# Patient Record
Sex: FEMALE | Race: WHITE | NOT HISPANIC OR LATINO | Employment: FULL TIME | URBAN - METROPOLITAN AREA
[De-identification: names, ages, dates, MRNs, and addresses within clinical notes are randomized per-mention and may not be internally consistent; named-entity substitution may affect disease eponyms.]

---

## 2023-10-24 ENCOUNTER — MEDICAL CORRESPONDENCE (OUTPATIENT)
Dept: HEALTH INFORMATION MANAGEMENT | Facility: CLINIC | Age: 33
End: 2023-10-24
Payer: COMMERCIAL

## 2023-10-27 ENCOUNTER — REFERRAL (OUTPATIENT)
Dept: TRANSPLANT | Facility: CLINIC | Age: 33
End: 2023-10-27
Payer: COMMERCIAL

## 2023-10-27 DIAGNOSIS — K72.90 DECOMPENSATION OF CIRRHOSIS OF LIVER (H): Primary | ICD-10-CM

## 2023-10-27 DIAGNOSIS — K74.60 DECOMPENSATION OF CIRRHOSIS OF LIVER (H): Primary | ICD-10-CM

## 2023-10-27 DIAGNOSIS — K76.6 PORTAL HYPERTENSION (H): ICD-10-CM

## 2023-10-27 DIAGNOSIS — K70.31 ALCOHOLIC CIRRHOSIS OF LIVER WITH ASCITES (H): ICD-10-CM

## 2023-10-27 NOTE — LETTER
October 30, 2023      Lola Milner  [address] [place] [id number]      Dear Lola,    Thank you for your interest in the Transplant Center at [facility]. We look forward to being a part of your care team and assisting you through the transplant process.    As we discussed, your transplant coordinator is Maurilio Gonzalez (Liver).  You may call your coordinator at any time with questions or concerns.  [phone].    Please complete the following.    Fill out and return the enclosed forms  Authorization for Electronic Communication  Authorization to Discuss Protected Health Information  Authorization for Release of Protected Health Information    Sign up for:  [employer], access to your electronic medical record (see enclosed pamphlet)  [email or web address] plugin.[employer], a transplant education website        You can use these tools to learn more about your transplant, communicate with your care team, and track your medical details              Sincerely,      Solid Organ Transplant  [facility]    cc: Referring Physician PCP Care Team

## 2023-10-27 NOTE — TELEPHONE ENCOUNTER
Referral intake call not done due to; patient is currently inpatient at [facility] with plans to transfer to [facility] for further medical management.

## 2023-10-28 NOTE — TELEPHONE ENCOUNTER
"Patient discussed with attending hepatologist Dr. Eloisa Guajardo who devised a plan with OSH where patient is currently ready for discharge.    Patient to come down for outpatient eval on Tuesday 10/31.    Below is note from Dr. Guajardo:  Eloisa Guajardo MD Klint, Thomas Jr., RN  Hey -  Here is the patient for an urgent add on    Hospitalized at [facility], is ready for discharge otherwise. Has had a MELD > 30 for months. Was admitted for EDGAR now that is better. Was cleared for home    4.5 months sober, didn't have insurance for awhile, now does. Now that she has insurance is set up for CD treatment. Has fiance who is ICU rn and supportive family that is taking her away from her previous life (worked as a ). Was told her drinking was hurting her liver in 2019, was \"in denial\". Smokes 1-2 cig/day, no real comorbidities  Eloisa          "

## 2023-10-30 ENCOUNTER — TELEPHONE (OUTPATIENT)
Dept: TRANSPLANT | Facility: CLINIC | Age: 33
End: 2023-10-30
Payer: COMMERCIAL

## 2023-10-30 VITALS — HEIGHT: 63 IN | WEIGHT: 150 LBS | BODY MASS INDEX: 26.58 KG/M2

## 2023-10-30 PROBLEM — K70.30 CIRRHOSIS, ALCOHOLIC (H): Status: ACTIVE | Noted: 2023-08-30

## 2023-10-30 PROBLEM — F10.11 HISTORY OF ALCOHOL ABUSE: Status: RESOLVED | Noted: 2023-06-23 | Resolved: 2023-10-30

## 2023-10-30 PROBLEM — E55.9 VITAMIN D DEFICIENCY: Status: ACTIVE | Noted: 2020-06-23

## 2023-10-30 PROBLEM — G62.1 ALCOHOL-INDUCED POLYNEUROPATHY (H): Status: ACTIVE | Noted: 2023-06-23

## 2023-10-30 PROBLEM — F41.9 ANXIETY: Status: ACTIVE | Noted: 2023-07-31

## 2023-10-30 PROBLEM — F10.11 HISTORY OF ALCOHOL ABUSE: Status: ACTIVE | Noted: 2023-06-23

## 2023-10-30 PROBLEM — K70.31 ALCOHOLIC CIRRHOSIS OF LIVER WITH ASCITES (H): Status: ACTIVE | Noted: 2023-08-30

## 2023-10-30 LAB
ABO/RH(D): NORMAL
ANTIBODY SCREEN: NEGATIVE
SPECIMEN EXPIRATION DATE: NORMAL

## 2023-10-30 NOTE — TELEPHONE ENCOUNTER
"Spoke with mother Thao and step-father John who are bringing her down for eval 10/31.  Gave hotel info  Gave address and times for 10/31  Answered questions and email MTP info to them.     MELD 43 (10/29/23)    Labs & testing done at [facility] - see care everywhere for info    Patient info below:    Head - no  Heart - no  Lungs - ex-smoker  Kidney - from liver   Cancer - no    Lymes as baby (16-18 months old)      Patient discussed with attending hepatologist Dr. Eloisa Guajardo who devised a plan with OSH where patient is currently ready for discharge.     Patient to come down for outpatient eval on Tuesday 10/31.     Below is note from Dr. Guajardo:  Eloisa Guajardo MD Klint, Thomas Jr., RN  Jeffrey -  Here is the patient for an urgent add on    Hospitalized at [facility], is ready for discharge otherwise. Has had a MELD > 30 for months. Was admitted for EDGAR now that is better. Was cleared for home    4.5 months sober, didn't have insurance for awhile, now does. Now that she has insurance is set up for CD treatment. Has aliyah who is ICU rn and supportive family that is taking her away from her previous life (worked as a ). Was told her drinking was hurting her liver in 2019, was \"in denial\". Smokes 1-2 cig/day, no real comorbidities  Eloisa  "

## 2023-10-30 NOTE — TELEPHONE ENCOUNTER
October 30, 2023 9:42 AM - Samantha Blackmon LPN:    I called patient and left a vm for her to return my call

## 2023-10-30 NOTE — TELEPHONE ENCOUNTER
[facility] Call Center    Phone Message    May a detailed message be left on voicemail: no     Reason for Call: Appointment Intake    Outbound call make to patient to confirm tomorrow's PLE (10/31/2023).  Left voicemail with scheduling call back #.  Routing to RNCC, Maurilio Gonzalez and Pranay Ramirez RN to let clinic know that the appointments have NOT been confirmed by patient yet.  Thank you!    Action Taken: Message routed to:  [facility] JANET METZ    Travel Screening: Not Applicable

## 2023-10-30 NOTE — TELEPHONE ENCOUNTER
"  Patient was asked the following questions during liver intake call.      Referring Provider: Arley Arredondo MD  Referring Diagnosis: alcoholic cirrhosis with ascites  PCP:  Susan Guadarrama DO     1)Do you know why you have liver disease: yes I am an alcoholic      If Alcoholic Cirrhosis is present when was your last drink:  June 13, 2023      Have you ever been through treatment for alcohol: [facility] in [place], 1st time today  2) Presence of Ascites: yes Paracentesis: x 4, most recent 1 week ago   3) Presence of Hepatic Encephalopathy: yes  Medications: yes  4) History of GI Bleeding: yes - could not elaborate  5) Oxygen Use: no  6) EGD: yes, Darci  7) Colonoscopy: no  8) MELD Score: 43 (10/29/23) per BIMAL Vanegas  9)  Labs available for review from PCP/GI:  In CE  10)HCC Diagnosis: no                                        11)Insurance information: in chart             Policy Shahid:              Subscriber/Policy/ID Number:              Group Number:      Referral intake process completed.  Patient is aware that after financial approval is received, medical records will be requested.   Patient confirmed for a callback from transplant coordinator - no.Scheduled for eval tomorrow 10/31/23  Tentative evaluation date TBD.     Confirmed coordinator will discuss evaluation process in more detail at the time of their call.   Patient is aware of the need to arrange age appropriate cancer screening, vaccinations, and dental care.  Reminded patient to complete questionnaire, complete medical records release, and review packet prior to evaluation visit .  Assessed patient for special needs (ie--wheelchair, assistance, guardian, and ):     Patient instructed to call [phone] with questions. Uses a walker, visually impaired, \"due to medications?\"     Patient gave verbal consent during intake call to obtain medical records and documents outside of [facility]:   yes      "

## 2023-10-30 NOTE — PROGRESS NOTES
"[facility] Solid Organ Transplant  Outpatient MNT: Liver Transplant Evaluation    Current BMI: 26.6 (HT 63 in,  lbs/68 kg)  BMI is within recommendation of <45 for liver transplant    Fried Frailty-- unable to assess- dizziness impacting ability to complete walk test     FraiLT-- Frail- unable to complete chair stand portion & tandem balance position   [email or web address]    Recommended Interventions to Address Frailty:  If pt does remain/return OP, recommend PT program      Time Spent: 15 minutes  Visit Type: Initial   Referring Physician: Jeffy  Pt accompanied by: her mom & step dad     History of previous txp: none     Nutrition Assessment  H/o etoh cirrhosis. Visit was brief today, as medical team decided to send her to ED after eval visits. MELD >40.   She reports very poor appetite/intake \"for a while\"- at least throughout the summer (when dx presented). For example, she tells me that one day this summer she only ate 1 strawberry. This was the case several times. Yesterday she had a 'better day'- had an apple, protein smoothie, soup, and bread. She typically drinks protein shakes (occasionally) and water.     Her main barrier impacting PO is N/V. She reports vomiting daily and unable to keep much food down. She is unsure if she has been on anti nausea meds- none in her med list currently.     Vitamins, Supplements, Pertinent Meds: MVI  Herbal Medicines/Supplements: none    Edema: + ascites + ALYSSA     Weight hx: pt states it is hard to say what her \"normal wt\" was when healthy, as this fluctuated a lot    Wt Readings from Last 10 Encounters:   10/31/23 68.3 kg (150 lb 8 oz)   10/30/23 68 kg (150 lb)   6/2020- 175 lbs  1/2020- 166 lbs    Physical Activity  Lives w/ fiance  Wheelchair today in the clinic, but has been using walker at home. Has had walker x 2 weeks due to falling, dizziness, etc.   Prior to illness, she reports no prior planned physical activity     Malnutrition  % " Intake: <75% for >/= 3 months (moderate malnutrition)  % Weight Loss: unable to assess w/ pt unsure on weight hx, lack of recent records on file   Subcutaneous Fat Loss: Mild  Muscle Loss: Moderate/Severe   Fluid Accumulation/Edema: Moderate/Severe   Malnutrition Diagnosis: Moderate vs Severe malnutrition in the context of chronic illness     Nutrition Diagnosis  Malnutrition r/t lack of PO intake, increased nutrition needs w/ liver disease AEB moderate vs severe malnutrition, frail status.     Nutrition Intervention  Nutrition education provided:  Discussed sodium intake (low sodium foods and drinks, seasoning food without salt and tips for low sodium diet). Pt reports she is not even craving any food. Would not worry about sodium restriction currently if she is craving a particular food item.     Reviewed adequate protein intake. Encouraged receiving protein from both animal and plant based sources. Pt is hit/miss if she gets a protein drink in. Sounds like most days she does. She would benefit for at least 3-4 protein drinks/day, but this is not feasible currently d/t N/V.     Reviewed if she does get anti-nausea meds to take them as prescribed every x hours to stay ahead of nausea. Pt has not tried this in the past.     Based on medical status/hospitalization outlook, pt would benefit from intensive PT to regain function.     Patient Understanding: Pt verbalized understanding of education provided.  Expected Engagement: Fair  Follow-Up Plans: PRN     Nutrition Goals  No nutrition goals identified at this time    Janna Morton, RD, LD, CCTD

## 2023-10-30 NOTE — TELEPHONE ENCOUNTER
Spoke with Lola briefly, she was in chem dep meeting. She will need a call later today.   She have me step-father, John, number to call as he and her mom are bringing her down for appts tomorrow.  Called and LVM for John at number given [phone]

## 2023-10-31 ENCOUNTER — OFFICE VISIT (OUTPATIENT)
Dept: TRANSPLANT | Facility: CLINIC | Age: 33
End: 2023-10-31
Attending: STUDENT IN AN ORGANIZED HEALTH CARE EDUCATION/TRAINING PROGRAM
Payer: COMMERCIAL

## 2023-10-31 ENCOUNTER — COMMITTEE REVIEW (OUTPATIENT)
Dept: TRANSPLANT | Facility: CLINIC | Age: 33
End: 2023-10-31

## 2023-10-31 ENCOUNTER — TELEPHONE (OUTPATIENT)
Dept: MULTI SPECIALTY CLINIC | Facility: CLINIC | Age: 33
End: 2023-10-31

## 2023-10-31 ENCOUNTER — TELEPHONE (OUTPATIENT)
Dept: TRANSPLANT | Facility: CLINIC | Age: 33
End: 2023-10-31

## 2023-10-31 ENCOUNTER — HOSPITAL ENCOUNTER (INPATIENT)
Facility: CLINIC | Age: 33
LOS: 15 days | Discharge: HOME | End: 2023-11-15
Attending: EMERGENCY MEDICINE | Admitting: TRANSPLANT SURGERY
Payer: COMMERCIAL

## 2023-10-31 ENCOUNTER — LAB (OUTPATIENT)
Dept: LAB | Facility: CLINIC | Age: 33
End: 2023-10-31
Attending: STUDENT IN AN ORGANIZED HEALTH CARE EDUCATION/TRAINING PROGRAM
Payer: COMMERCIAL

## 2023-10-31 ENCOUNTER — APPOINTMENT (OUTPATIENT)
Dept: GENERAL RADIOLOGY | Facility: CLINIC | Age: 33
End: 2023-10-31
Attending: EMERGENCY MEDICINE
Payer: COMMERCIAL

## 2023-10-31 VITALS
DIASTOLIC BLOOD PRESSURE: 54 MMHG | HEIGHT: 63 IN | BODY MASS INDEX: 26.67 KG/M2 | HEART RATE: 75 BPM | SYSTOLIC BLOOD PRESSURE: 88 MMHG | WEIGHT: 150.5 LBS

## 2023-10-31 DIAGNOSIS — N17.9 AKI (ACUTE KIDNEY INJURY) (H): ICD-10-CM

## 2023-10-31 DIAGNOSIS — D84.9 IMMUNOSUPPRESSED STATUS (H): ICD-10-CM

## 2023-10-31 DIAGNOSIS — K74.60 DECOMPENSATION OF CIRRHOSIS OF LIVER (H): ICD-10-CM

## 2023-10-31 DIAGNOSIS — K76.6 PORTAL HYPERTENSION (H): ICD-10-CM

## 2023-10-31 DIAGNOSIS — K70.31 ALCOHOLIC CIRRHOSIS OF LIVER WITH ASCITES (H): ICD-10-CM

## 2023-10-31 DIAGNOSIS — Z94.4 LIVER TRANSPLANTED (H): Primary | ICD-10-CM

## 2023-10-31 DIAGNOSIS — K72.10 END STAGE LIVER DISEASE (H): ICD-10-CM

## 2023-10-31 DIAGNOSIS — E83.42 HYPOMAGNESEMIA: ICD-10-CM

## 2023-10-31 DIAGNOSIS — K72.90 DECOMPENSATION OF CIRRHOSIS OF LIVER (H): ICD-10-CM

## 2023-10-31 DIAGNOSIS — E55.9 VITAMIN D DEFICIENCY: ICD-10-CM

## 2023-10-31 LAB
ABO/RH(D): NORMAL
ALBUMIN SERPL BCG-MCNC: 4 G/DL (ref 3.5–5.2)
ALBUMIN UR-MCNC: 30 MG/DL
ALP SERPL-CCNC: 137 U/L (ref 35–104)
ALT SERPL W P-5'-P-CCNC: 19 U/L (ref 0–50)
AMMONIA PLAS-SCNC: 77 UMOL/L (ref 11–51)
ANION GAP SERPL CALCULATED.3IONS-SCNC: 19 MMOL/L (ref 7–15)
ANTIBODY TITER IGM SCREEN: NEGATIVE
APPEARANCE UR: ABNORMAL
AST SERPL W P-5'-P-CCNC: 62 U/L (ref 0–45)
ATRIAL RATE - MUSE: 68 BPM
B IGG TITR SERPL: 8 {TITER}
B IGM TITR SERPL: 8 {TITER}
BACTERIA #/AREA URNS HPF: ABNORMAL /HPF
BILIRUB DIRECT SERPL-MCNC: >40 MG/DL (ref 0–0.3)
BILIRUB SERPL-MCNC: 44.4 MG/DL
BILIRUB UR QL STRIP: ABNORMAL
BUN SERPL-MCNC: 28.2 MG/DL (ref 6–20)
CALCIUM SERPL-MCNC: 9.6 MG/DL (ref 8.6–10)
CHLORIDE SERPL-SCNC: 92 MMOL/L (ref 98–107)
CMV IGG SERPL IA-ACNC: <0.2 U/ML
CMV IGG SERPL IA-ACNC: NORMAL
COLOR UR AUTO: ABNORMAL
CREAT SERPL-MCNC: 2.76 MG/DL (ref 0.51–0.95)
DEPRECATED HCO3 PLAS-SCNC: 20 MMOL/L (ref 22–29)
DIASTOLIC BLOOD PRESSURE - MUSE: NORMAL MMHG
EBV VCA IGG SER IA-ACNC: 69.3 U/ML
EBV VCA IGG SER IA-ACNC: POSITIVE
EGFRCR SERPLBLD CKD-EPI 2021: 23 ML/MIN/1.73M2
ERYTHROCYTE [DISTWIDTH] IN BLOOD BY AUTOMATED COUNT: 22.1 % (ref 10–15)
GLUCOSE SERPL-MCNC: 67 MG/DL (ref 70–99)
GLUCOSE UR STRIP-MCNC: NEGATIVE MG/DL
HCG SERPL QL: NEGATIVE
HCT VFR BLD AUTO: 22.4 % (ref 35–47)
HGB BLD-MCNC: 8 G/DL (ref 11.7–15.7)
HGB UR QL STRIP: ABNORMAL
HIV 1+2 AB+HIV1 P24 AG SERPL QL IA: NONREACTIVE
HYALINE CASTS: 39 /LPF
INR PPP: 3.81 (ref 0.85–1.15)
INTERPRETATION ECG - MUSE: NORMAL
KETONES UR STRIP-MCNC: NEGATIVE MG/DL
LEUKOCYTE ESTERASE UR QL STRIP: ABNORMAL
MCH RBC QN AUTO: 33.2 PG (ref 26.5–33)
MCHC RBC AUTO-ENTMCNC: 35.7 G/DL (ref 31.5–36.5)
MCV RBC AUTO: 93 FL (ref 78–100)
MUCOUS THREADS #/AREA URNS LPF: PRESENT /LPF
NITRATE UR QL: NEGATIVE
P AXIS - MUSE: 60 DEGREES
PH UR STRIP: 5.5 [PH] (ref 5–7)
PLATELET # BLD AUTO: 115 10E3/UL (ref 150–450)
POTASSIUM SERPL-SCNC: 3.7 MMOL/L (ref 3.4–5.3)
PR INTERVAL - MUSE: 158 MS
PROT SERPL-MCNC: ABNORMAL G/DL
QRS DURATION - MUSE: 106 MS
QT - MUSE: 440 MS
QTC - MUSE: 467 MS
R AXIS - MUSE: 26 DEGREES
RBC # BLD AUTO: 2.41 10E6/UL (ref 3.8–5.2)
RBC URINE: 4 /HPF
SODIUM SERPL-SCNC: 131 MMOL/L (ref 135–145)
SP GR UR STRIP: 1.02 (ref 1–1.03)
SPECIMEN EXPIRATION DATE: NORMAL
SPECIMEN EXPIRATION DATE: NORMAL
SQUAMOUS EPITHELIAL: 31 /HPF
SYSTOLIC BLOOD PRESSURE - MUSE: NORMAL MMHG
T AXIS - MUSE: 33 DEGREES
T PALLIDUM AB SER QL: NONREACTIVE
UROBILINOGEN UR STRIP-MCNC: NORMAL MG/DL
VENTRICULAR RATE- MUSE: 68 BPM
VIT D+METAB SERPL-MCNC: <6 NG/ML (ref 20–50)
WBC # BLD AUTO: 18.4 10E3/UL (ref 4–11)
WBC URINE: 25 /HPF
YEAST #/AREA URNS HPF: ABNORMAL /HPF

## 2023-10-31 PROCEDURE — 99223 1ST HOSP IP/OBS HIGH 75: CPT | Performed by: NURSE PRACTITIONER

## 2023-10-31 PROCEDURE — 250N000013 HC RX MED GY IP 250 OP 250 PS 637: Performed by: PHYSICIAN ASSISTANT

## 2023-10-31 PROCEDURE — 84450 TRANSFERASE (AST) (SGOT): CPT | Performed by: PATHOLOGY

## 2023-10-31 PROCEDURE — 80048 BASIC METABOLIC PNL TOTAL CA: CPT | Performed by: PATHOLOGY

## 2023-10-31 PROCEDURE — 82140 ASSAY OF AMMONIA: CPT | Performed by: EMERGENCY MEDICINE

## 2023-10-31 PROCEDURE — 86665 EPSTEIN-BARR CAPSID VCA: CPT | Performed by: STUDENT IN AN ORGANIZED HEALTH CARE EDUCATION/TRAINING PROGRAM

## 2023-10-31 PROCEDURE — 99291 CRITICAL CARE FIRST HOUR: CPT | Mod: 25 | Performed by: EMERGENCY MEDICINE

## 2023-10-31 PROCEDURE — 99418 PROLNG IP/OBS E/M EA 15 MIN: CPT | Performed by: PHYSICIAN ASSISTANT

## 2023-10-31 PROCEDURE — 999N000248 HC STATISTIC IV INSERT WITH US BY RN

## 2023-10-31 PROCEDURE — 71045 X-RAY EXAM CHEST 1 VIEW: CPT

## 2023-10-31 PROCEDURE — 87086 URINE CULTURE/COLONY COUNT: CPT | Performed by: PHYSICIAN ASSISTANT

## 2023-10-31 PROCEDURE — 82247 BILIRUBIN TOTAL: CPT | Performed by: PATHOLOGY

## 2023-10-31 PROCEDURE — 120N000011 HC R&B TRANSPLANT UMMC

## 2023-10-31 PROCEDURE — 84703 CHORIONIC GONADOTROPIN ASSAY: CPT | Performed by: PATHOLOGY

## 2023-10-31 PROCEDURE — 86923 COMPATIBILITY TEST ELECTRIC: CPT | Performed by: INTERNAL MEDICINE

## 2023-10-31 PROCEDURE — 93010 ELECTROCARDIOGRAM REPORT: CPT | Performed by: EMERGENCY MEDICINE

## 2023-10-31 PROCEDURE — 82306 VITAMIN D 25 HYDROXY: CPT | Performed by: STUDENT IN AN ORGANIZED HEALTH CARE EDUCATION/TRAINING PROGRAM

## 2023-10-31 PROCEDURE — 99417 PROLNG OP E/M EACH 15 MIN: CPT | Performed by: STUDENT IN AN ORGANIZED HEALTH CARE EDUCATION/TRAINING PROGRAM

## 2023-10-31 PROCEDURE — 36415 COLL VENOUS BLD VENIPUNCTURE: CPT | Performed by: EMERGENCY MEDICINE

## 2023-10-31 PROCEDURE — 85027 COMPLETE CBC AUTOMATED: CPT | Performed by: PATHOLOGY

## 2023-10-31 PROCEDURE — 86886 COOMBS TEST INDIRECT TITER: CPT | Performed by: STUDENT IN AN ORGANIZED HEALTH CARE EDUCATION/TRAINING PROGRAM

## 2023-10-31 PROCEDURE — 86644 CMV ANTIBODY: CPT | Performed by: STUDENT IN AN ORGANIZED HEALTH CARE EDUCATION/TRAINING PROGRAM

## 2023-10-31 PROCEDURE — 36415 COLL VENOUS BLD VENIPUNCTURE: CPT | Performed by: PATHOLOGY

## 2023-10-31 PROCEDURE — 81001 URINALYSIS AUTO W/SCOPE: CPT | Performed by: PHYSICIAN ASSISTANT

## 2023-10-31 PROCEDURE — 93005 ELECTROCARDIOGRAM TRACING: CPT | Performed by: EMERGENCY MEDICINE

## 2023-10-31 PROCEDURE — 71045 X-RAY EXAM CHEST 1 VIEW: CPT | Mod: 26 | Performed by: RADIOLOGY

## 2023-10-31 PROCEDURE — 99223 1ST HOSP IP/OBS HIGH 75: CPT | Mod: FS | Performed by: PHYSICIAN ASSISTANT

## 2023-10-31 PROCEDURE — G0463 HOSPITAL OUTPT CLINIC VISIT: HCPCS | Performed by: STUDENT IN AN ORGANIZED HEALTH CARE EDUCATION/TRAINING PROGRAM

## 2023-10-31 PROCEDURE — 86481 TB AG RESPONSE T-CELL SUSP: CPT | Performed by: STUDENT IN AN ORGANIZED HEALTH CARE EDUCATION/TRAINING PROGRAM

## 2023-10-31 PROCEDURE — 250N000011 HC RX IP 250 OP 636: Mod: JZ | Performed by: EMERGENCY MEDICINE

## 2023-10-31 PROCEDURE — 87040 BLOOD CULTURE FOR BACTERIA: CPT | Mod: XS | Performed by: PHYSICIAN ASSISTANT

## 2023-10-31 PROCEDURE — 82248 BILIRUBIN DIRECT: CPT | Performed by: PATHOLOGY

## 2023-10-31 PROCEDURE — 99207 PR APP CREDIT; MD BILLING SHARED VISIT: CPT | Performed by: STUDENT IN AN ORGANIZED HEALTH CARE EDUCATION/TRAINING PROGRAM

## 2023-10-31 PROCEDURE — 36415 COLL VENOUS BLD VENIPUNCTURE: CPT | Performed by: PHYSICIAN ASSISTANT

## 2023-10-31 PROCEDURE — 84075 ASSAY ALKALINE PHOSPHATASE: CPT | Performed by: PATHOLOGY

## 2023-10-31 PROCEDURE — 86780 TREPONEMA PALLIDUM: CPT | Performed by: STUDENT IN AN ORGANIZED HEALTH CARE EDUCATION/TRAINING PROGRAM

## 2023-10-31 PROCEDURE — P9047 ALBUMIN (HUMAN), 25%, 50ML: HCPCS | Performed by: PHYSICIAN ASSISTANT

## 2023-10-31 PROCEDURE — P9045 ALBUMIN (HUMAN), 5%, 250 ML: HCPCS | Mod: JZ | Performed by: EMERGENCY MEDICINE

## 2023-10-31 PROCEDURE — 82040 ASSAY OF SERUM ALBUMIN: CPT | Performed by: PATHOLOGY

## 2023-10-31 PROCEDURE — 250N000013 HC RX MED GY IP 250 OP 250 PS 637: Performed by: EMERGENCY MEDICINE

## 2023-10-31 PROCEDURE — 84460 ALANINE AMINO (ALT) (SGPT): CPT | Performed by: PATHOLOGY

## 2023-10-31 PROCEDURE — 99205 OFFICE O/P NEW HI 60 MIN: CPT | Performed by: STUDENT IN AN ORGANIZED HEALTH CARE EDUCATION/TRAINING PROGRAM

## 2023-10-31 PROCEDURE — 99000 SPECIMEN HANDLING OFFICE-LAB: CPT | Performed by: PATHOLOGY

## 2023-10-31 PROCEDURE — 86900 BLOOD TYPING SEROLOGIC ABO: CPT | Performed by: STUDENT IN AN ORGANIZED HEALTH CARE EDUCATION/TRAINING PROGRAM

## 2023-10-31 PROCEDURE — 250N000011 HC RX IP 250 OP 636: Performed by: PHYSICIAN ASSISTANT

## 2023-10-31 PROCEDURE — G0480 DRUG TEST DEF 1-7 CLASSES: HCPCS | Mod: 90 | Performed by: PATHOLOGY

## 2023-10-31 PROCEDURE — 85610 PROTHROMBIN TIME: CPT | Performed by: PATHOLOGY

## 2023-10-31 PROCEDURE — 99285 EMERGENCY DEPT VISIT HI MDM: CPT | Mod: 25 | Performed by: EMERGENCY MEDICINE

## 2023-10-31 RX ORDER — SODIUM BICARBONATE 650 MG/1
1300 TABLET ORAL 2 TIMES DAILY
Status: DISCONTINUED | OUTPATIENT
Start: 2023-10-31 | End: 2023-11-06

## 2023-10-31 RX ORDER — LACTULOSE 10 G/15ML
100 SOLUTION ORAL
Status: DISCONTINUED | OUTPATIENT
Start: 2023-10-31 | End: 2023-11-07

## 2023-10-31 RX ORDER — PANTOPRAZOLE SODIUM 40 MG/1
40 TABLET, DELAYED RELEASE ORAL
Status: DISCONTINUED | OUTPATIENT
Start: 2023-11-01 | End: 2023-11-05

## 2023-10-31 RX ORDER — MIDODRINE HYDROCHLORIDE 5 MG/1
15 TABLET ORAL 3 TIMES DAILY
Status: DISCONTINUED | OUTPATIENT
Start: 2023-10-31 | End: 2023-11-07

## 2023-10-31 RX ORDER — ALBUMIN (HUMAN) 12.5 G/50ML
100 SOLUTION INTRAVENOUS ONCE
Status: COMPLETED | OUTPATIENT
Start: 2023-10-31 | End: 2023-10-31

## 2023-10-31 RX ORDER — LACTULOSE 10 G/15ML
20 SOLUTION ORAL
Status: DISCONTINUED | OUTPATIENT
Start: 2023-10-31 | End: 2023-11-07

## 2023-10-31 RX ORDER — MINERAL OIL/HYDROPHIL PETROLAT
OINTMENT (GRAM) TOPICAL
Status: DISCONTINUED | OUTPATIENT
Start: 2023-10-31 | End: 2023-11-15 | Stop reason: HOSPADM

## 2023-10-31 RX ORDER — MULTIPLE VITAMINS W/ MINERALS TAB 9MG-400MCG
1 TAB ORAL DAILY
Status: DISCONTINUED | OUTPATIENT
Start: 2023-11-01 | End: 2023-11-02

## 2023-10-31 RX ORDER — ALBUMIN (HUMAN) 12.5 G/50ML
100 SOLUTION INTRAVENOUS DAILY
Status: DISCONTINUED | OUTPATIENT
Start: 2023-10-31 | End: 2023-10-31

## 2023-10-31 RX ORDER — MULTIVIT-MIN/IRON FUM/FOLIC AC 19 MG-400
1 TABLET ORAL DAILY
Status: ON HOLD | COMMUNITY
End: 2023-11-15

## 2023-10-31 RX ORDER — ALBUMIN (HUMAN) 12.5 G/50ML
75 SOLUTION INTRAVENOUS DAILY
Status: COMPLETED | OUTPATIENT
Start: 2023-11-01 | End: 2023-11-02

## 2023-10-31 RX ORDER — GABAPENTIN 100 MG/1
100 CAPSULE ORAL 3 TIMES DAILY
Status: DISCONTINUED | OUTPATIENT
Start: 2023-10-31 | End: 2023-10-31

## 2023-10-31 RX ORDER — LANOLIN ALCOHOL/MO/W.PET/CERES
3 CREAM (GRAM) TOPICAL
COMMUNITY
Start: 2023-10-29 | End: 2023-11-24

## 2023-10-31 RX ORDER — LACTULOSE 10 G/15ML
30 SOLUTION ORAL 3 TIMES DAILY
Status: DISCONTINUED | OUTPATIENT
Start: 2023-10-31 | End: 2023-11-02

## 2023-10-31 RX ORDER — MIDODRINE HYDROCHLORIDE 5 MG/1
15 TABLET ORAL 3 TIMES DAILY
Status: ON HOLD | COMMUNITY
Start: 2023-10-30 | End: 2023-11-15

## 2023-10-31 RX ORDER — LACTULOSE 10 G/15ML
30 SOLUTION ORAL 3 TIMES DAILY
Status: ON HOLD | COMMUNITY
Start: 2023-10-30 | End: 2023-11-15

## 2023-10-31 RX ORDER — GABAPENTIN 100 MG/1
100 CAPSULE ORAL 3 TIMES DAILY
Status: DISCONTINUED | OUTPATIENT
Start: 2023-10-31 | End: 2023-11-07

## 2023-10-31 RX ORDER — GABAPENTIN 100 MG/1
100 CAPSULE ORAL 3 TIMES DAILY
Status: ON HOLD | COMMUNITY
Start: 2023-10-30 | End: 2023-11-15

## 2023-10-31 RX ORDER — CEFTRIAXONE 2 G/1
2 INJECTION, POWDER, FOR SOLUTION INTRAMUSCULAR; INTRAVENOUS EVERY 24 HOURS
Status: DISCONTINUED | OUTPATIENT
Start: 2023-10-31 | End: 2023-11-02

## 2023-10-31 RX ORDER — SODIUM BICARBONATE 650 MG/1
1300 TABLET ORAL 2 TIMES DAILY
Status: ON HOLD | COMMUNITY
Start: 2023-10-30 | End: 2023-11-15

## 2023-10-31 RX ADMIN — LACTULOSE 30 ML: 20 SOLUTION ORAL at 19:58

## 2023-10-31 RX ADMIN — CEFTRIAXONE SODIUM 2 G: 2 INJECTION, POWDER, FOR SOLUTION INTRAMUSCULAR; INTRAVENOUS at 19:45

## 2023-10-31 RX ADMIN — GABAPENTIN 100 MG: 100 CAPSULE ORAL at 17:10

## 2023-10-31 RX ADMIN — GABAPENTIN 100 MG: 100 CAPSULE ORAL at 19:57

## 2023-10-31 RX ADMIN — RIFAXIMIN 550 MG: 550 TABLET ORAL at 19:57

## 2023-10-31 RX ADMIN — SODIUM BICARBONATE 650 MG TABLET 1300 MG: at 19:58

## 2023-10-31 RX ADMIN — ALBUMIN HUMAN 100 G: 0.25 SOLUTION INTRAVENOUS at 19:12

## 2023-10-31 RX ADMIN — MIDODRINE HYDROCHLORIDE 15 MG: 5 TABLET ORAL at 19:58

## 2023-10-31 RX ADMIN — ALBUMIN HUMAN 25 G: 0.05 INJECTION, SOLUTION INTRAVENOUS at 16:33

## 2023-10-31 ASSESSMENT — ACTIVITIES OF DAILY LIVING (ADL)
ADLS_ACUITY_SCORE: 35

## 2023-10-31 ASSESSMENT — PAIN SCALES - GENERAL: PAINLEVEL: MILD PAIN (3)

## 2023-10-31 NOTE — CONSULTS
Hepatology Consultation    Lola Milner   MRN# [id number]     Age: 33 year old YOB: 1990       Referring provider: Trina Ramos  Attending Hepatologist: Dr. Eloisa Guajardo   Consult requested for: decompensated liver disease       Assessment and Recommendation:   Assessment:   Ms. Milner is a 33-year-old with a history of alcohol associated cirrhosis and alcohol use disorder, last use 6/13/23 complicated by EV s/p banding, EDGAR with last wnl creatinine 0.64 (7/28/23), ascites with approximately weekly courtney, HE, hyponatremia (8/2023), anxiety who was seen for consultation for liver transplant in the outpatient hepatology clinic who was admitted with evidence of worsening creatinine to 2.76.       Recommendations:   # EDGAR  - has received albumin challenge.   - continue infectious work up, BC, UC pending. Para pending.   - Limit ascites to <3 liters due to EDGAR  - slight improvement in creatinine but this may be false due to likely dilutional component. With her serum albumin of 4.4, would hold off on further albumin challenge. Now has hemoglobin of 5.1, no evidence of GIB. She has had prior normal haptoglobin and LDH (7/2023)    # Alcohol associated cirrhosis  # Transplant candidacy  - last use 6/13/23, peth pending 10/31  - US abd 10/26 without thrombus or HCC  - seen in OP clinic for transplant eval on 10/31, awaiting completion of testing prior to final determination of listing. Needs Echo (pending), gyn- pap, PT (saw today). Has been seen by tx surgery, tx LISW.   - MELD 40, ABO A    # Ascites  - para for diagnostic sample. Currently empirically treated for infection.   - has needed weekly courtney. Now with EDGAR, not a candidate for diuretics.     # Esophageal varices  - large EV 9/12/23, no evidence of bleeding.     Plan of care discussed with Dr. Elosia Guajardo. The above note reflects our joint plan.     Thank you for the opportunity to be involved in Lola Milner care. Please call with any  questions or concerns.     ANDREEA Horton, CNP  Inpatient Hepatology [facility]               History of Present Illness:   Lola Milner is a 33 year old female with a history of alcohol associated cirrhosis and alcohol use disorder, last use 6/13/23. Her liver disease has been complicated by EV s/p banding, EDGAR with last wnl creatinine 0.64 (7/28/23), ascites with approximately weekly courtney (last one 10/20) , HE, hyponatremia (8/2023), anxiety who was seen for consultation for liver transplant in the outpatient hepatology clinic who was admitted with evidence of worsening creatinine to 2.76.     Ms. Milner denies any change in mentation, fevers, nausea or vomiting. She does report a decrease in her oral intake of protein, able to take in fruit without difficulty. She has been mobile around her house but continues to feel fatigued. She take lactulose TID having 2-3 BM's that are loose daily. She denies melena or hematochezia.     She does have an increase in lower extremity edema, improved today with placement of compression stockings. She does note having an increase in abdominal distention. The increase in abdominal distention, she does note some difficulty with drinking liquids. She did not make urine yesterday, now an increase after receiving albumin challenge last night.              Past Medical History:     Past Medical History:   Diagnosis Date    Alcoholic cirrhosis of liver with ascites (H) 08/30/2023    History of alcohol abuse     Formatting of this note might be different from the original. In remission as of late June 2023    Medical History  Medical History Date Comments   Anxiety 03/2020     Substance abuse (HCC)                Past Surgical History:     Surgical History  Surgery Date Site/Laterality Comments   TONSILLECTOMY & ADENOIDECTOMY < AGE 14                  Social History:     Social History     Tobacco Use    Smoking status: Former     Types: Cigarettes     Quit date: 06/2023     Years since  quittin.4    Smokeless tobacco: Never   Substance Use Topics    Alcohol use: Not Currently     Comment: last ETOH use 2023             Family History:     Medical History Relation Comments   Hyperlipidemia Father     Hypertension Father     Thyroid Half Sister thyroid problems   Negative Half brother     Diabetes Maternal Aunt     Arthritis Maternal Grandmother     Thyroid Mother thyroid problems   Other Paternal Aunt 1 fibromyalgia   Other Paternal Aunt 2 fibromyalgia                  Immunizations:     Immunization History   Administered Date(s) Administered    COVID-19 MONOVALENT 12+ (Pfizer) 2021, 2022    DTAP (<7y) 1997    HIB (PRP-T) 1992    HepB, Unspecified 2004, 2004, 2005    Hepatitis B, Peds 2004, 2004, 2005    Hib, Unspecified 1992    Historical DTP/aP 1992, 1992, 1992, 1993    MMR 1993, 1997    OPV, trivalent, live 1992, 1992, 1993, 1997    TD,PF 7+ (Tenivac) 2004    Td (Adult), Adsorbed 2004            Allergies:   No Known Allergies          Medications:     No current facility-administered medications for this encounter.     Current Outpatient Medications   Medication    gabapentin (NEURONTIN) 100 MG capsule    lactulose (CHRONULAC) 10 GM/15ML solution    melatonin 3 MG tablet    midodrine (PROAMATINE) 5 MG tablet    Multiple Vitamins-Minerals (THERA-TABS M) TABS    omeprazole (PRILOSEC) 20 MG DR capsule    sodium bicarbonate 650 MG tablet            Review of Systems:    ROS: 10 point ROS neg other than the symptoms noted above in the HPI.          Physical Exam:   Blood pressure (!) 83/43, pulse 72, temperature 97.5  F (36.4  C), temperature source Oral, resp. rate 15, SpO2 99%. There is no height or weight on file to calculate BMI.    General: In no acute distress, mild facial muscle wasting  Neuro: AOx3, No asterixis, tremulous  HEENT: PERRL  severe  "scleral icterus, Nooral lesions  Lymph:  Nocervical lymphadenoapthy  CV: S1/S2with gr 2/6 murmurs, Skin warm and dry  Lungs: clear to auscultation, diminished bases Respirations even and nonlabored on room air  Abd: Moderately distended, soft. Mildly tender  Extrem:  +1 lower  peripehral edema  Skin:  moderate jaundice  Psych: pleasant         Data:     Lab Results   Component Value Date    WBC 18.4 10/31/2023     Lab Results   Component Value Date    RBC 2.41 10/31/2023     Lab Results   Component Value Date    HGB 8.0 10/31/2023     Lab Results   Component Value Date    HCT 22.4 10/31/2023     No components found for: \"MCT\"  Lab Results   Component Value Date    MCV 93 10/31/2023     Lab Results   Component Value Date    MCH 33.2 10/31/2023     Lab Results   Component Value Date    MCHC 35.7 10/31/2023     Lab Results   Component Value Date    RDW 22.1 10/31/2023     Lab Results   Component Value Date     10/31/2023       Last Basic Metabolic Panel:  Lab Results   Component Value Date     10/31/2023      Lab Results   Component Value Date    POTASSIUM 3.7 10/31/2023     Lab Results   Component Value Date    CHLORIDE 92 10/31/2023     Lab Results   Component Value Date    GEE 9.6 10/31/2023     Lab Results   Component Value Date    CO2 20 10/31/2023     Lab Results   Component Value Date    BUN 28.2 10/31/2023     Lab Results   Component Value Date    CR 2.76 10/31/2023     Lab Results   Component Value Date    GLC 67 10/31/2023       Liver Function Studies -   Recent Labs   Lab Test 10/31/23  0814   ALBUMIN 4.0   BILITOTAL 44.4*   ALKPHOS 137*   AST 62*   ALT 19       Lab Results   Component Value Date    INR 3.81 10/31/2023       MELD 3.0: 40 at 10/31/2023  8:14 AM  MELD-Na: 45 at 10/31/2023  8:14 AM  Calculated from:  Serum Creatinine: 2.76 mg/dL at 10/31/2023  8:14 AM  Serum Sodium: 131 mmol/L at 10/31/2023  8:14 AM  Total Bilirubin: 44.4 mg/dL at 10/31/2023  8:14 AM  Serum Albumin: 4.0 g/dL (Using " max of 3.5 g/dL) at 10/31/2023  8:14 AM  INR(ratio): 3.81 at 10/31/2023  8:14 AM  Age at listing (hypothetical): 33 years  Sex: Female at 10/31/2023  8:14 AM             Previous Endoscopy:     EGD 9/12/23  Impression:       - Large (> 5 mm) esophageal varices with no stigmata of recent bleeding.        Completely eradicated. Banded.        - Portal hypertensive gastropathy. No gastric varices appreciated.        - Erythematous mucosa in the antrum. Biopsied.        - Normal examined duodenum.       IMAGING:  US abd w doppler 10/24/23  IMPRESSION:   1. Mildly enlarged liver with mild nodularity along the liver surface consistent with known history of liver cirrhosis. No discrete liver lesion identified on ultrasound.   2. Portal veins appear patent and hepatopetal. No portal vein thrombosis identified.   3. Splenomegaly.   4. Ascites adjacent to the liver and spleen.     MRI w/wo 9/29/23  IMPRESSION:   1. No bile duct dilatation or choledocholithiasis.   2.  Thick walled but contracted gallbladder without visible stone.   3.  Cirrhotic liver morphology, splenomegaly, varices, small volume ascites, small pleural effusions and anasarca.     Echo 9/26/23    Left Ventricle: Normal left ventricular systolic function. The EF is   visually estimated to be 60%.     Right Ventricle: Systolic function is normal. The RVSP measures 39   mmHg.    Left Atrium: The left atrium is normal in size.     Interatrial Septum No evidence of an atrial shunt.     Right Atrium: The right atrium is normal in size.     Tricuspid Valve: There is mild regurgitation.     Aorta: The sinus of Valsalva is normal. The ascending aorta is normal.     IVC/SVC: Normal IVC size with normal respirophasic changes.     Pericardium: There is a small pericardial effusion. Ascites noted.

## 2023-10-31 NOTE — PROGRESS NOTES
"\"Much or all of the text in this note was generated through the use of Dragon Dictate voice to text software. Errors in spelling or words which appear to be out of context are unintentional, may be present due having escaped editing\"    Assessment and Plan:  1. liver transplant evaluation - patient is a good candidate overall. Benefits and surgical risks of a liver transplantation were discussed.  2.  End stage liver disease due to Laennec's    Surgical evaluation:  1. Portal Vein:Patent  2. Hepatic Artery: Open  3. TIPS: absent  4. Previous Abdominal Surgery: Yes  5. Hepatocellular Carcinoma: None  6. Ascites: Present - large amount  7. Costal Angle: narrow  8. Portopulmonary Hypertension: absent  9. Hepatopulmonary Syndrome: absent  10. Cardiac Evaluation: needs stress echocardiogram  11. Nutritional Status: Poor  12. Diabetes: no  13.Hypertension no  14. Smoker:no  14: Fraility index:yes  15. Meets guidelines to receive Living Donor  No  16. Potential Living donors No  Computed MELD 3.0 unavailable. One or more values for this score either were not found within the given timeframe or did not fit some other criterion.  Computed MELD-Na unavailable. One or more values for this score either were not found within the given timeframe or did not fit some other criterion.      Recommendations:     Patient has significant liver disease suspected alcoholic hepatitis with alcoholic cirrhosis.  The MELD score is greater than 40.  Would recommend admission and inpatient evaluation and finish evaluation for transplant list if there are no further contraindications      Patients overall evaluation will be discussed at the Liver Transplant selection committee meeting with a final recommendation on the patients suitability for transplant to be made at that time.    Consult Full  Details:  Lola Milner was seen in consultation at the request of Dr. Isaac Obando for evaluation as a potential liver transplant recipient.    Reason " for Visit:  Lola Milner is a 33 year old year old female with Laennec's, who presents for liver transplant evaluation.    HPI:  Presenting complaint: Jaundice    Patient has been diagnosed to have Laënnec cirrhosis.  She is now sober 440 days.  She has decompensated in the form of ascites, and hepatorenal syndrome.  No variceal bleeding no spontaneous bacterial peritonitis.  She does have easy fatigability.  She is very severely jaundiced.  She is frail and uses a walker.  She was recently admitted in [place] for hepatorenal syndrome and just discharged from the hospital.    No known heart disease or lung disease.  Her mother and stepfather were present and they were very supportive.    Past Medical History:   Diagnosis Date    Alcoholic cirrhosis of liver with ascites (H) 08/30/2023    History of alcohol abuse     Formatting of this note might be different from the original. In remission as of late June 2023     No past surgical history on file.  No past surgical history on file.  No family history on file.  No Known Allergies  Prior to Admission medications    Medication Sig Start Date End Date Taking? Authorizing Provider   gabapentin (NEURONTIN) 100 MG capsule Take 100 mg by mouth 3 times daily 10/30/23  Yes Reported, Patient   lactulose (CHRONULAC) 10 GM/15ML solution Take 30 mLs by mouth daily as needed 10/30/23  Yes Reported, Patient   melatonin 3 MG tablet Take 3 mg by mouth nightly as needed 10/29/23  Yes Reported, Patient   midodrine (PROAMATINE) 5 MG tablet Take 15 mg by mouth 3 times daily 10/30/23  Yes Reported, Patient   Multiple Vitamins-Minerals (THERA-TABS M) TABS Take 1 tablet by mouth daily   Yes Reported, Patient   omeprazole (PRILOSEC) 20 MG DR capsule Take 20 mg by mouth daily 9/14/23  Yes Reported, Patient   sodium bicarbonate 650 MG tablet Take 1,300 mg by mouth 2 times daily 10/30/23 11/9/23 Yes Reported, Patient       Previous Transplant Hx: No    Cardiovascular Hx:       h/o Cardiac  "Issues: No       Exercise Tolerance: no chest pain or shortness of breath with exertion.    Potential Donor(s): No    ROS:    REVIEW OF SYSTEMS (check box if normal)  [x]                GENERAL  [x]                  PULMONARY [x]                 GENITOURINARY  [x]                 CNS                 [x]                  CARDIAC  [x]                  ENDOCRINE  [x]                 EARS,NOSE,THROAT [x]                  GASTROINTESTINAL [x]                  NEUROLOGIC    [x]                 MUSCLOSKELTAL  [x]                   HEMATOLOGY    Examination:     Vitals:  BP (!) 88/54 (BP Location: Right arm, Patient Position: Sitting, Cuff Size: Adult Regular)   Pulse 75   Ht 1.6 m (5' 3\")   Wt 68.3 kg (150 lb 8 oz)   BMI 26.66 kg/m      GENERAL APPEARANCE: alert and no distress; deeply icteric  EYES: PERRL  HENT: mouth without ulcers or lesions  NECK: supple, no adenopathy  RESP: lungs clear to auscultation - no rales, rhonchi or wheezes  CV: regular rhythm, normal rate, no rub   ABDOMEN:  soft, nontender, large amount of ascites present  MS: extremities normal- no gross deformities noted, no evidence of inflammation in joints, no muscle tenderness  SKIN: no rash  NEURO: Normal strength and tone, sensory exam grossly normal, mentation intact and speech normal  PSYCH: mentation appears normal. and affect normal/bright      Results:   Recent Results (from the past 168 hour(s))   Basic metabolic panel    Collection Time: 10/31/23  8:14 AM   Result Value Ref Range    Sodium 131 (L) 135 - 145 mmol/L    Potassium 3.7 3.4 - 5.3 mmol/L    Chloride 92 (L) 98 - 107 mmol/L    Carbon Dioxide (CO2) 20 (L) 22 - 29 mmol/L    Anion Gap 19 (H) 7 - 15 mmol/L    Urea Nitrogen 28.2 (H) 6.0 - 20.0 mg/dL    Creatinine 2.76 (H) 0.51 - 0.95 mg/dL    GFR Estimate 23 (L) >60 mL/min/1.73m2    Calcium 9.6 8.6 - 10.0 mg/dL    Glucose 67 (L) 70 - 99 mg/dL   HCG qualitative (female only)    Collection Time: 10/31/23  8:14 AM   Result Value Ref Range "    hCG Serum Qualitative Negative Negative   INR    Collection Time: 10/31/23  8:14 AM   Result Value Ref Range    INR 3.81 (H) 0.85 - 1.15   CBC with platelets    Collection Time: 10/31/23  8:14 AM   Result Value Ref Range    WBC Count 18.4 (H) 4.0 - 11.0 10e3/uL    RBC Count 2.41 (L) 3.80 - 5.20 10e6/uL    Hemoglobin 8.0 (L) 11.7 - 15.7 g/dL    Hematocrit 22.4 (L) 35.0 - 47.0 %    MCV 93 78 - 100 fL    MCH 33.2 (H) 26.5 - 33.0 pg    MCHC 35.7 31.5 - 36.5 g/dL    RDW 22.1 (H) 10.0 - 15.0 %    Platelet Count 115 (L) 150 - 450 10e3/uL     I had a long discussion with the patient regarding liver transplantation which included but was not limited to  the following points:    Liver transplant selection committee process.  The federal rules for cadaveric waiting list, the size and blood type matching of the organ. The availability of living-related donor transplantation.  The types of donors: brain death donors, non-heart beating donors, partial liver grafts: splits and living donor grafts  Extended criteria  Donors (older age, steasosis) and the increased  risk of primary non-function using the extended criteria donors  [facility] high risk donors,  Risk of donor transmitted infections and donor transmitted malignancy  The liver transplant operation and the associated risks and technical complications which can include intraoperative death, post operative death,  Primary non-function, bleeding requiring re-operations, arterial and biliary complications, bowel perforations, and intra abdominal abscess. Some of these complicaitons may require a second operation.  The postoperative course, the ICU stay and risk of postoperative complications which can include sepsis, MI, stroke, brain injury, pneumonia, pleural effusions, and renal dysfunction.  The current 1 year and 5 year graft and patient survivals.  The need for life long immunosuppressive therapy and the side effects of these medications, including the possibility of  toxicity, opportunistic infections, risk of cancer including lymphoma, and the possibility of rejection even if the patient is taking the medication exactly as prescribed.  The need for compliance with medications and follow-up visits in the clinic and thereafter.  The patient and family understand these risks and wish to proceed to transplantation     [facility] [place] [facility]  60 minutes spent by me on the date of the encounter doing chart review, history and exam, documentation and further activities per the note   HPI      ROS      Physical Exam

## 2023-10-31 NOTE — PROGRESS NOTES
Psychosocial Assessment  Lola Milner was seen in the Transplant Center as part of her evaluation as a potential liver transplant recipient. Her mother, Thao and step father, John accompanied her to the appointment.   Living Situation: Lola lives in an apartment in [place], [place] with her fianceAndi. She's lived there since September 2021, and does not repot any concerns related to her living environment.   [place], [place] is about 240 miles away from our transplant center. I discussed with them the requirement to remain local post transplant for about 30 days with a caregiver. Lola's mother, step father, and s/o would be able to be her caregivers. I reviewed local lodging options and potential funding.   Education/Employment:  Lola completed some college. She last worked in June 2023 as a . She is no longer working due to her health. She is not a .   Financial /Income: Lola does not have a source of income at this time. Her fiance is a RN and works fulltime. She does not have any financial concerns at this time. She is considering applying for SSDI, however does not want this to impact her insurance through [place].   Health Insurance:  [place] [employer] ([place] This writer talked with Lola about the financial risks of transplant, particularly about the high cost of transplant related medications and the importance of maintaining adequate health insurance coverage.  Family/Social Support: Lola lives with her fiance Andi. They've been together for seven years. She does not have any children. She has five siblings, who live in [facility] [facility] [place], and [place]. Her father John lives in [place], and her mother/step father live in [place], [place].   Her mother, step father and significant other would be her primary caregivers post transplant.    This writer stressed the importance of having a stable and involved support network before and after transplant.  Provided Lola  with education about the  relationship between a stable support system and better surgical and post-transplant outcomes compared to patients with a limited support system.    Functional Status: Lola utilized a walker to ambulate. Today, she presented to clinic with a wheelchair. She is able to complete all other daily living activities, and needs supervision when showering.   Chemical Dependency:  Lola's last consumption of alcohol was June 13, 2023. Prior to that she was drinking about 1/2 liter of vodka daily (about 11 standard drinks). She reports that she had been drinking daily for the last 10 years, and heavily for at least the last few years. She discontinued tobacco use in June 2023, and as smoking about a pack every three days. She denied any other substance use.   She has a history of two DUIs (2014, and 2015). She voiced that she has been advised to abstain from alcohol in the past. Lola is engaged in a partial hospitalizations substance use program through the [facility] in [place], [place]. This consists of one month treatment, five days of treatment four hours each time. Lola signed a release of information for me to speak with [facility] and obtain records. I spoke with Mackenzie [facility]ch [facility]. Mackenzie confirmed that Lola started treatment yesterday, and would be recommended to step down to their intensive outpatient program. Mackenzie shared that she did test positive for CBD.   Mental Health: Lola reports a history of anxiety and depression. She had counseling about a year ago and lasted for about a month. She felt like it was helpful, but discontinued therapy due to the cost. She was uninsured at the time. She does not have a history of other psychotherapy, psychiatric hospitalizations or past/current suicidal thoughts.   Adjustment to Illness:  This writer provided Lola  with supportive counseling throughout this interview.  This writer also encouraged Lola to attend the liver transplant support group for  "additional support and encouragement.   Impression/Recommendations:   Lola verbalizes understanding the psychosocial risks of transplant and teaching provided during this evaluation.  Lola is about four months sober. She shared that this is her longest period of sobriety. She completed a substance use assessment through the [facility] in [place], [place]. I spoke with Mackenzie through the [facility] and will send this writer collateral. Mackenzie confirmed that she started treatment yesterday, 10/30/2023.   Lola shared with this writer that she was drinking daily to address her mental health concerns of depression and anxiety. She noted that it was an \"escape.\" She presented as motivated for treatment, and that she is proud of her sobriety.   Lola has adequate finances and health insurance for transplant, and an intact support system. This writer will remain available to assist patient throughout the evaluation process and will follow patient through transplant if she is listed.  It was a pleasure to evaluate this patient for liver transplant.   Teaching completed during assessment:  1.     Housing and relocation needs post transplant.  2.     Caregiver needs post transplant.  3.     Financial issues related to transplant.  4.     Risks of alcohol use post transplant.  5.     Common psychosocial stressors pre/post transplant.        6.     Liver Transplant support group availability.        7.     [employer] Directive             Psychosocial Risks of Transplant Reviewed:  1.     Increased stress related to your emotional, family, social, employment, or   financial situation.  2.     Affect on work and/or disability benefits.  3.     Affect on future health and life insurance.  4.     Transplant outcome expectations may not be met.  5.     Mental Health risks: anxiety, depression, PTSD, guilt, grief and chronic fatigue.     Melanie Baer, ABEBA, LICSW  "

## 2023-10-31 NOTE — TELEPHONE ENCOUNTER
Quick Visit note:    Ms. Milner is a 33 year old woman with a history of AUD, ARLD, who presents for discussion of liver transplant.     Her labs done prior to the visit show worsening EDGAR and leukocytosis, recommend ED evaluation for IV albumin for EDGAR and infectious rule out (paracentesis, UA, blood cx, CXR)    Discussed as transplant selection today, felt to be reasonable from a social standpoint in terms of her AUD as she has engaged in treatment - although has only done one session. Has family support with her today in clinic. Recommend admission for further medical management as above and to also complete her transplant evaluation (TTE, pap smear/HPV testing). Recommend PT consult while in house. Please consult hepatology    MELD 3.0: 40 at 10/31/2023  8:14 AM  MELD-Na: 45 at 10/31/2023  8:14 AM  Calculated from:  Serum Creatinine: 2.76 mg/dL at 10/31/2023  8:14 AM  Serum Sodium: 131 mmol/L at 10/31/2023  8:14 AM  Total Bilirubin: 44.4 mg/dL at 10/31/2023  8:14 AM  Serum Albumin: 4.0 g/dL (Using max of 3.5 g/dL) at 10/31/2023  8:14 AM  INR(ratio): 3.81 at 10/31/2023  8:14 AM  Age at listing (hypothetical): 33 years  Sex: Female at 10/31/2023  8:14 AM    LT  - Needs to continue to engage in CD treatment  - Recommend pap smear  - TTE

## 2023-10-31 NOTE — ED TRIAGE NOTES
Pt ambulatory to triage with c/o abnormal labs. Pt recently discharged, had follow up labs this AM and creatine elevated, referred to ED for further evaluation. Pt A&Ox3, hypotensive otherwise all other VSS on RA, pain 5/10.      Triage Assessment (Adult)       Row Name 10/31/23 1229          Triage Assessment    Airway WDL WDL        Respiratory WDL    Respiratory WDL WDL        Skin Circulation/Temperature WDL    Skin Circulation/Temperature WDL X  Jaundice        Cardiac WDL    Cardiac WDL WDL        Peripheral/Neurovascular WDL    Peripheral Neurovascular WDL WDL        Cognitive/Neuro/Behavioral WDL    Cognitive/Neuro/Behavioral WDL X     Level of Consciousness alert     Arousal Level opens eyes spontaneously     Orientation disoriented to;time     Speech logical     Mood/Behavior calm;cooperative;behavior appropriate to situation        Marli Coma Scale    Best Eye Response 4-->(E4) spontaneous     Best Motor Response 6-->(M6) obeys commands     Best Verbal Response 4-->(V4) confused     Maril Coma Scale Score 14

## 2023-10-31 NOTE — H&P
[facility]    History and Physical - Hospitalist Service, GOLD TEAM        Date of Admission:  10/31/2023    Assessment & Plan      Lola Milner is a 33 year old female admitted on 10/31/2023. She has a past medical history significant for MDD, prior tobacco use, gout, polyneuropathy and decompensated cirrhosis secondary to alcohol use c/b hepatic encephalopathy, esophageal varices s/p banding, recurrent ascities and hepatorenal syndrome.     Recently admitted to [place] 10/23-10/29 and referred for liver transplant eval at [facility]. She was seen in Hepatology and Transplant clinic day of admission. Referred to the ED due to worsening renal function and leukocytosis.     Decompensated alcohol cirrhosis c/b HRS, HE, recurrent ascites, esophageal varices s/p banding (9/2023)  Hyperbilirubinemia   Coagulopathy  Seen in clinic by Dr. Guajardo and Dr. Watson prior to admission. Initially presented with decompensated liver disease in 6/2023. Course complicated by insurance challenges. Recently started chem dep. Requires intermittent paracentesis, last on 10/23. Tbili 44.4 on admission with direct bili >40. Appears Tbili has been in 40s for past few weeks. Recent RUQ US with doppler was negative for thrombus. Some concern for possible SBP on admission with worsening renal function and abdominal pain. Ideally would obtain paracentesis however unable to this evening, will empirically cover.   - Hepatology consulted  - CAPS consulted for dx paracentesis in AM   - Trending MELD labs daily  - Continue lactulose TID + [place] protocol  - Added back rifaxmin 550 mg BID (not taking outpatient, awaiting prior auth)  - Transplant eval:    ::Pending PETH    ::EBV IgG positive, CMV negative, treponema negative    ::Pending HIV, TB quant    ::TTE tomorrow    ::Needs pap smear/HPV testing - will need to check in AM if Gynecology able to assist while inpatient     MELD 3.0: 40 at  10/31/2023  8:14 AM  MELD-Na: 45 at 10/31/2023  8:14 AM  Calculated from:  Serum Creatinine: 2.76 mg/dL at 10/31/2023  8:14 AM  Serum Sodium: 131 mmol/L at 10/31/2023  8:14 AM  Total Bilirubin: 44.4 mg/dL at 10/31/2023  8:14 AM  Serum Albumin: 4.0 g/dL (Using max of 3.5 g/dL) at 10/31/2023  8:14 AM  INR(ratio): 3.81 at 10/31/2023  8:14 AM  Age at listing (hypothetical): 33 years  Sex: Female at 10/31/2023  8:14 AM    Leukocytosis  WBC 18.4 on admission. BP softer on admission, though in setting of cirrhosis and unclear true baseline. CXR without focal consolidation and no respiratory complaints. No dysuria but not voiding well for past 48 hours. She is having new onset abdominal pain, raising highest concern for SBP. Did have PICC line recently so would also worry about bacteremia, though afebrile.   - Start empiric ceftriaxone 2g daily   - Pending BCx2  - UA/UC   - CAPS consulted for dx paracentesis     EDGAR   AGMA   Hepatorenal syndrome  Cr 2.76, AG 19 on admission. Recent baseline from prior discharge appears ~1.5-1.7. Was seen by Nephrology during admission at [place] with concern for HRS, Cr improved with albumin and midodrine. Lasix and spironolactone held at time of discharge. Worsening renal function with concern for infection does raise concern for SBP. Has had decreased urine output.   - Albumin 100g today (1.5 g/kg) followed by albumin 70g daily x 2 days (1g/kg)  - Continue PTA midodrine 15 mg TID  - Continue PTA sodium bicarbonate 1300 mg BID  - Daily weights, I/Os  - SBP work-up as above    Hyponatremia  Na 131 on admission. Na was 135 at time of recent discharge. Likely related to EDGAR.  - Albumin challenge as above  - Trend Na in AM     Acute thrombocytopenia  Plts 115 on admission. Recent baseline appears to be 210s. Unable to tell heparin exposure but patient does report receiving twice daily abdominal injections during recent admission, raising c/f heparin exposure. 4T score is intermediate. May also  "be due to possible infection or worsening liver disease.   - HIT panel  - Hold/avoid heparin products  - Trend Plts     Chronic normocytic anemia  Hgb 8.0 on admission. Recent baseline appears around 7.  - Trend while inpatient    Blurred vision  Developed blurred vision to both eyes in past 72 hours with associated burning. Seems to be better in AM and worsening throughout the day. Improves with visine. Having trouble reading any labels. Wears glasses at baseline.  - Ophthalmology consulted  - PF eye drops as needed    Chest discomfort  Endorses \"squeezing\" chest pain for the past few days. Occasional racing heart/skipped beats that come and go. Located along sternum. Improves with palpation on exam and HR in NSR. Very low suspicion for cardiac etiology though certainly could have PVCs at times. Family wonders if having MSK symptoms due to using walker which is new.   - Low threshold to obtain troponin and EKG    Generalized weakness  Frail and deconditioned at baseline. Uses a walker. Undergoing transplant eval.   - PT/OT  - RD consult     Polyneuropathy   - Okay to continue gabapentin 100 mg TID for now, will need to hold if worsening EDGAR          Diet: 2 Gram Sodium Diet  DVT Prophylaxis: Pneumatic Compression Devices  Izaguirre Catheter: Not present  Lines: None     Cardiac Monitoring: None  Code Status: Full Code    Clinically Significant Risk Factors Present on Admission             # Anion Gap Metabolic Acidosis: Highest Anion Gap = 19 mmol/L in last 2 days, will monitor and treat as appropriate     # Coagulation Defect: INR = 3.81 (Ref range: 0.85 - 1.15) and/or PTT = N/A, will monitor for bleeding  # Thrombocytopenia: Lowest platelets = 115 in last 2 days, will monitor for bleeding        # Overweight: Estimated body mass index is 26.66 kg/m  as calculated from the following:    Height as of an earlier encounter on 10/31/23: 1.6 m (5' 3\").    Weight as of an earlier encounter on 10/31/23: 68.3 kg (150 lb 8 " oz).              Disposition Plan      Expected Discharge Date: 11/02/2023                The patient's care was discussed with the Attending Physician, Dr. Hearn, Patient, Patient's Family, and Hepatology Team.    Eleanor Copeland PA-C  Hospitalist Service, [facility]  Securely message with CallApp (more info)  Text page via [facility] Paging/Directory   See signed in provider for up to date coverage information    ______________________________________________________________________    Chief Complaint   Weakness, fatigue, referred from clinic    History is obtained from the patient and her family    History of Present Illness   Lola Milner is a 33 year old female who presented to the ED from Hepatology clinic due to increased WBC and worsening renal function.     She was recently admitted to [place] with confusion, elevated WBC and EDGAR. Discharged on 10/29. Had been doing well at time of discharge. Drove from [place]. Reports that on Sunday she had been urinating at her baseline but now it has decreased. No dysuria that she knows of. Does have worsening cramping of her entire abdomen which developed in the past day. Some nausea but relates some of this to anxiety. Has not had a BM since early this morning. Has skipped some lactulose due to fear of incontinence during the drive down. Notes her mentation feels slowed and not always able to find the word she is looking for. Hasn't really eaten today, has been drinking fluids. Has had some worsening swelling in her legs in past 1-2 days as well.     She does endorse some squeezing chest pain at times in the past few days. Occasionally feels like her heart is racing and skipping beats. Seems to come and go.     Also reporting new blurred vision in the past 3 days. Having difficulty reading anything. Seems better in the morning, gets worse as the day goes by. Affects both eyes. Feels burning at times. Visine  has been helpful.     Past Medical History    Past Medical History:   Diagnosis Date    Alcoholic cirrhosis of liver with ascites (H) 08/30/2023    History of alcohol abuse     Formatting of this note might be different from the original. In remission as of late June 2023       Past Surgical History   No past surgical history on file.    Prior to Admission Medications   Prior to Admission Medications   Prescriptions Last Dose Informant Patient Reported? Taking?   Multiple Vitamins-Minerals (THERA-TABS M) TABS   Yes No   Sig: Take 1 tablet by mouth daily   gabapentin (NEURONTIN) 100 MG capsule   Yes No   Sig: Take 100 mg by mouth 3 times daily   lactulose (CHRONULAC) 10 GM/15ML solution   Yes No   Sig: Take 30 mLs by mouth daily as needed   melatonin 3 MG tablet   Yes No   Sig: Take 3 mg by mouth nightly as needed   midodrine (PROAMATINE) 5 MG tablet   Yes No   Sig: Take 15 mg by mouth 3 times daily   omeprazole (PRILOSEC) 20 MG DR capsule   Yes No   Sig: Take 20 mg by mouth daily   sodium bicarbonate 650 MG tablet   Yes No   Sig: Take 1,300 mg by mouth 2 times daily      Facility-Administered Medications: None        Review of Systems    The 10 point Review of Systems is negative other than noted in the HPI or here.      Physical Exam   Vital Signs: Temp: 98.1  F (36.7  C) Temp src: Oral BP: (!) 87/46 Pulse: 74   Resp: 15 SpO2: 94 % O2 Device: None (Room air)    Weight: 0 lbs 0 oz    General Appearance: Awake. Alert and oriented x4. Sitting up in bed. Slow to respond at times.   Eyes: Normal lids. + scleral icterus. Pupils equal, round, reactive. No nystagmus.   HEENT: Normocephalic, atraumatic. Nares patent. Oropharynx clear. Mucous membranes moist. Dry lips.   Respiratory: Normal work of breathing on room air. Lungs CTAB. No wheezes.  Cardiovascular: RRR. S1, S2. + murmur, question physiologic vs systolic. Pedal pulses 2+ BLE with 1+ edema.  GI: Abdomen mildly distended. Normoactive. Soft. Tender throughout. No  guarding. No rebounding.   Lymph/Hematologic: No abnormal or excessive bruising on exposed skin.   Skin: Warm, dry + jaundice   Musculoskeletal: Moves all extremities spontaneously.  Neurologic: A&Ox4. Slow to respond. + asterixis     Medical Decision Making       90 MINUTES SPENT BY ME on the date of service doing chart review, history, exam, documentation & further activities per the note.      Data     I have personally reviewed the following data over the past 24 hrs:    18.4 (H)  \   8.0 (L)   / 115 (L)     131 (L) 92 (L) 28.2 (H) /  67 (L)   3.7 20 (L) 2.76 (H) \     ALT: 19 AST: 62 (H) AP: 137 (H) TBILI: 44.4 (HH)   ALB: 4.0 TOT PROTEIN: N/A LIPASE: N/A     INR:  3.81 (H) PTT:  N/A   D-dimer:  N/A Fibrinogen:  N/A       Imaging results reviewed over the past 24 hrs:   Recent Results (from the past 24 hour(s))   XR Chest Port 1 View    Narrative    EXAM: XR CHEST PORT 1 VIEW 10/31/2023 12:59 PM      HISTORY: sob.    COMPARISON: None.     TECHNIQUE: Frontal view of the chest.    FINDINGS: Trachea is midline. Mild cardiomegaly.. Pulmonary  vasculature is distinct. No focal airspace opacity, pleural effusion,  pneumothorax. Low lung volumes.    No acute osseous abnormality. Visualized soft tissues and upper  abdomen are unremarkable.      Impression    IMPRESSION:   1. No focal consolidative opacity.  2. Cardiomegaly.  3. Low lung volumes.    I have personally reviewed the examination and initial interpretation  and I agree with the findings.    YAZ IRVIN MD         SYSTEM ID:  [id number]

## 2023-10-31 NOTE — COMMITTEE REVIEW
Abdominal Committee Review Note     Evaluation Date: 10/31/2023  Committee Review Date: 10/31/2023    Organ being evaluated for: Liver    Transplant Phase: Evaluation  Transplant Status: Active    Transplant Coordinator: Maurilio Gonzalez  Transplant Surgeon:   Luis Watson    Referring Physician: Arley Arredondo    Primary Diagnosis: Alcohol-Associated Cirrhosis Without Acute Alcohol-Associated Hepatitis  Secondary Diagnosis:     Committee Review Members:  Nutrition Janna Morton, RD   Pharmacist Gina Gongora, [facility]    - Clinical Clarita Tidwell, ABEBA, Melanie Baer, [facility]   Transplant Denia Amador, SUSANN, Alyson Oviedo, RN, Mariola Castellanos, RN, Jr Pranay Ramirez, RN, Afia Houston, APRN CNP, Sabas Dillard, RN, Maurilio Gonzalez, RN   Transplant Hepatology  Anna Antony Sinha MD, Arley Hogan MD, Remedios Obando MD, Eloisa Guajardo MD, Dennis Mathis MD, Thomas M. Leventhal, MD   Transplant Surgery Trenton Harper MD, Aime Lowe MD, Thao Benito PA-C, Zenobia Chew NP, Jagjit Santiago MD, Papa Coe MD, Romina Elizabeth MD, MD       Transplant Eligibility: Cirrhosis with MELD, ETOH    Committee Review Decision: Approved    Relative Contraindications:     Absolute Contraindications:     Committee Chair Eloisa Guajardo MD verbally attested to the committee's decision.    Committee Discussion Details: Approved. Not Approved for Living Donor.

## 2023-10-31 NOTE — LETTER
"    10/31/2023         RE: Lola Milner  [address]  [facility] [address]        Dear Colleague,    Thank you for referring your patient, Lola Milner, to the [facility] TRANSPL[facility]. Please see a copy of my visit note below.    [facility] Solid Organ Transplant  Outpatient MNT: Liver Transplant Evaluation    Current BMI: 26.6 (HT 63 in,  lbs/68 kg)  BMI is within recommendation of <45 for liver transplant    Fried Frailty-- unable to assess- dizziness impacting ability to complete walk test     FraiLT-- Frail- unable to complete chair stand portion & tandem balance position   [email or web address]    Recommended Interventions to Address Frailty:  If pt does remain/return OP, recommend PT program      Time Spent: 15 minutes  Visit Type: Initial   Referring Physician: Jeffy  Pt accompanied by: her mom & step dad     History of previous txp: none     Nutrition Assessment  H/o etoh cirrhosis. Visit was brief today, as medical team decided to send her to ED after eval visits. MELD >40.   She reports very poor appetite/intake \"for a while\"- at least throughout the summer (when dx presented). For example, she tells me that one day this summer she only ate 1 strawberry. This was the case several times. Yesterday she had a 'better day'- had an apple, protein smoothie, soup, and bread. She typically drinks protein shakes (occasionally) and water.     Her main barrier impacting PO is N/V. She reports vomiting daily and unable to keep much food down. She is unsure if she has been on anti nausea meds- none in her med list currently.     Vitamins, Supplements, Pertinent Meds: MVI  Herbal Medicines/Supplements: none    Edema: + ascites + ALYSSA     Weight hx: pt states it is hard to say what her \"normal wt\" was when healthy, as this fluctuated a lot    Wt Readings from Last 10 Encounters:   10/31/23 68.3 kg (150 lb 8 oz)   10/30/23 68 kg (150 lb)   6/2020- 175 lbs  1/2020- 166 " "lbs    Physical Activity  Lives w/ fiance  Wheelchair today in the clinic, but has been using walker at home. Has had walker x 2 weeks due to falling, dizziness, etc.   Prior to illness, she reports no prior planned physical activity     Malnutrition  % Intake: <75% for >/= 3 months (moderate malnutrition)  % Weight Loss: unable to assess w/ pt unsure on weight hx, lack of recent records on file   Subcutaneous Fat Loss: Mild  Muscle Loss: Moderate/Severe   Fluid Accumulation/Edema: Moderate/Severe   Malnutrition Diagnosis: Moderate vs Severe malnutrition in the context of chronic illness     Nutrition Diagnosis  Malnutrition r/t lack of PO intake, increased nutrition needs w/ liver disease AEB moderate vs severe malnutrition, frail status.     Nutrition Intervention  Nutrition education provided:  Discussed sodium intake (low sodium foods and drinks, seasoning food without salt and tips for low sodium diet). Pt reports she is not even craving any food. Would not worry about sodium restriction currently if she is craving a particular food item.     Reviewed adequate protein intake. Encouraged receiving protein from both animal and plant based sources. Pt is hit/miss if she gets a protein drink in. Sounds like most days she does. She would benefit for at least 3-4 protein drinks/day, but this is not feasible currently d/t N/V.     Reviewed if she does get anti-nausea meds to take them as prescribed every x hours to stay ahead of nausea. Pt has not tried this in the past.     Based on medical status/hospitalization outlook, pt would benefit from intensive PT to regain function.     Patient Understanding: Pt verbalized understanding of education provided.  Expected Engagement: Fair  Follow-Up Plans: PRN     Nutrition Goals  No nutrition goals identified at this time    Janna Morton, RD, LD, CCTD                                  \"Much or all of the text in this note was generated through the use of Dragon Dictate " "voice to text software. Errors in spelling or words which appear to be out of context are unintentional, may be present due having escaped editing\"    Assessment and Plan:  1. liver transplant evaluation - patient is a good candidate overall. Benefits and surgical risks of a liver transplantation were discussed.  2.  End stage liver disease due to Laennec's    Surgical evaluation:  1. Portal Vein:Patent  2. Hepatic Artery: Open  3. TIPS: absent  4. Previous Abdominal Surgery: Yes  5. Hepatocellular Carcinoma: None  6. Ascites: Present - large amount  7. Costal Angle: narrow  8. Portopulmonary Hypertension: absent  9. Hepatopulmonary Syndrome: absent  10. Cardiac Evaluation: needs stress echocardiogram  11. Nutritional Status: Poor  12. Diabetes: no  13.Hypertension no  14. Smoker:no  14: Fraility index:yes  15. Meets guidelines to receive Living Donor  No  16. Potential Living donors No  Computed MELD 3.0 unavailable. One or more values for this score either were not found within the given timeframe or did not fit some other criterion.  Computed MELD-Na unavailable. One or more values for this score either were not found within the given timeframe or did not fit some other criterion.      Recommendations:     Patient has significant liver disease suspected alcoholic hepatitis with alcoholic cirrhosis.  The MELD score is greater than 40.  Would recommend admission and inpatient evaluation and finish evaluation for transplant list if there are no further contraindications      Patients overall evaluation will be discussed at the Liver Transplant selection committee meeting with a final recommendation on the patients suitability for transplant to be made at that time.    Consult Full  Details:  Lola Milner was seen in consultation at the request of Dr. Isaac Obando for evaluation as a potential liver transplant recipient.    Reason for Visit:  Lola Milner is a 33 year old year old female with Laennec's, who " presents for liver transplant evaluation.    HPI:  Presenting complaint: Jaundice    Patient has been diagnosed to have Laënnec cirrhosis.  She is now sober 440 days.  She has decompensated in the form of ascites, and hepatorenal syndrome.  No variceal bleeding no spontaneous bacterial peritonitis.  She does have easy fatigability.  She is very severely jaundiced.  She is frail and uses a walker.  She was recently admitted in [place] for hepatorenal syndrome and just discharged from the hospital.    No known heart disease or lung disease.  Her mother and stepfather were present and they were very supportive.    Past Medical History:   Diagnosis Date    Alcoholic cirrhosis of liver with ascites (H) 08/30/2023    History of alcohol abuse     Formatting of this note might be different from the original. In remission as of late June 2023     No past surgical history on file.  No past surgical history on file.  No family history on file.  No Known Allergies  Prior to Admission medications    Medication Sig Start Date End Date Taking? Authorizing Provider   gabapentin (NEURONTIN) 100 MG capsule Take 100 mg by mouth 3 times daily 10/30/23  Yes Reported, Patient   lactulose (CHRONULAC) 10 GM/15ML solution Take 30 mLs by mouth daily as needed 10/30/23  Yes Reported, Patient   melatonin 3 MG tablet Take 3 mg by mouth nightly as needed 10/29/23  Yes Reported, Patient   midodrine (PROAMATINE) 5 MG tablet Take 15 mg by mouth 3 times daily 10/30/23  Yes Reported, Patient   Multiple Vitamins-Minerals (THERA-TABS M) TABS Take 1 tablet by mouth daily   Yes Reported, Patient   omeprazole (PRILOSEC) 20 MG DR capsule Take 20 mg by mouth daily 9/14/23  Yes Reported, Patient   sodium bicarbonate 650 MG tablet Take 1,300 mg by mouth 2 times daily 10/30/23 11/9/23 Yes Reported, Patient       Previous Transplant Hx: No    Cardiovascular Hx:       h/o Cardiac Issues: No       Exercise Tolerance: no chest pain or shortness of breath with  "exertion.    Potential Donor(s): No    ROS:    REVIEW OF SYSTEMS (check box if normal)  [x]                GENERAL  [x]                  PULMONARY [x]                 GENITOURINARY  [x]                 CNS                 [x]                  CARDIAC  [x]                  ENDOCRINE  [x]                 EARS,NOSE,THROAT [x]                  GASTROINTESTINAL [x]                  NEUROLOGIC    [x]                 MUSCLOSKELTAL  [x]                   HEMATOLOGY    Examination:     Vitals:  BP (!) 88/54 (BP Location: Right arm, Patient Position: Sitting, Cuff Size: Adult Regular)   Pulse 75   Ht 1.6 m (5' 3\")   Wt 68.3 kg (150 lb 8 oz)   BMI 26.66 kg/m      GENERAL APPEARANCE: alert and no distress; deeply icteric  EYES: PERRL  HENT: mouth without ulcers or lesions  NECK: supple, no adenopathy  RESP: lungs clear to auscultation - no rales, rhonchi or wheezes  CV: regular rhythm, normal rate, no rub   ABDOMEN:  soft, nontender, large amount of ascites present  MS: extremities normal- no gross deformities noted, no evidence of inflammation in joints, no muscle tenderness  SKIN: no rash  NEURO: Normal strength and tone, sensory exam grossly normal, mentation intact and speech normal  PSYCH: mentation appears normal. and affect normal/bright      Results:   Recent Results (from the past 168 hour(s))   Basic metabolic panel    Collection Time: 10/31/23  8:14 AM   Result Value Ref Range    Sodium 131 (L) 135 - 145 mmol/L    Potassium 3.7 3.4 - 5.3 mmol/L    Chloride 92 (L) 98 - 107 mmol/L    Carbon Dioxide (CO2) 20 (L) 22 - 29 mmol/L    Anion Gap 19 (H) 7 - 15 mmol/L    Urea Nitrogen 28.2 (H) 6.0 - 20.0 mg/dL    Creatinine 2.76 (H) 0.51 - 0.95 mg/dL    GFR Estimate 23 (L) >60 mL/min/1.73m2    Calcium 9.6 8.6 - 10.0 mg/dL    Glucose 67 (L) 70 - 99 mg/dL   HCG qualitative (female only)    Collection Time: 10/31/23  8:14 AM   Result Value Ref Range    hCG Serum Qualitative Negative Negative   INR    Collection Time: 10/31/23  " 8:14 AM   Result Value Ref Range    INR 3.81 (H) 0.85 - 1.15   CBC with platelets    Collection Time: 10/31/23  8:14 AM   Result Value Ref Range    WBC Count 18.4 (H) 4.0 - 11.0 10e3/uL    RBC Count 2.41 (L) 3.80 - 5.20 10e6/uL    Hemoglobin 8.0 (L) 11.7 - 15.7 g/dL    Hematocrit 22.4 (L) 35.0 - 47.0 %    MCV 93 78 - 100 fL    MCH 33.2 (H) 26.5 - 33.0 pg    MCHC 35.7 31.5 - 36.5 g/dL    RDW 22.1 (H) 10.0 - 15.0 %    Platelet Count 115 (L) 150 - 450 10e3/uL     I had a long discussion with the patient regarding liver transplantation which included but was not limited to  the following points:    Liver transplant selection committee process.  The federal rules for cadaveric waiting list, the size and blood type matching of the organ. The availability of living-related donor transplantation.  The types of donors: brain death donors, non-heart beating donors, partial liver grafts: splits and living donor grafts  Extended criteria  Donors (older age, steasosis) and the increased  risk of primary non-function using the extended criteria donors  The CDC high risk donors,  Risk of donor transmitted infections and donor transmitted malignancy  The liver transplant operation and the associated risks and technical complications which can include intraoperative death, post operative death,  Primary non-function, bleeding requiring re-operations, arterial and biliary complications, bowel perforations, and intra abdominal abscess. Some of these complicaitons may require a second operation.  The postoperative course, the ICU stay and risk of postoperative complications which can include sepsis, MI, stroke, brain injury, pneumonia, pleural effusions, and renal dysfunction.  The current 1 year and 5 year graft and patient survivals.  The need for life long immunosuppressive therapy and the side effects of these medications, including the possibility of toxicity, opportunistic infections, risk of cancer including lymphoma, and the  possibility of rejection even if the patient is taking the medication exactly as prescribed.  The need for compliance with medications and follow-up visits in the clinic and thereafter.  The patient and family understand these risks and wish to proceed to transplantation     [facility]  60 minutes spent by me on the date of the encounter doing chart review, history and exam, documentation and further activities per the note   HPI      ROS      Physical Exam          Psychosocial Assessment  Lola Milner was seen in the Transplant Center as part of her evaluation as a potential liver transplant recipient. Her mother, Thao and step father, John accompanied her to the appointment.   Living Situation: Lola lives in an apartment in [place], [place] with her fianceAndi. She's lived there since September 2021, and does not repot any concerns related to her living environment.   [place], [place] is about 240 miles away from our transplant center. I discussed with them the requirement to remain local post transplant for about 30 days with a caregiver. Lola's mother, step father, and s/o would be able to be her caregivers. I reviewed local lodging options and potential funding.   Education/Employment:  Lola completed some college. She last worked in June 2023 as a . She is no longer working due to her health. She is not a .   Financial /Income: Lola does not have a source of income at this time. Her fiance is a RN and works fulltime. She does not have any financial concerns at this time. She is considering applying for SSDI, however does not want this to impact her insurance through [place].   Health Insurance:  [employer] [place] [employer] [place] This writer talked with Lola about the financial risks of transplant, particularly about the high cost of transplant related medications and the importance of maintaining adequate health insurance  coverage.  Family/Social Support: Lola lives with her fiance Andi. They've been together for seven years. She does not have any children. She has five siblings, who live in [facility], and [place]. Her father John lives in [place], and her mother/step father live in [place], [place].   Her mother, step father and significant other would be her primary caregivers post transplant.    This writer stressed the importance of having a stable and involved support network before and after transplant.  Provided Lola  with education about the relationship between a stable support system and better surgical and post-transplant outcomes compared to patients with a limited support system.    Functional Status: Lola utilized a walker to ambulate. Today, she presented to clinic with a wheelchair. She is able to complete all other daily living activities, and needs supervision when showering.   Chemical Dependency:  Lola's last consumption of alcohol was June 13, 2023. Prior to that she was drinking about 1/2 liter of vodka daily (about 11 standard drinks). She reports that she had been drinking daily for the last 10 years, and heavily for at least the last few years. She discontinued tobacco use in June 2023, and as smoking about a pack every three days. She denied any other substance use.   She has a history of two DUIs (2014, and 2015). She voiced that she has been advised to abstain from alcohol in the past. Lola is engaged in a partial hospitalizations substance use program through the [facility] in [place], [place]. This consists of one month treatment, five days of treatment four hours each time. Lola signed a release of information for me to speak with [facility] and obtain records. I spoke with Mackenzie Birch [facility]. Mackenzie confirmed that Lola started treatment yesterday, and would be recommended to step down to their intensive outpatient program. Mackenzie shared that she did test positive for CBD.   Mental  "Health: Lola reports a history of anxiety and depression. She had counseling about a year ago and lasted for about a month. She felt like it was helpful, but discontinued therapy due to the cost. She was uninsured at the time. She does not have a history of other psychotherapy, psychiatric hospitalizations or past/current suicidal thoughts.   Adjustment to Illness:  This writer provided Lola  with supportive counseling throughout this interview.  This writer also encouraged Lola to attend the liver transplant support group for additional support and encouragement.   Impression/Recommendations:   Lola verbalizes understanding the psychosocial risks of transplant and teaching provided during this evaluation.  Lola is about four months sober. She shared that this is her longest period of sobriety. She completed a substance use assessment through the [facility] in [place], [place]. I spoke with Mackenzie through the [facility] and will send this writer collateral. Mackenzie confirmed that she started treatment yesterday, 10/30/2023.   Lola shared with this writer that she was drinking daily to address her mental health concerns of depression and anxiety. She noted that it was an \"escape.\" She presented as motivated for treatment, and that she is proud of her sobriety.   Lola has adequate finances and health insurance for transplant, and an intact support system. This writer will remain available to assist patient throughout the evaluation process and will follow patient through transplant if she is listed.  It was a pleasure to evaluate this patient for liver transplant.   Teaching completed during assessment:  1.     Housing and relocation needs post transplant.  2.     Caregiver needs post transplant.  3.     Financial issues related to transplant.  4.     Risks of alcohol use post transplant.  5.     Common psychosocial stressors pre/post transplant.        6.     Liver Transplant support group availability.        7.   "   [employer] Directive             Psychosocial Risks of Transplant Reviewed:  1.     Increased stress related to your emotional, family, social, employment, or   financial situation.  2.     Affect on work and/or disability benefits.  3.     Affect on future health and life insurance.  4.     Transplant outcome expectations may not be met.  5.     Mental Health risks: anxiety, depression, PTSD, guilt, grief and chronic fatigue.     ABEBA Rosas, [facility]     Date of Visit: 10/31/2023    Reason for referral: LT evaluation    Subjective: Ms. Milner is a 33 year old woman with a history of AUD, ARLD who presents to discuss transplant    2019 - seen in urgent care for joint pain - found to have elevated LFTs   TBR 0.8, RUQ showing hepatic steatosis. Provider thought this was related to heavy alcohol use. She got labs with her PCP 2/2022 AST 65. Labs 5/2022 in ED for vaginal bleeding TBR 1.3      About a year and a half ago sought out a therapist for her mental health issues. Has tried to stop drinking in the past, would tell her mom when she quit for a short period in the past and was proud of this. 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 Attended AA in June and more recently. Did not have insurance, so had issues following up but did see [facility] and was subsequent hospitalizations for complications of her liver disease. She got insurance this month, was referred for CD treatment and for transplant evaluation.     She was due to start CD treatment last week but was admitted. Attended 4 hours of group CD treatment yesterday, focusing on self care during this session.     She is weaker but walking with a walker.     Ascites  - Requiring serial  paracenteisis    HE  - No history of this    Varices    She has never had a Pap smear     Co morbidities  - Former tobacco use  - Gout   - Polyneuropathy  - Depression    ROS: 14 point ROS negative except for positives noted in HPI.    PMHx:  Past Medical History:   Diagnosis Date    Alcoholic cirrhosis of liver with ascites (H) 2023    History of alcohol abuse     Formatting of this note might be different from the original. In remission as of late 2023   Gout     PSHx:  None    FamHx:  No family history of liver disease  Older sister with mental health and alcohol use    SocHx:  Social History     Socioeconomic History    Marital status: Single     Spouse name: Not on file    Number of children: Not on file    Years of education: Not on file    Highest education level: Not on file   Occupational History    Not on file   Tobacco Use    Smoking status: Former     Types: Cigarettes     Quit date: 2023     Years since quittin.4    Smokeless tobacco: Never   Substance and Sexual Activity    Alcohol use: Not Currently     Comment: last ETOH use 2023    Drug use: Never    Sexual activity: Not on file   Other Topics Concern    Not on file   Social History Narrative    Not on file     Social Determinants of Health     Financial Resource Strain: Not on file   Food Insecurity: Not on file   Transportation Needs: Not on file   Physical Activity: Not on file   Stress: Not on file   Social Connections: Not on file   Interpersonal Safety: Not on file   Housing Stability: Not on file       Medications:  Current Outpatient Medications   Medication    gabapentin (NEURONTIN) 100 MG capsule    lactulose (CHRONULAC) 10 GM/15ML solution    melatonin 3 MG tablet    midodrine (PROAMATINE) 5 MG tablet    Multiple Vitamins-Minerals (THERA-TABS M) TABS    omeprazole (PRILOSEC) 20 MG DR capsule    sodium bicarbonate 650 MG tablet     No current facility-administered medications for this visit.       Allergies:   No  "Known Allergies    Objective:  BP (!) 88/54 (BP Location: Right arm, Patient Position: Sitting, Cuff Size: Adult Regular)   Pulse 75   Ht 1.6 m (5' 3\")   Wt 68.3 kg (150 lb 8 oz)   BMI 26.66 kg/m    Constitutional: chronically ill appearing woman in mild distress  Eyes: icteric  Respiratory: Normal respiratory excursion   MSK: normal range of motion of visualized extremities  Abd: +ascites  Skin:  jaundice  Neuro: AAOx3, sometimes slow to respond  Psychiatric: normal mood and orientation    Labs:  Last Comprehensive Metabolic Panel:  Sodium   Date Value Ref Range Status   10/31/2023 131 (L) 135 - 145 mmol/L Final     Comment:     Reference intervals for this test were updated on 09/26/2023 to more accurately reflect our healthy population. There may be differences in the flagging of prior results with similar values performed with this method. Interpretation of those prior results can be made in the context of the updated reference intervals.      Potassium   Date Value Ref Range Status   10/31/2023 3.7 3.4 - 5.3 mmol/L Final     Chloride   Date Value Ref Range Status   10/31/2023 92 (L) 98 - 107 mmol/L Final     Carbon Dioxide (CO2)   Date Value Ref Range Status   10/31/2023 20 (L) 22 - 29 mmol/L Final     Anion Gap   Date Value Ref Range Status   10/31/2023 19 (H) 7 - 15 mmol/L Final     Glucose   Date Value Ref Range Status   10/31/2023 67 (L) 70 - 99 mg/dL Final     Urea Nitrogen   Date Value Ref Range Status   10/31/2023 28.2 (H) 6.0 - 20.0 mg/dL Final     Creatinine   Date Value Ref Range Status   10/31/2023 2.76 (H) 0.51 - 0.95 mg/dL Final     GFR Estimate   Date Value Ref Range Status   10/31/2023 23 (L) >60 mL/min/1.73m2 Final     Calcium   Date Value Ref Range Status   10/31/2023 9.6 8.6 - 10.0 mg/dL Final     Bilirubin Total   Date Value Ref Range Status   10/31/2023 44.4 (HH) <=1.2 mg/dL Final     Alkaline Phosphatase   Date Value Ref Range Status   10/31/2023 137 (H) 35 - 104 U/L Final     ALT "   Date Value Ref Range Status   10/31/2023 19 0 - 50 U/L Final     Comment:     Reference intervals for this test were updated on 6/12/2023 to more accurately reflect our healthy population. There may be differences in the flagging of prior results with similar values performed with this method. Interpretation of those prior results can be made in the context of the updated reference intervals.       AST   Date Value Ref Range Status   10/31/2023 62 (H) 0 - 45 U/L Final     Comment:     Reference intervals for this test were updated on 6/12/2023 to more accurately reflect our healthy population. There may be differences in the flagging of prior results with similar values performed with this method. Interpretation of those prior results can be made in the context of the updated reference intervals.       Lab Results   Component Value Date    WBC 18.4 10/31/2023     Lab Results   Component Value Date    RBC 2.41 10/31/2023     Lab Results   Component Value Date    HGB 8.0 10/31/2023     Lab Results   Component Value Date    HCT 22.4 10/31/2023     Lab Results   Component Value Date    MCV 93 10/31/2023     Lab Results   Component Value Date    MCH 33.2 10/31/2023     Lab Results   Component Value Date    MCHC 35.7 10/31/2023     Lab Results   Component Value Date    RDW 22.1 10/31/2023     Lab Results   Component Value Date     10/31/2023       INR   Date Value Ref Range Status   10/31/2023 3.81 (H) 0.85 - 1.15 Final        MELD 3.0: 40 at 10/31/2023  8:14 AM  MELD-Na: 45 at 10/31/2023  8:14 AM  Calculated from:  Serum Creatinine: 2.76 mg/dL at 10/31/2023  8:14 AM  Serum Sodium: 131 mmol/L at 10/31/2023  8:14 AM  Total Bilirubin: 44.4 mg/dL at 10/31/2023  8:14 AM  Serum Albumin: 4.0 g/dL (Using max of 3.5 g/dL) at 10/31/2023  8:14 AM  INR(ratio): 3.81 at 10/31/2023  8:14 AM  Age at listing (hypothetical): 33 years  Sex: Female at 10/31/2023  8:14 AM      Imaging:    RUQ US 10/2023    FINDINGS:   Liver is  mildly enlarged measuring approximately 17.1 cm in the craniocaudad dimension.. Mild nodularity along the liver surface. Mild increase echogenicity along the pleural triads which is nonspecific. No discrete liver lesion identified on ultrasound imaging.     PEAK VELOCITIES:   Main portal vein: 24.6 cm/s, hepatopetal flow.   Left portal vein: 22.3 cm/s, hepatopetal flow.   Right portal vein: 33.3 cm/s, hepatopetal flow.   Splenic vein: 22 cm/s, hepatopetal flow.     HEPATIC ARTERY:   Peak systolic velocity: 232.3 cm/s.   End-diastolic velocity: 96.1 cm/s.   Resistive index: 0.59     IVC/MHV/LHV/RHV demonstrate normal phasicity. Main portal vein measures 1.16 cm.     Spleen: Enlarged measuring 15.9 x 5.1 x 15.3 cm     Ascites adjacent to the liver and spleen. The lower abdomen is not evaluated for ascites.     IMPRESSION:   1. Mildly enlarged liver with mild nodularity along the liver surface consistent with known history of liver cirrhosis. No discrete liver lesion identified on ultrasound.   2. Portal veins appear patent and hepatopetal. No portal vein thrombosis identified.   3. Splenomegaly.   4. Ascites adjacent to the liver and spleen.     MRCP 9/2023    FINDINGS:     Lung bases:.Small pleural effusions and dependent atelectasis left greater than right.     Liver: The liver is enlarged measuring 18.5 cm in length and demonstrates diffuse surface nodularity of cirrhosis. No enhancing mass lesion evident. No significant signal loss on out of phase imaging.     Gallbladder and bile ducts: Thick-walled but nondistended gallbladder with surrounding pericholecystic fluid.. No visible calculi. No bile duct dilatation or evidence of choledocholithiasis.     Spleen: The spleen is enlarged measuring 15 cm in length.     Pancreas: Normal pancreas.     Adrenal glands: Normal bilateral adrenal glands.     Bilateral kidneys and ureters: No renal calculus or hydronephrosis. Unremarkable ureters.     Bowel: No distention or  signs of obstruction. Small volume ascites present within the upper abdomen over the surface of the liver, spleen and between loops of intestine.     Vessels: No abdominal aortic aneurysm. Tangle of varices are present within the right paracolic region and the umbilical vein is recanalized. Additional small varices are visible around the GE junction.     Lymph nodes: No enlarged lymph nodes identified.     Abdominal wall: Diffuse subcutaneous edema.     Osseous: Normal osseous structures.      IMPRESSION:   1.  No bile duct dilatation or choledocholithiasis.   2.  Thick walled but contracted gallbladder without visible stone.   3.  Cirrhotic liver morphology, splenomegaly, varices, small volume ascites, small pleural effusions and anasarca.          Endoscopy:     EGD 9/2023    Findings:        Two columns of large (> 5 mm) varices with no stigmata of recent        bleeding were found in the lower third of the esophagus. They were 7 mm        in largest diameter. No red tevin signs were present. Three bands were        successfully placed with complete eradication, resulting in deflation of        varices. There was no bleeding during and at the end of the procedure.        Mild portal hypertensive gastropathy was found in the gastric fundus and        in the gastric body. No gastric varices appreciated.        Diffuse mildly erythematous mucosa without bleeding was found in the        gastric antrum. Biopsies were taken with a cold forceps for Helicobacter        pylori testing.        The examined duodenum was normal.                                                                                    Impression:        - Large (> 5 mm) esophageal varices with no stigmata of recent bleeding.        Completely eradicated. Banded.        - Portal hypertensive gastropathy. No gastric varices appreciated.        - Erythematous mucosa in the antrum. Biopsied.        - Normal examined duodenum.     Assessment/Plan: Ms.  Annia is a 33 year old woman with a history of AUD, ARLD, who presents for discussion of liver transplant.     Her labs done prior to the visit show worsening EDGAR and leukocytosis, recommend ED evaluation for IV albumin for EDGAR and infectious rule out (paracentesis, UA, blood cx, CXR)    Discussed as transplant selection today, felt to be reasonable from a social standpoint in terms of her AUD as she has engaged in treatment - although has only done one session due to insurance issues. Has family support with her today in clinic. Recommend admission for further medical management as above and to also complete her transplant evaluation (TTE, pap smear/HPV testing). Recommend PT consult while in house.    MELD 3.0: 40 at 10/31/2023  8:14 AM  MELD-Na: 45 at 10/31/2023  8:14 AM  Calculated from:  Serum Creatinine: 2.76 mg/dL at 10/31/2023  8:14 AM  Serum Sodium: 131 mmol/L at 10/31/2023  8:14 AM  Total Bilirubin: 44.4 mg/dL at 10/31/2023  8:14 AM  Serum Albumin: 4.0 g/dL (Using max of 3.5 g/dL) at 10/31/2023  8:14 AM  INR(ratio): 3.81 at 10/31/2023  8:14 AM  Age at listing (hypothetical): 33 years  Sex: Female at 10/31/2023  8:14 AM    LT evaluation  - Needs to continue to engage in CD treatment  - Recommend pap smear/HPV testing  - TTE    Eloisa Guajardo MD MSc  Transplant Hepatology  [facility]    Approximately 30 minutes was spent for the visit with 60 minutes of non face-to-face time were spent in review of the patient's medical record on the day of the visit. This included review of previous: clinic visits, hospital records, lab results, imaging studies, and documentation.  The findings from this review are summarized in the above note.

## 2023-10-31 NOTE — TELEPHONE ENCOUNTER
DATE:  10/31/2023     TIME OF RECEIPT FROM LAB: 1012    LAB TEST:  Total Bili/Direct Bili    LAB VALUE:  Total Bili=44.4--Direct Bili=>40    RESULTS GIVEN WITH READ-BACK TO:Maurilio Gonzalez     TIME LAB VALUE REPORTED TO PROVIDER:   1013  Lab phone # [phone]

## 2023-10-31 NOTE — PROGRESS NOTES
[facility]     Date of Visit: 10/31/2023    Reason for referral: LT evaluation    Subjective: Ms. Milner is a 33 year old woman with a history of AUD, ARLD who presents to discuss transplant    2019 - seen in urgent care for joint pain - found to have elevated LFTs   TBR 0.8, RUQ showing hepatic steatosis. Provider thought this was related to heavy alcohol use. She got labs with her PCP 2/2022 AST 65. Labs 5/2022 in ED for vaginal bleeding TBR 1.3      About a year and a half ago sought out a therapist for her mental health issues. Has tried to stop drinking in the past, would tell her mom when she quit for a short period in the past and was proud of this. 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 Attended AA in June and more recently. Did not have insurance, so had issues following up but did see [facility] and was subsequent hospitalizations for complications of her liver disease. She got insurance this month, was referred for CD treatment and for transplant evaluation.     She was due to start CD treatment last week but was admitted. Attended 4 hours of group CD treatment yesterday, focusing on self care during this session.     She is weaker but walking with a walker.     Ascites  - Requiring serial paracenteisis    HE  - No history of this    Varices    She has never had a Pap smear     Co morbidities  - Former tobacco use  - Gout   - Polyneuropathy  - Depression    ROS: 14 point ROS negative except for positives noted in HPI.    PMHx:  Past Medical History:   Diagnosis Date     Alcoholic cirrhosis of liver with ascites (H) 08/30/2023     History of alcohol abuse     Formatting of this note might be different from the original. In remission as of late June 2023   Gout  "    PSHx:  None    FamHx:  No family history of liver disease  Older sister with mental health and alcohol use    SocHx:  Social History     Socioeconomic History     Marital status: Single     Spouse name: Not on file     Number of children: Not on file     Years of education: Not on file     Highest education level: Not on file   Occupational History     Not on file   Tobacco Use     Smoking status: Former     Types: Cigarettes     Quit date: 2023     Years since quittin.4     Smokeless tobacco: Never   Substance and Sexual Activity     Alcohol use: Not Currently     Comment: last ETOH use 2023     Drug use: Never     Sexual activity: Not on file   Other Topics Concern     Not on file   Social History Narrative     Not on file     Social Determinants of Health     Financial Resource Strain: Not on file   Food Insecurity: Not on file   Transportation Needs: Not on file   Physical Activity: Not on file   Stress: Not on file   Social Connections: Not on file   Interpersonal Safety: Not on file   Housing Stability: Not on file       Medications:  Current Outpatient Medications   Medication     gabapentin (NEURONTIN) 100 MG capsule     lactulose (CHRONULAC) 10 GM/15ML solution     melatonin 3 MG tablet     midodrine (PROAMATINE) 5 MG tablet     Multiple Vitamins-Minerals (THERA-TABS M) TABS     omeprazole (PRILOSEC) 20 MG DR capsule     sodium bicarbonate 650 MG tablet     No current facility-administered medications for this visit.       Allergies:   No Known Allergies    Objective:  BP (!) 88/54 (BP Location: Right arm, Patient Position: Sitting, Cuff Size: Adult Regular)   Pulse 75   Ht 1.6 m (5' 3\")   Wt 68.3 kg (150 lb 8 oz)   BMI 26.66 kg/m    Constitutional: chronically ill appearing woman in mild distress  Eyes: icteric  Respiratory: Normal respiratory excursion   MSK: normal range of motion of visualized extremities  Abd: +ascites  Skin:  jaundice  Neuro: AAOx3, sometimes slow to " respond  Psychiatric: normal mood and orientation    Labs:  Last Comprehensive Metabolic Panel:  Sodium   Date Value Ref Range Status   10/31/2023 131 (L) 135 - 145 mmol/L Final     Comment:     Reference intervals for this test were updated on 09/26/2023 to more accurately reflect our healthy population. There may be differences in the flagging of prior results with similar values performed with this method. Interpretation of those prior results can be made in the context of the updated reference intervals.      Potassium   Date Value Ref Range Status   10/31/2023 3.7 3.4 - 5.3 mmol/L Final     Chloride   Date Value Ref Range Status   10/31/2023 92 (L) 98 - 107 mmol/L Final     Carbon Dioxide (CO2)   Date Value Ref Range Status   10/31/2023 20 (L) 22 - 29 mmol/L Final     Anion Gap   Date Value Ref Range Status   10/31/2023 19 (H) 7 - 15 mmol/L Final     Glucose   Date Value Ref Range Status   10/31/2023 67 (L) 70 - 99 mg/dL Final     Urea Nitrogen   Date Value Ref Range Status   10/31/2023 28.2 (H) 6.0 - 20.0 mg/dL Final     Creatinine   Date Value Ref Range Status   10/31/2023 2.76 (H) 0.51 - 0.95 mg/dL Final     GFR Estimate   Date Value Ref Range Status   10/31/2023 23 (L) >60 mL/min/1.73m2 Final     Calcium   Date Value Ref Range Status   10/31/2023 9.6 8.6 - 10.0 mg/dL Final     Bilirubin Total   Date Value Ref Range Status   10/31/2023 44.4 (HH) <=1.2 mg/dL Final     Alkaline Phosphatase   Date Value Ref Range Status   10/31/2023 137 (H) 35 - 104 U/L Final     ALT   Date Value Ref Range Status   10/31/2023 19 0 - 50 U/L Final     Comment:     Reference intervals for this test were updated on 6/12/2023 to more accurately reflect our healthy population. There may be differences in the flagging of prior results with similar values performed with this method. Interpretation of those prior results can be made in the context of the updated reference intervals.       AST   Date Value Ref Range Status   10/31/2023  62 (H) 0 - 45 U/L Final     Comment:     Reference intervals for this test were updated on 6/12/2023 to more accurately reflect our healthy population. There may be differences in the flagging of prior results with similar values performed with this method. Interpretation of those prior results can be made in the context of the updated reference intervals.       Lab Results   Component Value Date    WBC 18.4 10/31/2023     Lab Results   Component Value Date    RBC 2.41 10/31/2023     Lab Results   Component Value Date    HGB 8.0 10/31/2023     Lab Results   Component Value Date    HCT 22.4 10/31/2023     Lab Results   Component Value Date    MCV 93 10/31/2023     Lab Results   Component Value Date    MCH 33.2 10/31/2023     Lab Results   Component Value Date    MCHC 35.7 10/31/2023     Lab Results   Component Value Date    RDW 22.1 10/31/2023     Lab Results   Component Value Date     10/31/2023       INR   Date Value Ref Range Status   10/31/2023 3.81 (H) 0.85 - 1.15 Final        MELD 3.0: 40 at 10/31/2023  8:14 AM  MELD-Na: 45 at 10/31/2023  8:14 AM  Calculated from:  Serum Creatinine: 2.76 mg/dL at 10/31/2023  8:14 AM  Serum Sodium: 131 mmol/L at 10/31/2023  8:14 AM  Total Bilirubin: 44.4 mg/dL at 10/31/2023  8:14 AM  Serum Albumin: 4.0 g/dL (Using max of 3.5 g/dL) at 10/31/2023  8:14 AM  INR(ratio): 3.81 at 10/31/2023  8:14 AM  Age at listing (hypothetical): 33 years  Sex: Female at 10/31/2023  8:14 AM      Imaging:    RUQ US 10/2023    FINDINGS:   Liver is mildly enlarged measuring approximately 17.1 cm in the craniocaudad dimension.. Mild nodularity along the liver surface. Mild increase echogenicity along the pleural triads which is nonspecific. No discrete liver lesion identified on ultrasound imaging.     PEAK VELOCITIES:   Main portal vein: 24.6 cm/s, hepatopetal flow.   Left portal vein: 22.3 cm/s, hepatopetal flow.   Right portal vein: 33.3 cm/s, hepatopetal flow.   Splenic vein: 22 cm/s,  hepatopetal flow.     HEPATIC ARTERY:   Peak systolic velocity: 232.3 cm/s.   End-diastolic velocity: 96.1 cm/s.   Resistive index: 0.59     IVC/MHV/LHV/RHV demonstrate normal phasicity. Main portal vein measures 1.16 cm.     Spleen: Enlarged measuring 15.9 x 5.1 x 15.3 cm     Ascites adjacent to the liver and spleen. The lower abdomen is not evaluated for ascites.     IMPRESSION:   1. Mildly enlarged liver with mild nodularity along the liver surface consistent with known history of liver cirrhosis. No discrete liver lesion identified on ultrasound.   2. Portal veins appear patent and hepatopetal. No portal vein thrombosis identified.   3. Splenomegaly.   4. Ascites adjacent to the liver and spleen.     MRCP 9/2023    FINDINGS:     Lung bases:.Small pleural effusions and dependent atelectasis left greater than right.     Liver: The liver is enlarged measuring 18.5 cm in length and demonstrates diffuse surface nodularity of cirrhosis. No enhancing mass lesion evident. No significant signal loss on out of phase imaging.     Gallbladder and bile ducts: Thick-walled but nondistended gallbladder with surrounding pericholecystic fluid.. No visible calculi. No bile duct dilatation or evidence of choledocholithiasis.     Spleen: The spleen is enlarged measuring 15 cm in length.     Pancreas: Normal pancreas.     Adrenal glands: Normal bilateral adrenal glands.     Bilateral kidneys and ureters: No renal calculus or hydronephrosis. Unremarkable ureters.     Bowel: No distention or signs of obstruction. Small volume ascites present within the upper abdomen over the surface of the liver, spleen and between loops of intestine.     Vessels: No abdominal aortic aneurysm. Tangle of varices are present within the right paracolic region and the umbilical vein is recanalized. Additional small varices are visible around the GE junction.     Lymph nodes: No enlarged lymph nodes identified.     Abdominal wall: Diffuse subcutaneous  edema.     Osseous: Normal osseous structures.      IMPRESSION:   1.  No bile duct dilatation or choledocholithiasis.   2.  Thick walled but contracted gallbladder without visible stone.   3.  Cirrhotic liver morphology, splenomegaly, varices, small volume ascites, small pleural effusions and anasarca.          Endoscopy:     EGD 9/2023    Findings:        Two columns of large (> 5 mm) varices with no stigmata of recent        bleeding were found in the lower third of the esophagus. They were 7 mm        in largest diameter. No red tevin signs were present. Three bands were        successfully placed with complete eradication, resulting in deflation of        varices. There was no bleeding during and at the end of the procedure.        Mild portal hypertensive gastropathy was found in the gastric fundus and        in the gastric body. No gastric varices appreciated.        Diffuse mildly erythematous mucosa without bleeding was found in the        gastric antrum. Biopsies were taken with a cold forceps for Helicobacter        pylori testing.        The examined duodenum was normal.                                                                                    Impression:        - Large (> 5 mm) esophageal varices with no stigmata of recent bleeding.        Completely eradicated. Banded.        - Portal hypertensive gastropathy. No gastric varices appreciated.        - Erythematous mucosa in the antrum. Biopsied.        - Normal examined duodenum.     Assessment/Plan: Ms. Milner is a 33 year old woman with a history of AUD, ARLD, who presents for discussion of liver transplant.     Her labs done prior to the visit show worsening EDGAR and leukocytosis, recommend ED evaluation for IV albumin for EDGAR and infectious rule out (paracentesis, UA, blood cx, CXR)    Discussed as transplant selection today, felt to be reasonable from a social standpoint in terms of her AUD as she has engaged in treatment - although has only  done one session due to insurance issues. Has family support with her today in clinic. Recommend admission for further medical management as above and to also complete her transplant evaluation (TTE, pap smear/HPV testing). Recommend PT consult while in house.    MELD 3.0: 40 at 10/31/2023  8:14 AM  MELD-Na: 45 at 10/31/2023  8:14 AM  Calculated from:  Serum Creatinine: 2.76 mg/dL at 10/31/2023  8:14 AM  Serum Sodium: 131 mmol/L at 10/31/2023  8:14 AM  Total Bilirubin: 44.4 mg/dL at 10/31/2023  8:14 AM  Serum Albumin: 4.0 g/dL (Using max of 3.5 g/dL) at 10/31/2023  8:14 AM  INR(ratio): 3.81 at 10/31/2023  8:14 AM  Age at listing (hypothetical): 33 years  Sex: Female at 10/31/2023  8:14 AM    LT evaluation  - Needs to continue to engage in CD treatment  - Recommend pap smear/HPV testing  - TTE    Eloisa Guajardo MD MSc  Transplant Hepatology  [facility]    Approximately 30 minutes was spent for the visit with 60 minutes of non face-to-face time were spent in review of the patient's medical record on the day of the visit. This included review of previous: clinic visits, hospital records, lab results, imaging studies, and documentation.  The findings from this review are summarized in the above note.

## 2023-10-31 NOTE — ED PROVIDER NOTES
[place] EMERGENCY DEPARTMENT ([facility])    10/31/23       ED PROVIDER NOTE        History     Chief Complaint   Patient presents with    Abnormal Labs     HPI  Lola Milner is a 33 year old female with a past medical history significant for end stage liver disease, decompensated alcoholic cirrhosis with history of hepatic encephalopathy, esophageal varices s/p banding 9/2023, recurrent ascites, MDD and tobacco use  who presents to the Emergency Department for evaluation of liver failure.  Patient says that she was discharged from [facility] in in [place] 2 days prior.  Patient says they are waiting for her Medicaid to go through so she get approval to come to the [facility].  The left yesterday from [place] and came here to the emergency [place] to be evaluated for possible liver transplant.  Patient had a full day of evaluation for a liver transplant.  Patient had blood work done this morning.  Patient was transferred to the ER to be directly admitted.  Patient says that she was feeling stable yesterday.  Says that now that she had a full day of evaluation she is feeling more tired and weak.  Family notes that her yellow skin is stable and has not worsened.  They say that her condition has been similarly since July of this past summer.  Patient has been sober.  Patient notes some mild shortness of breath.  Notes mild swelling in her legs but says is improved since previous.      Past Medical History  Past Medical History:   Diagnosis Date    Alcoholic cirrhosis of liver with ascites (H) 08/30/2023    History of alcohol abuse     Formatting of this note might be different from the original. In remission as of late June 2023     No past surgical history on file.  gabapentin (NEURONTIN) 100 MG capsule  lactulose (CHRONULAC) 10 GM/15ML solution  melatonin 3 MG tablet  midodrine (PROAMATINE) 5 MG tablet  Multiple Vitamins-Minerals (THERA-TABS M) TABS  omeprazole (PRILOSEC) 20 MG   capsule  sodium bicarbonate 650 MG tablet      No Known Allergies  Family History  No family history on file.  Social History   Social History     Tobacco Use    Smoking status: Former     Types: Cigarettes     Quit date: 2023     Years since quittin.4    Smokeless tobacco: Never   Substance Use Topics    Alcohol use: Not Currently     Comment: last ETOH use 2023    Drug use: Never      Past medical history, past surgical history, medications, allergies, family history, and social history were reviewed with the patient. No additional pertinent items.      A complete review of systems was performed with pertinent positives and negatives noted in the HPI, and all other systems negative.    Physical Exam   BP: (!) 83/43  Pulse: 72  Temp: 97.5  F (36.4  C)  Resp: 15  SpO2: 99 %  Physical Exam  Constitutional:       General: She is not in acute distress.     Appearance: She is well-developed. She is ill-appearing. She is not toxic-appearing or diaphoretic.      Comments: Extremely icteric skin.  Answering questions.  Oriented.   HENT:      Head: Normocephalic and atraumatic.   Eyes:      General: Scleral icterus present.   Cardiovascular:      Rate and Rhythm: Normal rate and regular rhythm.      Heart sounds: Normal heart sounds.   Pulmonary:      Effort: Pulmonary effort is normal. No respiratory distress.      Breath sounds: Normal breath sounds.   Abdominal:      General: There is no distension.      Palpations: Abdomen is soft.      Tenderness: There is no abdominal tenderness. There is no rebound.   Musculoskeletal:         General: No tenderness.      Cervical back: Normal range of motion.      Right lower leg: Edema present.      Left lower leg: Edema present.   Skin:     General: Skin is warm and dry.   Neurological:      Mental Status: She is alert and oriented to person, place, and time.      Comments: 2 flap asterixis   Psychiatric:         Behavior: Behavior normal.         Thought Content:  Thought content normal.           ED Course, Procedures, & Data      Procedures            EKG Interpretation:      Interpreted by Trina Ramos MD  Time reviewed: 1302  Symptoms at time of EKG: None   Rhythm: normal sinus   Rate: Normal  Axis: Normal  Ectopy: none  Conduction: normal  ST Segments/ T Waves: No ST-T wave changes  Q Waves: none  Comparison to prior: No old EKG available    Clinical Impression: non-specific EKG                       Results for orders placed or performed during the hospital encounter of 10/31/23   XR Chest Port 1 View     Status: None    Narrative    EXAM: XR CHEST PORT 1 VIEW 10/31/2023 12:59 PM      HISTORY: sob.    COMPARISON: None.     TECHNIQUE: Frontal view of the chest.    FINDINGS: Trachea is midline. Mild cardiomegaly.. Pulmonary  vasculature is distinct. No focal airspace opacity, pleural effusion,  pneumothorax. Low lung volumes.    No acute osseous abnormality. Visualized soft tissues and upper  abdomen are unremarkable.      Impression    IMPRESSION:   1. No focal consolidative opacity.  2. Cardiomegaly.  3. Low lung volumes.    I have personally reviewed the examination and initial interpretation  and I agree with the findings.    YAZ IRVIN MD         SYSTEM ID:  [id number]   Ammonia (on ice)     Status: Abnormal   Result Value Ref Range    Ammonia 77 (H) 11 - 51 umol/L   EKG 12-lead, tracing only     Status: None (Preliminary result)   Result Value Ref Range    Systolic Blood Pressure  mmHg    Diastolic Blood Pressure  mmHg    Ventricular Rate 68 BPM    Atrial Rate 68 BPM    TN Interval 158 ms    QRS Duration 106 ms     ms    QTc 467 ms    P Axis 60 degrees    R AXIS 26 degrees    T Axis 33 degrees    Interpretation ECG       Sinus rhythm  Low voltage QRS  Cannot rule out Anterior infarct , age undetermined  Abnormal ECG     Results for orders placed or performed in visit on 10/31/23   Basic metabolic panel     Status: Abnormal   Result Value Ref  Range    Sodium 131 (L) 135 - 145 mmol/L    Potassium 3.7 3.4 - 5.3 mmol/L    Chloride 92 (L) 98 - 107 mmol/L    Carbon Dioxide (CO2) 20 (L) 22 - 29 mmol/L    Anion Gap 19 (H) 7 - 15 mmol/L    Urea Nitrogen 28.2 (H) 6.0 - 20.0 mg/dL    Creatinine 2.76 (H) 0.51 - 0.95 mg/dL    GFR Estimate 23 (L) >60 mL/min/1.73m2    Calcium 9.6 8.6 - 10.0 mg/dL    Glucose 67 (L) 70 - 99 mg/dL   Hepatic panel     Status: Abnormal   Result Value Ref Range    Protein Total      Albumin 4.0 3.5 - 5.2 g/dL    Bilirubin Total 44.4 (HH) <=1.2 mg/dL    Alkaline Phosphatase 137 (H) 35 - 104 U/L    AST 62 (H) 0 - 45 U/L    ALT 19 0 - 50 U/L    Bilirubin Direct >40.00 (H) 0.00 - 0.30 mg/dL   HCG qualitative (female only)     Status: Normal   Result Value Ref Range    hCG Serum Qualitative Negative Negative   Vitamin D Deficiency     Status: Abnormal   Result Value Ref Range    Vitamin D, Total (25-Hydroxy) <6 (L) 20 - 50 ng/mL    Narrative    Season, race, dietary intake, and treatment affect the concentration of 25-hydroxy-Vitamin D. Values may decrease during winter months and increase during summer months.    Vitamin D determination is routinely performed by an immunoassay specific for 25 hydroxyvitamin D3.  If an individual is on vitamin D2(ergocalciferol) supplementation, please specify 25 OH vitamin D2 and D3 level determination by LCMSMS test VITD23.     INR     Status: Abnormal   Result Value Ref Range    INR 3.81 (H) 0.85 - 1.15   CBC with platelets     Status: Abnormal   Result Value Ref Range    WBC Count 18.4 (H) 4.0 - 11.0 10e3/uL    RBC Count 2.41 (L) 3.80 - 5.20 10e6/uL    Hemoglobin 8.0 (L) 11.7 - 15.7 g/dL    Hematocrit 22.4 (L) 35.0 - 47.0 %    MCV 93 78 - 100 fL    MCH 33.2 (H) 26.5 - 33.0 pg    MCHC 35.7 31.5 - 36.5 g/dL    RDW 22.1 (H) 10.0 - 15.0 %    Platelet Count 115 (L) 150 - 450 10e3/uL   CMV Antibody IgG     Status: Normal   Result Value Ref Range    CMV Ernestine IgG Instrument Value <0.20 <0.60 U/mL    CMV Antibody IgG  No detectable antibody. No detectable antibody.    EBV Capsid Antibody IgG     Status: Abnormal   Result Value Ref Range    EBV Capsid Ernestine IgG Instrument Value 69.3 (H) <18.0 U/mL    EBV Capsid Antibody IgG Positive (A) No detectable antibody.   Treponema Abs w Reflex to RPR and Titer     Status: Normal   Result Value Ref Range    Treponema Antibody Total Nonreactive Nonreactive   ABO and Rh     Status: None   Result Value Ref Range    ABO/RH(D) A POS     SPECIMEN EXPIRATION DATE 20231103235900    Antibody titer red cell     Status: None   Result Value Ref Range    Anti-B IgG Titer 8     Anti-B IgM Titer 8     SPECIMEN EXPIRATION DATE [id number]     ANTIBODY TITER IGM SCREEN Negative    Adult Type and Screen     Status: None   Result Value Ref Range    ABO/RH(D) A POS     Antibody Screen Negative Negative    SPECIMEN EXPIRATION DATE [id number]    ABO/Rh type and screen     Status: None    Narrative    The following orders were created for panel order ABO/Rh type and screen.  Procedure                               Abnormality         Status                     ---------                               -----------         ------                     Adult Type and Screen[[id number]]                            Final result                 Please view results for these tests on the individual orders.   Quantiferon TB Gold Plus     Status: None (In process)    Specimen: Arm, Right; Blood    Narrative    The following orders were created for panel order Quantiferon TB Gold Plus.  Procedure                               Abnormality         Status                     ---------                               -----------         ------                     Quantiferon TB Gold Plus...[[id number]]                      In process                 Quantiferon TB Gold Plus...[[id number]]                      In process                 Quantiferon TB Gold Plus...[[id number]]                      In process                  Quantiferon TB Gold Plus...[[id number]]                      In process                   Please view results for these tests on the individual orders.     Medications - No data to display  Labs Ordered and Resulted from Time of ED Arrival to Time of ED Departure   AMMONIA - Abnormal       Result Value    Ammonia 77 (*)      XR Chest Port 1 View   Final Result   IMPRESSION:    1. No focal consolidative opacity.   2. Cardiomegaly.   3. Low lung volumes.      I have personally reviewed the examination and initial interpretation   and I agree with the findings.      YAZ IRVIN MD            SYSTEM ID:  [id number]             Critical care was performed.   Critical Care Addendum  My initial assessment, based on my review of vital signs, physical exam, and discussion with transplant surgery , established a high suspicion that Lola Milner has  ESLD , which requires immediate intervention, and therefore she is critically ill.     After the initial assessment, the care team initiated multiple lab tests and consulted with transplant surgery  to provide stabilization care. Due to the critical nature of this patient, I reassessed vital signs, physical exam, and mental status multiple times prior to her disposition.     Time also spent performing documentation and discussion with family to obtain medical information for decision making.     Critical care time (excluding teaching time and procedures): 30 minutes.     Assessment & Plan    Patient is a 33-year-old female with a known history of end-stage liver disease who presents here to be admitted for evaluation for possible liver transplant.  Patient's MELD score is above 20.  Patient has severe hepatorenal disease with a creatinine that is elevated and a T. bili that is above 40.  Patient's case was initially discussed with the transplant surgery team who recommends internal medicine admission.  Patient will be admitted to internal medicine with hepatology on  consult.  Patient and family are aware of plan of care.    I have reviewed the nursing notes. I have reviewed the findings, diagnosis, plan and need for follow up with the patient.    New Prescriptions    No medications on file       Final diagnoses:   End stage liver disease (H)       Trina NICOLE [facility] EMERGENCY DEPARTMENT  10/31/2023     Trina Ramos MD  10/31/23 2123

## 2023-11-01 ENCOUNTER — APPOINTMENT (OUTPATIENT)
Dept: PHYSICAL THERAPY | Facility: CLINIC | Age: 33
End: 2023-11-01
Attending: PHYSICIAN ASSISTANT
Payer: COMMERCIAL

## 2023-11-01 ENCOUNTER — APPOINTMENT (OUTPATIENT)
Dept: CARDIOLOGY | Facility: CLINIC | Age: 33
End: 2023-11-01
Attending: PHYSICIAN ASSISTANT
Payer: COMMERCIAL

## 2023-11-01 ENCOUNTER — APPOINTMENT (OUTPATIENT)
Dept: OCCUPATIONAL THERAPY | Facility: CLINIC | Age: 33
End: 2023-11-01
Attending: PHYSICIAN ASSISTANT
Payer: COMMERCIAL

## 2023-11-01 LAB
ABSOLUTE NEUTROPHILS, BODY FLUID: 9.5 /UL
ACANTHOCYTES BLD QL SMEAR: SLIGHT
ALBUMIN BODY FLUID SOURCE: NORMAL
ALBUMIN FLD-MCNC: 1.7 G/DL
ALBUMIN SERPL BCG-MCNC: 4.4 G/DL (ref 3.5–5.2)
ALP SERPL-CCNC: 77 U/L (ref 35–104)
ALT SERPL W P-5'-P-CCNC: 14 U/L (ref 0–50)
ANION GAP SERPL CALCULATED.3IONS-SCNC: 19 MMOL/L (ref 7–15)
APPEARANCE FLD: CLEAR
AST SERPL W P-5'-P-CCNC: 39 U/L (ref 0–45)
AUER BODIES BLD QL SMEAR: ABNORMAL
BACTERIA UR CULT: NORMAL
BASO STIPL BLD QL SMEAR: ABNORMAL
BASOPHILS # BLD AUTO: 0 10E3/UL (ref 0–0.2)
BASOPHILS NFR BLD AUTO: 0 %
BILIRUB SERPL-MCNC: 33.8 MG/DL
BITE CELLS BLD QL SMEAR: ABNORMAL
BLD PROD TYP BPU: NORMAL
BLISTER CELLS BLD QL SMEAR: ABNORMAL
BLOOD COMPONENT TYPE: NORMAL
BUN SERPL-MCNC: 32.9 MG/DL (ref 6–20)
BURR CELLS BLD QL SMEAR: ABNORMAL
CALCIUM SERPL-MCNC: 9.3 MG/DL (ref 8.6–10)
CELL COUNT BODY FLUID SOURCE: NORMAL
CHLORIDE SERPL-SCNC: 90 MMOL/L (ref 98–107)
CODING SYSTEM: NORMAL
COLOR FLD: YELLOW
CREAT SERPL-MCNC: 2.64 MG/DL (ref 0.51–0.95)
CROSSMATCH: NORMAL
CROSSMATCH: NORMAL
DACRYOCYTES BLD QL SMEAR: ABNORMAL
DEPRECATED HCO3 PLAS-SCNC: 19 MMOL/L (ref 22–29)
EGFRCR SERPLBLD CKD-EPI 2021: 24 ML/MIN/1.73M2
ELLIPTOCYTES BLD QL SMEAR: ABNORMAL
EOSINOPHIL # BLD AUTO: 0.1 10E3/UL (ref 0–0.7)
EOSINOPHIL NFR BLD AUTO: 1 %
ERYTHROCYTE [DISTWIDTH] IN BLOOD BY AUTOMATED COUNT: 20.6 % (ref 10–15)
ERYTHROCYTE [DISTWIDTH] IN BLOOD BY AUTOMATED COUNT: 22 % (ref 10–15)
FIBRINOGEN PPP-MCNC: <61 MG/DL (ref 170–490)
FIBRINOGEN PPP-MCNC: <61 MG/DL (ref 170–490)
FRAGMENTS BLD QL SMEAR: ABNORMAL
GAMMA INTERFERON BACKGROUND BLD IA-ACNC: 0 IU/ML
GLUCOSE SERPL-MCNC: 101 MG/DL (ref 70–99)
GRAM STAIN RESULT: NORMAL
GRAM STAIN RESULT: NORMAL
HCT VFR BLD AUTO: 14.6 % (ref 35–47)
HCT VFR BLD AUTO: 20.4 % (ref 35–47)
HGB BLD-MCNC: 5.1 G/DL (ref 11.7–15.7)
HGB BLD-MCNC: 7.2 G/DL (ref 11.7–15.7)
HGB C CRYSTALS: ABNORMAL
HOLD SPECIMEN HIT: NORMAL
HOLD SPECIMEN: NORMAL
HOWELL-JOLLY BOD BLD QL SMEAR: ABNORMAL
IMM GRANULOCYTES # BLD: 0.1 10E3/UL
IMM GRANULOCYTES NFR BLD: 1 %
INR PPP: 4.85 (ref 0.85–1.15)
ISSUE DATE AND TIME: NORMAL
LVEF ECHO: NORMAL
LYMPHOCYTES # BLD AUTO: 1.1 10E3/UL (ref 0.8–5.3)
LYMPHOCYTES NFR BLD AUTO: 12 %
LYMPHOCYTES NFR FLD MANUAL: 37 %
M TB IFN-G BLD-IMP: ABNORMAL
M TB IFN-G CD4+ BCKGRND COR BLD-ACNC: 0.21 IU/ML
MCH RBC QN AUTO: 32.4 PG (ref 26.5–33)
MCH RBC QN AUTO: 32.7 PG (ref 26.5–33)
MCHC RBC AUTO-ENTMCNC: 34.9 G/DL (ref 31.5–36.5)
MCHC RBC AUTO-ENTMCNC: 35.3 G/DL (ref 31.5–36.5)
MCV RBC AUTO: 92 FL (ref 78–100)
MCV RBC AUTO: 94 FL (ref 78–100)
MITOGEN IGNF BCKGRD COR BLD-ACNC: 0 IU/ML
MITOGEN IGNF BCKGRD COR BLD-ACNC: 0 IU/ML
MONOCYTES # BLD AUTO: 0.5 10E3/UL (ref 0–1.3)
MONOCYTES NFR BLD AUTO: 6 %
MONOS+MACROS NFR FLD MANUAL: 58 %
NEUTROPHILS # BLD AUTO: 7.5 10E3/UL (ref 1.6–8.3)
NEUTROPHILS NFR BLD AUTO: 80 %
NEUTS BAND NFR FLD MANUAL: 6 %
NEUTS HYPERSEG BLD QL SMEAR: ABNORMAL
NRBC # BLD AUTO: 0 10E3/UL
NRBC BLD AUTO-RTO: 0 /100
PF4 HEPARIN CMPLX AB SER QL: NEGATIVE
PLAT MORPH BLD: ABNORMAL
PLATELET # BLD AUTO: 70 10E3/UL (ref 150–450)
PLATELET # BLD AUTO: 71 10E3/UL (ref 150–450)
POLYCHROMASIA BLD QL SMEAR: ABNORMAL
POTASSIUM SERPL-SCNC: 3.6 MMOL/L (ref 3.4–5.3)
PROT FLD-MCNC: 1.6 G/DL
PROT SERPL-MCNC: ABNORMAL G/DL
PROTEIN BODY FLUID SOURCE: NORMAL
QUANTIFERON MITOGEN: 0.21 IU/ML
QUANTIFERON NIL TUBE: 0 IU/ML
QUANTIFERON TB1 TUBE: 0 IU/ML
QUANTIFERON TB2 TUBE: 0
RBC # BLD AUTO: 1.56 10E6/UL (ref 3.8–5.2)
RBC # BLD AUTO: 2.22 10E6/UL (ref 3.8–5.2)
RBC AGGLUT BLD QL: ABNORMAL
RBC MORPH BLD: ABNORMAL
ROULEAUX BLD QL SMEAR: ABNORMAL
SICKLE CELLS BLD QL SMEAR: ABNORMAL
SMUDGE CELLS BLD QL SMEAR: ABNORMAL
SODIUM SERPL-SCNC: 128 MMOL/L (ref 135–145)
SPHEROCYTES BLD QL SMEAR: ABNORMAL
STOMATOCYTES BLD QL SMEAR: ABNORMAL
TARGETS BLD QL SMEAR: ABNORMAL
TOXIC GRANULES BLD QL SMEAR: ABNORMAL
UNIT ABO/RH: NORMAL
UNIT NUMBER: NORMAL
UNIT STATUS: NORMAL
UNIT TYPE ISBT: 6200
UNIT TYPE ISBT: 6200
UNIT TYPE ISBT: 8400
VARIANT LYMPHS BLD QL SMEAR: ABNORMAL
WBC # BLD AUTO: 9.3 10E3/UL (ref 4–11)
WBC # BLD AUTO: 9.5 10E3/UL (ref 4–11)
WBC # FLD AUTO: 159 /UL

## 2023-11-01 PROCEDURE — 85384 FIBRINOGEN ACTIVITY: CPT | Performed by: PHYSICIAN ASSISTANT

## 2023-11-01 PROCEDURE — P9012 CRYOPRECIPITATE EACH UNIT: HCPCS | Performed by: INTERNAL MEDICINE

## 2023-11-01 PROCEDURE — P9047 ALBUMIN (HUMAN), 25%, 50ML: HCPCS | Performed by: PHYSICIAN ASSISTANT

## 2023-11-01 PROCEDURE — 250N000013 HC RX MED GY IP 250 OP 250 PS 637: Performed by: INTERNAL MEDICINE

## 2023-11-01 PROCEDURE — 0W9G3ZX DRAINAGE OF PERITONEAL CAVITY, PERCUTANEOUS APPROACH, DIAGNOSTIC: ICD-10-PCS | Performed by: INTERNAL MEDICINE

## 2023-11-01 PROCEDURE — P9047 ALBUMIN (HUMAN), 25%, 50ML: HCPCS | Performed by: INTERNAL MEDICINE

## 2023-11-01 PROCEDURE — 85027 COMPLETE CBC AUTOMATED: CPT | Performed by: STUDENT IN AN ORGANIZED HEALTH CARE EDUCATION/TRAINING PROGRAM

## 2023-11-01 PROCEDURE — 93321 DOPPLER ECHO F-UP/LMTD STD: CPT | Mod: 26 | Performed by: INTERNAL MEDICINE

## 2023-11-01 PROCEDURE — 93308 TTE F-UP OR LMTD: CPT

## 2023-11-01 PROCEDURE — 250N000011 HC RX IP 250 OP 636: Performed by: INTERNAL MEDICINE

## 2023-11-01 PROCEDURE — 36415 COLL VENOUS BLD VENIPUNCTURE: CPT | Performed by: STUDENT IN AN ORGANIZED HEALTH CARE EDUCATION/TRAINING PROGRAM

## 2023-11-01 PROCEDURE — 82042 OTHER SOURCE ALBUMIN QUAN EA: CPT | Performed by: PHYSICIAN ASSISTANT

## 2023-11-01 PROCEDURE — 87070 CULTURE OTHR SPECIMN AEROBIC: CPT | Performed by: PHYSICIAN ASSISTANT

## 2023-11-01 PROCEDURE — 97116 GAIT TRAINING THERAPY: CPT | Mod: GP

## 2023-11-01 PROCEDURE — 93325 DOPPLER ECHO COLOR FLOW MAPG: CPT | Mod: 26 | Performed by: INTERNAL MEDICINE

## 2023-11-01 PROCEDURE — 97530 THERAPEUTIC ACTIVITIES: CPT | Mod: GP

## 2023-11-01 PROCEDURE — 84157 ASSAY OF PROTEIN OTHER: CPT | Performed by: PHYSICIAN ASSISTANT

## 2023-11-01 PROCEDURE — 36415 COLL VENOUS BLD VENIPUNCTURE: CPT | Performed by: INTERNAL MEDICINE

## 2023-11-01 PROCEDURE — 93325 DOPPLER ECHO COLOR FLOW MAPG: CPT

## 2023-11-01 PROCEDURE — 82040 ASSAY OF SERUM ALBUMIN: CPT | Performed by: PHYSICIAN ASSISTANT

## 2023-11-01 PROCEDURE — 85025 COMPLETE CBC W/AUTO DIFF WBC: CPT | Performed by: PHYSICIAN ASSISTANT

## 2023-11-01 PROCEDURE — 250N000013 HC RX MED GY IP 250 OP 250 PS 637: Performed by: EMERGENCY MEDICINE

## 2023-11-01 PROCEDURE — 250N000011 HC RX IP 250 OP 636: Mod: JZ | Performed by: STUDENT IN AN ORGANIZED HEALTH CARE EDUCATION/TRAINING PROGRAM

## 2023-11-01 PROCEDURE — 250N000011 HC RX IP 250 OP 636: Performed by: PHYSICIAN ASSISTANT

## 2023-11-01 PROCEDURE — 86022 PLATELET ANTIBODIES: CPT | Performed by: PHYSICIAN ASSISTANT

## 2023-11-01 PROCEDURE — 85384 FIBRINOGEN ACTIVITY: CPT | Performed by: INTERNAL MEDICINE

## 2023-11-01 PROCEDURE — 97161 PT EVAL LOW COMPLEX 20 MIN: CPT | Mod: GP

## 2023-11-01 PROCEDURE — 87205 SMEAR GRAM STAIN: CPT | Performed by: PHYSICIAN ASSISTANT

## 2023-11-01 PROCEDURE — P9016 RBC LEUKOCYTES REDUCED: HCPCS | Performed by: INTERNAL MEDICINE

## 2023-11-01 PROCEDURE — 36415 COLL VENOUS BLD VENIPUNCTURE: CPT | Performed by: PHYSICIAN ASSISTANT

## 2023-11-01 PROCEDURE — 97165 OT EVAL LOW COMPLEX 30 MIN: CPT | Mod: GO | Performed by: OCCUPATIONAL THERAPIST

## 2023-11-01 PROCEDURE — 49083 ABD PARACENTESIS W/IMAGING: CPT | Performed by: INTERNAL MEDICINE

## 2023-11-01 PROCEDURE — 250N000009 HC RX 250: Performed by: INTERNAL MEDICINE

## 2023-11-01 PROCEDURE — 85610 PROTHROMBIN TIME: CPT | Performed by: PHYSICIAN ASSISTANT

## 2023-11-01 PROCEDURE — 89050 BODY FLUID CELL COUNT: CPT | Performed by: PHYSICIAN ASSISTANT

## 2023-11-01 PROCEDURE — 99232 SBSQ HOSP IP/OBS MODERATE 35: CPT | Mod: 25 | Performed by: INTERNAL MEDICINE

## 2023-11-01 PROCEDURE — 93308 TTE F-UP OR LMTD: CPT | Mod: 26 | Performed by: INTERNAL MEDICINE

## 2023-11-01 PROCEDURE — 250N000013 HC RX MED GY IP 250 OP 250 PS 637: Performed by: PHYSICIAN ASSISTANT

## 2023-11-01 PROCEDURE — 120N000011 HC R&B TRANSPLANT UMMC

## 2023-11-01 PROCEDURE — 97535 SELF CARE MNGMENT TRAINING: CPT | Mod: GO | Performed by: OCCUPATIONAL THERAPIST

## 2023-11-01 RX ORDER — ERYTHROMYCIN 5 MG/G
OINTMENT OPHTHALMIC 2 TIMES DAILY
Status: COMPLETED | OUTPATIENT
Start: 2023-11-01 | End: 2023-11-04

## 2023-11-01 RX ORDER — CARBOXYMETHYLCELLULOSE SODIUM 5 MG/ML
1 SOLUTION/ DROPS OPHTHALMIC 4 TIMES DAILY
Status: DISCONTINUED | OUTPATIENT
Start: 2023-11-01 | End: 2023-11-15 | Stop reason: HOSPADM

## 2023-11-01 RX ORDER — FOLIC ACID 1 MG/1
1 TABLET ORAL DAILY
Status: COMPLETED | OUTPATIENT
Start: 2023-11-01 | End: 2023-11-07

## 2023-11-01 RX ORDER — ONDANSETRON 2 MG/ML
4 INJECTION INTRAMUSCULAR; INTRAVENOUS EVERY 6 HOURS PRN
Status: DISCONTINUED | OUTPATIENT
Start: 2023-11-01 | End: 2023-11-15 | Stop reason: HOSPADM

## 2023-11-01 RX ORDER — CHOLECALCIFEROL (VITAMIN D3) 1250 MCG
1250 CAPSULE ORAL
Qty: 8 CAPSULE | Refills: 0 | Status: DISCONTINUED | OUTPATIENT
Start: 2023-11-01 | End: 2023-11-07

## 2023-11-01 RX ORDER — ALBUMIN (HUMAN) 12.5 G/50ML
25 SOLUTION INTRAVENOUS ONCE
Status: COMPLETED | OUTPATIENT
Start: 2023-11-01 | End: 2023-11-01

## 2023-11-01 RX ADMIN — RIFAXIMIN 550 MG: 550 TABLET ORAL at 20:24

## 2023-11-01 RX ADMIN — MIDODRINE HYDROCHLORIDE 15 MG: 5 TABLET ORAL at 20:23

## 2023-11-01 RX ADMIN — FOLIC ACID 1 MG: 1 TABLET ORAL at 13:58

## 2023-11-01 RX ADMIN — THIAMINE HCL TAB 100 MG 100 MG: 100 TAB at 13:57

## 2023-11-01 RX ADMIN — ONDANSETRON 4 MG: 2 INJECTION INTRAMUSCULAR; INTRAVENOUS at 19:05

## 2023-11-01 RX ADMIN — Medication 1 DROP: at 20:25

## 2023-11-01 RX ADMIN — MIDODRINE HYDROCHLORIDE 15 MG: 5 TABLET ORAL at 13:58

## 2023-11-01 RX ADMIN — GABAPENTIN 100 MG: 100 CAPSULE ORAL at 09:11

## 2023-11-01 RX ADMIN — Medication 1 TABLET: at 09:09

## 2023-11-01 RX ADMIN — ALBUMIN HUMAN 50 G: 0.25 SOLUTION INTRAVENOUS at 09:03

## 2023-11-01 RX ADMIN — ALBUMIN HUMAN 25 G: 0.25 SOLUTION INTRAVENOUS at 10:04

## 2023-11-01 RX ADMIN — RIFAXIMIN 550 MG: 550 TABLET ORAL at 09:13

## 2023-11-01 RX ADMIN — LACTULOSE 30 ML: 20 SOLUTION ORAL at 09:08

## 2023-11-01 RX ADMIN — LACTULOSE 30 ML: 20 SOLUTION ORAL at 13:59

## 2023-11-01 RX ADMIN — GABAPENTIN 100 MG: 100 CAPSULE ORAL at 20:24

## 2023-11-01 RX ADMIN — CEFTRIAXONE SODIUM 2 G: 2 INJECTION, POWDER, FOR SOLUTION INTRAMUSCULAR; INTRAVENOUS at 19:05

## 2023-11-01 RX ADMIN — SODIUM BICARBONATE 650 MG TABLET 1300 MG: at 09:13

## 2023-11-01 RX ADMIN — MIDODRINE HYDROCHLORIDE 15 MG: 5 TABLET ORAL at 09:13

## 2023-11-01 RX ADMIN — LACTULOSE 20 G: 20 SOLUTION ORAL at 06:10

## 2023-11-01 RX ADMIN — ERYTHROMYCIN 1 G: 5 OINTMENT OPHTHALMIC at 21:41

## 2023-11-01 RX ADMIN — Medication 1 DROP: at 18:58

## 2023-11-01 RX ADMIN — GABAPENTIN 100 MG: 100 CAPSULE ORAL at 13:58

## 2023-11-01 RX ADMIN — SODIUM BICARBONATE 650 MG TABLET 1300 MG: at 20:22

## 2023-11-01 RX ADMIN — PANTOPRAZOLE SODIUM 40 MG: 40 TABLET, DELAYED RELEASE ORAL at 09:09

## 2023-11-01 ASSESSMENT — ACTIVITIES OF DAILY LIVING (ADL)
ADLS_ACUITY_SCORE: 43
NUMBER_OF_TIMES_PATIENT_HAS_FALLEN_WITHIN_LAST_SIX_MONTHS: 4
FALL_HISTORY_WITHIN_LAST_SIX_MONTHS: YES
DOING_ERRANDS_INDEPENDENTLY_DIFFICULTY: NO
ADLS_ACUITY_SCORE: 35
DIFFICULTY_COMMUNICATING: NO
ADLS_ACUITY_SCORE: 37
DRESSING/BATHING_DIFFICULTY: NO
HEARING_DIFFICULTY_OR_DEAF: NO
ADLS_ACUITY_SCORE: 43
WEAR_GLASSES_OR_BLIND: YES
ADLS_ACUITY_SCORE: 37
ADLS_ACUITY_SCORE: 33
ADLS_ACUITY_SCORE: 43
ADLS_ACUITY_SCORE: 37
DIFFICULTY_EATING/SWALLOWING: YES
ADLS_ACUITY_SCORE: 43
EATING/SWALLOWING: EATING
PREVIOUS_RESPONSIBILITIES: MEAL PREP;HOUSEKEEPING;LAUNDRY;SHOPPING;MEDICATION MANAGEMENT;FINANCES;DRIVING
WALKING_OR_CLIMBING_STAIRS_DIFFICULTY: NO
ADLS_ACUITY_SCORE: 35
CONCENTRATING,_REMEMBERING_OR_MAKING_DECISIONS_DIFFICULTY: YES
ADLS_ACUITY_SCORE: 43
TOILETING_ISSUES: NO
CHANGE_IN_FUNCTIONAL_STATUS_SINCE_ONSET_OF_CURRENT_ILLNESS/INJURY: YES
ADLS_ACUITY_SCORE: 35

## 2023-11-01 NOTE — PLAN OF CARE
Goal Outcome Evaluation:    [id number]  /54   Pulse 86   Temp 98.2  F (36.8  C) (Oral)   Resp 15   SpO2 95%     Pt. A&O, VSS, on RA. Denies pain and SOB. On 2 gram Na diet. Up with x1 with walker to commode (pt brought home walker). On stage 1 of  [place] Encephalopathy criteria, gave PRN Lactulose q2h x3, 2 more dose left to be given. PIV, SL. Skin is general jaundice with yellowish eye. No acute change, continue with poc.

## 2023-11-01 NOTE — PROVIDER NOTIFICATION
RE: ED rm 9 M.R.    FYI- pt bleeding from rectum with bowel movements, bright red blood and lg amt.  Thanks,   Melanie [phone]

## 2023-11-01 NOTE — PROGRESS NOTES
November 1, 2023 9:59 AM - Maurilio Gonzalez RN:     Patient completing Liver Transplant Evaluation.    NEEDS:  Echocardiogram  Pap smear  Full virology labs (Hepatitis A Antibody IgG, Hepatitis B core antibody, Hepatitis B Surface Antibody, Hepatitis B surface antigen, Hepatitis C antibody)

## 2023-11-01 NOTE — PROGRESS NOTES
Hgb 5.1 and Platelet 71, significantly down from yesterday. Medicine team was notified. Awaiting blood product to come up from blood bank.

## 2023-11-01 NOTE — PROGRESS NOTES
"   11/01/23 0930   Appointment Info   Signing Clinician's Name / Credentials (OT) Kiki Russ, OTR/L   Rehab Comments (OT) OT / Lymphedema Evaluation   Living Environment   People in Home significant other  (fiance)   Current Living Arrangements apartment   Home Accessibility no concerns   Transportation Anticipated car, drives self;family or friend will provide   Living Environment Comments Pt lives with her fiance in an apartment w/ elevator access. Pt has a tub/shower combo w/ new shower chair.   Self-Care   Usual Activity Tolerance good   Current Activity Tolerance moderate   Equipment Currently Used at Home walker, rolling;raised toilet seat;shower chair   Fall history within last six months yes   Number of times patient has fallen within last six months 4   Activity/Exercise/Self-Care Comment Pt was IND w/ ADLs at baseline, though father recently provided FWW, shower chair and raised toilet seat due to pt w/ increased falls.   Instrumental Activities of Daily Living (IADL)   Previous Responsibilities meal prep;housekeeping;laundry;shopping;medication management;finances;driving   IADL Comments Pt is IND w/ IADLs at baseline, is primarily responsible for cooking and cleaning. Pt is a . Pt is not currently working. Pt's fiance works as an ICU RN.   General Information   Onset of Illness/Injury or Date of Surgery 10/31/23   Referring Physician Eleanor Copeland PA-C   Patient/Family Therapy Goal Statement (OT) To improve endurance, to reduce falls   Additional Occupational Profile Info/Pertinent History of Current Problem Per EMR: \"Lola Milner is a 33 year old female admitted on 10/31/2023. She has a past medical history significant for MDD, prior tobacco use, gout, polyneuropathy and decompensated cirrhosis secondary to alcohol use c/b hepatic encephalopathy, esophageal varices s/p banding, recurrent ascities and hepatorenal syndrome.      Recently admitted to [place] 10/23-10/29 and referred for liver " "transplant eval at [facility]. She was seen in Hepatology and Transplant clinic day of admission. Referred to the ED due to worsening renal function and leukocytosis.\"   Existing Precautions/Restrictions fall   Limitations/Impairments safety/cognitive  (will monitor)   Left Upper Extremity (Weight-bearing Status) full weight-bearing (FWB)   Right Upper Extremity (Weight-bearing Status) full weight-bearing (FWB)   Left Lower Extremity (Weight-bearing Status) full weight-bearing (FWB)   Right Lower Extremity (Weight-bearing Status) full weight-bearing (FWB)   General Observations and Info Activity order: Up w/ assist   Cognitive Status Examination   Orientation Status orientation to person, place and time   Cognitive Status Comments Pt reports fatigue and feeling overwhelmed due to many providers arriving all at once. Pt IND answering PLOF questions, seems mildly slow to answer at times. Will continue to assess/monitor functional cogition for higher level IADLs.   Visual Perception   Visual Impairment/Limitations WFL;blurry vision  (intermittent)   Sensory   Sensory Quick Adds sensation intact   Sensory Comments Pt reports baseline neuropathy   Pain Assessment   Patient Currently in Pain No   Edema General Information   Onset of Edema   (chronic)   Affected Body Part(s) Right LE;Left LE   Etiology Comments EDGAR, cirrhosis, decreased activity   General Comments/Previous Edema Treatment/Edema Equipment Pt reports current presentation is much reduced from prior. Pt reports significant pedal edema.   Edema Examination/Assessment   Skin Condition Dryness;Intact;Pitting   Skin Condition Comments Jaundiced. Bruising midshin. Mildly dry.   Scar No   Ulcerations No   Pitting Assessment 1+ concentrated around ankles.   Range of Motion Comprehensive   General Range of Motion bilateral upper extremity ROM WFL   Strength Comprehensive (MMT)   Comment, General Manual Muscle Testing (MMT) Assessment BUE WFL for ADLs.   Coordination "   Coordination Comments Tremor in BUE noted.   Bed Mobility   Bed Mobility supine-sit   Supine-Sit [place] (Bed Mobility) supervision   Transfers   Transfers sit-stand transfer   Sit-Stand Transfer   Sit-Stand [place] (Transfers) contact guard   Assistive Device (Sit-Stand Transfers) walker, front-wheeled   Balance   Balance Comments Pt appears mildly unsteady w/ FWW. Defer to PT for formal assessment.   Activities of Daily Living   BADL Assessment/Intervention bathing;upper body dressing;lower body dressing;grooming;toileting   Bathing Assessment/Intervention   Position (Bathing) unsupported sitting   [place] Level (Bathing) minimum assist (75% patient effort)   Comment, (Bathing) Per clinical judgment   Assistive Devices (Bathing) hand-held shower spray hose;shower chair   Upper Body Dressing Assessment/Training   Comment, (Upper Body Dressing) Per clinical judgment   [place] Level (Upper Body Dressing) set up   Lower Body Dressing Assessment/Training   Comment, (Lower Body Dressing) Total A to don compression stockings. Anticipate Min A for other LB clothing.   [place] Level (Lower Body Dressing) minimum assist (75% patient effort)   Grooming Assessment/Training   Position (Grooming) unsupported sitting   [place] Level (Grooming) set up   Comment, (Grooming) Seated for balance and safety, per clinical judgment   Toileting   Position (Toileting) unsupported sitting   Assistive Devices (Toileting) grab bar, toilet   Comment, (Toileting) IND w/ garment management and juan alberto cares. Mod I w/ STS w/ grab bar and FWW   [place] Level (Toileting) supervision   Clinical Impression   Criteria for Skilled Therapeutic Interventions Met (OT) Yes, treatment indicated   OT Diagnosis Impaired ADL/IADL performance   Edema: Patient Presentation Edema   OT Problem List-Impairments impacting ADL problems related to;activity tolerance impaired;balance;cognition;mobility   Assessment of Occupational  Performance 3-5 Performance Deficits   Identified Performance Deficits LB dressing, meal prep, housekeeping, work, community mobility   Planned Therapy Interventions (OT) ADL retraining;IADL retraining;home program guidelines;progressive activity/exercise   Edema: Planned Interventions Fit for compression garment;Edema exercises;Precautions to prevent infection/exacerbation;Education;ADL training   Intervention Comments Donned Comperm for edema management and OH   Clinical Decision Making Complexity (OT) problem focused assessment/low complexity   Risk & Benefits of therapy have been explained evaluation/treatment results reviewed;care plan/treatment goals reviewed;risks/benefits reviewed;participants voiced agreement with care plan;participants included;patient   Clinical Impression Comments Pt presents w/ BLE edema, orthostatic hypotension, and mildly impaired ADL/IADL performance. Pt will benefit from IP OT and lymphedema therapies to promote IND and safety.   OT Total Evaluation Time   OT Eval, Low Complexity Minutes (43035) 5   OT Goals   Therapy Frequency (OT) 3 times/week   OT Predicted Duration/Target Date for Goal Attainment 11/17/23   OT Goals Lower Body Dressing;Meal Preparation;Home Management;Cognition;Edema;OT Goal 1   OT: Lower Body Dressing Modified independent   OT: Meal Preparation Modified independent;with simple meal preparation;ambulatory level   OT: Home Management Modified independent;with light demand household tasks;using adaptive equipment;ambulatory level   OT: Cognitive Patient/caregiver will verbalize understanding of cognitive assessment results/recommendations as needed for safe discharge planning   OT: Edema education to increase ability to manage edema after discharge from the hospital Patient;Demonstrate;[place];Skin care routine;signs/symptoms of intolerance;wear schedule;limb positioning;garrnet/bandage care;discharge recommendations   OT: Management of edema bandages  Patient;Verbalize;Caregiver;Demonstrate;[place];garment(s)   OT: Functional edema exercise program to reduce limb volume, increase activity tolerance and improve independence with ADL Patient;Demonstrates;[place];HEP;walking program   OT: Goal 1 Pt will IND verbalize x3 EC/WS or memory aid techniques to promote IND w/ IADLs.   Self-Care/Home Management   Self-Care/Home Mgmt/ADL, Compensatory, Meal Prep Minutes (73742) 10   Symptoms Noted During/After Treatment (Meal Preparation/Planning Training) none   Treatment Detail/Skilled Intervention Edema: Pt working w/ PT, but due to report of OH, OT visited w/ pt to complete quick evaluation (see above) and issue BLE compression. OT provided basic overview of lymphatic system anatomy/physiology, precautions, and treatment options to manage swelling and OH. See evaluation above for description of BLE edema and skin. Pt is appropriate for trial of Comperm elastic stockinette to promote fluid mobilization and edema management, for improved skin integrity, functional mobility, and ADL independence. Pt's LE's washed and lotioned with Sween 24. Donned Size E Comperm from MTPs to knee creases w/ double thickness folded over feet for increased compression. Pt reporting comfort. Pt educated on wearing for ~23 hrs/day with ~1 hr break to assess skin and perform skin cares. Pt educated in indications for removal (pain, numbness, tingling, soiled, or rolling). Pt educated in monitoring Comperm carefully for rolling/bunching which could cause tourniquet effect. Pt educated in conservative strategies for managing BLE edema, including elevation and muscle pump activation. Pt verbalized understanding of all education. Patient care order in place. Pt left w/ PT about to complete walk in [place].   OT Discharge Planning   OT Plan OT: standing GH, LB dressing, higher level functional cog. Edema: New C2   OT Discharge Recommendation (DC Rec) home with assist   OT Rationale for DC Rec  Pt is just below IND ADL baseline, has assist from family for IADLs. Pt is unstable during functional mobility, defer to PT for recs.   OT Brief overview of current status SBA w/ FWW   Total Session Time   Timed Code Treatment Minutes 10   Total Session Time (sum of timed and untimed services) 15

## 2023-11-01 NOTE — PLAN OF CARE
Goal Outcome Evaluation:      Plan of Care Reviewed With: patient    Overall Patient Progress: no changeOverall Patient Progress: no change    Outcome Evaluation: see RD note 11/1    Jo-Ann Arce RDN, LD    6C (beds 2259-3863) + 7C (beds 9418-9740) + ED   RD pager: [phone]  Weekend/Holiday RD pager: [phone]     hard copy

## 2023-11-01 NOTE — PROGRESS NOTES
[facility]    Medicine Progress Note - Hospitalist Service, GOLD TEAM 10    Date of Admission:  10/31/2023    Assessment & Plan   Lola Milner is a 33 year old female admitted on 10/31/2023. She has a past medical history significant for MDD, prior tobacco use, gout, polyneuropathy and decompensated cirrhosis secondary to alcohol use c/b hepatic encephalopathy, esophageal varices s/p banding, recurrent ascities and hepatorenal syndrome.      Recently admitted to [place] 10/23-10/29 and referred for liver transplant eval at [facility]. She was seen in Hepatology and Transplant clinic day of admission. Referred to the ED due to worsening renal function and leukocytosis, being considered for transplant and undergoing eval.     Decompensated alcohol cirrhosis c/b HRS, HE, recurrent ascites, esophageal varices s/p banding (9/2023)  Hyperbilirubinemia   Coagulopathy  Seen in clinic by Dr. Guajardo and Dr. Watson prior to admission. Initially presented with decompensated liver disease in 6/2023. Course complicated by insurance challenges. Recently started chem dep. Requires intermittent paracentesis, last on 10/23. Tbili 44.4 on admission with direct bili >40. Appears Tbili has been in 40s for past few weeks. Recent RUQ US with doppler was negative for thrombus. Some concern for possible SBP on admission with worsening renal function and abdominal pain. Ideally would obtain paracentesis however unable to this evening, will empirically cover.   - Hepatology consulted  - CAPS consulted for dx paracentesis-->procedure pendin cryoprecipitate and PRBC transfusion  - Trending MELD labs daily  - Continue lactulose TID + [place] protocol  - rifaxmin 550 mg BID (not taking outpatient, awaiting prior auth)  - Transplant eval:    ::PETH ordered    ::EBV IgG positive, CMV negative, treponema negative    ::Pending HIV, TB quant    ::TTE completed 11/1    ::Needs pap smear/HPV testing--Gyn  consult placed for cervical cancer screening and patient as part of transplant evaluation      MELD 3.0: 40 at 11/1/2023  6:22 AM  MELD-Na: 45 at 11/1/2023  6:22 AM  Calculated from:  Serum Creatinine: 2.64 mg/dL at 11/1/2023  6:22 AM  Serum Sodium: 128 mmol/L at 11/1/2023  6:22 AM  Total Bilirubin: 33.8 mg/dL at 11/1/2023  6:22 AM  Serum Albumin: 4.4 g/dL (Using max of 3.5 g/dL) at 11/1/2023  6:22 AM  INR(ratio): 4.85 at 11/1/2023  6:22 AM  Age at listing (hypothetical): 33 years  Sex: Female at 11/1/2023  6:22 AM       Leukocytosis: Resolved  WBC 18.4 on admission. BP softer on admission, though in setting of cirrhosis and unclear true baseline. CXR without focal consolidation and no respiratory complaints. No dysuria but not voiding well for past 48 hours. She is having new onset abdominal pain, raising highest concern for SBP. Did have PICC line recently so would also worry about bacteremia, though afebrile.   -Continue empiric ceftriaxone 2g daily while awaiting diagnostic paracentesis  - BCx2  - UA not indicative of infection, UC in process  - CAPS consulted for dx paracentesis      EDGAR   AGMA   Hepatorenal syndrome  Cr 2.76, AG 19 on admission. Recent baseline from prior discharge appears ~1.5-1.7. Was seen by Nephrology during admission at [place] with concern for HRS, Cr improved with albumin and midodrine. Lasix and spironolactone held at time of discharge. Worsening renal function with concern for infection does raise concern for SBP. Has had decreased urine output.   - Albumin 100g 10/31 (1.5 g/kg) followed by albumin 70g daily x 2 days (1g/kg)  - Continue PTA midodrine 15 mg TID  - Continue PTA sodium bicarbonate 1300 mg BID  - Daily weights, I/Os  - SBP work-up as above     Hyponatremia  Na 131 on admission. Na was 135 at time of recent discharge.  In setting of EDGAR, also advanced liver cirrhosis.  Sodium today 128, no mental status changes.  - Albumin challenge as above  -Repeat BMP in the morning    "  Acute thrombocytopenia  Plts 115 on admission down trended to 71 today although some component of dilution likely. Recent baseline appears to be 210s. Unable to tell heparin exposure but patient does report receiving twice daily abdominal injections during recent admission, raising c/f heparin exposure. 4T score is intermediate. May also be due to possible infection or worsening liver disease.   - HIT panel ordered and negative  - Hold/avoid heparin products  - Trend Plts      Chronic normocytic anemia  Hgb 8.0 on admission. Recent baseline appears around 7.  Hemoglobin 5.1 this morning, likely some component of dilution with large volume albumin administration over the last 24 hours.  -Repeat hemoglobin after transfusion and repeat CBC in the morning     Blurred vision  Developed blurred vision to both eyes in past 72 hours with associated burning. Seems to be better in AM and worsening throughout the day. Improves with visine. Having trouble reading any labels. Wears glasses at baseline.  - Ophthalmology consulted  -Per Optho recs:   Discontinue using visine   Preservative free artificial tears four times a day scheduled, up to q2h as needed  Erythromycin ointment bid for 3 days  Ophtho will perform surface check on 11/03, since the patient is going to be admitted and see if her vision has improved in the interim     Chest discomfort: Resolved today  Endorses \"squeezing\" chest pain for the past few days. Occasional racing heart/skipped beats that come and go. Located along sternum. Improves with palpation on exam and HR in NSR. Very low suspicion for cardiac etiology though certainly could have PVCs at times. Family wonders if having MSK symptoms due to using walker which is new. Admission EKG without focal ischemic changes, TTE without wall motion abnormalities noted.     Generalized weakness  Frail and deconditioned at baseline. Uses a walker. Undergoing transplant eval.   - PT/OT  - RD consult    " "  Polyneuropathy   - Okay to continue gabapentin 100 mg TID for now, will need to hold if worsening EDGAR                Diet: Snacks/Supplements Adult: Other; 10 AM: Vital cuisine, vary flavors. 2PM magic cup, vary flavors. HS: ensure max, vary flavors; Between Meals  Regular Diet Adult    DVT Prophylaxis: Pneumatic Compression Devices  Izaguirre Catheter: Not present  Lines: None     Cardiac Monitoring: None  Code Status: Full Code      Clinically Significant Risk Factors         # Hyponatremia: Lowest Na = 128 mmol/L in last 2 days, will monitor as appropriate     # Anion Gap Metabolic Acidosis: Highest Anion Gap = 19 mmol/L in last 2 days, will monitor and treat as appropriate   # Coagulation Defect: INR = 4.85 (Ref range: 0.85 - 1.15) and/or PTT = N/A, will monitor for bleeding  # Thrombocytopenia: Lowest platelets = 71 in last 2 days, will monitor for bleeding          # Overweight: Estimated body mass index is 26.66 kg/m  as calculated from the following:    Height as of an earlier encounter on 10/31/23: 1.6 m (5' 3\").    Weight as of an earlier encounter on 10/31/23: 68.3 kg (150 lb 8 oz)., PRESENT ON ADMISSION  # Severe Malnutrition: based on nutrition assessment, PRESENT ON ADMISSION          Disposition Plan     Expected Discharge Date: 11/02/2023                    Amalia Preston MD  Hospitalist Service, GOLD TEAM 10  [facility]  Securely message with [employer] (more info)  Text page via [employer] Paging/Directory   See signed in provider for up to date coverage information  ______________________________________________________________________    Interval History   No acute events overnight.  Labs checked this morning with fibrinogen less than 61, hemoglobin 5.1.  Patient denies shortness of breath, pain, nausea.  Has baseline tremor in hands that she states is unchanged from usual.    Physical Exam   Vital Signs: Temp: 97.3  F (36.3  C) Temp src: Oral BP: 107/62 " Pulse: 80   Resp: 16 SpO2: 99 % O2 Device: None (Room air)    Weight: 0 lbs 0 oz    Gen: NAD, lying comfortably in bed, pleasant and cooperative with interview and exam  HEENT: normocephalic, +scleral icterus, no perioral lesions, oral mucosa moist and mild mucosal bleeding appreciated  CV: RRR at time of exam  Pulm: good air movement bilaterally without wheezing  Abd: soft, +bs, nontender to palpation diffusely, nondistended  LE: Trace edema bilaterally  Skin: no rash or jaundice on limited exam  Neuro: AOx3, resting tremor present, no parminder asterixis in upper extremities or head and neck  Psych: mood-affect congruent      Medical Decision Making             Data     I have personally reviewed the following data over the past 24 hrs:    9.5  \   5.1 (LL)   / 71 (L)     128 (L) 90 (L) 32.9 (H) /  101 (H)   3.6 19 (L) 2.64 (H) \     ALT: 14 AST: 39 AP: 77 TBILI: 33.8 (HH)   ALB: 4.4 TOT PROTEIN: N/A LIPASE: N/A     INR:  4.85 (H) PTT:  N/A   D-dimer:  N/A Fibrinogen:  <61 (LL)

## 2023-11-01 NOTE — PROGRESS NOTES
CLINICAL NUTRITION SERVICES - ASSESSMENT NOTE     Nutrition Prescription    RECOMMENDATIONS FOR MDs/PROVIDERS TO ORDER:  Sent [employer] message to provider:   Recommend liberalizing to regular diet d/t very poor PO, do not anticipate patient will not have excessive sodium intakes at this time.   Please add scheduled nausea medications as N/V are significant barrier for patient's PO.     Malnutrition Status:    Severe malnutrition in the context of acute illness/disease    Recommendations already ordered by Registered Dietitian (RD):  Addition of thiamine 100 mg/day and folic acid 1 mg/day due to very poor PO and liver cirrhosis. Addition of   Due to low vitamin D level, provide with 50,000 international unit(s) of vitamin D3 once a week for 8 weeks then recheck vitamin D level.  Pt agreeable to trial of variety of ONS, will follow up on pt preference.     Future/Additional Recommendations:  If patient unable to significantly increase PO with scheduled anti-nausea medications, would recommend enteral nutrition support.    If TF is initiated, would recommend: Osmolite 1.5 Santhosh (or equivalent) @ goal of  60ml/hr  (1440ml/day) provides: 2160 kcals, 90 g PRO, 1097 ml free H20, 293 g CHO, and 0 g fiber daily.    Initiate @ 10 ml/hr and advance by 10 ml Q8H pending pt's tolerance.  Do not advance TF rate unless Mg++ > 1.5, K+ is > 3, and phos > 1.9  Recommend 30 ml Q4H fluid flushes for tube patency. Additional fluids and/or adjustments per MD.     Ensure patient has 100 mg Thiamine x 5-7 days to prevent lyte depletion if at risk for refeeding syndrome   If gastric enteral access: HOB > 30 degrees      REASON FOR ASSESSMENT  Lola Milner is a/an 33 year old female assessed by the dietitian for Provider Order - undergoing transplant eval, optimize nutrition status    Patient admitted for liver transplant eval at [facility]. She was seen in Hepatology and Transplant clinic day of admission. Referred to the ED due to worsening renal  "function and leukocytosis.      MEDICAL HISTORY  MDD, prior tobacco use, gout, polyneuropathy and decompensated cirrhosis secondary to alcohol use c/b hepatic encephalopathy, esophageal varices s/p banding, recurrent ascities and hepatorenal syndrome.      NUTRITION HISTORY  Pt had outpatient dietitian visit 10/31/23 for liver transplant evaluation. Visit was brief d/t pt sent to ED after evaluation visits. Pt reported very poor appetite and intake for \"a while\" (at least throughout the summer). Pt struggles with PO d/t N/V as well. Pt reports vomiting daily. Pt reported several times where she only ate 1 strawberry. A better day of PO (such as was on 10/30) would be apple, protein smoothie, soup, and bread. She typically drinks protein shakes (occasionally) and water.   Pt received education on low sodium diet, protein intakes. Reviewed if patient gets anti-nausea meds as prescribed to stay ahead of nausea.     Confirmed above history with patient. She reports nausea medications tend to help her. Visit cut short d/t another provider coming in for visit.     CURRENT NUTRITION ORDERS  Diet: 2 g Sodium    Intake/Tolerance: Intake of 0 % of 1 meal(s) so far.    LABS   10/31/23 08:14 10/31/23 13:55 11/01/23 06:22   Sodium 131 (L)  128 (L)   Potassium 3.7  3.6   Chloride 92 (L)  90 (L)   Carbon Dioxide (CO2) 20 (L)  19 (L)   Urea Nitrogen 28.2 (H)  32.9 (H)   Creatinine 2.76 (H)  2.64 (H)   GFR Estimate 23 (L)  24 (L)   Calcium 9.6  9.3   Anion Gap 19 (H)  19 (H)   Albumin 4.0  4.4   Protein Total See Comment  See Comment   Alkaline Phosphatase 137 (H)  77   ALT 19  14   AST 62 (H)  39   Ammonia  77 (H)    Bilirubin Direct >40.00 (H)     Bilirubin Total 44.4 (HH)  33.8 (HH)   Glucose 67 (L)  101 (H)   HCG Qualitative Serum Negative     Vitamin D, Total (25-Hydroxy) <6 (L)         MEDICATIONS  Lactulose  Multivitamin w/ minerals  Protonix   Sodium bicarb    ANTHROPOMETRICS  Height: 160 cm (5' 3\")  Most Recent Weight:    "   IBW: 52.3 kg   BMI: 26.66 kg/m2; BMI Category: Overweight BMI 25-29.9  Weight History: Wt fluctuates with fluid status but additionally suspect true weight loss with ongoing poor PO.  10.4 kg (13.2%) weight loss over 1 month.  Wt Readings from Last 20 Encounters:   10/31/23 68.3 kg (150 lb 8 oz)   10/30/23 68 kg (150 lb)     72.9 kg (160 lb 12.8 oz) 10/13/2023     78.7 kg (173 lb 6.4 oz) 09/25/2023      79 kg (174 lb 3.2 oz) 08/25/2023     80.8 kg (178 lb 2.1 oz) 07/09/2023     76.2 kg (168 lb 1.6 oz) 06/23/2023       ASSESSED NUTRITION NEEDS  Dosing Weight: 68.3 kg (Actual BW)   Estimated Energy Needs: 0098-2229 kcals/day (30 - 35 kcals/kg )  Justification: Increased needs  Estimated Protein Needs:  grams protein/day (1.2 - 1.5 grams of pro/kg)  Justification: Increased needs  Estimated Fluid Needs: 1399-5497 mL/day (25 - 30 mL/kg)   Justification: Maintenance    PHYSICAL FINDINGS  Jaundice     Selwyn Score: 15  Per EMR: Skin  Skin WDL: .WDL except, color  Skin Color: other (see comments) (jaundice)  Device Skin Interventions Taken: No adjustments needed    MALNUTRITION  % Intake: </= 50% for >/= 5 days (severe)  % Weight Loss: > 5% in 1 month (severe malnutrition)  Subcutaneous Fat Loss: Unable to assess - visit cut short by another provider coming in for visit, will follow up for NFPE  Muscle Loss: Unable to assess - visit cut short by another provider coming in for visit, will follow up for NFPE  Fluid Accumulation/Edema: Unable to assess - visit cut short by another provider coming in for visit, will follow up for NFPE  Malnutrition Diagnosis: Severe malnutrition in the context of acute illness/disease    NUTRITION DIAGNOSIS  Inadequate oral intake related to poor appetite, N/V as evidenced by patient report, chart review, weight loss.       INTERVENTIONS  Implementation  Nutrition Education: will be provided if nutrition education needs arise.    Medical food supplement therapy     Goals  Patient to  consume % of nutritionally adequate meal trays TID, or the equivalent with supplements/snacks.        Monitoring/Evaluation  Progress toward goals will be monitored and evaluated per protocol.    Jo-Ann Arce RDN, LD    6C (beds 4192-2836) + 7C (beds 3746-2741) + ED   RD pager: [phone]  Weekend/Holiday RD pager: [phone]

## 2023-11-01 NOTE — CONSULTS
OPHTHALMOLOGY CONSULT NOTE  11/01/23    Patient: Lola Milner    ASSESSMENT/PLAN:     Lola Milner is a 33 year old female who presented with     #Dry Eye Syndrome, OU  Intermittent blurred vision that is accompanied by gritty sensation and burning. Better in the morning and worsens throughout the day. Patient has been medicating with visine. No relative afferent pupillary defect; pressures within normal limits; extraocular movements full, ortho, and painless; confrontational visual fields full. Exam with diffuse punctate epithelial erosions in both eyes and a 2 mm perilimbal confluence of stain uptake in the right eye. This is [place] negative, extending from 12 o'clock toward central visual axis. Everted lids and swept fornices but no evidence of foreign body. Unclear if this may be an excoriation due to a foreign body that has since been ejected or simply a confluent collection of punctate epithelial erosions caused by dry eye. No infiltrate, heaped edges, or stromal haze. Considered corneal endothelial dystrophy but no gross evidence on exam or convincing history. Fundus exam unremarkable. The patient's current visual acuity is worse than would normally be associated with moderate to severe dry eye, but no other obvious causes of her worsened visual acuity can be elucidated on bedside exam.     Plan:  Discontinue using visine   Preservative free artificial tears four times a day scheduled, up to q1h as needed  Erythromycin ophthalmic ointment bid for 3 days to both eyes  Ophtho will perform surface check on 11/03, since the patient is going to be admitted and see if her vision has improved in the interim; otherwise patient to schedule follow up in 1-2 weeks for further testing, though denies acute change in vision    It is our pleasure to participate in this patient's care and treatment. Please contact us with any further questions or concerns.    Discussed with Dr. Quintero who agreed with this assessment and  plan.     Kerwin Hightower MD, PGY2  Ophthalmology Resident  [facility]    HISTORY OF PRESENTING ILLNESS:     Lola Milner is a 33 year old female who presented on 11/01/23  with blurred vision.  Intermittent blurred vision that is accompanied by gritty sensation and burning. Better in the morning and worsens throughout the day. Patient has been medicating with visine. Past 3 days or so.  Denies flashes, floaters, double vision, pain with Extraocular movements, or field deficits    10+ review of systems were otherwise negative except for that which has been stated above.      OCULAR/MEDICAL/SURGICAL HISTORIES:     Past Ocular History:   Last eye exam: none   Previously diagnosed ocular conditions: none  Prior eye surgery/laser: none  Contact lens wear: none  Glasses: none  Eyedrops: visine prn    Pertinent Systemic Medications:   Current Facility-Administered Medications   Medication    albumin human 25 % injection 75 g    cefTRIAXone (ROCEPHIN) 2 g vial to attach to  ml bag for ADULTS or NS 50 ml bag for PEDS    Daily 2 GRAM acetaminophen limit, unless fulminent liver failure or transaminases greater than or equal to 300 - 400, then none    gabapentin (NEURONTIN) capsule 100 mg    lactulose (CHRONULAC) solution 20 g    Or    lactulose (CHRONULAC) solution for enema prep 100 g    lactulose (CHRONULAC) solution 30 mL    midodrine (PROAMATINE) tablet 15 mg    mineral oil-hydrophilic petrolatum (AQUAPHOR)    multivitamin w/minerals (THERA-VIT-M) tablet 1 tablet    pantoprazole (PROTONIX) EC tablet 40 mg    polyethylene glycol-propylene glycol PF (SYSTANE ULTRA PF) opthalmic solution 1 drop    rifaximin (XIFAXAN) tablet 550 mg    sodium bicarbonate tablet 1,300 mg     Current Outpatient Medications   Medication    gabapentin (NEURONTIN) 100 MG capsule    lactulose (CHRONULAC) 10 GM/15ML solution    melatonin 3 MG tablet    midodrine (PROAMATINE) 5 MG tablet    Multiple Vitamins-Minerals (THERA-TABS  M) TABS    omeprazole (PRILOSEC) 20 MG DR capsule    sodium bicarbonate 650 MG tablet     Past Medical History:  Past Medical History:   Diagnosis Date    Alcoholic cirrhosis of liver with ascites (H) 08/30/2023    History of alcohol abuse     Formatting of this note might be different from the original. In remission as of late June 2023       Past Surgical History:   No past surgical history on file.    Family History:   No history of macular degeneration or glaucoma    Social History:   No tobacco use    EXAMINATION:     Base Eye Exam       Visual Acuity (Snellen - Linear)         Right Left    Near sc 20/200 ph 20/100 20/200 ph 20/100              Tonometry (Tonopen, 11:57 AM)         Right Left    Pressure 19 20              Pupils         Pupils    Right PERRL    Left PERRL              Visual Fields         Left Right     Full Full              Extraocular Movement         Right Left     Full, Ortho Full, Ortho              Dilation       Both eyes: 1.0% Mydriacyl, 2.5% Jace Synephrine @ 11:57 AM                  Slit Lamp and Fundus Exam       External Exam         Right Left    External Normal Normal              Slit Lamp Exam         Right Left    Lids/Lashes Normal Normal    Conjunctiva/Sclera White and quiet White and quiet    Cornea Diffuse Punctate epithelial erosions; 2 mm perilimbal confluence of stain uptake. [place] negative, extending from 12 o'clock toward central visual axis Clear    Anterior Chamber Deep and quiet Deep and quiet    Iris Round and reactive Round and reactive    Lens Clear Clear    Anterior Vitreous Normal Normal              Fundus Exam         Right Left    Disc Normal Normal    Macula Normal Normal    Vessels Normal Normal    Periphery Normal Normal                      Kerwin Hightower MD  Resident Physician, PGY2  Department of Ophthalmology  11/01/23 11:03 AM

## 2023-11-01 NOTE — PROCEDURES
[facility]    Paracentesis    Date/Time: 11/1/2023 5:34 PM    Performed by: Janett Marinelli MD  Authorized by: Janett Marinelli MD    Risks, benefits and alternatives discussed.      UNIVERSAL PROTOCOL   Site Marked: Yes  Prior Images Obtained and Reviewed:  Yes  Required items: Required blood products, implants, devices and special equipment available    Patient identity confirmed:  Verbally with patient, arm band, provided demographic data and hospital-assigned identification number  NA - No sedation, light sedation, or local anesthesia  Confirmation Checklist:  Patient's identity using two indicators, relevant allergies, procedure was appropriate and matched the consent or emergent situation and correct equipment/implants were available  Time out: Immediately prior to the procedure a time out was called    Universal Protocol: the Joint Commission Universal Protocol was followed    Preparation: Patient was prepped and draped in usual sterile fashion    ESBL (mL):  0    PRE-PROCEDURE DETAILS     Procedure purpose:  Diagnostic    ANESTHESIA (see MAR for exact dosages):     Anesthesia method:  Local infiltration    Local anesthetic:  Lidocaine 1% w/o epi    PROCEDURE DETAILS     Ultrasound guidance: yes      Puncture site:  R lower quadrant    Fluid removed amount:  30 ml    Fluid appearance:  Clear, yellow and serous    Dressing:  Adhesive bandage      PROCEDURE  Describe Procedure: 21 G 2 inch needle used under dynamic US guidance and 30 ml yellow serous fluid drained.  Patient Tolerance:  Patient tolerated the procedure well with no immediate complications

## 2023-11-01 NOTE — PROGRESS NOTES
Second unit of blood started. Pt has not been able to allow blood transfusion to go faster. Hgb recheck changed to 1600 from 1400. Pt reported noticing blood in stool with last BM. Pt was advised not to flush toilet next time.

## 2023-11-01 NOTE — PROGRESS NOTES
"ED PT Eval     11/01/23 0900   Appointment Info   Signing Clinician's Name / Credentials (PT) Holland Mary, PT, DPT   Living Environment   People in Home significant other  (fiance)   Current Living Arrangements apartment   Home Accessibility no concerns   Living Environment Comments Patient lives in apartment with elevator access with fiance who works as RN in ICU.   Self-Care   Usual Activity Tolerance good   Current Activity Tolerance moderate   Equipment Currently Used at Home walker, rolling   Fall history within last six months yes   Number of times patient has fallen within last six months 4   Activity/Exercise/Self-Care Comment Patient's father recently visited and brought FWW due to recent falls. Patient has been using FWW since. At baseline patient is fully IND and was working before she got sick.   General Information   Onset of Illness/Injury or Date of Surgery 11/01/23   Referring Physician Eleanor Copeland, BAKARI   Patient/Family Therapy Goals Statement (PT) To return home   Pertinent History of Current Problem (include personal factors and/or comorbidities that impact the POC) Per EMR \"Lola Milner is a 33 year old female admitted on 10/31/2023. She has a past medical history significant for MDD, prior tobacco use, gout, polyneuropathy and decompensated cirrhosis secondary to alcohol use c/b hepatic encephalopathy, esophageal varices s/p banding, recurrent ascities and hepatorenal syndrome.\"   Existing Precautions/Restrictions fall   General Observations activity: up with assist   Cognition   Affect/Mental Status (Cognition) WFL   Cognitive Status Comments reports some \"fogginess\" but intact and appropriate throughout   Range of Motion (ROM)   Range of Motion ROM is WFL   Strength (Manual Muscle Testing)   Strength (Manual Muscle Testing) strength is WFL   Bed Mobility   Bed Mobility sit-supine;supine-sit   Supine-Sit [place] (Bed Mobility) independent   Sit-Supine [place] (Bed Mobility) " independent   Transfers   Transfers sit-stand transfer   Sit-Stand Transfer   Sit-Stand [place] (Transfers) modified independence   Assistive Device (Sit-Stand Transfers) walker, front-wheeled   Gait/Stairs (Locomotion)   [place] Level (Gait) contact guard   Assistive Device (Gait) walker, front-wheeled   Comment, (Gait/Stairs) slight posterior lean while ambulating, compensated well with FWW   Balance   Balance Comments compensatory ankle strategy in unsupported stance with sway   Clinical Impression   Criteria for Skilled Therapeutic Intervention Yes, treatment indicated   PT Diagnosis (PT) impaired functional mobility   Influenced by the following impairments decreased activity tolerance, impaired balance   Functional limitations due to impairments bed mobility, transfers, gait, stairs   Clinical Presentation (PT Evaluation Complexity) stable   Clinical Presentation Rationale clinical judgement   Clinical Decision Making (Complexity) low complexity   Planned Therapy Interventions (PT) balance training;bed mobility training;gait training;ROM (range of motion);stair training;strengthening;stretching;transfer training   Risk & Benefits of therapy have been explained evaluation/treatment results reviewed;care plan/treatment goals reviewed;risks/benefits reviewed;current/potential barriers reviewed;participants voiced agreement with care plan;participants included;patient   PT Total Evaluation Time   PT Eval, Low Complexity Minutes (55985) 10   Physical Therapy Goals   PT Frequency 5x/week   PT Predicted Duration/Target Date for Goal Attainment 11/15/23   PT Goals Bed Mobility;Transfers;Gait;Stairs   PT Discharge Planning   PT Plan progress ambulatory endurance, progress to unsupported gait as appropriate, monitor BP   PT Discharge Recommendation (DC Rec) home with outpatient physical therapy   PT Rationale for DC Rec At this time patient with impaired balance and endurance but is compensated well with FWW.  Ximena prolonged hospitalization and project that patient will be appropriate for return home with OP PT when medically stable.   PT Brief overview of current status mod I with home walker (in room)   Total Session Time   Total Session Time (sum of timed and untimed services) 10       Holland Mary, PT, DPT  Pager #[phone]

## 2023-11-01 NOTE — PLAN OF CARE
Goal Outcome Evaluation:      Plan of Care Reviewed With: patient, parent    Overall Patient Progress: no changeOverall Patient Progress: no change    Outcome Evaluation: Liver Transplant Evaluation Ongoing, discharge needs TBD

## 2023-11-02 ENCOUNTER — APPOINTMENT (OUTPATIENT)
Dept: CARDIOLOGY | Facility: CLINIC | Age: 33
End: 2023-11-02
Attending: NURSE PRACTITIONER
Payer: COMMERCIAL

## 2023-11-02 ENCOUNTER — APPOINTMENT (OUTPATIENT)
Dept: OCCUPATIONAL THERAPY | Facility: CLINIC | Age: 33
End: 2023-11-02
Attending: PHYSICIAN ASSISTANT
Payer: COMMERCIAL

## 2023-11-02 LAB
ALBUMIN SERPL BCG-MCNC: 4.4 G/DL (ref 3.5–5.2)
ALP SERPL-CCNC: 74 U/L (ref 35–104)
ALT SERPL W P-5'-P-CCNC: 13 U/L (ref 0–50)
ANION GAP SERPL CALCULATED.3IONS-SCNC: 18 MMOL/L (ref 7–15)
AST SERPL W P-5'-P-CCNC: 36 U/L (ref 0–45)
BILIRUB SERPL-MCNC: 33.8 MG/DL
BUN SERPL-MCNC: 35.5 MG/DL (ref 6–20)
CALCIUM SERPL-MCNC: 9.6 MG/DL (ref 8.6–10)
CHLORIDE SERPL-SCNC: 92 MMOL/L (ref 98–107)
CREAT SERPL-MCNC: 2.62 MG/DL (ref 0.51–0.95)
DEPRECATED HCO3 PLAS-SCNC: 19 MMOL/L (ref 22–29)
EGFRCR SERPLBLD CKD-EPI 2021: 24 ML/MIN/1.73M2
ERYTHROCYTE [DISTWIDTH] IN BLOOD BY AUTOMATED COUNT: 21.7 % (ref 10–15)
GLUCOSE SERPL-MCNC: 77 MG/DL (ref 70–99)
HCT VFR BLD AUTO: 19.9 % (ref 35–47)
HGB BLD-MCNC: 7 G/DL (ref 11.7–15.7)
INR PPP: 4.96 (ref 0.85–1.15)
MCH RBC QN AUTO: 32 PG (ref 26.5–33)
MCHC RBC AUTO-ENTMCNC: 35.2 G/DL (ref 31.5–36.5)
MCV RBC AUTO: 91 FL (ref 78–100)
PLATELET # BLD AUTO: 72 10E3/UL (ref 150–450)
POTASSIUM SERPL-SCNC: 3.7 MMOL/L (ref 3.4–5.3)
PROT SERPL-MCNC: ABNORMAL G/DL
RBC # BLD AUTO: 2.19 10E6/UL (ref 3.8–5.2)
SODIUM SERPL-SCNC: 129 MMOL/L (ref 135–145)
WBC # BLD AUTO: 10.3 10E3/UL (ref 4–11)

## 2023-11-02 PROCEDURE — 36415 COLL VENOUS BLD VENIPUNCTURE: CPT | Performed by: PHYSICIAN ASSISTANT

## 2023-11-02 PROCEDURE — P9047 ALBUMIN (HUMAN), 25%, 50ML: HCPCS | Performed by: PHYSICIAN ASSISTANT

## 2023-11-02 PROCEDURE — 97530 THERAPEUTIC ACTIVITIES: CPT | Mod: GO

## 2023-11-02 PROCEDURE — 97535 SELF CARE MNGMENT TRAINING: CPT | Mod: GO

## 2023-11-02 PROCEDURE — 250N000013 HC RX MED GY IP 250 OP 250 PS 637: Performed by: INTERNAL MEDICINE

## 2023-11-02 PROCEDURE — 93325 DOPPLER ECHO COLOR FLOW MAPG: CPT | Mod: TC

## 2023-11-02 PROCEDURE — 250N000009 HC RX 250: Performed by: INTERNAL MEDICINE

## 2023-11-02 PROCEDURE — C8928 TTE W OR W/O FOL W/CON,STRES: HCPCS

## 2023-11-02 PROCEDURE — 85610 PROTHROMBIN TIME: CPT | Performed by: PHYSICIAN ASSISTANT

## 2023-11-02 PROCEDURE — 250N000011 HC RX IP 250 OP 636: Performed by: STUDENT IN AN ORGANIZED HEALTH CARE EDUCATION/TRAINING PROGRAM

## 2023-11-02 PROCEDURE — 99232 SBSQ HOSP IP/OBS MODERATE 35: CPT | Performed by: INTERNAL MEDICINE

## 2023-11-02 PROCEDURE — 99233 SBSQ HOSP IP/OBS HIGH 50: CPT | Performed by: NURSE PRACTITIONER

## 2023-11-02 PROCEDURE — 250N000013 HC RX MED GY IP 250 OP 250 PS 637: Performed by: EMERGENCY MEDICINE

## 2023-11-02 PROCEDURE — 93018 CV STRESS TEST I&R ONLY: CPT | Performed by: STUDENT IN AN ORGANIZED HEALTH CARE EDUCATION/TRAINING PROGRAM

## 2023-11-02 PROCEDURE — 85027 COMPLETE CBC AUTOMATED: CPT | Performed by: PHYSICIAN ASSISTANT

## 2023-11-02 PROCEDURE — G0145 SCR C/V CYTO,THINLAYER,RESCR: HCPCS | Performed by: STUDENT IN AN ORGANIZED HEALTH CARE EDUCATION/TRAINING PROGRAM

## 2023-11-02 PROCEDURE — 93350 STRESS TTE ONLY: CPT | Mod: 26 | Performed by: STUDENT IN AN ORGANIZED HEALTH CARE EDUCATION/TRAINING PROGRAM

## 2023-11-02 PROCEDURE — 93017 CV STRESS TEST TRACING ONLY: CPT

## 2023-11-02 PROCEDURE — 250N000011 HC RX IP 250 OP 636: Performed by: PHYSICIAN ASSISTANT

## 2023-11-02 PROCEDURE — 93325 DOPPLER ECHO COLOR FLOW MAPG: CPT | Mod: 26 | Performed by: STUDENT IN AN ORGANIZED HEALTH CARE EDUCATION/TRAINING PROGRAM

## 2023-11-02 PROCEDURE — 93321 DOPPLER ECHO F-UP/LMTD STD: CPT | Mod: 26 | Performed by: STUDENT IN AN ORGANIZED HEALTH CARE EDUCATION/TRAINING PROGRAM

## 2023-11-02 PROCEDURE — 120N000011 HC R&B TRANSPLANT UMMC

## 2023-11-02 PROCEDURE — 255N000002 HC RX 255 OP 636: Performed by: STUDENT IN AN ORGANIZED HEALTH CARE EDUCATION/TRAINING PROGRAM

## 2023-11-02 PROCEDURE — 87624 HPV HI-RISK TYP POOLED RSLT: CPT | Performed by: STUDENT IN AN ORGANIZED HEALTH CARE EDUCATION/TRAINING PROGRAM

## 2023-11-02 PROCEDURE — 93016 CV STRESS TEST SUPVJ ONLY: CPT | Performed by: STUDENT IN AN ORGANIZED HEALTH CARE EDUCATION/TRAINING PROGRAM

## 2023-11-02 PROCEDURE — 250N000013 HC RX MED GY IP 250 OP 250 PS 637: Performed by: PHYSICIAN ASSISTANT

## 2023-11-02 PROCEDURE — 250N000009 HC RX 250: Performed by: STUDENT IN AN ORGANIZED HEALTH CARE EDUCATION/TRAINING PROGRAM

## 2023-11-02 PROCEDURE — 258N000003 HC RX IP 258 OP 636: Performed by: STUDENT IN AN ORGANIZED HEALTH CARE EDUCATION/TRAINING PROGRAM

## 2023-11-02 PROCEDURE — 84450 TRANSFERASE (AST) (SGOT): CPT | Performed by: PHYSICIAN ASSISTANT

## 2023-11-02 RX ORDER — LACTULOSE 10 G/15ML
30 SOLUTION ORAL 2 TIMES DAILY
Status: DISCONTINUED | OUTPATIENT
Start: 2023-11-02 | End: 2023-11-06

## 2023-11-02 RX ORDER — ATROPINE SULFATE 0.4 MG/ML
.2-2 AMPUL (ML) INJECTION
Status: COMPLETED | OUTPATIENT
Start: 2023-11-02 | End: 2023-11-02

## 2023-11-02 RX ORDER — METOPROLOL TARTRATE 1 MG/ML
1-20 INJECTION, SOLUTION INTRAVENOUS
Status: ACTIVE | OUTPATIENT
Start: 2023-11-02 | End: 2023-11-02

## 2023-11-02 RX ORDER — MULTIVITAMIN,THERAPEUTIC
1 TABLET ORAL DAILY
Status: DISCONTINUED | OUTPATIENT
Start: 2023-11-03 | End: 2023-11-13 | Stop reason: ALTCHOICE

## 2023-11-02 RX ORDER — SODIUM CHLORIDE 9 MG/ML
INJECTION, SOLUTION INTRAVENOUS CONTINUOUS
Status: ACTIVE | OUTPATIENT
Start: 2023-11-02 | End: 2023-11-02

## 2023-11-02 RX ORDER — CIPROFLOXACIN 250 MG/1
250 TABLET, FILM COATED ORAL
Status: DISCONTINUED | OUTPATIENT
Start: 2023-11-02 | End: 2023-11-06

## 2023-11-02 RX ORDER — DOBUTAMINE HYDROCHLORIDE 200 MG/100ML
10-50 INJECTION INTRAVENOUS CONTINUOUS
Status: ACTIVE | OUTPATIENT
Start: 2023-11-02 | End: 2023-11-02

## 2023-11-02 RX ADMIN — RIFAXIMIN 550 MG: 550 TABLET ORAL at 08:42

## 2023-11-02 RX ADMIN — MIDODRINE HYDROCHLORIDE 15 MG: 5 TABLET ORAL at 19:43

## 2023-11-02 RX ADMIN — LACTULOSE 30 ML: 20 SOLUTION ORAL at 08:43

## 2023-11-02 RX ADMIN — Medication 1 DROP: at 14:47

## 2023-11-02 RX ADMIN — FOLIC ACID 1 MG: 1 TABLET ORAL at 08:42

## 2023-11-02 RX ADMIN — ALBUMIN HUMAN 75 G: 0.25 SOLUTION INTRAVENOUS at 09:35

## 2023-11-02 RX ADMIN — METOPROLOL TARTRATE 3 MG: 5 INJECTION INTRAVENOUS at 15:42

## 2023-11-02 RX ADMIN — PANTOPRAZOLE SODIUM 40 MG: 40 TABLET, DELAYED RELEASE ORAL at 08:42

## 2023-11-02 RX ADMIN — GABAPENTIN 100 MG: 100 CAPSULE ORAL at 14:51

## 2023-11-02 RX ADMIN — CIPROFLOXACIN HYDROCHLORIDE 250 MG: 250 TABLET, FILM COATED ORAL at 14:51

## 2023-11-02 RX ADMIN — MIDODRINE HYDROCHLORIDE 15 MG: 5 TABLET ORAL at 08:42

## 2023-11-02 RX ADMIN — LACTULOSE 30 ML: 20 SOLUTION ORAL at 19:43

## 2023-11-02 RX ADMIN — RIFAXIMIN 550 MG: 550 TABLET ORAL at 19:43

## 2023-11-02 RX ADMIN — GABAPENTIN 100 MG: 100 CAPSULE ORAL at 08:42

## 2023-11-02 RX ADMIN — SODIUM BICARBONATE 650 MG TABLET 1300 MG: at 08:43

## 2023-11-02 RX ADMIN — THIAMINE HCL TAB 100 MG 100 MG: 100 TAB at 08:43

## 2023-11-02 RX ADMIN — GABAPENTIN 100 MG: 100 CAPSULE ORAL at 19:43

## 2023-11-02 RX ADMIN — DOBUTAMINE 10 MCG/KG/MIN: 250 INJECTION INTRAVENOUS at 15:23

## 2023-11-02 RX ADMIN — Medication 1 TABLET: at 08:42

## 2023-11-02 RX ADMIN — ATROPINE SULFATE 2 MG: 0.4 INJECTION, SOLUTION INTRAVENOUS at 15:40

## 2023-11-02 RX ADMIN — ERYTHROMYCIN 1 G: 5 OINTMENT OPHTHALMIC at 19:43

## 2023-11-02 RX ADMIN — Medication 1 DROP: at 19:43

## 2023-11-02 RX ADMIN — HUMAN ALBUMIN MICROSPHERES AND PERFLUTREN 7 ML: 10; .22 INJECTION, SOLUTION INTRAVENOUS at 15:44

## 2023-11-02 RX ADMIN — Medication 1 DROP: at 08:43

## 2023-11-02 RX ADMIN — ERYTHROMYCIN 1 G: 5 OINTMENT OPHTHALMIC at 08:43

## 2023-11-02 RX ADMIN — MIDODRINE HYDROCHLORIDE 15 MG: 5 TABLET ORAL at 14:51

## 2023-11-02 ASSESSMENT — ACTIVITIES OF DAILY LIVING (ADL)
ADLS_ACUITY_SCORE: 33
ADLS_ACUITY_SCORE: 34
ADLS_ACUITY_SCORE: 34
ADLS_ACUITY_SCORE: 33
ADLS_ACUITY_SCORE: 34
ADLS_ACUITY_SCORE: 34
ADLS_ACUITY_SCORE: 33
ADLS_ACUITY_SCORE: 34

## 2023-11-02 NOTE — CONSULTS
Brief Gynecology Consult Note    Lola Milner is a 34yo admitted to medicine for liver transplant evaluation in the setting of decompensated alcohol cirrhosis complicated by hepatic encephalopathy, ascites, esophageal varices, etc.    Gynecology was consulted for cervical cancer screening as part of the liver transplant.    Lola reports that she has never had a Pap smear.  She has been amenorrheic for at least half a year since developing decompensated liver disease.  Prior to that point, she reports abnormal uterine bleeding with menses that lasted for about a month at a time.  She denies any personal or family history of cervical, ovarian, or endometrial cancer.    Pap smear with HPV cotesting performed, specimen sent to the lab.  Please reconsult gynecology if results are abnormal.    Loyda Hedrick MD  OB/GYN, PGY-4  11/02/2023, 11:30 AM     The patient was reviewed with Dr. Hedrick.  I agree with the above assessment and plan of care.     Marie German MD, FACOG

## 2023-11-02 NOTE — PROGRESS NOTES
Pt here for dobutamine stress test. Test, meds and side effects reviewed with patient. Test stopped at 50 mcg dobutamine and a total of 2 mg IV atropine. Target HR not achieved. Gave a total of 3 mg IV Metoprolol to bring HR back to baseline. Post monitoring complete and VSS. Pt transferred back to  via transport. Report given to SHADI Hamilton.    Meryl Cummings RN

## 2023-11-02 NOTE — PLAN OF CARE
BP (!) 89/45 (BP Location: Right arm)   Pulse 86   Temp 98.1  F (36.7  C) (Oral)   Resp 17   SpO2 99%     Shift: 1652-9354  Isolation Status: none  VS: hypotensive on room air, afebrile  Neuro: Aox4  Behaviors: calm and cooperative  BG: n/a  Labs: hemoglobin 7.2  Respiratory: WDL, denies SOB  Cardiac: WDL, regular rate and rhythm  Pain/Nausea: moderate pain and intermittent nausea   PRN: none given  Diet: regular, poor appetite  IV Access: R. PIV SL  Infusion(s): none given  Lines/Drains: none  GI/: 1 BM, stool urine/mix  Skin: Paracentesis site covered with small dressing  Mobility: standby assist with walker  Events/Education: Pt had one emesis directly after giving her meds. All thrown up intact. On [facility] protocol scored a zero. Team aware of low blood pressure.  Plan: Continue plan of care and notify team of any changes.

## 2023-11-02 NOTE — PROGRESS NOTES
Admitted/transferred from: ED  Time of arrival on unit 2000  2 RN full  skin assessment completed by Leilani SALAMANCA RN and Marilee García RN  Skin assessment finding: issues found bruise on right knee, some generalized bruising, redness in groin area and under breasts, paracentesis site with bandage     Interventions/actions: other none taken      Will continue to monitor.

## 2023-11-02 NOTE — PROGRESS NOTES
[facility]    Medicine Progress Note - Hospitalist Service, GOLD TEAM 10    Date of Admission:  10/31/2023    Assessment & Plan   Lola Milner is a 33 year old female admitted on 10/31/2023. She has a past medical history significant for MDD, prior tobacco use, gout, polyneuropathy and decompensated cirrhosis secondary to alcohol use c/b hepatic encephalopathy, esophageal varices s/p banding, recurrent ascities and hepatorenal syndrome.      Recently admitted to [facility] 10/23-10/29 and referred for liver transplant eval at [facility]. She was seen in Hepatology and Transplant clinic day of admission. Referred to the ED due to worsening renal function and leukocytosis, being considered for transplant and undergoing eval.     Decompensated alcohol cirrhosis c/b HRS, HE, recurrent ascites, esophageal varices s/p banding (9/2023)  Hyperbilirubinemia   Coagulopathy  Seen in clinic by Dr. Guajardo and Dr. Watson prior to admission. Initially presented with decompensated liver disease in 6/2023. Course complicated by insurance challenges. Recently started chem dep. Requires intermittent paracentesis, last on 10/23. Tbili 44.4 on admission with direct bili >40. Appears Tbili has been in 40s for past few weeks. Recent RUQ US with doppler was negative for thrombus. Some concern for possible SBP on admission with worsening renal function and abdominal pain. Ideally would obtain paracentesis however unable to this evening, will empirically cover.   - Hepatology consulted, appreciate recommendations  - CAPS consulted for dx paracentesis--> no evidence of SBP on cell count and differential, fluid culture in process  - De-escalation of antibiotics today 11/2, stop ceftriaxone and start ciprofloxacin SBP prophylaxis  - Trending MELD labs daily  -Decrease to lactulose twice daily given frequent stooling + [facility] protocol  - rifaxmin 550 mg BID (not taking outpatient, awaiting prior  auth)  - Transplant eval:    ::PETH ordered    ::EBV IgG positive, CMV negative, treponema negative    ::Pending HIV, TB quant    ::TTE completed 11/1    ::Needs pap smear/HPV testing--Gyn consult placed for cervical cancer screening and this was completed today 11/2     MELD 3.0: 40 at 11/2/2023  5:24 AM  MELD-Na: 45 at 11/2/2023  5:24 AM  Calculated from:  Serum Creatinine: 2.62 mg/dL at 11/2/2023  5:24 AM  Serum Sodium: 129 mmol/L at 11/2/2023  5:24 AM  Total Bilirubin: 33.8 mg/dL at 11/2/2023  5:24 AM  Serum Albumin: 4.4 g/dL (Using max of 3.5 g/dL) at 11/2/2023  5:24 AM  INR(ratio): 4.96 at 11/2/2023  5:24 AM  Age at listing (hypothetical): 33 years  Sex: Female at 11/2/2023  5:24 AM           Leukocytosis: Resolved  WBC 18.4 on admission. BP softer on admission, though in setting of cirrhosis and unclear true baseline. CXR without focal consolidation and no respiratory complaints. No dysuria but not voiding well for past 48 hours. She is having new onset abdominal pain, raising highest concern for SBP. Did have PICC line recently so would also worry about bacteremia, though afebrile.   -De-escalation of antibiotics today 11/2 as above, stop ceftriaxone and start ciprofloxacin SBP prophylaxis  - BCx2  - UA not indicative of infection, UC in process  - CAPS consulted for dx paracentesis      EDGAR   AGMA   Hepatorenal syndrome  Cr 2.76, AG 19 on admission. Recent baseline from prior discharge appears ~1.5-1.7. Was seen by Nephrology during admission at [place] with concern for HRS, Cr improved with albumin and midodrine. Lasix and spironolactone held at time of discharge. Worsening renal function with concern for infection does raise concern for SBP. Has had decreased urine output.   - Albumin 100g 10/31 (1.5 g/kg) followed by albumin 70g daily x 2 days (1g/kg)--this completes after today 11/2  - Continue PTA midodrine 15 mg TID  - Continue PTA sodium bicarbonate 1300 mg BID  - Daily weights, I/Os  - SBP work-up  "as above     Hyponatremia: Stable  Na 131 on admission. Na was 135 at time of recent discharge.  In setting of EDGAR, also advanced liver cirrhosis.  Sodium today 129, no mental status changes.  - Albumin challenge as above  -Trend BMP     Acute thrombocytopenia  Plts 115 on admission down trended to 71 today although some component of dilution likely. Recent baseline appears to be 210s. Unable to tell heparin exposure but patient does report receiving twice daily abdominal injections during recent admission, raising c/f heparin exposure. 4T score is intermediate. May also be due to possible infection or worsening liver disease.   - HIT panel ordered and negative  - Hold/avoid heparin products  - Trend Plts      Chronic normocytic anemia  Hgb 8.0 on admission. Recent baseline appears around 7.  Hemoglobin 5.1 11/1, likely some component of dilution with large volume albumin administration over previous last 24 hours, responded to a little over a unit packed RBC transfusion with repeat hemoglobin just above 7.  -Daily CBC     Blurred vision  Developed blurred vision to both eyes in past 72 hours before admission with associated burning. Seems to be better in AM and worsening throughout the day. Improves with visine. Having trouble reading any labels. Wears glasses at baseline.  - Ophthalmology consulted  -Per Optho recs:   Discontinue using visine   Preservative free artificial tears four times a day scheduled, up to q2h as needed  Erythromycin ointment bid for 3 days  Ophtho will perform surface check on 11/03, since the patient is going to be admitted and see if her vision has improved in the interim     Chest discomfort: Resolved 11/1  Endorses \"squeezing\" chest pain for the past few days. Occasional racing heart/skipped beats that come and go. Located along sternum. Improves with palpation on exam and HR in NSR. Very low suspicion for cardiac etiology though certainly could have PVCs at times. Family wonders if " "having MSK symptoms due to using walker which is new. Admission EKG without focal ischemic changes, TTE without wall motion abnormalities noted.     Generalized weakness  Frail and deconditioned at baseline. Uses a walker. Undergoing transplant eval.   - PT/OT  - RD consult      Polyneuropathy   - Okay to continue gabapentin 100 mg TID for now, will need to hold if worsening EDGAR                Diet: Regular Diet Adult  Snacks/Supplements Adult: Other; 10 AM: magic cup, vary flavors. 2PM magic cup, vary flavors. HS: ensure clear, vary flavors; Between Meals    DVT Prophylaxis: VTE Prophylaxis contraindicated due to coagulopathy of liver disease and need for blood product transfusion this admission  Izaguirre Catheter: Not present  Lines: None     Cardiac Monitoring: None  Code Status: Full Code      Clinically Significant Risk Factors         # Hyponatremia: Lowest Na = 128 mmol/L in last 2 days, will monitor as appropriate     # Anion Gap Metabolic Acidosis: Highest Anion Gap = 19 mmol/L in last 2 days, will monitor and treat as appropriate   # Coagulation Defect: INR = 4.96 (Ref range: 0.85 - 1.15) and/or PTT = N/A, will monitor for bleeding  # Thrombocytopenia: Lowest platelets = 70 in last 2 days, will monitor for bleeding          # Overweight: Estimated body mass index is 26.66 kg/m  as calculated from the following:    Height as of an earlier encounter on 10/31/23: 1.6 m (5' 3\").    Weight as of an earlier encounter on 10/31/23: 68.3 kg (150 lb 8 oz)., PRESENT ON ADMISSION  # Severe Malnutrition: based on nutrition assessment, PRESENT ON ADMISSION   # Financial/Environmental Concerns: none (Pt reports her SO is able to pay rent and pay her bills)         Disposition Plan      Expected Discharge Date: 11/04/2023      Destination: home with family              Amalia Angela Preston MD  Hospitalist Service, GOLD TEAM 10  [facility]  Securely message with [employer] (more " info)  Text page via [facility] Paging/Directory   See signed in provider for up to date coverage information  ______________________________________________________________________    Interval History   No acute events overnight.  Patient without acute complaint this morning, just feels a bit overwhelmed with being in the hospital and illness burden and notes that she has family and loved ones coming to stay with her today and in coming days and this keeps her comfort.  She does not report pain, nausea or shortness of breath.  She reports frequent stooling with over 10 stools a day and asks if lactulose dosing could be reduced.    Physical Exam   Vital Signs: Temp: 97.8  F (36.6  C) Temp src: Oral BP: 103/52 Pulse: 78   Resp: 16 SpO2: 94 % O2 Device: None (Room air)    Weight: 0 lbs 0 oz    Gen: NAD, lying comfortably in bed, pleasant and cooperative with interview and exam  HEENT: normocephalic, + scleral icterus, no perioral lesions, oral mucosa moist  CV: RRR at time of exam  Pulm: good air movement bilaterally without wheezing  Abd: soft, +bs, nontender to palpation diffusely, moderately distended  LE: no edema bilaterally  Skin: Marked jaundice on limited exam  Neuro: AOx3  Psych: mood-affect congruent      Medical Decision Making             Data     I have personally reviewed the following data over the past 24 hrs:    10.3  \   7.0 (L)   / 72 (L)     129 (L) 92 (L) 35.5 (H) /  77   3.7 19 (L) 2.62 (H) \     ALT: 13 AST: 36 AP: 74 TBILI: 33.8 (HH)   ALB: 4.4 TOT PROTEIN: N/A LIPASE: N/A     INR:  4.96 (H) PTT:  N/A   D-dimer:  N/A Fibrinogen:  N/A

## 2023-11-02 NOTE — PROGRESS NOTES
BP 91/47 (BP Location: Right arm)   Pulse 76   Temp 97.3  F (36.3  C) (Oral)   Resp 17   SpO2 99%     SHIFT: 9081-6731  ISOLATION: NA  VITALS: Soft BP on RA (90's Systolic), afebrile  BG: NA  Recent Labs   Lab 11/01/23  0622 10/31/23  0814   * 67*   NEURO: Aox4, Calm, Cooperative, Q4h [place] score (0,0)  DIET: Regular Diet, Poor appetite  PAIN: Denies Pain & Nausea  RESPIRATORY: Clear Upper & Lower Lobes Bilaterally  CARDIAC: S1 & S2 audible  : Pt did not void within shift, MD notified (bladder scanned 2 mL)  GI: No BM within shift, last BM 11/1/23  TUBES: NA  MIVF/GTT/ABX: NA  PIV: Right Upper PIV  ASSIST: 1x assist w/ walker  SAFETY: WDL  SKIN: Jaundice, abdominal distention, paracentesis site covered with small dressing  LABS: WBC 10.3, Hgb 7.0, Plt 72  Recent Labs   Lab Test 11/02/23  0524 11/01/23  1753 11/01/23  1031 11/01/23  0622 10/31/23  0814   POTASSIUM  --   --   --  3.6 3.7   HGB 7.0* 7.2* 5.1*  --  8.0*   PLAN: Continue w/poc & Notify MD of any changes

## 2023-11-02 NOTE — PROVIDER NOTIFICATION
Lola Milner, [id number]    [facility], latest BP is 89/45.    Maribell RN   [phone]      Will continue to monitor per provider.

## 2023-11-02 NOTE — PROGRESS NOTES
[facility]  HEPATOLOGY PROGRESS NOTE  Lola Milner [id number]       IMPRESSION:  Lola Milner is a 33 year old female with a history of alcohol associated cirrhosis and alcohol use disorder, last use 6/13/23 complicated by EV s/p banding, EDGAR with last wnl creatinine 0.64 (7/28/23), ascites with approximately weekly courtney, HE, hyponatremia (8/2023), anxiety who was seen for consultation for liver transplant in the outpatient hepatology clinic who was admitted with evidence of worsening creatinine to 2.76.      RECOMMENDATIONS:  # EDGAR  - received albumin challenge.   - infectious work up ngtd- BC, UC, para  - creat remains elevated    # Alcohol associated cirrhosis  # Transplant candidacy  - last reported use 6/13/23, peth pending.   - discussed at transplant conference 10/31, completing eval while inpatient. Echo with increased fluid volume. Transplant surgery requesting dobutamine stress echo. Appreciate gyn seeing inpatient for pap and risk assessment. Has been seen by PT and up walking.   - US abd 10/26 without thrombus or HCC  - MELD 40, ABO A    # Ascites  - diagnostic para negative for SBP. May de escalate abx. She meets criteria for SBP ppx with EDGAR, high t. Bili. Will need Cipro 250 mg daily .   - para as needed for comfort. Not a candidate for diuretics.     # Esophageal varices  - large EV seen 9/12/23. No current evidence of bleeding.     # Mild protein calorie malnutrition  - seen by dietitian, has protein supplements ordered.     Patient was discussed with attending physician, Dr. Remedios Obando.  The above note reflects our joint plan.     Next follow up appointment: tbd    Thank you for the opportunity to be involved with  Lola Milner care. Please call with any questions or concerns.    Afia Houston, ANDREEA, CNP  Inpatient Hepatology [facility]        SUBJECTIVE:  Up in hallway walking with walker. Did have nausea and vomiting yesterday. Has been trying to increase her oral intake with supplements.  No  fevers, abdominal pain, melena or hematochezia. Feeling overwhelmed with process of transplant eval.     ROS:  A 10-point review of systems was negative.    OBJECTIVE:  VS: /52   Pulse 78   Temp 97.8  F (36.6  C) (Oral)   Resp 16   SpO2 94%       General: In no acute distress, mild facial muscle wasting  Neuro: AOx3, No asterixis  HEENT:   moderate scleral icterus, Nooral lesions  CV: Skin warm and dry  Lungs:  Respirations even and nonlabored on room air  Abd:  Moderately distended, nontender   Extrem:  +1 lower peripheral edema   Skin:  moderate jaundice  Psych: pleasant    MEDICATIONS:  Current Facility-Administered Medications   Medication    carboxymethylcellulose PF (REFRESH PLUS) 0.5 % ophthalmic solution 1 drop    cefTRIAXone (ROCEPHIN) 2 g vial to attach to  ml bag for ADULTS or NS 50 ml bag for PEDS    cholecalciferol (VITAMIN D3) capsule 1,250 mcg    Daily 2 GRAM acetaminophen limit, unless fulminent liver failure or transaminases greater than or equal to 300 - 400, then none    erythromycin (ROMYCIN) ophthalmic ointment    folic acid (FOLVITE) tablet 1 mg    gabapentin (NEURONTIN) capsule 100 mg    lactulose (CHRONULAC) solution 20 g    Or    lactulose (CHRONULAC) solution for enema prep 100 g    lactulose (CHRONULAC) solution 30 mL    midodrine (PROAMATINE) tablet 15 mg    mineral oil-hydrophilic petrolatum (AQUAPHOR)    multivitamin w/minerals (THERA-VIT-M) tablet 1 tablet    ondansetron (ZOFRAN) injection 4 mg    pantoprazole (PROTONIX) EC tablet 40 mg    polyethylene glycol-propylene glycol PF (SYSTANE ULTRA PF) opthalmic solution 1 drop    rifaximin (XIFAXAN) tablet 550 mg    sodium bicarbonate tablet 1,300 mg    thiamine (B-1) tablet 100 mg       REVIEW OF LABORATORY, PATHOLOGY AND IMAGING RESULTS:  BMP  Recent Labs   Lab 11/02/23 0524 11/01/23  0622 10/31/23  0814   * 128* 131*   POTASSIUM 3.7 3.6 3.7   CHLORIDE 92* 90* 92*   GEE 9.6 9.3 9.6   CO2 19* 19* 20*   BUN 35.5*  32.9* 28.2*   CR 2.62* 2.64* 2.76*   GLC 77 101* 67*     CBC  Recent Labs   Lab 11/02/23  0524 11/01/23  1753 11/01/23  1031 10/31/23  0814   WBC 10.3 9.3 9.5 18.4*   RBC 2.19* 2.22* 1.56* 2.41*   HGB 7.0* 7.2* 5.1* 8.0*   HCT 19.9* 20.4* 14.6* 22.4*   MCV 91 92 94 93   MCH 32.0 32.4 32.7 33.2*   MCHC 35.2 35.3 34.9 35.7   RDW 21.7* 20.6* 22.0* 22.1*   PLT 72* 70* 71* 115*     INR  Recent Labs   Lab 11/02/23  0524 11/01/23  0622 10/31/23  0814   INR 4.96* 4.85* 3.81*     LFTs  Recent Labs   Lab 11/02/23  0524 11/01/23  0622 10/31/23  0814   ALKPHOS 74 77 137*   AST 36 39 62*   ALT 13 14 19   BILITOTAL 33.8* 33.8* 44.4*   ALBUMIN 4.4 4.4 4.0        MELD 3.0: 40 at 11/2/2023  5:24 AM  MELD-Na: 45 at 11/2/2023  5:24 AM  Calculated from:  Serum Creatinine: 2.62 mg/dL at 11/2/2023  5:24 AM  Serum Sodium: 129 mmol/L at 11/2/2023  5:24 AM  Total Bilirubin: 33.8 mg/dL at 11/2/2023  5:24 AM  Serum Albumin: 4.4 g/dL (Using max of 3.5 g/dL) at 11/2/2023  5:24 AM  INR(ratio): 4.96 at 11/2/2023  5:24 AM  Age at listing (hypothetical): 33 years  Sex: Female at 11/2/2023  5:24 AM      Imaging Results:    Echo 10/31/23  Interpretation Summary  Global and regional left ventricular function is hyperkinetic with an EF of  65-70%.  Global right ventricular function is normal.  No significant valvular abnormalities present.  Mild pulmonary hypertension is present.  IVC diameter >2.1 cm collapsing <50% with sniff suggests a high RA pressure  estimated at 15 mmHg or greater.  Trivial pericardial effusion is present.

## 2023-11-02 NOTE — PROGRESS NOTES
Nutrition Services Brief Note - please see note from 11/1 for full assessment    Diet: regular with 10 am: vital cuisine, 2 pm: magic cup, HS ensure max protein     Labs (11/2): Na 129 mmol/L (L), BUN 35.5 mg/dL (H), Cr 2.62 mg/dL (H), T Bili 33.8 mg/dl (H)     Meds:  Thera-vit-m   Thiamine    Interventions  Modify Thera-vit-m to Thera-vit. Patient's T. Bili is elevated.   Modify nutritional supplements to ensure clear - berry once daily and magic cup BID     Unit RD will continue to monitor per protocol  Gina Gaming MS/RD/LD  7A/7B (beds 219-229) RD pager: [phone]  Weekend/Holiday RD pager: [phone]

## 2023-11-03 ENCOUNTER — DOCUMENTATION ONLY (OUTPATIENT)
Dept: TRANSPLANT | Facility: CLINIC | Age: 33
End: 2023-11-03
Payer: COMMERCIAL

## 2023-11-03 ENCOUNTER — APPOINTMENT (OUTPATIENT)
Dept: OCCUPATIONAL THERAPY | Facility: CLINIC | Age: 33
End: 2023-11-03
Payer: COMMERCIAL

## 2023-11-03 ENCOUNTER — APPOINTMENT (OUTPATIENT)
Dept: PHYSICAL THERAPY | Facility: CLINIC | Age: 33
End: 2023-11-03
Payer: COMMERCIAL

## 2023-11-03 ENCOUNTER — TELEPHONE (OUTPATIENT)
Dept: TRANSPLANT | Facility: CLINIC | Age: 33
End: 2023-11-03
Payer: COMMERCIAL

## 2023-11-03 LAB
ALBUMIN SERPL BCG-MCNC: 4.4 G/DL (ref 3.5–5.2)
ALP SERPL-CCNC: 80 U/L (ref 35–104)
ALT SERPL W P-5'-P-CCNC: 11 U/L (ref 0–50)
ANION GAP SERPL CALCULATED.3IONS-SCNC: 20 MMOL/L (ref 7–15)
AST SERPL W P-5'-P-CCNC: 33 U/L (ref 0–45)
BILIRUB SERPL-MCNC: 36.4 MG/DL
BUN SERPL-MCNC: 39.7 MG/DL (ref 6–20)
CALCIUM SERPL-MCNC: 9.9 MG/DL (ref 8.6–10)
CHLORIDE SERPL-SCNC: 92 MMOL/L (ref 98–107)
CREAT SERPL-MCNC: 2.88 MG/DL (ref 0.51–0.95)
DEPRECATED HCO3 PLAS-SCNC: 17 MMOL/L (ref 22–29)
EGFRCR SERPLBLD CKD-EPI 2021: 22 ML/MIN/1.73M2
ERYTHROCYTE [DISTWIDTH] IN BLOOD BY AUTOMATED COUNT: 21.5 % (ref 10–15)
GLUCOSE SERPL-MCNC: 68 MG/DL (ref 70–99)
HCT VFR BLD AUTO: 21.3 % (ref 35–47)
HGB BLD-MCNC: 7.5 G/DL (ref 11.7–15.7)
INR PPP: 4.23 (ref 0.85–1.15)
LABORATORY REPORT: NORMAL
MCH RBC QN AUTO: 32.8 PG (ref 26.5–33)
MCHC RBC AUTO-ENTMCNC: 35.2 G/DL (ref 31.5–36.5)
MCV RBC AUTO: 93 FL (ref 78–100)
PLATELET # BLD AUTO: 81 10E3/UL (ref 150–450)
PLPETH BLD-MCNC: <10 NG/ML
POPETH BLD-MCNC: <10 NG/ML
POTASSIUM SERPL-SCNC: 3.6 MMOL/L (ref 3.4–5.3)
PROT SERPL-MCNC: ABNORMAL G/DL
RBC # BLD AUTO: 2.29 10E6/UL (ref 3.8–5.2)
SODIUM SERPL-SCNC: 129 MMOL/L (ref 135–145)
WBC # BLD AUTO: 10.6 10E3/UL (ref 4–11)

## 2023-11-03 PROCEDURE — 99232 SBSQ HOSP IP/OBS MODERATE 35: CPT | Performed by: INTERNAL MEDICINE

## 2023-11-03 PROCEDURE — 97530 THERAPEUTIC ACTIVITIES: CPT | Mod: GO

## 2023-11-03 PROCEDURE — 97535 SELF CARE MNGMENT TRAINING: CPT | Mod: GO

## 2023-11-03 PROCEDURE — 120N000011 HC R&B TRANSPLANT UMMC

## 2023-11-03 PROCEDURE — 85610 PROTHROMBIN TIME: CPT | Performed by: PHYSICIAN ASSISTANT

## 2023-11-03 PROCEDURE — 250N000013 HC RX MED GY IP 250 OP 250 PS 637: Performed by: EMERGENCY MEDICINE

## 2023-11-03 PROCEDURE — 250N000013 HC RX MED GY IP 250 OP 250 PS 637: Performed by: INTERNAL MEDICINE

## 2023-11-03 PROCEDURE — 99233 SBSQ HOSP IP/OBS HIGH 50: CPT | Performed by: NURSE PRACTITIONER

## 2023-11-03 PROCEDURE — 250N000011 HC RX IP 250 OP 636: Mod: JZ | Performed by: STUDENT IN AN ORGANIZED HEALTH CARE EDUCATION/TRAINING PROGRAM

## 2023-11-03 PROCEDURE — 99233 SBSQ HOSP IP/OBS HIGH 50: CPT | Mod: GC | Performed by: SURGERY

## 2023-11-03 PROCEDURE — 85027 COMPLETE CBC AUTOMATED: CPT | Performed by: PHYSICIAN ASSISTANT

## 2023-11-03 PROCEDURE — 84450 TRANSFERASE (AST) (SGOT): CPT | Performed by: PHYSICIAN ASSISTANT

## 2023-11-03 PROCEDURE — 250N000009 HC RX 250: Performed by: INTERNAL MEDICINE

## 2023-11-03 PROCEDURE — 36415 COLL VENOUS BLD VENIPUNCTURE: CPT | Performed by: PHYSICIAN ASSISTANT

## 2023-11-03 PROCEDURE — 250N000013 HC RX MED GY IP 250 OP 250 PS 637: Performed by: PHYSICIAN ASSISTANT

## 2023-11-03 PROCEDURE — 97116 GAIT TRAINING THERAPY: CPT | Mod: GP

## 2023-11-03 PROCEDURE — 97530 THERAPEUTIC ACTIVITIES: CPT | Mod: GP

## 2023-11-03 RX ORDER — HYDROXYZINE HYDROCHLORIDE 10 MG/1
10 TABLET, FILM COATED ORAL
Status: COMPLETED | OUTPATIENT
Start: 2023-11-03 | End: 2023-11-03

## 2023-11-03 RX ADMIN — GABAPENTIN 100 MG: 100 CAPSULE ORAL at 14:12

## 2023-11-03 RX ADMIN — PANTOPRAZOLE SODIUM 40 MG: 40 TABLET, DELAYED RELEASE ORAL at 10:03

## 2023-11-03 RX ADMIN — Medication 1 DROP: at 14:12

## 2023-11-03 RX ADMIN — ONDANSETRON 4 MG: 2 INJECTION INTRAMUSCULAR; INTRAVENOUS at 09:24

## 2023-11-03 RX ADMIN — SODIUM BICARBONATE 650 MG TABLET 1300 MG: at 09:04

## 2023-11-03 RX ADMIN — THIAMINE HCL TAB 100 MG 100 MG: 100 TAB at 09:06

## 2023-11-03 RX ADMIN — RIFAXIMIN 550 MG: 550 TABLET ORAL at 09:07

## 2023-11-03 RX ADMIN — GABAPENTIN 100 MG: 100 CAPSULE ORAL at 09:06

## 2023-11-03 RX ADMIN — GABAPENTIN 100 MG: 100 CAPSULE ORAL at 19:41

## 2023-11-03 RX ADMIN — SODIUM BICARBONATE 650 MG TABLET 1300 MG: at 19:40

## 2023-11-03 RX ADMIN — LACTULOSE 30 ML: 20 SOLUTION ORAL at 19:41

## 2023-11-03 RX ADMIN — CIPROFLOXACIN HYDROCHLORIDE 250 MG: 250 TABLET, FILM COATED ORAL at 09:04

## 2023-11-03 RX ADMIN — MIDODRINE HYDROCHLORIDE 15 MG: 5 TABLET ORAL at 09:05

## 2023-11-03 RX ADMIN — Medication 1 DROP: at 21:14

## 2023-11-03 RX ADMIN — ONDANSETRON 4 MG: 2 INJECTION INTRAMUSCULAR; INTRAVENOUS at 21:17

## 2023-11-03 RX ADMIN — FOLIC ACID 1 MG: 1 TABLET ORAL at 09:07

## 2023-11-03 RX ADMIN — MIDODRINE HYDROCHLORIDE 15 MG: 5 TABLET ORAL at 14:12

## 2023-11-03 RX ADMIN — Medication 1 DROP: at 16:42

## 2023-11-03 RX ADMIN — HYDROXYZINE HYDROCHLORIDE 10 MG: 10 TABLET ORAL at 21:14

## 2023-11-03 RX ADMIN — RIFAXIMIN 550 MG: 550 TABLET ORAL at 19:40

## 2023-11-03 RX ADMIN — LACTULOSE 30 ML: 20 SOLUTION ORAL at 09:03

## 2023-11-03 RX ADMIN — ERYTHROMYCIN 1 G: 5 OINTMENT OPHTHALMIC at 09:18

## 2023-11-03 RX ADMIN — ERYTHROMYCIN 1 G: 5 OINTMENT OPHTHALMIC at 19:40

## 2023-11-03 RX ADMIN — Medication 1 DROP: at 09:08

## 2023-11-03 RX ADMIN — THERA TABS 1 TABLET: TAB at 14:12

## 2023-11-03 ASSESSMENT — ACTIVITIES OF DAILY LIVING (ADL)
ADLS_ACUITY_SCORE: 34

## 2023-11-03 NOTE — PROGRESS NOTES
/71 (BP Location: Right arm)   Pulse 79   Temp 97.6  F (36.4  C) (Oral)   Resp 18   Wt 68.6 kg (151 lb 4.8 oz)   SpO2 98%   BMI 26.80 kg/m      SHIFT: 4543-6434  ISOLATION: NA  VITALS: Softer BP on RA, afebrile  BG: NA  Recent Labs   Lab 11/02/23  0524 11/01/23  0622 10/31/23  0814   GLC 77 101* 67*   NEURO: Aox4, Pt on [place] scoring (0,0,0)  DIET: Regular Diet, Poor appetite  PAIN: Denies Pain & Nausea  RESPIRATORY: Clear Upper & Lower Lobes Bilaterally  CARDIAC: S1 & S2 audible  /GI: Pt had 1x unmeasured urine stool mix  TUBES: NA  MIVF/GTT/ABX: NA  ASSIST: SBA with walker   SAFETY: Bed alarm on  SKIN: Jaundice, abdominal distention   LABS: Hgb increase 7.0 to 7.5, WBC 10.6, Plt 81, Total Bilirubin 36.4  Recent Labs   Lab Test 11/03/23  0530 11/02/23  0524 11/01/23  1753 11/01/23  1031 11/01/23  0622 10/31/23  0814   POTASSIUM  --  3.7  --   --  3.6 3.7   HGB 7.5* 7.0* 7.2*   < >  --  8.0*    < > = values in this interval not displayed.   PLAN: Continue to monitor Pt orientation status ([place] assessment)

## 2023-11-03 NOTE — PROGRESS NOTES
November 3, 2023 1:44 PM - Maurilio Gonzalez RN:     NEW LIVER LISTING    Listing MELD: 48  ABO: A  Diagnosis: alcoholic cirrhosis of liver with ascites    Patient is Open to All Extended Criteria. Not a Living Donor candidate.

## 2023-11-03 NOTE — LETTER
November 3, 2023    Lola Milner  [address] [place] [id number]  [facility] [address]    Dear Ms. Milner,    This letter is sent to confirm that you have completed your transplant work-up and you are a candidate in the liver transplant program at the [facility].  You were placed on the liver active waitlist on 11/3/2023.      Now that the evaluation process is complete, the general hepatology team will take over the management of your cares. This team also works with your hepatologist and will be your main contact for symptom management, appointments, medications, and any questions that are not specific to transplant. You can reach this team by calling [phone] or [employer] message your hepatology provider. The transplant team will continue to monitor MELD labs and notify you when MELD labs are due.     When you are active on the waitlist and an organ becomes available, a coordinator will need to speak to you immediately.  You could be contacted at any time during the day or night as an organ could become available at any time.  Please make certain our office always has your current telephone numbers and address.      Items we will need from you:    We have received approval from your insurance company for the transplant procedure.  It is critical that you notify us if there is any change in your insurance.  It is also important that you familiarize yourself with the details of your specific insurance policy.  Our patient  is available to assist you if you should have any questions regarding your coverage.    You will need to have labs drawn frequently while you are listed. The frequency is based on your MELD score. Your current listing MELD is 40. Below is a chart to help you understand how often to have labs drawn.  If you are uncertain of your MELD score/how often you need labs, please contact your Transplant Coordinator.  Additionally, if you would like  to have labs drawn outside the [facility] system, please notify me to make arrangements to have labs ordered.  It will be your responsibility to remind your physician to forward your results to the Transplant Office.                MELD greater than 25 - Weekly labs              MELD 18-24 - Monthly labs        MELD 17 or less- every 3 months    During this waiting period, we may request additional periodic laboratory tests with your primary physician.  It will be your responsibility to remind your physician to forward your results to the Transplant Office.    We need to be kept informed of any changes in your medical condition such as:    changes in your medications,   significant changes in your health  significant infections (such as pneumonia or abscesses)  blood transfusions  any condition which requires hospitalization  any surgery    Remember to complete any routine cancer screening tests required before your transplant.  This includes colonoscopy; prostate screening for men, and mammogram and gynecologic testing for women, as well as dental work.  Your primary care clinic can assist you with arranging for these exams.  Remind your caregivers to forward copies of the records and final reports.      We want you to know that our program has physician and surgeon coverage 24 hours a day, 365 days a year. In addition, our transplant surgeons and physicians will not be on call for two or more transplant programs more than 30 miles apart unless the circumstances have been reviewed and approved by the [employer] Organ [employer]aring (UNOS) Membership and Professional Standards Committee (MPSC). Finally, our primary physician and primary surgeons are not designated as the primary surgeon or primary physician at more than 1 transplant hospital. If this coverage changes or there are substantial program changes, you will be notified in writing by letter.     Attached is a letter from UNOS that describes the  services and information offered to patients by UNOS and the Organ Procurement and Transplantation Network (OPTN).    We appreciate having had the opportunity to participate in your care.  If you have questions, please feel free to call the Transplant Office at [phone] or [phone].    Sincerely,  Maurilio Gonzalez RN  Pre-Liver Transplant Coordinator  [phone] (direct)  [phone] (fax)    Solid Organ Transplant  [facility]    Enclosures: UNOS Letter  cc: Care Team    The Organ Procurement and Transplantation Network Toll-free patient services line:  [phone]  Your resource for organ transplant information    Staffed 8:30 am - 5:00 pm ET Monday - Friday Leave a message 24/7 to receive a call back    The Organ Procurement and Transplantation Network (OPTN) is the national transplant system. It makes the policies that decide how donated organs are matched to patients waiting for a transplant. The OPTN:    Makes sure donated organs get matched to people on the transplant waiting list  Tells people about the donation and transplant processes  Makes sure that the public knows about the need for more organ and tissue donations    The OPTN has a free patient services line that you can call to:  Get more information about:  Organ donation and organ transplants  Donation and transplant policies  Get an information kit with:  A list of transplant hospitals  Waiting list information  Talk about any questions you may have about your transplant hospital or organ procurement organization. The staff will do their best to help you or point you to others who may help.  Find out how you can volunteer with the OPTN and help shape transplant policy     The patient services line number is: 2-667-796-4233    Patient services line staff CANNOT answer questions about your own medical care, including:  Waiting list  status  Test results  Medical records  You will need to call your transplant hospital for this information.    The following websites have more information about transplantation and donation:    OPTN: https://optn.transplant.hrsa.gov/    For potential living donors and transplant recipients:    Living with transplant: https://www.transplantliving.org/    Living donation process: https://optn.transplant.hrsa.gov/living-donation/    Financial assistance: https://www.livingdonorassistance.org/    Transplantation data: https://www.srtr.org/    Organ donation: https://www.organdonor.gov/    Volunteer with the OPTN: https://optn.transplant.hrsa.gov/get-involved/

## 2023-11-03 NOTE — PROGRESS NOTES
CLINICAL NUTRITION SERVICES - BRIEF NOTE      Nutrition Prescription     RECOMMENDATIONS FOR MDs/PROVIDERS TO ORDER:  None at this time     Recommendations already ordered by Registered Dietitian (RD):  Updated supplement order/preferences     Future/Additional Recommendations:  If patient unable to significantly increase PO with scheduled anti-nausea medications, would recommend enteral nutrition support.    If TF is initiated, would recommend: Osmolite 1.5 Santhosh (or equivalent) @ goal of  60ml/hr  (1440ml/day) provides: 2160 kcals, 90 g PRO, 1097 ml free H20, 293 g CHO, and 0 g fiber daily.    Initiate @ 10 ml/hr and advance by 10 ml Q8H pending pt's tolerance.  Do not advance TF rate unless Mg++ > 1.5, K+ is > 3, and phos > 1.9  Recommend 30 ml Q4H fluid flushes for tube patency. Additional fluids and/or adjustments per MD.     Ensure patient has 100 mg Thiamine x 5-7 days to prevent lyte depletion if at risk for refeeding syndrome   If gastric enteral access: HOB > 30 degrees     *Please see full assessment note from 11/1/23    New Findings:  Pt and family reports continued poor po intake. Pt voices preference for chocolate magic cup, would like to continue Ensure clear once daily. Family reports pt liked Ensure clear mixed with moses. Family also reports pt isn't consuming much PO, so would like to have the magic cup TID. Pt has some snacks in room including peanuts, which she was snacking on during this writer's visit.     Interventions  Medical food supplement therapy  Encouraged small/frequent meals    RD to follow per protocol.    HELGA Ledesma, RD, LD  7A/7B (beds 219-229) RD pager: [phone]  Weekend/Holiday RD pager: [phone]

## 2023-11-03 NOTE — CONSULTS
Assessment and Plan:  1. liver transplant evaluation - patient is a good candidate overall. Benefits and surgical risks of a liver transplantation were discussed.  2. 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    Surgical evaluation:  1. Portal Vein: patent  2. Hepatic Artery: patent  3. TIPS: no  4. Previous Abdominal Surgery: no  5. Hepatocellular Carcinoma: no  6. Ascites: yes  8. Portopulmonary Hypertension: no  9. Hepatopulmonary Syndrome: no  10. Cardiac Evaluation: good but recommend stress test.  11. Nutritional Status: good  12. Tobacco:  no  13. Diabetes:no  14. HTN:no  15. LD candidate: no    Recommendations:   -Follow up PAP results.   -Recommend stress echo to evaluate for alcoholic cardiomyopathy.   -Confirm plan for ongoing lifelong management of alcohol addiction (last drink was back in June and she seems very committed to staying sober).    Patients overall evaluation will be discussed at the Liver Transplant selection committee meeting with a final recommendation on the patients suitability for transplant to be made at that time.    HPI:  33 year old female with alcoholic cirrhosis who was recently admitted after being seen in hepatology clinic with increased wbc and worsening renal function. Her MELD is at least 40 now and liver recovery is not likely. Appears to be acute on chronic liver failure given rapid worsening and swollen liver on imaging.     Previous Transplant Hx: none    Cardiovascular Hx:  h/o Cardiac Issues: no  Exercise Tolerance: good    Potential Donor(s): no    ROS:   REVIEW OF SYSTEMS (check box if normal)  [X]   GENERAL [X]   PULMONARY [X]   GENITOURINARY  [X]    CNS [X]   CARDIAC [X]    ENDOCRINE  [X]   EARS,NOSE,THROAT [X]    GASTROINTESTINAL [X]   NEUROLOGIC   [X]   MUSCLOSKELTAL [X]    HEMATOLOGY    Examination:     Vitals:  There were no vitals taken for this visit.    GENERAL APPEARANCE: alert and no distress  EYES: PERRL  HENT: mouth without  ulcers or lesions  NECK: supple, no adenopathy  RESP: lungs clear to auscultation - no rales, rhonchi or wheezes  CV: regular rhythm, normal rate, no rub   ABDOMEN: protuberent, soft, nontender. No scars  MS: extremities normal- no gross deformities noted, no evidence of inflammation in joints, no muscle tenderness  SKIN: no rash  NEURO: Normal strength and tone, sensory exam grossly normal, mentation intact and speech normal  PSYCH: mentation appears normal. and affect normal/bright      Results:             I had a long discussion with the patient regarding liver transplantation which included but was not limited to  the following points:  Liver transplant selection committee process.  The federal rules for cadaveric waiting list, the size and blood type matching of the organ. The availability of living-related donor transplantation.  The types of donors: brain death donors, non-heart beating donors, partial liver grafts: splits and living donor grafts  Extended criteria Donors (older age, steasosis) and the increased risk of primary non-function using the extended criteria donors  [facility] high risk donors, Risk of donor transmitted infections and donor transmitted malignancy  The liver transplant operation and the associated risks and technical complications which can include intraoperative death, post operative death, Primary non-function, bleeding requiring re-operations, arterial and biliary complications, bowel perforations, and intra abdominal abscess. Some of these complicaitons may require a second operation.  The postoperative course, the ICU stay and risk of postoperative complications which can include sepsis, MI, stroke, brain injury, pneumonia, pleural effusions, and renal dysfunction.  The current 1 year and 5 year graft and patient survivals.  The need for life long immunosuppressive therapy and the side effects of these medications, including the possibility of toxicity, opportunistic infections,  risk of cancer including lymphoma, and the possibility of rejection even if the patient is taking the medication exactly as prescribed.  The need for compliance with medications and follow-up visits in the clinic and thereafter.  The patient and family understand these risks and wish to proceed to transplantation    I spent 60 minutes with the patient and more than 50% of the time was spend in direct face to face counseling.

## 2023-11-03 NOTE — PROGRESS NOTES
Transplant Social Work Services Progress Note    Date of Initial Social Work Evaluation: 10/31/2023  Collaborated with: Patient at bedside, patient's mother and step-father.     Data: Lola is in evaluation for liver transplant  Intervention: I met with Lola, her mother and step-father at bedside. I reviewed that the expectation is for her to return back to her [facility] treatment once medically able. Lola and her parents voiced understanding. They have also contacted the program and have the option to join virtually however, need privacy to complete treatment. They did not have any other questions or concerns at this time.   Unable to send patient a [employer] message   Assessment: No new assessment  Education provided by SW: Recommendation to return back to treatment when able. Patient presented as motivated to do so.   Plan: I am not follow for any discharge planning. Will continue to follow for transplant evaluation.     ABEBA Rosas, [facility]  Liver Transplant   [facility]  Phone: [phone]  Pager: [phone]

## 2023-11-03 NOTE — PROGRESS NOTES
[facility]  HEPATOLOGY PROGRESS NOTE  Lola Milner [id number]       IMPRESSION:  Lola Milner is a 33 year old female with a history of alcohol associated cirrhosis and alcohol use disorder, last use 6/13/23 complicated by EV s/p banding, EDGAR with last wnl creatinine 0.64 (7/28/23), ascites with approximately weekly courtney, HE, hyponatremia (8/2023), anxiety who was seen for consultation for liver transplant in the outpatient hepatology clinic who was admitted with evidence of worsening creatinine to 2.76.      RECOMMENDATIONS:  # EDGAR  - received albumin challenge.   - infectious work up ngtd- BC, UC, para  - creat remains elevated. Continues to make urine, reports an increase. Likely multifactorial with pre renal process- dehydration, LVP, high t. Bili.     # Alcohol associated cirrhosis  # Transplant candidacy  - last reported use 6/13/23, peth pending. Peth negative 10/2, 9/4  - discussed at transplant conference 10/31, completing eval while inpatient. Echo with increased fluid volume, DSE negative. GYN pap obtained and pending. Has been seen by PT and up walking. Once her pap results are available, will plan to list for liver transplant.   - US abd 10/26 without thrombus or HCC  - MELD 40, ABO A Continue with daily MELD labs.     # Ascites  - diagnostic para negative for SBP. On ciprofloxacin 250 mg daily for SBP ppx.    - para as needed for comfort. Not a candidate for diuretics given EDGAR.     # Esophageal varices  - large EV seen 9/12/23. No current evidence of bleeding.     # Mild protein calorie malnutrition  - seen by dietitian, has protein supplements ordered.     Patient was discussed with attending physician, Dr. Remedios Obando.  The above note reflects our joint plan.     Next follow up appointment: tbd    Thank you for the opportunity to be involved with  Lola Milner care. Please call with any questions or concerns.    Afia Houston, ANDREEA, CNP  Inpatient Hepatology [facility]        SUBJECTIVE:  Vision  improved with current drops and lacrilube. Had episode of vomiting yesterday, trying to increase her oral intake of supplements and snacks. No fevers, melena. Mild abdominal pain that has been stable.     ROS:  A 10-point review of systems was negative.    OBJECTIVE:  VS: /71 (BP Location: Right arm)   Pulse 79   Temp 97.6  F (36.4  C) (Oral)   Resp 18   Wt 68.6 kg (151 lb 4.8 oz)   SpO2 98%   BMI 26.80 kg/m        General: In no acute distress, mild facial muscle wasting  Neuro: AOx3, No asterixis  HEENT:   moderate scleral icterus, Nooral lesions  CV: Skin warm and dry  Lungs:  Respirations even and nonlabored on room air  Abd:  Moderately distended, nontender   Extrem:  +1 lower peripheral edema   Skin:  moderate jaundice  Psych: pleasant    MEDICATIONS:  Current Facility-Administered Medications   Medication    carboxymethylcellulose PF (REFRESH PLUS) 0.5 % ophthalmic solution 1 drop    cholecalciferol (VITAMIN D3) capsule 1,250 mcg    ciprofloxacin (CIPRO) tablet 250 mg    Daily 2 GRAM acetaminophen limit, unless fulminent liver failure or transaminases greater than or equal to 300 - 400, then none    erythromycin (ROMYCIN) ophthalmic ointment    folic acid (FOLVITE) tablet 1 mg    gabapentin (NEURONTIN) capsule 100 mg    lactulose (CHRONULAC) solution 20 g    Or    lactulose (CHRONULAC) solution for enema prep 100 g    lactulose (CHRONULAC) solution 30 mL    midodrine (PROAMATINE) tablet 15 mg    mineral oil-hydrophilic petrolatum (AQUAPHOR)    multivitamin, therapeutic (THERA-VIT) tablet 1 tablet    ondansetron (ZOFRAN) injection 4 mg    pantoprazole (PROTONIX) EC tablet 40 mg    polyethylene glycol-propylene glycol PF (SYSTANE ULTRA PF) opthalmic solution 1 drop    rifaximin (XIFAXAN) tablet 550 mg    sodium bicarbonate tablet 1,300 mg    sodium chloride (PF) 0.9% PF flush 5-10 mL    thiamine (B-1) tablet 100 mg       REVIEW OF LABORATORY, PATHOLOGY AND IMAGING RESULTS:  [facility]  Recent Labs   Lab  11/03/23  0530 11/02/23  0524 11/01/23  0622 10/31/23  0814   * 129* 128* 131*   POTASSIUM 3.6 3.7 3.6 3.7   CHLORIDE 92* 92* 90* 92*   GEE 9.9 9.6 9.3 9.6   CO2 17* 19* 19* 20*   BUN 39.7* 35.5* 32.9* 28.2*   CR 2.88* 2.62* 2.64* 2.76*   GLC 68* 77 101* 67*     CBC  Recent Labs   Lab 11/03/23  0530 11/02/23  0524 11/01/23  1753 11/01/23  1031   WBC 10.6 10.3 9.3 9.5   RBC 2.29* 2.19* 2.22* 1.56*   HGB 7.5* 7.0* 7.2* 5.1*   HCT 21.3* 19.9* 20.4* 14.6*   MCV 93 91 92 94   MCH 32.8 32.0 32.4 32.7   MCHC 35.2 35.2 35.3 34.9   RDW 21.5* 21.7* 20.6* 22.0*   PLT 81* 72* 70* 71*     INR  Recent Labs   Lab 11/03/23 0530 11/02/23  0524 11/01/23  0622 10/31/23  0814   INR 4.23* 4.96* 4.85* 3.81*     LFTs  Recent Labs   Lab 11/03/23  0530 11/02/23  0524 11/01/23  0622 10/31/23  0814   ALKPHOS 80 74 77 137*   AST 33 36 39 62*   ALT 11 13 14 19   BILITOTAL 36.4* 33.8* 33.8* 44.4*   ALBUMIN 4.4 4.4 4.4 4.0        MELD 3.0: 40 at 11/3/2023  5:30 AM  MELD-Na: 44 at 11/3/2023  5:30 AM  Calculated from:  Serum Creatinine: 2.88 mg/dL at 11/3/2023  5:30 AM  Serum Sodium: 129 mmol/L at 11/3/2023  5:30 AM  Total Bilirubin: 36.4 mg/dL at 11/3/2023  5:30 AM  Serum Albumin: 4.4 g/dL (Using max of 3.5 g/dL) at 11/3/2023  5:30 AM  INR(ratio): 4.23 at 11/3/2023  5:30 AM  Age at listing (hypothetical): 33 years  Sex: Female at 11/3/2023  5:30 AM      Imaging Results:    DSE 11/2/23  Non diagnostic dobutamine echocardiogram given target heart rate was not  achieved (reached 75% of MPHR).  However at this degree of stress there was no inducible ischemia.  Normal biventricular size, thickness, and global systolic function at  baseline, LVEF=60-65%.  With dobutamine, LVEF increased to >70% and LV cavity size decreased  appropriately.  No regional wall motion abnormalities at rest or with dobutamine.  No angina was elicited.  No ECG evidence of ischemia. Normal heart rate and blood pressure response to  dobutamine

## 2023-11-03 NOTE — PROGRESS NOTES
[facility]    Medicine Progress Note - Hospitalist Service, GOLD TEAM 10    Date of Admission:  10/31/2023    Assessment & Plan   Lola Milner is a 33 year old female admitted on 10/31/2023. She has a past medical history significant for MDD, prior tobacco use, gout, polyneuropathy and decompensated cirrhosis secondary to alcohol use c/b hepatic encephalopathy, esophageal varices s/p banding, recurrent ascities and hepatorenal syndrome.      Recently admitted to [place] 10/23-10/29 and referred for liver transplant eval at [facility]. She was seen in Hepatology and Transplant clinic day of admission. Referred to the ED due to worsening renal function and leukocytosis, being considered for transplant and undergoing eval.     Decompensated alcohol cirrhosis c/b HRS, HE, recurrent ascites, esophageal varices s/p banding (9/2023)  Hyperbilirubinemia   Coagulopathy  Seen in clinic by Dr. Guajardo and Dr. Watson prior to admission. Initially presented with decompensated liver disease in 6/2023. Course complicated by insurance challenges. Recently started chem dep. Requires intermittent paracentesis, last on 10/23. Tbili 44.4 on admission with direct bili >40. Appears Tbili has been in 40s for past few weeks. Recent RUQ US with doppler was negative for thrombus. Some concern for possible SBP on admission with worsening renal function and abdominal pain. Ideally would obtain paracentesis however unable to this evening, will empirically cover.   - Hepatology consulted, appreciate recommendations  - CAPS consulted for dx paracentesis--> no evidence of SBP on cell count and differential, fluid culture in process  - De-escalation of antibiotics 11/2, stop ceftriaxone and start ciprofloxacin SBP prophylaxis  - Trending MELD labs daily  - lactulose twice daily (decreased 11/2 given frequent stooling) + [place] protocol  - rifaxmin 550 mg BID (not taking outpatient, awaiting prior  auth)  - Transplant eval:    ::PETH ordered and <10    ::EBV IgG positive, CMV negative, treponema negative    ::HIV negative, TB quant indeterminate    ::TTE completed     ::Pap smear/HPV testing in process (completed , appreciate Gyn team assistance)     MELD 3.0: 40 at 11/3/2023  5:30 AM  MELD-Na: 44 at 11/3/2023  5:30 AM  Calculated from:  Serum Creatinine: 2.88 mg/dL at 11/3/2023  5:30 AM  Serum Sodium: 129 mmol/L at 11/3/2023  5:30 AM  Total Bilirubin: 36.4 mg/dL at 11/3/2023  5:30 AM  Serum Albumin: 4.4 g/dL (Using max of 3.5 g/dL) at 11/3/2023  5:30 AM  INR(ratio): 4.23 at 11/3/2023  5:30 AM  Age at listin years  Sex: Female at 11/3/2023  5:30 AM    Leukocytosis: Resolved  WBC 18.4 on admission. BP softer on admission, though in setting of cirrhosis and unclear true baseline. CXR without focal consolidation and no respiratory complaints. No dysuria but not voiding well for past 48 hours. She is having new onset abdominal pain, raising highest concern for SBP. Did have PICC line recently so would also worry about bacteremia, though afebrile.   -De-escalation of antibiotics  as above, stopped ceftriaxone and started ciprofloxacin SBP prophylaxis  - BCx2  - UA not indicative of infection, UC in process  - CAPS consulted for dx paracentesis and this was completed , appreciate assistance     EDGAR   AGMA   Hepatorenal syndrome  Cr 2.76, AG 19 on admission. Recent baseline from prior discharge appears ~1.5-1.7. Was seen by Nephrology during admission at [place] with concern for HRS, Cr improved with albumin and midodrine. Lasix and spironolactone held at time of discharge. Assessed for SBP. Has had decreased urine output.   - Albumin 100g 10/31 (1.5 g/kg) followed by albumin 70g daily x 2 days (1g/kg)--this completed   - Continue PTA midodrine 15 mg TID  - Continue PTA sodium bicarbonate 1300 mg BID  - Daily weights, I/Os  - SBP work-up as above     Hyponatremia: Stable  Na 131 on admission.  "Na was 135 at time of recent discharge.  In setting of EDGAR, also advanced liver cirrhosis.  Sodium today 129, no mental status changes.  - Albumin challenge completedas above  -Trend BMP     Acute thrombocytopenia  Plts 115 on admission down trended to 71 today although some component of dilution likely. Recent baseline appears to be 210s. Unable to tell heparin exposure but patient does report receiving twice daily abdominal injections during recent admission, raising c/f heparin exposure. 4T score is intermediate. Likely due to worsening liver disease.   - HIT panel ordered and negative  - Hold/avoid heparin products  - Trend Plts      Chronic normocytic anemia  Hgb 8.0 on admission. Recent baseline appears around 7.  Hemoglobin 5.1 11/1, likely some component of dilution with large volume albumin administration over previous last 24 hours, responded to a little over a unit packed RBC transfusion on 11/1 with repeat hemoglobin just above 7.  -Daily CBC     Blurred vision  Developed blurred vision to both eyes in past 72 hours before admission with associated burning. Seems to be better in AM and worsening throughout the day. Improves with visine. Having trouble reading any labels. Wears glasses at baseline.  - Ophthalmology consulted  -Per Optho recs:   Discontinue using visine   Preservative free artificial tears four times a day scheduled, up to q2h as needed  Erythromycin ointment bid for 3 days  Ophtho will perform surface check on 11/03, since the patient is going to be admitted and see if her vision has improved in the interim     Chest discomfort: Resolved 11/1  Endorses \"squeezing\" chest pain for the past few days. Occasional racing heart/skipped beats that come and go. Located along sternum. Improves with palpation on exam and HR in NSR. Very low suspicion for cardiac etiology though certainly could have PVCs at times. Family wonders if having MSK symptoms due to using walker which is new. Admission EKG " "without focal ischemic changes, TTE without wall motion abnormalities noted.     Generalized weakness  Frail and deconditioned at baseline. Uses a walker. Undergoing transplant eval.   - PT/OT  - RD consult      Polyneuropathy   - Okay to continue gabapentin 100 mg TID for now, will need to hold if worsening EDGAR                Diet: Regular Diet Adult  Snacks/Supplements Adult: Other; Chocolate Magic cup TID. Ensure clear once daily.; Between Meals    DVT Prophylaxis: VTE Prophylaxis contraindicated due to coagulopathy of liver disease and need for blood product transfusion this admission   Izaguirre Catheter: Not present  Lines: None     Cardiac Monitoring: None  Code Status: Full Code      Clinically Significant Risk Factors         # Hyponatremia: Lowest Na = 129 mmol/L in last 2 days, will monitor as appropriate     # Anion Gap Metabolic Acidosis: Highest Anion Gap = 20 mmol/L in last 2 days, will monitor and treat as appropriate   # Coagulation Defect: INR = 4.23 (Ref range: 0.85 - 1.15) and/or PTT = N/A, will monitor for bleeding  # Thrombocytopenia: Lowest platelets = 70 in last 2 days, will monitor for bleeding          # Overweight: Estimated body mass index is 26.8 kg/m  as calculated from the following:    Height as of an earlier encounter on 10/31/23: 1.6 m (5' 3\").    Weight as of this encounter: 68.6 kg (151 lb 4.8 oz)., PRESENT ON ADMISSION  # Severe Malnutrition: based on nutrition assessment, PRESENT ON ADMISSION   # Financial/Environmental Concerns: none (Pt reports her SO is able to pay rent and pay her bills)         Disposition Plan      Expected Discharge Date: 11/10/2023      Destination: home with family              Amalia Angela Preston MD  Hospitalist Service, GOLD TEAM [address] [place] [address]  Securely message with [employer] (more info)  Text page via [facility] Paging/Directory   See signed in provider for up to date coverage " information  ______________________________________________________________________    Interval History   No acute events overnight. Pt visiting with family this morning. No SOB or pain, had emesis with morning meds and better with medications given spaced out.     Physical Exam   Vital Signs: Temp: 98.3  F (36.8  C) Temp src: Oral BP: 90/47 Pulse: 77   Resp: 18 SpO2: 96 % O2 Device: None (Room air)    Weight: 151 lbs 4.8 oz    Gen: NAD, lying comfortably in bed, pleasant and cooperative with interview and exam  HEENT: normocephalic, marked scleral icterus, no perioral lesions, oral mucosa moist  CV: RRR at time of exam  Pulm: good air movement bilaterally without wheezing  Abd: soft, +bs, nontender to palpation diffusely, mildly to moderately distended  Skin: marked jaundice on limited exam  Neuro: AOx3  Psych: mood-affect congruent      Medical Decision Making             Data     I have personally reviewed the following data over the past 24 hrs:    10.6  \   7.5 (L)   / 81 (L)     129 (L) 92 (L) 39.7 (H) /  68 (L)   3.6 17 (L) 2.88 (H) \     ALT: 11 AST: 33 AP: 80 TBILI: 36.4 (HH)   ALB: 4.4 TOT PROTEIN: N/A LIPASE: N/A     INR:  4.23 (H) PTT:  N/A   D-dimer:  N/A Fibrinogen:  N/A

## 2023-11-03 NOTE — PROGRESS NOTES
Pre-Liver Transplant Evaluation/Teaching    TEACHING TOPICS: Evaluation Process, Evaluation Items, Diagnostic Studies, Consultation, Chemical Dependency Policy, CD Eval, Donor Source, Liver Allocation, MELD System, UNOS, Waiting List, Follow up while on transplant list, Follow up after transplantation, Infection and Rejection, Immunosuppression , Medication Teaching, Lab Recording after transplant, Laboratory Frequency after transplant , Consent for evaluation and One year survival rates    INSTRUCTIONAL MATERIAL USED/GIVEN:   Liver Transplant Handbook, MELD Booklet, Donor Booklet, Web Sites Options, Verbal instructions, Multiple Listing Brochure , Consent for evaluation signed, One year survival rates and SRTR (Scientific Registry) Data    Person(s) involved in teaching: patient, aliyah Escamilla, mother Thao  Asks Questions: YES  Eager to Learn: YES  Cooperative: YES  Receptive (willing/able to accept information): YES  Reason for the appointment, diagnosis and treatment plan: YES  Knowledge of proper use of medications and conditions for which they are ordered (with special attention to potential side effects or drug interactions): YES  Which situations necessitate calling provider and whom to contact: YES    Teaching Concerns Addressed   Comments: patient, family asked good questions, very attentive   Nutritional needs and diet plan: YES  How and/when to access community resources: YES  Patient is aware of and agrees to required commitment to post-op care and long term follow-up: YES    Patient open to all Extended Criteria organ offers (underutilized donors): Yes or no: Yes  Details: Patient is Open to All Extended Criteria.    Patient has name and phone number of transplant coordinator.   Time Spent face-to-face teachin minutes.

## 2023-11-03 NOTE — TELEPHONE ENCOUNTER
[facility] Call Center    Phone Message    May a detailed message be left on voicemail: yes     Reason for Call: Other: patient states she is returning call to coordinator, coordinator not available at time of call. Please call back.     Action Taken: Message routed to:  Other: RNCC    Travel Screening: Not Applicable

## 2023-11-03 NOTE — PROVIDER NOTIFICATION
Pt in room 7201 Lola Milner total Bilirubin was 36.4 elevated from previous lab draw of 33.8.  Pt Total Protein not proceed due to Enteric Compromised in lab   Kita Trent RN, [phone]

## 2023-11-04 ENCOUNTER — ANESTHESIA EVENT (OUTPATIENT)
Dept: SURGERY | Facility: CLINIC | Age: 33
End: 2023-11-04
Payer: COMMERCIAL

## 2023-11-04 ENCOUNTER — APPOINTMENT (OUTPATIENT)
Dept: OCCUPATIONAL THERAPY | Facility: CLINIC | Age: 33
End: 2023-11-04
Payer: COMMERCIAL

## 2023-11-04 ENCOUNTER — ORGAN (OUTPATIENT)
Dept: TRANSPLANT | Facility: CLINIC | Age: 33
End: 2023-11-04

## 2023-11-04 ENCOUNTER — APPOINTMENT (OUTPATIENT)
Dept: GENERAL RADIOLOGY | Facility: CLINIC | Age: 33
End: 2023-11-04
Attending: NURSE PRACTITIONER
Payer: COMMERCIAL

## 2023-11-04 LAB
ABO/RH(D): NORMAL
ACANTHOCYTES BLD QL SMEAR: ABNORMAL
ALBUMIN SERPL BCG-MCNC: 4.1 G/DL (ref 3.5–5.2)
ALBUMIN SERPL BCG-MCNC: 4.3 G/DL (ref 3.5–5.2)
ALP SERPL-CCNC: 77 U/L (ref 35–104)
ALP SERPL-CCNC: 83 U/L (ref 35–104)
ALT SERPL W P-5'-P-CCNC: 11 U/L (ref 0–50)
ALT SERPL W P-5'-P-CCNC: 11 U/L (ref 0–50)
AMYLASE SERPL-CCNC: 46 U/L (ref 28–100)
ANION GAP SERPL CALCULATED.3IONS-SCNC: 17 MMOL/L (ref 7–15)
ANION GAP SERPL CALCULATED.3IONS-SCNC: 18 MMOL/L (ref 7–15)
ANTIBODY SCREEN: NEGATIVE
APTT PPP: 84 SECONDS (ref 22–38)
AST SERPL W P-5'-P-CCNC: 29 U/L (ref 0–45)
AST SERPL W P-5'-P-CCNC: 32 U/L (ref 0–45)
AUER BODIES BLD QL SMEAR: ABNORMAL
BASO STIPL BLD QL SMEAR: ABNORMAL
BASOPHILS # BLD AUTO: 0 10E3/UL (ref 0–0.2)
BASOPHILS NFR BLD AUTO: 0 %
BILIRUB SERPL-MCNC: 33.2 MG/DL
BILIRUB SERPL-MCNC: 35.9 MG/DL
BITE CELLS BLD QL SMEAR: ABNORMAL
BLD PROD TYP BPU: NORMAL
BLISTER CELLS BLD QL SMEAR: ABNORMAL
BLOOD COMPONENT TYPE: NORMAL
BUN SERPL-MCNC: 43.6 MG/DL (ref 6–20)
BUN SERPL-MCNC: 44.2 MG/DL (ref 6–20)
BURR CELLS BLD QL SMEAR: ABNORMAL
CALCIUM SERPL-MCNC: 9.5 MG/DL (ref 8.6–10)
CALCIUM SERPL-MCNC: 9.7 MG/DL (ref 8.6–10)
CHLORIDE SERPL-SCNC: 89 MMOL/L (ref 98–107)
CHLORIDE SERPL-SCNC: 90 MMOL/L (ref 98–107)
CODING SYSTEM: NORMAL
CREAT SERPL-MCNC: 2.72 MG/DL (ref 0.51–0.95)
CREAT SERPL-MCNC: 3.07 MG/DL (ref 0.51–0.95)
CROSSMATCH: NORMAL
DACRYOCYTES BLD QL SMEAR: ABNORMAL
DEPRECATED HCO3 PLAS-SCNC: 18 MMOL/L (ref 22–29)
DEPRECATED HCO3 PLAS-SCNC: 19 MMOL/L (ref 22–29)
EGFRCR SERPLBLD CKD-EPI 2021: 20 ML/MIN/1.73M2
EGFRCR SERPLBLD CKD-EPI 2021: 23 ML/MIN/1.73M2
ELLIPTOCYTES BLD QL SMEAR: ABNORMAL
EOSINOPHIL # BLD AUTO: 0.1 10E3/UL (ref 0–0.7)
EOSINOPHIL NFR BLD AUTO: 1 %
ERYTHROCYTE [DISTWIDTH] IN BLOOD BY AUTOMATED COUNT: 20.9 % (ref 10–15)
ERYTHROCYTE [DISTWIDTH] IN BLOOD BY AUTOMATED COUNT: 21 % (ref 10–15)
FIBRINOGEN PPP-MCNC: 82 MG/DL (ref 170–490)
FRAGMENTS BLD QL SMEAR: ABNORMAL
GLUCOSE SERPL-MCNC: 85 MG/DL (ref 70–99)
GLUCOSE SERPL-MCNC: 91 MG/DL (ref 70–99)
HCG INTACT+B SERPL-ACNC: 1 MIU/ML
HCT VFR BLD AUTO: 19.1 % (ref 35–47)
HCT VFR BLD AUTO: 19.2 % (ref 35–47)
HGB BLD-MCNC: 6.8 G/DL (ref 11.7–15.7)
HGB BLD-MCNC: 8.3 G/DL (ref 11.7–15.7)
HGB C CRYSTALS: ABNORMAL
HOWELL-JOLLY BOD BLD QL SMEAR: ABNORMAL
IMM GRANULOCYTES # BLD: 0.1 10E3/UL
IMM GRANULOCYTES NFR BLD: 1 %
INR PPP: 4.31 (ref 0.85–1.15)
INR PPP: 4.72 (ref 0.85–1.15)
ISSUE DATE AND TIME: NORMAL
LYMPHOCYTES # BLD AUTO: 1.1 10E3/UL (ref 0.8–5.3)
LYMPHOCYTES NFR BLD AUTO: 12 %
MAGNESIUM SERPL-MCNC: 1.8 MG/DL (ref 1.7–2.3)
MCH RBC QN AUTO: 32.4 PG (ref 26.5–33)
MCH RBC QN AUTO: 32.4 PG (ref 26.5–33)
MCHC RBC AUTO-ENTMCNC: 35.4 G/DL (ref 31.5–36.5)
MCHC RBC AUTO-ENTMCNC: 35.6 G/DL (ref 31.5–36.5)
MCV RBC AUTO: 91 FL (ref 78–100)
MCV RBC AUTO: 91 FL (ref 78–100)
MONOCYTES # BLD AUTO: 0.6 10E3/UL (ref 0–1.3)
MONOCYTES NFR BLD AUTO: 7 %
NEUTROPHILS # BLD AUTO: 7.2 10E3/UL (ref 1.6–8.3)
NEUTROPHILS NFR BLD AUTO: 79 %
NEUTS HYPERSEG BLD QL SMEAR: ABNORMAL
NRBC # BLD AUTO: 0 10E3/UL
NRBC BLD AUTO-RTO: 0 /100
PHOSPHATE SERPL-MCNC: 4 MG/DL (ref 2.5–4.5)
PLAT MORPH BLD: ABNORMAL
PLATELET # BLD AUTO: 73 10E3/UL (ref 150–450)
PLATELET # BLD AUTO: 75 10E3/UL (ref 150–450)
POLYCHROMASIA BLD QL SMEAR: ABNORMAL
POTASSIUM SERPL-SCNC: 3.3 MMOL/L (ref 3.4–5.3)
POTASSIUM SERPL-SCNC: 3.6 MMOL/L (ref 3.4–5.3)
PROT SERPL-MCNC: ABNORMAL G/DL
PROT SERPL-MCNC: ABNORMAL G/DL
RBC # BLD AUTO: 2.1 10E6/UL (ref 3.8–5.2)
RBC # BLD AUTO: 2.1 10E6/UL (ref 3.8–5.2)
RBC AGGLUT BLD QL: ABNORMAL
RBC MORPH BLD: ABNORMAL
ROULEAUX BLD QL SMEAR: ABNORMAL
SARS-COV-2 RNA RESP QL NAA+PROBE: NEGATIVE
SICKLE CELLS BLD QL SMEAR: ABNORMAL
SMUDGE CELLS BLD QL SMEAR: ABNORMAL
SODIUM SERPL-SCNC: 125 MMOL/L (ref 135–145)
SODIUM SERPL-SCNC: 126 MMOL/L (ref 135–145)
SPECIMEN EXPIRATION DATE: NORMAL
SPHEROCYTES BLD QL SMEAR: ABNORMAL
STOMATOCYTES BLD QL SMEAR: ABNORMAL
TARGETS BLD QL SMEAR: ABNORMAL
TOXIC GRANULES BLD QL SMEAR: ABNORMAL
UNIT ABO/RH: NORMAL
UNIT NUMBER: NORMAL
UNIT STATUS: NORMAL
UNIT TYPE ISBT: 6200
VARIANT LYMPHS BLD QL SMEAR: ABNORMAL
WBC # BLD AUTO: 8.9 10E3/UL (ref 4–11)
WBC # BLD AUTO: 9.1 10E3/UL (ref 4–11)

## 2023-11-04 PROCEDURE — 120N000011 HC R&B TRANSPLANT UMMC

## 2023-11-04 PROCEDURE — 250N000013 HC RX MED GY IP 250 OP 250 PS 637: Performed by: PHYSICIAN ASSISTANT

## 2023-11-04 PROCEDURE — 36415 COLL VENOUS BLD VENIPUNCTURE: CPT | Performed by: PHYSICIAN ASSISTANT

## 2023-11-04 PROCEDURE — 87535 HIV-1 PROBE&REVERSE TRNSCRPJ: CPT | Performed by: NURSE PRACTITIONER

## 2023-11-04 PROCEDURE — 93005 ELECTROCARDIOGRAM TRACING: CPT

## 2023-11-04 PROCEDURE — 82435 ASSAY OF BLOOD CHLORIDE: CPT | Performed by: NURSE PRACTITIONER

## 2023-11-04 PROCEDURE — 85610 PROTHROMBIN TIME: CPT | Performed by: PHYSICIAN ASSISTANT

## 2023-11-04 PROCEDURE — 86923 COMPATIBILITY TEST ELECTRIC: CPT | Performed by: INTERNAL MEDICINE

## 2023-11-04 PROCEDURE — 86644 CMV ANTIBODY: CPT | Performed by: NURSE PRACTITIONER

## 2023-11-04 PROCEDURE — 81001 URINALYSIS AUTO W/SCOPE: CPT | Performed by: NURSE PRACTITIONER

## 2023-11-04 PROCEDURE — 84450 TRANSFERASE (AST) (SGOT): CPT | Performed by: NURSE PRACTITIONER

## 2023-11-04 PROCEDURE — 85018 HEMOGLOBIN: CPT | Performed by: STUDENT IN AN ORGANIZED HEALTH CARE EDUCATION/TRAINING PROGRAM

## 2023-11-04 PROCEDURE — 85018 HEMOGLOBIN: CPT | Performed by: INTERNAL MEDICINE

## 2023-11-04 PROCEDURE — 85025 COMPLETE CBC W/AUTO DIFF WBC: CPT | Performed by: PHYSICIAN ASSISTANT

## 2023-11-04 PROCEDURE — 36415 COLL VENOUS BLD VENIPUNCTURE: CPT | Performed by: STUDENT IN AN ORGANIZED HEALTH CARE EDUCATION/TRAINING PROGRAM

## 2023-11-04 PROCEDURE — 250N000011 HC RX IP 250 OP 636: Mod: JZ | Performed by: INTERNAL MEDICINE

## 2023-11-04 PROCEDURE — 250N000013 HC RX MED GY IP 250 OP 250 PS 637: Performed by: INTERNAL MEDICINE

## 2023-11-04 PROCEDURE — 86665 EPSTEIN-BARR CAPSID VCA: CPT | Performed by: NURSE PRACTITIONER

## 2023-11-04 PROCEDURE — 71046 X-RAY EXAM CHEST 2 VIEWS: CPT | Mod: 26 | Performed by: RADIOLOGY

## 2023-11-04 PROCEDURE — 86803 HEPATITIS C AB TEST: CPT | Performed by: NURSE PRACTITIONER

## 2023-11-04 PROCEDURE — 83735 ASSAY OF MAGNESIUM: CPT | Performed by: NURSE PRACTITIONER

## 2023-11-04 PROCEDURE — 250N000009 HC RX 250: Performed by: INTERNAL MEDICINE

## 2023-11-04 PROCEDURE — 86900 BLOOD TYPING SEROLOGIC ABO: CPT | Performed by: NURSE PRACTITIONER

## 2023-11-04 PROCEDURE — P9016 RBC LEUKOCYTES REDUCED: HCPCS | Performed by: INTERNAL MEDICINE

## 2023-11-04 PROCEDURE — 84702 CHORIONIC GONADOTROPIN TEST: CPT | Performed by: NURSE PRACTITIONER

## 2023-11-04 PROCEDURE — 36415 COLL VENOUS BLD VENIPUNCTURE: CPT | Performed by: NURSE PRACTITIONER

## 2023-11-04 PROCEDURE — 85018 HEMOGLOBIN: CPT | Performed by: NURSE PRACTITIONER

## 2023-11-04 PROCEDURE — 71046 X-RAY EXAM CHEST 2 VIEWS: CPT

## 2023-11-04 PROCEDURE — 82150 ASSAY OF AMYLASE: CPT | Performed by: NURSE PRACTITIONER

## 2023-11-04 PROCEDURE — 85730 THROMBOPLASTIN TIME PARTIAL: CPT | Performed by: NURSE PRACTITIONER

## 2023-11-04 PROCEDURE — 86923 COMPATIBILITY TEST ELECTRIC: CPT

## 2023-11-04 PROCEDURE — 87081 CULTURE SCREEN ONLY: CPT | Performed by: NURSE PRACTITIONER

## 2023-11-04 PROCEDURE — 80069 RENAL FUNCTION PANEL: CPT | Performed by: PHYSICIAN ASSISTANT

## 2023-11-04 PROCEDURE — 99232 SBSQ HOSP IP/OBS MODERATE 35: CPT | Performed by: INTERNAL MEDICINE

## 2023-11-04 PROCEDURE — 250N000013 HC RX MED GY IP 250 OP 250 PS 637: Performed by: EMERGENCY MEDICINE

## 2023-11-04 PROCEDURE — 87389 HIV-1 AG W/HIV-1&-2 AB AG IA: CPT | Performed by: NURSE PRACTITIONER

## 2023-11-04 PROCEDURE — 85610 PROTHROMBIN TIME: CPT | Performed by: NURSE PRACTITIONER

## 2023-11-04 PROCEDURE — 86704 HEP B CORE ANTIBODY TOTAL: CPT | Performed by: NURSE PRACTITIONER

## 2023-11-04 PROCEDURE — 36415 COLL VENOUS BLD VENIPUNCTURE: CPT | Performed by: INTERNAL MEDICINE

## 2023-11-04 PROCEDURE — 86706 HEP B SURFACE ANTIBODY: CPT | Performed by: NURSE PRACTITIONER

## 2023-11-04 PROCEDURE — 85384 FIBRINOGEN ACTIVITY: CPT | Performed by: NURSE PRACTITIONER

## 2023-11-04 PROCEDURE — 97535 SELF CARE MNGMENT TRAINING: CPT | Mod: GO

## 2023-11-04 PROCEDURE — 87340 HEPATITIS B SURFACE AG IA: CPT | Performed by: NURSE PRACTITIONER

## 2023-11-04 PROCEDURE — 87635 SARS-COV-2 COVID-19 AMP PRB: CPT | Performed by: NURSE PRACTITIONER

## 2023-11-04 PROCEDURE — 86645 CMV ANTIBODY IGM: CPT | Performed by: NURSE PRACTITIONER

## 2023-11-04 RX ORDER — FLUCONAZOLE 2 MG/ML
400 INJECTION, SOLUTION INTRAVENOUS ONCE
Qty: 200 ML | Refills: 0 | Status: COMPLETED | OUTPATIENT
Start: 2023-11-05 | End: 2023-11-05

## 2023-11-04 RX ORDER — PIPERACILLIN SODIUM, TAZOBACTAM SODIUM 3; .375 G/15ML; G/15ML
3.38 INJECTION, POWDER, LYOPHILIZED, FOR SOLUTION INTRAVENOUS ONCE
Qty: 15 ML | Refills: 0 | Status: COMPLETED | OUTPATIENT
Start: 2023-11-05 | End: 2023-11-05

## 2023-11-04 RX ORDER — PIPERACILLIN SODIUM, TAZOBACTAM SODIUM 3; .375 G/15ML; G/15ML
3.38 INJECTION, POWDER, LYOPHILIZED, FOR SOLUTION INTRAVENOUS
Status: DISCONTINUED | OUTPATIENT
Start: 2023-11-05 | End: 2023-11-05 | Stop reason: HOSPADM

## 2023-11-04 RX ORDER — LIDOCAINE 40 MG/G
CREAM TOPICAL
Status: CANCELLED | OUTPATIENT
Start: 2023-11-04

## 2023-11-04 RX ORDER — SODIUM CHLORIDE, SODIUM LACTATE, POTASSIUM CHLORIDE, CALCIUM CHLORIDE 600; 310; 30; 20 MG/100ML; MG/100ML; MG/100ML; MG/100ML
INJECTION, SOLUTION INTRAVENOUS CONTINUOUS
Status: CANCELLED | OUTPATIENT
Start: 2023-11-04

## 2023-11-04 RX ORDER — HYDROMORPHONE HCL IN WATER/PF 6 MG/30 ML
0.4 PATIENT CONTROLLED ANALGESIA SYRINGE INTRAVENOUS EVERY 5 MIN PRN
Status: CANCELLED | OUTPATIENT
Start: 2023-11-04

## 2023-11-04 RX ORDER — FENTANYL CITRATE 50 UG/ML
25 INJECTION, SOLUTION INTRAMUSCULAR; INTRAVENOUS EVERY 5 MIN PRN
Status: CANCELLED | OUTPATIENT
Start: 2023-11-04

## 2023-11-04 RX ORDER — FENTANYL CITRATE 50 UG/ML
50 INJECTION, SOLUTION INTRAMUSCULAR; INTRAVENOUS EVERY 5 MIN PRN
Status: CANCELLED | OUTPATIENT
Start: 2023-11-04

## 2023-11-04 RX ORDER — ONDANSETRON 2 MG/ML
4 INJECTION INTRAMUSCULAR; INTRAVENOUS EVERY 30 MIN PRN
Status: CANCELLED | OUTPATIENT
Start: 2023-11-04

## 2023-11-04 RX ORDER — HYDROMORPHONE HCL IN WATER/PF 6 MG/30 ML
0.2 PATIENT CONTROLLED ANALGESIA SYRINGE INTRAVENOUS EVERY 5 MIN PRN
Status: CANCELLED | OUTPATIENT
Start: 2023-11-04

## 2023-11-04 RX ORDER — ONDANSETRON 4 MG/1
4 TABLET, ORALLY DISINTEGRATING ORAL EVERY 30 MIN PRN
Status: CANCELLED | OUTPATIENT
Start: 2023-11-04

## 2023-11-04 RX ORDER — POTASSIUM CHLORIDE 7.45 MG/ML
10 INJECTION INTRAVENOUS ONCE
Status: COMPLETED | OUTPATIENT
Start: 2023-11-04 | End: 2023-11-04

## 2023-11-04 RX ADMIN — RIFAXIMIN 550 MG: 550 TABLET ORAL at 19:27

## 2023-11-04 RX ADMIN — PANTOPRAZOLE SODIUM 40 MG: 40 TABLET, DELAYED RELEASE ORAL at 08:52

## 2023-11-04 RX ADMIN — Medication 1 DROP: at 05:54

## 2023-11-04 RX ADMIN — Medication 1 DROP: at 12:07

## 2023-11-04 RX ADMIN — ERYTHROMYCIN 1 G: 5 OINTMENT OPHTHALMIC at 09:12

## 2023-11-04 RX ADMIN — SODIUM BICARBONATE 650 MG TABLET 1300 MG: at 19:27

## 2023-11-04 RX ADMIN — GABAPENTIN 100 MG: 100 CAPSULE ORAL at 19:27

## 2023-11-04 RX ADMIN — POTASSIUM CHLORIDE 10 MEQ: 7.46 INJECTION, SOLUTION INTRAVENOUS at 09:11

## 2023-11-04 RX ADMIN — Medication 1 DROP: at 19:28

## 2023-11-04 RX ADMIN — SODIUM BICARBONATE 650 MG TABLET 1300 MG: at 08:52

## 2023-11-04 RX ADMIN — GABAPENTIN 100 MG: 100 CAPSULE ORAL at 15:01

## 2023-11-04 RX ADMIN — MIDODRINE HYDROCHLORIDE 15 MG: 5 TABLET ORAL at 15:01

## 2023-11-04 RX ADMIN — GABAPENTIN 100 MG: 100 CAPSULE ORAL at 08:52

## 2023-11-04 RX ADMIN — FOLIC ACID 1 MG: 1 TABLET ORAL at 08:52

## 2023-11-04 RX ADMIN — CIPROFLOXACIN HYDROCHLORIDE 250 MG: 250 TABLET, FILM COATED ORAL at 08:52

## 2023-11-04 RX ADMIN — RIFAXIMIN 550 MG: 550 TABLET ORAL at 08:52

## 2023-11-04 RX ADMIN — LACTULOSE 30 ML: 20 SOLUTION ORAL at 19:25

## 2023-11-04 RX ADMIN — MIDODRINE HYDROCHLORIDE 15 MG: 5 TABLET ORAL at 08:52

## 2023-11-04 RX ADMIN — Medication 1 DROP: at 15:44

## 2023-11-04 RX ADMIN — LACTULOSE 30 ML: 20 SOLUTION ORAL at 08:52

## 2023-11-04 RX ADMIN — THERA TABS 1 TABLET: TAB at 12:07

## 2023-11-04 RX ADMIN — THIAMINE HCL TAB 100 MG 100 MG: 100 TAB at 08:52

## 2023-11-04 ASSESSMENT — LIFESTYLE VARIABLES: TOBACCO_USE: 1

## 2023-11-04 ASSESSMENT — ENCOUNTER SYMPTOMS
ORTHOPNEA: 0
DYSRHYTHMIAS: 0

## 2023-11-04 ASSESSMENT — ACTIVITIES OF DAILY LIVING (ADL)
ADLS_ACUITY_SCORE: 34

## 2023-11-04 ASSESSMENT — COPD QUESTIONNAIRES: COPD: 0

## 2023-11-04 NOTE — PROGRESS NOTES
[facility]    Medicine Progress Note - Hospitalist Service, GOLD TEAM 10    Date of Admission:  10/31/2023    Assessment & Plan   Lola Milner is a 33 year old female admitted on 10/31/2023. She has a past medical history significant for MDD, prior tobacco use, gout, polyneuropathy and decompensated cirrhosis secondary to alcohol use c/b hepatic encephalopathy, esophageal varices s/p banding, recurrent ascities and hepatorenal syndrome.      Recently admitted to [place] 10/23-10/29 and referred for liver transplant eval at [facility]. She was seen in Hepatology and Transplant clinic day of admission. Referred to the ED due to worsening renal function and leukocytosis, being considered for transplant and undergoing eval.    Today:  --Transfuse 1 unit packed RBC with recheck hemoglobin this evening  --Patient is n.p.o. at midnight  --Liver transplant listed at 11/3     Decompensated alcohol cirrhosis c/b HRS, HE, recurrent ascites, esophageal varices s/p banding (9/2023)  Hyperbilirubinemia   Coagulopathy  Seen in clinic by Dr. Guajardo and Dr. Watson prior to admission. Initially presented with decompensated liver disease in 6/2023. Course complicated by insurance challenges. Recently started chem dep. Requires intermittent paracentesis, last on 10/23. Tbili 44.4 on admission with direct bili >40. Appears Tbili has been in 40s for past few weeks. Recent RUQ US with doppler was negative for thrombus. Some concern for possible SBP on admission with worsening renal function and abdominal pain. Ideally would obtain paracentesis however unable to this evening, will empirically cover.   - Hepatology consulted, appreciate recommendations  - CAPS consulted for dx paracentesis--> no evidence of SBP on cell count and differential, fluid culture in process  - De-escalation of antibiotics 11/2, stopped ceftriaxone and started ciprofloxacin SBP prophylaxis  - Trending MELD labs daily  -  lactulose twice daily (decreased  given frequent stooling) + [place] protocol  - rifaxmin 550 mg BID (not taking outpatient, awaiting prior auth)  - Transplant eval:    ::PETH ordered and <10    ::EBV IgG positive, CMV negative, treponema negative    ::HIV negative, TB quant indeterminate    ::TTE completed     ::Pap smear/HPV testing in process (completed , appreciate Gyn team assistance)     MELD 3.0: 40 at 2023 10:49 AM  MELD-Na: 44 at 2023 10:49 AM  Calculated from:  Serum Creatinine: 2.72 mg/dL at 2023 10:49 AM  Serum Sodium: 125 mmol/L at 2023 10:49 AM  Total Bilirubin: 35.9 mg/dL at 2023 10:49 AM  Serum Albumin: 4.3 g/dL (Using max of 3.5 g/dL) at 2023 10:49 AM  INR(ratio): 4.31 at 2023 10:49 AM  Age at listin years  Sex: Female at 2023 10:49 AM        Leukocytosis: Resolved  WBC 18.4 on admission. BP softer on admission, though in setting of cirrhosis and unclear true baseline. CXR without focal consolidation and no respiratory complaints. No dysuria but not voiding well for past 48 hours. She is having new onset abdominal pain, raising highest concern for SBP. Did have PICC line recently so would also worry about bacteremia, though afebrile.   -De-escalation of antibiotics  as above, stopped ceftriaxone and started ciprofloxacin SBP prophylaxis  - BCx2 NGTD  - UA not indicative of infection, UC in process  - CAPS consulted for dx paracentesis and this was completed , appreciate assistance     EDGAR   AGMA   Hepatorenal syndrome  Cr 2.76, AG 19 on admission. Recent baseline from prior discharge appears ~1.5-1.7. Was seen by Nephrology during admission at [place] with concern for HRS, Cr improved with albumin and midodrine. Lasix and spironolactone held at time of discharge. Assessed for SBP. Has had decreased urine output.   - Albumin 100g 10/31 (1.5 g/kg) followed by albumin 70g daily x 2 days (1g/kg)--this completed   - Continue PTA  "midodrine 15 mg TID  - Continue PTA sodium bicarbonate 1300 mg BID  - Daily weights, I/Os  - SBP work-up completed per above     Hyponatremia: Stable  Na 131 on admission. Na was 135 at time of recent discharge.  In setting of EDGAR, also advanced liver cirrhosis.  Sodium today 129, no mental status changes.  - Albumin challenge completed as above  -Trend BMP     Acute thrombocytopenia  Plts 115 on admission down trended to 71 today although some component of dilution likely. Recent baseline appears to be 210s. Unable to tell heparin exposure but patient does report receiving twice daily abdominal injections during recent admission, raising c/f heparin exposure. 4T score is intermediate. Likely due to worsening liver disease.   - HIT panel ordered and negative  - Hold/avoid heparin products  - Trend Plts      Chronic normocytic anemia  Hgb 8.0 on admission. Recent baseline appears around 7.  Hemoglobin 5.1 11/1, likely some component of dilution with large volume albumin administration over previous last 24 hours, responded to a little over a unit packed RBC transfusion on 11/1 with repeat hemoglobin just above 7.  -Transfuse 1 unit packed RBC today 11/4 with recheck hemoglobin this evening  -Daily CBC     Blurred vision  Developed blurred vision to both eyes in past 72 hours before admission with associated burning. Seems to be better in AM and worsening throughout the day. Improves with visine. Having trouble reading any labels. Wears glasses at baseline.  - Ophthalmology consulted  -Per Optho recs:   Discontinue using visine   Preservative free artificial tears four times a day scheduled, up to q2h as needed  Erythromycin ointment bid for 3 days  Ophtho will perform surface check on 11/03, since the patient is going to be admitted and see if her vision has improved in the interim     Chest discomfort: Resolved 11/1  Endorses \"squeezing\" chest pain for the past few days. Occasional racing heart/skipped beats that " "come and go. Located along sternum. Improves with palpation on exam and HR in NSR. Very low suspicion for cardiac etiology though certainly could have PVCs at times. Family wonders if having MSK symptoms due to using walker which is new. Admission EKG without focal ischemic changes, TTE without wall motion abnormalities noted.     Generalized weakness  Frail and deconditioned at baseline. Uses a walker. Undergoing transplant eval.   - PT/OT  - RD consult      Polyneuropathy   - Okay to continue gabapentin 100 mg TID for now, will need to hold if worsening EDGAR                Diet: Regular Diet Adult  Snacks/Supplements Adult: Other; Chocolate Magic cup TID. Ensure clear once daily.; Between Meals  NPO for Medical/Clinical Reasons Except for: Meds    DVT Prophylaxis: VTE Prophylaxis contraindicated due to coagulopathy of liver disease and need for blood product transfusion this admission    Izaguirre Catheter: Not present  Lines: None     Cardiac Monitoring: None  Code Status: Full Code      Clinically Significant Risk Factors        # Hypokalemia: Lowest K = 3.3 mmol/L in last 2 days, will replace as needed  # Hyponatremia: Lowest Na = 125 mmol/L in last 2 days, will monitor as appropriate     # Anion Gap Metabolic Acidosis: Highest Anion Gap = 20 mmol/L in last 2 days, will monitor and treat as appropriate   # Coagulation Defect: INR = 4.31 (Ref range: 0.85 - 1.15) and/or PTT = 84 Seconds (Ref range: 22 - 38 Seconds), will monitor for bleeding  # Thrombocytopenia: Lowest platelets = 73 in last 2 days, will monitor for bleeding          # Overweight: Estimated body mass index is 27.67 kg/m  as calculated from the following:    Height as of an earlier encounter on 10/31/23: 1.6 m (5' 3\").    Weight as of this encounter: 70.9 kg (156 lb 3.2 oz).   # Severe Malnutrition: based on nutrition assessment    # Financial/Environmental Concerns: none (Pt reports her SO is able to pay rent and pay her bills)         Disposition " Plan      Expected Discharge Date: 11/10/2023      Destination: home with family  Discharge Comments: If liver transplant listed, will remain inpatient until transplant            Amalia Preston MD  Hospitalist Service, GOLD TEAM 10  [facility]  Securely message with iodine (more info)  Text page via [facility] Paging/Directory   See signed in provider for up to date coverage information  ______________________________________________________________________    Interval History   No acute events overnight.  Patient newly liver transplant listed as of 11/3.  Patient without acute complaint today other than feeling a bit nervous about possible impending surgery but happy to be listed for transplant.  Patient denies pain, dyspnea and nausea.  No excessive stooling reported.  Family bedside and supportive.    Physical Exam   Vital Signs: Temp: 97.3  F (36.3  C) Temp src: Oral BP: 106/64 Pulse: 70   Resp: 16 SpO2: 99 % O2 Device: Nasal cannula Oxygen Delivery: 1/2 LPM  Weight: 156 lbs 3.2 oz    Gen: NAD, lying comfortably in bed, pleasant and cooperative with interview and exam  HEENT: normocephalic, marked scleral icterus, no perioral lesions, oral mucosa moist  CV: RRR at time of exam  Pulm: good air movement bilaterally without wheezing  Abd: soft, +bs, very mildly tender to palpation diffusely, moderately distended with fluid wave present  Skin: Marked jaundice on limited exam  Neuro: AOx3  Psych: mood-affect congruent      Medical Decision Making             Data     I have personally reviewed the following data over the past 24 hrs:    9.1  \   6.8 (LL)   / 75 (L)     125 (L) 89 (L) 44.2 (H) /  85   3.6 18 (L) 2.72 (H) \     ALT: 11 AST: 32 AP: 83 TBILI: 35.9 (HH)   ALB: 4.3 TOT PROTEIN: N/A LIPASE: N/A     INR:  4.31 (H) PTT:  84 (H)   D-dimer:  N/A Fibrinogen:  82 (LL)

## 2023-11-04 NOTE — PROGRESS NOTES
CLINICAL NUTRITION SERVICES - Brief NOTE     Nutrition Prescription    RECOMMENDATIONS FOR MDs/PROVIDERS TO ORDER:  Recommend FT placement after surgery. Patient with poor intake/appetite, weight loss and severe malnutrition     Future/Additional Recommendations:  -- when diet is advanced after surgery recommend calorie counts and supplements: magic cup BID (chocolate) and ensure clear (berry) per patient supplement preferences.     -- If TF warranted this admission recommend: Osmolite 1.5 Santhosh (or equivalent) @ goal of  55ml/hr  (1320ml/day) + 2 packets Prosource TF20 provides: 2140 kcals (31 kcals/kg) , 123 g PRO (1.8 g/kg), 1005 ml free H20, 268 g CHO, and 0 g fiber daily.  - start TF at 15 ml/hr for 8 hrs and increase 10 ml q 8 hrs until goal rate  - 30 ml H2O flush q 4 hours for tube patency  - liquid multivitamin w/ minerals 15 ml/daily  - monitor K+, Mg++ and phos with TF advancement. Patient is at risk for refeeding syndrome.        Provider consult - Pre Op Liver Transplant     EVALUATION OF THE PROGRESS TOWARD GOALS   Diet: Regular with magic cup BID (chocolate) and ensure clear berry      NEW FINDINGS   Labs (11/4): Na 126 mmol/L (L), K+ 3.3 mmol/L (L), BUN 43.6 mg/dL (H), Cr 3.07 mg/dL (H)     Meds:  Cipro  Folvite  Lactulose   Thera-vit  Thiamine    ASSESSED NUTRITION NEEDS (liver transplant) - dosing weight 69 kg   Estimated Energy Needs: 2130-8381 kcals/day (30 - 35 kcals/kg )   Justification: Increased needs, SOT course   Estimated Protein Needs: 104-138 grams protein/day (1.5 - 2 grams of pro/kg)   Justification: Increased needs SOT course   Estimated Fluid Needs: 7921-2554 mL/day (25 - 30 mL/kg)   Justification: Maintenance     INTERVENTIONS  Implementation  EN regimen recs    Monitoring/Evaluation  Progress toward goals will be monitored and evaluated per protocol.    Gina Gaming MS/RD/LD  7A/7B (beds 219-229) RD pager: [phone]  Weekend/Holiday RD pager: [phone]

## 2023-11-04 NOTE — PROVIDER NOTIFICATION
Paged Marie Patiño: pt is reporting 5/10 abdominal pain and does not have any PRNs. Could we get something ordered for pt?     Response: One time PRN ordered for 10mg Atarax ordered

## 2023-11-04 NOTE — PLAN OF CARE
VS: BP 93/45   Pulse 75   Temp 97.8  F (36.6  C) (Oral)   Resp 16   Wt 70.9 kg (156 lb 3.2 oz)   SpO2 96%   BMI 27.67 kg/m      Cares: 1900 - 0730     Neuro: Aox4 / scheduled lactulose and [place] scoring 0 all night   Cardiac: softer pressures 90/40s, afebrile   Respiratory: WDL on RA, denies SOB   GI/: oliguric, LBM 11-4 (loose)  Skin: jaundiced / scattered bruising   Diet: Regular   Labs: pending AM labs   BG: none   LDA: Right PIV SL   Mobility: SBA w/ gait belt and walker   Pain/Nausea: abdominal pain, nausea x1   PRN medications: zofran x1, atarax x1  Plan of Care: continue with current POC and update MD with any changes

## 2023-11-04 NOTE — PROGRESS NOTES
/58 (BP Location: Right arm)   Pulse 71   Temp 97.7  F (36.5  C) (Oral)   Resp 16   Wt 70.9 kg (156 lb 3.2 oz)   SpO2 100%   BMI 27.67 kg/m      Shift: 9603-3127  Isolation Status: Standard  VS: 100% on 0.5L nasal cannula, 96% on room air- O2 fell to 85% during sleep, afebrile  Neuro: Aox4  Behaviors: calm, cooperative, some nervousness surrounding news of transplant  BG: N/A  Labs: 2 critical labs this shift, hgb: 6.8, fibrinogen: 82  Respiratory: patient reported increased coughing this shift, IS at bedside   Cardiac: Bps low, SBP:80s-90s, DBP:40-50s  Pain/Nausea: Denies pain, one episode of emesis this shift after taking 4 medications- advise taking pills one at a time   PRN: none this shift   Diet: NPO at midnight, currently regular adult diet   IV Access: 1 RPIV  Infusion(s): packed RBCs given this shift   Lines/Drains: none   GI/: oliguric   Skin: jaundice, generalized bruising   Mobility: SBA  Events/Education: Education provided surrounding transplant surgery, video resources given to patient and caregiver (mother). Education provided around IS use and encouraged every 2 hours   Plan: Notified this shift that liver is becoming available tomorrow morning, transplant surgery estimated for tomorrow afternoon/evening. Pre-operative check list being reviewed and finished at this time. Waiting on urine sample for hcg and urinalysis prior to surgery.

## 2023-11-04 NOTE — PROGRESS NOTES
BP 90/47 (BP Location: Right arm)   Pulse 77   Temp 98.3  F (36.8  C) (Oral)   Resp 18   Wt 68.6 kg (151 lb 4.8 oz)   SpO2 96%   BMI 26.80 kg/m      Shift: 6282-7598  Isolation Status: Standard  VS: 96 on room air, afebrile  Neuro: Aox4  Behaviors: calm, cooperative   BG: none   Labs: critical lab value last shift: Tbili 36.4  Respiratory: WDL  Cardiac: WDL, low Bps: SBP: 90s, DBP: 50s  Pain/Nausea: One episode of emesis this shift, nausea when taking pills   PRN: IV Zofran administered 1x  Diet: Regular   IV Access: 1 right PIV   Infusion(s): none  Lines/Drains: none  GI/: WDL  Skin: jaundice, bruising on both arms and scattered, +1 edema of the ankles bilaterally   Mobility: SBA  Events/Education: Patient and family notified that patient is on transplant list. Patient reports that she is fearful.   Plan: Continue observation until liver transplantation surgery. Scheduled lactulose + [place] protocol (Q4 hours).

## 2023-11-04 NOTE — TELEPHONE ENCOUNTER
"TRANSPLANT OR REPORT    Organ: Liver  Laterality (if known): Na  Organ Location: [facility]    UNOS ID: [id number]  Donor OR Time: 11/5/23 0800 EST  Expected/Actual Cross Clamp Time: Unknown  Expected Organ Arrival Time: 11/5/23 1100 CST    Surgeon: Mynor/González  Time in OR: 11/5/23 1100  Time in 3C (N/A for JULIENNE): Na    Recipient Details  Admission ETA: Already admitted to 7A  Unit: 7A  Isolation: None  Latex Allergy: No  : No  Diagnosis: ESLD    Liver Transplants  Bypass/Perfusion: No  Cellsaver: Yes  Hemodialysis: No  ~ \"RENAL STAFF TEACHING SERVICE MEDICINE\" : Remind JULIENNE Fellow to discuss with nephrology on call.  ~ CRRT Resource Nurse: VERNON, Dr. Lowe declined CRRT  (Telephone Number for CRRT [phone]. When prompted, the caller should say  CRRT Resource 1\")    Kidney/Panc Transplants  XM Status (Need to wait for XM?): NA    Liver or KP/PA Recipients - Vessel Banking:  Donor has positive serologies for HIV/HCV/HBV: No  Donor has risk criteria for HIV/HCV/HBV: No      Transplant Coordinator Contact Info: Flor Leiva [phone]      Vessel Bank Information  Transplant hospitals must not store a donor s extra vessels if the donor has tested positive for any of the following:   - HIV by antibody, antigen, or nucleic acid test (NINA)   - Hepatitis B surface antigen (HBsAg)   - Hepatitis B (HBV) by NINA   - Hepatitis C (HCV) by antibody or NINA     Extra vessels from donors that do not test positive for HIV, HBV, or HCV as above may be stored   "

## 2023-11-04 NOTE — ANESTHESIA PREPROCEDURE EVALUATION
Anesthesia Pre-Procedure Evaluation    Patient: Lola Milner   MRN: [id number] : 1990        Procedure : Procedure(s):  Transplant liver recipient  donor          Past Medical History:   Diagnosis Date     Alcoholic cirrhosis of liver with ascites (H) 2023     History of alcohol abuse     Formatting of this note might be different from the original. In remission as of late 2023      No past surgical history on file.   No Known Allergies   Social History     Tobacco Use     Smoking status: Former     Types: Cigarettes     Quit date: 2023     Years since quittin.4     Smokeless tobacco: Never   Substance Use Topics     Alcohol use: Not Currently     Comment: last ETOH use 2023      Wt Readings from Last 1 Encounters:   23 70.9 kg (156 lb 3.2 oz)        Anesthesia Evaluation   Pt has had prior anesthetic. Type: MAC.    No history of anesthetic complications       ROS/MED HX  ENT/Pulmonary:     (+)                tobacco use, Past use,                   (-) asthma and COPD   Neurologic:     (+)    peripheral neuropathy, - alcohol induced polyneuropathy hands and feet.                        (-) no CVA and no Spinal cord injury   Cardiovascular:     (+)  - -   -  - -                                 Previous cardiac testing   Echo: Date: 10/31/23 Results:  Interpretation Summary  Global and regional left ventricular function is hyperkinetic with an EF of  65-70%.  Global right ventricular function is normal.  No significant valvular abnormalities present.  Mild pulmonary hypertension is present.  IVC diameter >2.1 cm collapsing <50% with sniff suggests a high RA pressure  estimated at 15 mmHg or greater.  Trivial pericardial effusion is present.  There is no prior study for direct comparison.  ______________________________________________________________________________  Left Ventricle  Global and regional left ventricular function is hyperkinetic with an EF of  65-70%. Left  ventricular size is normal. Left ventricular wall thickness is  normal. Left ventricular diastolic function is not assessable.     Right Ventricle  The right ventricle is normal size. Global right ventricular function is  normal.     Atria  Mild biatrial enlargement is present.     Mitral Valve  The mitral valve is normal. Trace mitral insufficiency is present.     Aortic Valve  Aortic valve is normal in structure and function.     Tricuspid Valve  Mild tricuspid insufficiency is present. The right ventricular systolic  pressure is approximated at 35.8 mmHg plus the right atrial pressure. Mild  pulmonary hypertension is present.     Pulmonic Valve  The pulmonic valve is normal.     Vessels  The aorta root is normal. IVC diameter >2.1 cm collapsing <50% with sniff  suggests a high RA pressure estimated at 15 mmHg or greater.     Pericardium  Trivial pericardial effusion is present.     Miscellaneous  A left pleural effusion is present. No significant valvular abnormalities  present.     Compared to Previous Study  There is no prior study for direct comparison.      Stress Test:  Date: 11/2/23 Results:  Interpretation Summary  Non diagnostic dobutamine echocardiogram given target heart rate was not  achieved (reached 75% of MPHR).  However at this degree of stress there was no inducible ischemia.  Normal biventricular size, thickness, and global systolic function at  baseline, LVEF=60-65%.  With dobutamine, LVEF increased to >70% and LV cavity size decreased  appropriately.  No regional wall motion abnormalities at rest or with dobutamine.  No angina was elicited.  No ECG evidence of ischemia. Normal heart rate and blood pressure response to  dobutamine.  Baseline pictures not taken given complete echo performed the day before.  ______________________________________________________________________________  Stress  The drug infusion was stopped due to maximum amount of medication given.  Target HR not acheived.  The  patient did not exhibit any symptoms during drug infusion.  The maximum dose of dobutamine was 50mcg/kg/min.  The maximum dose of atropine was 2.0mg.  The maximum dose of metoprolol was 3mg.        ECG Reviewed:  Date: 10/31/23 Results:  Sinus rhythm   Low voltage QRS   Cannot rule out Anterior infarct , age undetermined   Abnormal ECG     Cath:  Date: Results:   (-) CHF, orthopnea/PND and arrhythmias   METS/Exercise Tolerance:     Hematologic: Comments: Chronic normocytic anemia, thrombocytopenia    (+)      anemia, history of blood transfusion,      (-) history of blood clots   Musculoskeletal:       GI/Hepatic: Comment: Decompensated alcoholic cirrhosis cb hepatorenal syndrome, hepatic encephalopathy, recurrent ascites, hyperbilirubinemia, hyponatremia, and esophageal varices sp banding 9/2023,     (+)   esophageal disease, Varices,         liver disease,       Renal/Genitourinary:     (+) renal disease, type: ARF,            Endo: Comment: AGMA, hyponatremia, vitamin D deficiency      Psychiatric/Substance Use:     (+) psychiatric history 1 and depression alcohol abuse      Infectious Disease:  - neg infectious disease ROS     Malignancy:  - neg malignancy ROS     Other:      (+)  , H/O Chronic Pain,, other significant disability Other (comment) (generalized weakness requiring walker)         Physical Exam    Airway        Mallampati: III   TM distance: > 3 FB   Neck ROM: full   Mouth opening: > 3 cm    Respiratory Devices and Support         Dental       (+) Minor Abnormalities - some fillings, tiny chips      Cardiovascular   cardiovascular exam normal          Pulmonary   pulmonary exam normal            Other findings: Ascites, jaundice, AAx3    OUTSIDE LABS:  CBC:   Lab Results   Component Value Date    WBC 9.1 11/04/2023    WBC 8.9 11/04/2023    HGB 6.8 (LL) 11/04/2023    HGB 6.8 (LL) 11/04/2023    HCT 19.1 (L) 11/04/2023    HCT 19.2 (L) 11/04/2023    PLT 75 (L) 11/04/2023    PLT 73 (L) 11/04/2023     BMP:    Lab Results   Component Value Date     (L) 11/04/2023     (L) 11/04/2023    POTASSIUM 3.6 11/04/2023    POTASSIUM 3.3 (L) 11/04/2023    CHLORIDE 89 (L) 11/04/2023    CHLORIDE 90 (L) 11/04/2023    CO2 18 (L) 11/04/2023    CO2 19 (L) 11/04/2023    BUN 44.2 (H) 11/04/2023    BUN 43.6 (H) 11/04/2023    CR 2.72 (H) 11/04/2023    CR 3.07 (H) 11/04/2023    GLC 85 11/04/2023    GLC 91 11/04/2023     COAGS:   Lab Results   Component Value Date    PTT 84 (H) 11/04/2023    INR 4.31 (H) 11/04/2023    FIBR 82 (LL) 11/04/2023     POC:   Lab Results   Component Value Date    HCGS Negative 10/31/2023     HEPATIC:   Lab Results   Component Value Date    ALBUMIN 4.3 11/04/2023    PROTTOTAL  11/04/2023      Comment:      Unsatisfactory specimen - icteric.    ALT 11 11/04/2023    AST 32 11/04/2023    ALKPHOS 83 11/04/2023    BILITOTAL 35.9 (HH) 11/04/2023    TONIO 77 (H) 10/31/2023     OTHER:   Lab Results   Component Value Date    GEE 9.7 11/04/2023    PHOS 4.0 11/04/2023    MAG 1.8 11/04/2023    AMYLASE 46 11/04/2023       Anesthesia Plan    ASA Status:  4, emergent    NPO Status:  ELEVATED Aspiration Risk/Unknown    Anesthesia Type: General.     - Airway: ETT   Induction: Intravenous.   Maintenance: Balanced.   Techniques and Equipment:     - Lines/Monitors: 2nd IV, Arterial Line, Central Line, CVP, BIS, ASHOK            ASHOK Absolute Contra-indication: NONE            ASHOK Relative Contra-indication: Thrombocytopenia, Esophageal Varices     - Blood: Blood in Room, T&C, PRBC, Cell Saver, FFP, PLT     Consents    Anesthesia Plan(s) and associated risks, benefits, and realistic alternatives discussed. Questions answered and patient/representative(s) expressed understanding.     - Discussed: Risks, Benefits and Alternatives for BOTH SEDATION and the PROCEDURE were discussed     - Discussed with:  Patient      - Extended Intubation/Ventilatory Support Discussed: No.      - Patient is DNR/DNI Status: No     Use of blood  products discussed: Yes.     - Discussed with: Patient.     - Consented: consented to blood products     Postoperative Care    Pain management: Multi-modal analgesia, IV analgesics, Oral pain medications.   PONV prophylaxis: Ondansetron (or other 5HT-3), Dexamethasone or Solumedrol     Comments:    Other Comments: 15 pRBC  15 FFP  2plt  PCC/FC 500x2+1000/3x1000  [place]  [employer]ra  D5 1/2NS carrier  Norepi/epi/vaso  MAC/Tulio Weathers  RICC x 2  ASHOK epic with x7 probe            Khai Katz MD

## 2023-11-04 NOTE — PHARMACY-ADMISSION MEDICATION HISTORY
Pharmacy Intern Admission Medication History    Admission medication history is complete. The information provided in this note is only as accurate as the sources available at the time of the update.    Information Source(s): Patient and Care Everywhere  via in-person    Pertinent Information: Spoke with patient who was knowledgeable about her meds. Pt has no dispense history viewable in dispense report within the last year.    Changes made to PTA medication list:  Added: None  Deleted: None  Changed: per patient and Care Everywhere  Lactulose: daily prn --> TID  Sodium bicarbonate: added 'for 10 days'    Medication Affordability: Pt stated that she was prescribed but never picked up Rifaximin 550 mg tablets (1 tab BID) due to the cost ~$3000 copay.       Allergies reviewed with patient and updates made in EHR: yes    Medication History Completed By: Loren Decker 11/4/2023 2:13 PM    Prior to Admission medications    Medication Sig Last Dose Taking? Auth Provider Long Term End Date   gabapentin (NEURONTIN) 100 MG capsule Take 100 mg by mouth 3 times daily 10/31/2023 at 0700 Yes Reported, Patient     lactulose (CHRONULAC) 10 GM/15ML solution Take 30 mLs by mouth 3 times daily 10/30/2023 at 2100 Yes Reported, Patient     melatonin 3 MG tablet Take 3 mg by mouth nightly as needed 10/30/2023 at 2100 Yes Reported, Patient     midodrine (PROAMATINE) 5 MG tablet Take 15 mg by mouth 3 times daily 10/31/2023 at 0700 Yes Reported, Patient     Multiple Vitamins-Minerals (THERA-TABS M) TABS Take 1 tablet by mouth daily 10/31/2023 at 0700 Yes Reported, Patient     omeprazole (PRILOSEC) 20 MG DR capsule Take 20 mg by mouth daily 10/31/2023 at 0700 Yes Reported, Patient     sodium bicarbonate 650 MG tablet Take 1,300 mg by mouth 2 times daily For 10 days 10/31/2023 at 0700 Yes Reported, Patient  11/9/23

## 2023-11-05 ENCOUNTER — APPOINTMENT (OUTPATIENT)
Dept: ULTRASOUND IMAGING | Facility: CLINIC | Age: 33
End: 2023-11-05
Payer: COMMERCIAL

## 2023-11-05 ENCOUNTER — DOCUMENTATION ONLY (OUTPATIENT)
Dept: TRANSPLANT | Facility: CLINIC | Age: 33
End: 2023-11-05
Payer: COMMERCIAL

## 2023-11-05 ENCOUNTER — APPOINTMENT (OUTPATIENT)
Dept: GENERAL RADIOLOGY | Facility: CLINIC | Age: 33
End: 2023-11-05
Attending: STUDENT IN AN ORGANIZED HEALTH CARE EDUCATION/TRAINING PROGRAM
Payer: COMMERCIAL

## 2023-11-05 ENCOUNTER — ANESTHESIA (OUTPATIENT)
Dept: SURGERY | Facility: CLINIC | Age: 33
End: 2023-11-05
Payer: COMMERCIAL

## 2023-11-05 ENCOUNTER — APPOINTMENT (OUTPATIENT)
Dept: GENERAL RADIOLOGY | Facility: CLINIC | Age: 33
End: 2023-11-05
Payer: COMMERCIAL

## 2023-11-05 LAB
ALBUMIN SERPL BCG-MCNC: 3 G/DL (ref 3.5–5.2)
ALBUMIN SERPL BCG-MCNC: 4.2 G/DL (ref 3.5–5.2)
ALBUMIN UR-MCNC: 10 MG/DL
ALLEN'S TEST: ABNORMAL
ALP SERPL-CCNC: 80 U/L (ref 35–104)
ALP SERPL-CCNC: 82 U/L (ref 35–104)
ALT SERPL W P-5'-P-CCNC: 11 U/L (ref 0–50)
ALT SERPL W P-5'-P-CCNC: 137 U/L (ref 0–50)
ANION GAP SERPL CALCULATED.3IONS-SCNC: 12 MMOL/L (ref 7–15)
ANION GAP SERPL CALCULATED.3IONS-SCNC: 17 MMOL/L (ref 7–15)
APPEARANCE UR: ABNORMAL
APTT PPP: 36 SECONDS (ref 22–38)
APTT PPP: 37 SECONDS (ref 22–38)
APTT PPP: 54 SECONDS (ref 22–38)
AST SERPL W P-5'-P-CCNC: 246 U/L (ref 0–45)
AST SERPL W P-5'-P-CCNC: 31 U/L (ref 0–45)
BACTERIA BLD CULT: NO GROWTH
BACTERIA BLD CULT: NO GROWTH
BASE EXCESS BLDA CALC-SCNC: -1.9 MMOL/L (ref -9–1.8)
BASE EXCESS BLDA CALC-SCNC: -2.2 MMOL/L (ref -9.6–2)
BASE EXCESS BLDA CALC-SCNC: -2.5 MMOL/L (ref -9.6–2)
BASE EXCESS BLDA CALC-SCNC: -4.3 MMOL/L (ref -9.6–2)
BASE EXCESS BLDA CALC-SCNC: -5.9 MMOL/L (ref -9.6–2)
BASE EXCESS BLDA CALC-SCNC: -6.1 MMOL/L (ref -9.6–2)
BASE EXCESS BLDA CALC-SCNC: -6.2 MMOL/L (ref -9.6–2)
BASE EXCESS BLDA CALC-SCNC: -6.3 MMOL/L (ref -9.6–2)
BASE EXCESS BLDA CALC-SCNC: -6.9 MMOL/L (ref -9.6–2)
BASE EXCESS BLDA CALC-SCNC: -7.3 MMOL/L (ref -9.6–2)
BASOPHILS # BLD AUTO: 0 10E3/UL (ref 0–0.2)
BASOPHILS NFR BLD AUTO: 0 %
BILIRUB DIRECT SERPL-MCNC: 11.32 MG/DL (ref 0–0.3)
BILIRUB SERPL-MCNC: 13.4 MG/DL
BILIRUB SERPL-MCNC: 36.9 MG/DL
BILIRUB UR QL STRIP: ABNORMAL
BLD PROD TYP BPU: NORMAL
BLOOD COMPONENT TYPE: NORMAL
BUN SERPL-MCNC: 40.5 MG/DL (ref 6–20)
BUN SERPL-MCNC: 46.4 MG/DL (ref 6–20)
CA-I BLD-MCNC: 4.1 MG/DL (ref 4.4–5.2)
CA-I BLD-MCNC: 4.5 MG/DL (ref 4.4–5.2)
CA-I BLD-MCNC: 4.6 MG/DL (ref 4.4–5.2)
CA-I BLD-MCNC: 4.6 MG/DL (ref 4.4–5.2)
CA-I BLD-MCNC: 4.7 MG/DL (ref 4.4–5.2)
CA-I BLD-MCNC: 4.7 MG/DL (ref 4.4–5.2)
CA-I BLD-MCNC: 5.4 MG/DL (ref 4.4–5.2)
CA-I BLD-MCNC: 5.5 MG/DL (ref 4.4–5.2)
CA-I BLD-MCNC: 5.7 MG/DL (ref 4.4–5.2)
CALCIUM SERPL-MCNC: 10.9 MG/DL (ref 8.6–10)
CALCIUM SERPL-MCNC: 9.5 MG/DL (ref 8.6–10)
CHLORIDE SERPL-SCNC: 88 MMOL/L (ref 98–107)
CHLORIDE SERPL-SCNC: 94 MMOL/L (ref 98–107)
CODING SYSTEM: NORMAL
COLOR UR AUTO: ABNORMAL
CREAT SERPL-MCNC: 2.31 MG/DL (ref 0.51–0.95)
CREAT SERPL-MCNC: 3.1 MG/DL (ref 0.51–0.95)
CROSSMATCH: NORMAL
DEPRECATED HCO3 PLAS-SCNC: 19 MMOL/L (ref 22–29)
DEPRECATED HCO3 PLAS-SCNC: 22 MMOL/L (ref 22–29)
EGFRCR SERPLBLD CKD-EPI 2021: 20 ML/MIN/1.73M2
EGFRCR SERPLBLD CKD-EPI 2021: 28 ML/MIN/1.73M2
EOSINOPHIL # BLD AUTO: 0 10E3/UL (ref 0–0.7)
EOSINOPHIL NFR BLD AUTO: 0 %
ERYTHROCYTE [DISTWIDTH] IN BLOOD BY AUTOMATED COUNT: 16.3 % (ref 10–15)
ERYTHROCYTE [DISTWIDTH] IN BLOOD BY AUTOMATED COUNT: 16.8 % (ref 10–15)
ERYTHROCYTE [DISTWIDTH] IN BLOOD BY AUTOMATED COUNT: 19.8 % (ref 10–15)
FIBRINOGEN PPP-MCNC: 142 MG/DL (ref 170–490)
FIBRINOGEN PPP-MCNC: 159 MG/DL (ref 170–490)
FIBRINOGEN PPP-MCNC: 177 MG/DL (ref 170–490)
GLUCOSE BLD-MCNC: 113 MG/DL (ref 70–99)
GLUCOSE BLD-MCNC: 127 MG/DL (ref 70–99)
GLUCOSE BLD-MCNC: 129 MG/DL (ref 70–99)
GLUCOSE BLD-MCNC: 135 MG/DL (ref 70–99)
GLUCOSE BLD-MCNC: 136 MG/DL (ref 70–99)
GLUCOSE BLD-MCNC: 250 MG/DL (ref 70–99)
GLUCOSE BLD-MCNC: 280 MG/DL (ref 70–99)
GLUCOSE BLD-MCNC: 307 MG/DL (ref 70–99)
GLUCOSE BLD-MCNC: 77 MG/DL (ref 70–99)
GLUCOSE BLDC GLUCOMTR-MCNC: 172 MG/DL (ref 70–99)
GLUCOSE BLDC GLUCOMTR-MCNC: 250 MG/DL (ref 70–99)
GLUCOSE BLDC GLUCOMTR-MCNC: 337 MG/DL (ref 70–99)
GLUCOSE BLDC GLUCOMTR-MCNC: 339 MG/DL (ref 70–99)
GLUCOSE BLDC GLUCOMTR-MCNC: 352 MG/DL (ref 70–99)
GLUCOSE BLDC GLUCOMTR-MCNC: 74 MG/DL (ref 70–99)
GLUCOSE SERPL-MCNC: 301 MG/DL (ref 70–99)
GLUCOSE SERPL-MCNC: 81 MG/DL (ref 70–99)
GLUCOSE UR STRIP-MCNC: NEGATIVE MG/DL
HBV CORE AB SERPL QL IA: NONREACTIVE
HBV SURFACE AB SERPL IA-ACNC: 2.89 M[IU]/ML
HBV SURFACE AB SERPL IA-ACNC: NONREACTIVE M[IU]/ML
HBV SURFACE AG SERPL QL IA: NONREACTIVE
HCO3 BLD-SCNC: 25 MMOL/L (ref 21–28)
HCO3 BLDA-SCNC: 18 MMOL/L (ref 21–28)
HCO3 BLDA-SCNC: 18 MMOL/L (ref 21–28)
HCO3 BLDA-SCNC: 19 MMOL/L (ref 21–28)
HCO3 BLDA-SCNC: 20 MMOL/L (ref 21–28)
HCO3 BLDA-SCNC: 20 MMOL/L (ref 21–28)
HCO3 BLDA-SCNC: 22 MMOL/L (ref 21–28)
HCO3 BLDA-SCNC: 22 MMOL/L (ref 21–28)
HCT VFR BLD AUTO: 22.8 % (ref 35–47)
HCT VFR BLD AUTO: 25.8 % (ref 35–47)
HCT VFR BLD AUTO: 31.6 % (ref 35–47)
HCV AB SERPL QL IA: NONREACTIVE
HGB BLD-MCNC: 11 G/DL (ref 11.7–15.7)
HGB BLD-MCNC: 7.2 G/DL (ref 11.7–15.7)
HGB BLD-MCNC: 8.2 G/DL (ref 11.7–15.7)
HGB BLD-MCNC: 8.3 G/DL (ref 11.7–15.7)
HGB BLD-MCNC: 8.4 G/DL (ref 11.7–15.7)
HGB BLD-MCNC: 8.4 G/DL (ref 11.7–15.7)
HGB BLD-MCNC: 8.9 G/DL (ref 11.7–15.7)
HGB BLD-MCNC: 9 G/DL (ref 11.7–15.7)
HGB BLD-MCNC: 9 G/DL (ref 11.7–15.7)
HGB BLD-MCNC: 9.6 G/DL (ref 11.7–15.7)
HGB BLD-MCNC: 9.7 G/DL (ref 11.7–15.7)
HGB BLD-MCNC: 9.9 G/DL (ref 11.7–15.7)
HGB UR QL STRIP: ABNORMAL
HIV 1+2 AB+HIV1 P24 AG SERPL QL IA: NONREACTIVE
IMM GRANULOCYTES # BLD: 0 10E3/UL
IMM GRANULOCYTES NFR BLD: 1 %
INR PPP: 1.44 (ref 0.85–1.15)
INR PPP: 1.6 (ref 0.85–1.15)
INR PPP: 1.89 (ref 0.85–1.15)
INR PPP: 4.35 (ref 0.85–1.15)
ISSUE DATE AND TIME: NORMAL
KETONES UR STRIP-MCNC: NEGATIVE MG/DL
LACTATE BLD-SCNC: 1.5 MMOL/L
LACTATE BLD-SCNC: 1.8 MMOL/L
LACTATE BLD-SCNC: 2.1 MMOL/L
LACTATE BLD-SCNC: 2.4 MMOL/L
LACTATE BLD-SCNC: 2.6 MMOL/L
LACTATE BLD-SCNC: 2.7 MMOL/L
LACTATE BLD-SCNC: 3.5 MMOL/L
LACTATE SERPL-SCNC: 2.3 MMOL/L (ref 0.7–2)
LEUKOCYTE ESTERASE UR QL STRIP: ABNORMAL
LYMPHOCYTES # BLD AUTO: 0.1 10E3/UL (ref 0.8–5.3)
LYMPHOCYTES NFR BLD AUTO: 3 %
MCH RBC QN AUTO: 31 PG (ref 26.5–33)
MCH RBC QN AUTO: 31.5 PG (ref 26.5–33)
MCH RBC QN AUTO: 32.5 PG (ref 26.5–33)
MCHC RBC AUTO-ENTMCNC: 34.8 G/DL (ref 31.5–36.5)
MCHC RBC AUTO-ENTMCNC: 34.9 G/DL (ref 31.5–36.5)
MCHC RBC AUTO-ENTMCNC: 36 G/DL (ref 31.5–36.5)
MCV RBC AUTO: 89 FL (ref 78–100)
MCV RBC AUTO: 90 FL (ref 78–100)
MCV RBC AUTO: 91 FL (ref 78–100)
MONOCYTES # BLD AUTO: 0.1 10E3/UL (ref 0–1.3)
MONOCYTES NFR BLD AUTO: 2 %
MUCOUS THREADS #/AREA URNS LPF: PRESENT /LPF
NEUTROPHILS # BLD AUTO: 5.1 10E3/UL (ref 1.6–8.3)
NEUTROPHILS NFR BLD AUTO: 94 %
NITRATE UR QL: NEGATIVE
NRBC # BLD AUTO: 0 10E3/UL
NRBC BLD AUTO-RTO: 0 /100
O2/TOTAL GAS SETTING VFR VENT: 40 %
O2/TOTAL GAS SETTING VFR VENT: 52 %
O2/TOTAL GAS SETTING VFR VENT: 53 %
O2/TOTAL GAS SETTING VFR VENT: 53 %
O2/TOTAL GAS SETTING VFR VENT: 60 %
O2/TOTAL GAS SETTING VFR VENT: 91 %
PCO2 BLD: 49 MM HG (ref 35–45)
PCO2 BLDA: 33 MM HG (ref 35–45)
PCO2 BLDA: 34 MM HG (ref 35–45)
PCO2 BLDA: 35 MM HG (ref 35–45)
PCO2 BLDA: 37 MM HG (ref 35–45)
PCO2 BLDA: 37 MM HG (ref 35–45)
PCO2 BLDA: 38 MM HG (ref 35–45)
PCO2 BLDA: 38 MM HG (ref 35–45)
PH BLD: 7.31 [PH] (ref 7.35–7.45)
PH BLDA: 7.3 [PH] (ref 7.35–7.45)
PH BLDA: 7.32 [PH] (ref 7.35–7.45)
PH BLDA: 7.32 [PH] (ref 7.35–7.45)
PH BLDA: 7.34 [PH] (ref 7.35–7.45)
PH BLDA: 7.36 [PH] (ref 7.35–7.45)
PH BLDA: 7.36 [PH] (ref 7.35–7.45)
PH BLDA: 7.38 [PH] (ref 7.35–7.45)
PH BLDA: 7.39 [PH] (ref 7.35–7.45)
PH BLDA: 7.41 [PH] (ref 7.35–7.45)
PH UR STRIP: 5.5 [PH] (ref 5–7)
PLATELET # BLD AUTO: 111 10E3/UL (ref 150–450)
PLATELET # BLD AUTO: 162 10E3/UL (ref 150–450)
PLATELET # BLD AUTO: 168 10E3/UL (ref 150–450)
PLATELET # BLD AUTO: 73 10E3/UL (ref 150–450)
PO2 BLD: 167 MM HG (ref 80–105)
PO2 BLDA: 108 MM HG (ref 80–105)
PO2 BLDA: 109 MM HG (ref 80–105)
PO2 BLDA: 119 MM HG (ref 80–105)
PO2 BLDA: 123 MM HG (ref 80–105)
PO2 BLDA: 134 MM HG (ref 80–105)
PO2 BLDA: 143 MM HG (ref 80–105)
PO2 BLDA: 164 MM HG (ref 80–105)
PO2 BLDA: 171 MM HG (ref 80–105)
PO2 BLDA: 173 MM HG (ref 80–105)
POTASSIUM BLD-SCNC: 2.9 MMOL/L (ref 3.5–5)
POTASSIUM BLD-SCNC: 3.3 MMOL/L (ref 3.5–5)
POTASSIUM BLD-SCNC: 3.4 MMOL/L (ref 3.5–5)
POTASSIUM BLD-SCNC: 3.5 MMOL/L (ref 3.5–5)
POTASSIUM BLD-SCNC: 3.5 MMOL/L (ref 3.5–5)
POTASSIUM BLD-SCNC: 3.6 MMOL/L (ref 3.5–5)
POTASSIUM BLD-SCNC: 3.6 MMOL/L (ref 3.5–5)
POTASSIUM BLD-SCNC: 3.7 MMOL/L (ref 3.5–5)
POTASSIUM BLD-SCNC: 3.7 MMOL/L (ref 3.5–5)
POTASSIUM SERPL-SCNC: 3 MMOL/L (ref 3.4–5.3)
POTASSIUM SERPL-SCNC: 3.3 MMOL/L (ref 3.4–5.3)
PROT SERPL-MCNC: 4.1 G/DL (ref 6.4–8.3)
PROT SERPL-MCNC: ABNORMAL G/DL
RBC # BLD AUTO: 2.52 10E6/UL (ref 3.8–5.2)
RBC # BLD AUTO: 2.86 10E6/UL (ref 3.8–5.2)
RBC # BLD AUTO: 3.55 10E6/UL (ref 3.8–5.2)
RBC URINE: 2 /HPF
SODIUM BLD-SCNC: 123 MMOL/L (ref 135–145)
SODIUM BLD-SCNC: 124 MMOL/L (ref 135–145)
SODIUM BLD-SCNC: 125 MMOL/L (ref 135–145)
SODIUM BLD-SCNC: 126 MMOL/L (ref 135–145)
SODIUM BLD-SCNC: 126 MMOL/L (ref 135–145)
SODIUM BLD-SCNC: 127 MMOL/L (ref 135–145)
SODIUM BLD-SCNC: 127 MMOL/L (ref 135–145)
SODIUM SERPL-SCNC: 124 MMOL/L (ref 135–145)
SODIUM SERPL-SCNC: 128 MMOL/L (ref 135–145)
SP GR UR STRIP: 1.02 (ref 1–1.03)
SQUAMOUS EPITHELIAL: 12 /HPF
TRANSITIONAL EPI: <1 /HPF
UNIT ABO/RH: NORMAL
UNIT NUMBER: NORMAL
UNIT STATUS: NORMAL
UNIT TYPE ISBT: 600
UNIT TYPE ISBT: 6200
UNIT TYPE ISBT: 7300
UNIT TYPE ISBT: 8400
UROBILINOGEN UR STRIP-MCNC: NORMAL MG/DL
WBC # BLD AUTO: 5.4 10E3/UL (ref 4–11)
WBC # BLD AUTO: 9.1 10E3/UL (ref 4–11)
WBC # BLD AUTO: 9.7 10E3/UL (ref 4–11)
WBC URINE: 5 /HPF
YEAST #/AREA URNS HPF: ABNORMAL /HPF

## 2023-11-05 PROCEDURE — 85049 AUTOMATED PLATELET COUNT: CPT | Performed by: INTERNAL MEDICINE

## 2023-11-05 PROCEDURE — P9059 PLASMA, FRZ BETWEEN 8-24HOUR: HCPCS

## 2023-11-05 PROCEDURE — 272N000001 HC OR GENERAL SUPPLY STERILE: Performed by: TRANSPLANT SURGERY

## 2023-11-05 PROCEDURE — 258N000003 HC RX IP 258 OP 636

## 2023-11-05 PROCEDURE — 88304 TISSUE EXAM BY PATHOLOGIST: CPT | Mod: 26 | Performed by: PATHOLOGY

## 2023-11-05 PROCEDURE — 360N000078 HC SURGERY LEVEL 5, PER MIN: Performed by: TRANSPLANT SURGERY

## 2023-11-05 PROCEDURE — 0FY00Z0 TRANSPLANTATION OF LIVER, ALLOGENEIC, OPEN APPROACH: ICD-10-PCS | Performed by: TRANSPLANT SURGERY

## 2023-11-05 PROCEDURE — 250N000025 HC SEVOFLURANE, PER MIN: Performed by: TRANSPLANT SURGERY

## 2023-11-05 PROCEDURE — 85730 THROMBOPLASTIN TIME PARTIAL: CPT | Performed by: INTERNAL MEDICINE

## 2023-11-05 PROCEDURE — 85610 PROTHROMBIN TIME: CPT

## 2023-11-05 PROCEDURE — 250N000013 HC RX MED GY IP 250 OP 250 PS 637

## 2023-11-05 PROCEDURE — 83605 ASSAY OF LACTIC ACID: CPT | Performed by: STUDENT IN AN ORGANIZED HEALTH CARE EDUCATION/TRAINING PROGRAM

## 2023-11-05 PROCEDURE — 999N000065 XR ABDOMEN 1 VIEW

## 2023-11-05 PROCEDURE — 99232 SBSQ HOSP IP/OBS MODERATE 35: CPT | Performed by: INTERNAL MEDICINE

## 2023-11-05 PROCEDURE — 85396 CLOTTING ASSAY WHOLE BLOOD: CPT

## 2023-11-05 PROCEDURE — 250N000011 HC RX IP 250 OP 636: Mod: JZ | Performed by: STUDENT IN AN ORGANIZED HEALTH CARE EDUCATION/TRAINING PROGRAM

## 2023-11-05 PROCEDURE — 93975 VASCULAR STUDY: CPT

## 2023-11-05 PROCEDURE — 99223 1ST HOSP IP/OBS HIGH 75: CPT | Mod: FS | Performed by: NURSE PRACTITIONER

## 2023-11-05 PROCEDURE — P9037 PLATE PHERES LEUKOREDU IRRAD: HCPCS

## 2023-11-05 PROCEDURE — 85384 FIBRINOGEN ACTIVITY: CPT | Performed by: INTERNAL MEDICINE

## 2023-11-05 PROCEDURE — 85730 THROMBOPLASTIN TIME PARTIAL: CPT | Performed by: STUDENT IN AN ORGANIZED HEALTH CARE EDUCATION/TRAINING PROGRAM

## 2023-11-05 PROCEDURE — 85384 FIBRINOGEN ACTIVITY: CPT

## 2023-11-05 PROCEDURE — 250N000011 HC RX IP 250 OP 636: Mod: JZ | Performed by: NURSE PRACTITIONER

## 2023-11-05 PROCEDURE — 250N000009 HC RX 250

## 2023-11-05 PROCEDURE — 82248 BILIRUBIN DIRECT: CPT | Performed by: STUDENT IN AN ORGANIZED HEALTH CARE EDUCATION/TRAINING PROGRAM

## 2023-11-05 PROCEDURE — 88305 TISSUE EXAM BY PATHOLOGIST: CPT | Mod: 26 | Performed by: PATHOLOGY

## 2023-11-05 PROCEDURE — 85610 PROTHROMBIN TIME: CPT | Performed by: STUDENT IN AN ORGANIZED HEALTH CARE EDUCATION/TRAINING PROGRAM

## 2023-11-05 PROCEDURE — 85025 COMPLETE CBC W/AUTO DIFF WBC: CPT | Performed by: PHYSICIAN ASSISTANT

## 2023-11-05 PROCEDURE — 250N000011 HC RX IP 250 OP 636: Performed by: TRANSPLANT SURGERY

## 2023-11-05 PROCEDURE — 88313 SPECIAL STAINS GROUP 2: CPT | Mod: 26 | Performed by: PATHOLOGY

## 2023-11-05 PROCEDURE — 85384 FIBRINOGEN ACTIVITY: CPT | Performed by: STUDENT IN AN ORGANIZED HEALTH CARE EDUCATION/TRAINING PROGRAM

## 2023-11-05 PROCEDURE — P9037 PLATE PHERES LEUKOREDU IRRAD: HCPCS | Performed by: INTERNAL MEDICINE

## 2023-11-05 PROCEDURE — 258N000003 HC RX IP 258 OP 636: Performed by: NURSE PRACTITIONER

## 2023-11-05 PROCEDURE — 250N000013 HC RX MED GY IP 250 OP 250 PS 637: Performed by: EMERGENCY MEDICINE

## 2023-11-05 PROCEDURE — 74018 RADEX ABDOMEN 1 VIEW: CPT | Mod: 26 | Performed by: RADIOLOGY

## 2023-11-05 PROCEDURE — 82330 ASSAY OF CALCIUM: CPT

## 2023-11-05 PROCEDURE — 272N000002 HC OR SUPPLY OTHER OPNP: Performed by: TRANSPLANT SURGERY

## 2023-11-05 PROCEDURE — 250N000013 HC RX MED GY IP 250 OP 250 PS 637: Performed by: INTERNAL MEDICINE

## 2023-11-05 PROCEDURE — 84132 ASSAY OF SERUM POTASSIUM: CPT | Performed by: STUDENT IN AN ORGANIZED HEALTH CARE EDUCATION/TRAINING PROGRAM

## 2023-11-05 PROCEDURE — 71045 X-RAY EXAM CHEST 1 VIEW: CPT | Mod: 26 | Performed by: RADIOLOGY

## 2023-11-05 PROCEDURE — 250N000013 HC RX MED GY IP 250 OP 250 PS 637: Performed by: PHYSICIAN ASSISTANT

## 2023-11-05 PROCEDURE — P9059 PLASMA, FRZ BETWEEN 8-24HOUR: HCPCS | Performed by: INTERNAL MEDICINE

## 2023-11-05 PROCEDURE — 812N000006 HC ACQUISITION LIVER CADAVER DONOR

## 2023-11-05 PROCEDURE — 258N000003 HC RX IP 258 OP 636: Performed by: TRANSPLANT SURGERY

## 2023-11-05 PROCEDURE — 82803 BLOOD GASES ANY COMBINATION: CPT

## 2023-11-05 PROCEDURE — 85730 THROMBOPLASTIN TIME PARTIAL: CPT

## 2023-11-05 PROCEDURE — 85610 PROTHROMBIN TIME: CPT | Performed by: PHYSICIAN ASSISTANT

## 2023-11-05 PROCEDURE — 30283B1 TRANSFUSION OF NONAUTOLOGOUS 4-FACTOR PROTHROMBIN COMPLEX CONCENTRATE INTO VEIN, PERCUTANEOUS APPROACH: ICD-10-PCS | Performed by: TRANSPLANT SURGERY

## 2023-11-05 PROCEDURE — 250N000012 HC RX MED GY IP 250 OP 636 PS 637: Performed by: STUDENT IN AN ORGANIZED HEALTH CARE EDUCATION/TRAINING PROGRAM

## 2023-11-05 PROCEDURE — 999N000157 HC STATISTIC RCP TIME EA 10 MIN

## 2023-11-05 PROCEDURE — 250N000011 HC RX IP 250 OP 636

## 2023-11-05 PROCEDURE — 93975 VASCULAR STUDY: CPT | Mod: 26 | Performed by: RADIOLOGY

## 2023-11-05 PROCEDURE — 85049 AUTOMATED PLATELET COUNT: CPT | Performed by: STUDENT IN AN ORGANIZED HEALTH CARE EDUCATION/TRAINING PROGRAM

## 2023-11-05 PROCEDURE — P9016 RBC LEUKOCYTES REDUCED: HCPCS

## 2023-11-05 PROCEDURE — 85049 AUTOMATED PLATELET COUNT: CPT

## 2023-11-05 PROCEDURE — 88313 SPECIAL STAINS GROUP 2: CPT | Mod: TC | Performed by: TRANSPLANT SURGERY

## 2023-11-05 PROCEDURE — 36415 COLL VENOUS BLD VENIPUNCTURE: CPT | Performed by: PHYSICIAN ASSISTANT

## 2023-11-05 PROCEDURE — 200N000002 HC R&B ICU UMMC

## 2023-11-05 PROCEDURE — 370N000017 HC ANESTHESIA TECHNICAL FEE, PER MIN: Performed by: TRANSPLANT SURGERY

## 2023-11-05 PROCEDURE — 999N000065 XR CHEST PORT 1 VIEW

## 2023-11-05 PROCEDURE — 85027 COMPLETE CBC AUTOMATED: CPT

## 2023-11-05 PROCEDURE — 250N000011 HC RX IP 250 OP 636: Mod: JZ

## 2023-11-05 PROCEDURE — 82803 BLOOD GASES ANY COMBINATION: CPT | Performed by: STUDENT IN AN ORGANIZED HEALTH CARE EDUCATION/TRAINING PROGRAM

## 2023-11-05 PROCEDURE — 94002 VENT MGMT INPAT INIT DAY: CPT

## 2023-11-05 PROCEDURE — 250N000011 HC RX IP 250 OP 636: Mod: JZ | Performed by: INTERNAL MEDICINE

## 2023-11-05 PROCEDURE — 250N000009 HC RX 250: Performed by: TRANSPLANT SURGERY

## 2023-11-05 PROCEDURE — P9016 RBC LEUKOCYTES REDUCED: HCPCS | Performed by: INTERNAL MEDICINE

## 2023-11-05 PROCEDURE — 84450 TRANSFERASE (AST) (SGOT): CPT | Performed by: PHYSICIAN ASSISTANT

## 2023-11-05 PROCEDURE — 99291 CRITICAL CARE FIRST HOUR: CPT | Performed by: INTERNAL MEDICINE

## 2023-11-05 PROCEDURE — 85610 PROTHROMBIN TIME: CPT | Performed by: INTERNAL MEDICINE

## 2023-11-05 PROCEDURE — 88309 TISSUE EXAM BY PATHOLOGIST: CPT | Mod: 26 | Performed by: PATHOLOGY

## 2023-11-05 RX ORDER — FIBRINOGEN (HUMAN) 700-1300MG
110 KIT INTRAVENOUS ONCE
Qty: 110 MG | Refills: 0 | Status: DISCONTINUED | OUTPATIENT
Start: 2023-11-05 | End: 2023-11-05

## 2023-11-05 RX ORDER — FLUCONAZOLE 2 MG/ML
400 INJECTION, SOLUTION INTRAVENOUS EVERY 24 HOURS
Qty: 1400 ML | Refills: 0 | Status: DISCONTINUED | OUTPATIENT
Start: 2023-11-06 | End: 2023-11-06

## 2023-11-05 RX ORDER — SODIUM CHLORIDE, SODIUM LACTATE, POTASSIUM CHLORIDE, CALCIUM CHLORIDE 600; 310; 30; 20 MG/100ML; MG/100ML; MG/100ML; MG/100ML
INJECTION, SOLUTION INTRAVENOUS CONTINUOUS
Status: DISCONTINUED | OUTPATIENT
Start: 2023-11-05 | End: 2023-11-05 | Stop reason: HOSPADM

## 2023-11-05 RX ORDER — NOREPINEPHRINE BITARTRATE 0.06 MG/ML
.01-.6 INJECTION, SOLUTION INTRAVENOUS CONTINUOUS
Status: DISCONTINUED | OUTPATIENT
Start: 2023-11-05 | End: 2023-11-05 | Stop reason: HOSPADM

## 2023-11-05 RX ORDER — CHLORHEXIDINE GLUCONATE ORAL RINSE 1.2 MG/ML
15 SOLUTION DENTAL EVERY 12 HOURS
Status: DISCONTINUED | OUTPATIENT
Start: 2023-11-05 | End: 2023-11-08

## 2023-11-05 RX ORDER — PREDNISONE 10 MG/1
10 TABLET ORAL ONCE
Status: COMPLETED | OUTPATIENT
Start: 2023-11-10 | End: 2023-11-10

## 2023-11-05 RX ORDER — FIBRINOGEN (HUMAN) 700-1300MG
1100 KIT INTRAVENOUS ONCE
Status: COMPLETED | OUTPATIENT
Start: 2023-11-05 | End: 2023-11-05

## 2023-11-05 RX ORDER — FENTANYL CITRATE 50 UG/ML
INJECTION, SOLUTION INTRAMUSCULAR; INTRAVENOUS PRN
Status: DISCONTINUED | OUTPATIENT
Start: 2023-11-05 | End: 2023-11-05

## 2023-11-05 RX ORDER — METHYLPREDNISOLONE SODIUM SUCCINATE 125 MG/2ML
100 INJECTION, POWDER, LYOPHILIZED, FOR SOLUTION INTRAMUSCULAR; INTRAVENOUS ONCE
Status: COMPLETED | OUTPATIENT
Start: 2023-11-07 | End: 2023-11-07

## 2023-11-05 RX ORDER — FUROSEMIDE 10 MG/ML
INJECTION INTRAMUSCULAR; INTRAVENOUS PRN
Status: DISCONTINUED | OUTPATIENT
Start: 2023-11-05 | End: 2023-11-05

## 2023-11-05 RX ORDER — METHYLPREDNISOLONE SODIUM SUCCINATE 125 MG/2ML
50 INJECTION, POWDER, LYOPHILIZED, FOR SOLUTION INTRAMUSCULAR; INTRAVENOUS ONCE
Status: COMPLETED | OUTPATIENT
Start: 2023-11-08 | End: 2023-11-08

## 2023-11-05 RX ORDER — SODIUM CHLORIDE, SODIUM LACTATE, POTASSIUM CHLORIDE, CALCIUM CHLORIDE 600; 310; 30; 20 MG/100ML; MG/100ML; MG/100ML; MG/100ML
INJECTION, SOLUTION INTRAVENOUS CONTINUOUS PRN
Status: DISCONTINUED | OUTPATIENT
Start: 2023-11-05 | End: 2023-11-05

## 2023-11-05 RX ORDER — NALOXONE HYDROCHLORIDE 0.4 MG/ML
0.2 INJECTION, SOLUTION INTRAMUSCULAR; INTRAVENOUS; SUBCUTANEOUS
Status: DISCONTINUED | OUTPATIENT
Start: 2023-11-05 | End: 2023-11-15 | Stop reason: HOSPADM

## 2023-11-05 RX ORDER — PIPERACILLIN SODIUM, TAZOBACTAM SODIUM 3; .375 G/15ML; G/15ML
3.38 INJECTION, POWDER, LYOPHILIZED, FOR SOLUTION INTRAVENOUS EVERY 6 HOURS
Status: DISCONTINUED | OUTPATIENT
Start: 2023-11-05 | End: 2023-11-07

## 2023-11-05 RX ORDER — PROPOFOL 10 MG/ML
5-75 INJECTION, EMULSION INTRAVENOUS CONTINUOUS
Status: DISCONTINUED | OUTPATIENT
Start: 2023-11-05 | End: 2023-11-08

## 2023-11-05 RX ORDER — NICOTINE POLACRILEX 4 MG
15-30 LOZENGE BUCCAL
Status: DISCONTINUED | OUTPATIENT
Start: 2023-11-05 | End: 2023-11-05

## 2023-11-05 RX ORDER — MYCOPHENOLATE MOFETIL 250 MG/1
750 CAPSULE ORAL
Status: DISCONTINUED | OUTPATIENT
Start: 2023-11-05 | End: 2023-11-15 | Stop reason: HOSPADM

## 2023-11-05 RX ORDER — SODIUM CHLORIDE, SODIUM GLUCONATE, SODIUM ACETATE, POTASSIUM CHLORIDE AND MAGNESIUM CHLORIDE 526; 502; 368; 37; 30 MG/100ML; MG/100ML; MG/100ML; MG/100ML; MG/100ML
INJECTION, SOLUTION INTRAVENOUS CONTINUOUS PRN
Status: DISCONTINUED | OUTPATIENT
Start: 2023-11-05 | End: 2023-11-05

## 2023-11-05 RX ORDER — DEXTROSE MONOHYDRATE 25 G/50ML
25-50 INJECTION, SOLUTION INTRAVENOUS
Status: DISCONTINUED | OUTPATIENT
Start: 2023-11-05 | End: 2023-11-06

## 2023-11-05 RX ORDER — TACROLIMUS 1 MG/1
2 CAPSULE ORAL
Status: DISCONTINUED | OUTPATIENT
Start: 2023-11-05 | End: 2023-11-10

## 2023-11-05 RX ORDER — VASOPRESSIN IN 0.9 % NACL 2 UNIT/2ML
SYRINGE (ML) INTRAVENOUS PRN
Status: DISCONTINUED | OUTPATIENT
Start: 2023-11-05 | End: 2023-11-05

## 2023-11-05 RX ORDER — DEXTROSE MONOHYDRATE 100 MG/ML
INJECTION, SOLUTION INTRAVENOUS CONTINUOUS PRN
Status: DISCONTINUED | OUTPATIENT
Start: 2023-11-05 | End: 2023-11-08

## 2023-11-05 RX ORDER — NALOXONE HYDROCHLORIDE 0.4 MG/ML
0.4 INJECTION, SOLUTION INTRAMUSCULAR; INTRAVENOUS; SUBCUTANEOUS
Status: DISCONTINUED | OUTPATIENT
Start: 2023-11-05 | End: 2023-11-15 | Stop reason: HOSPADM

## 2023-11-05 RX ORDER — EPHEDRINE SULFATE 50 MG/ML
INJECTION, SOLUTION INTRAMUSCULAR; INTRAVENOUS; SUBCUTANEOUS PRN
Status: DISCONTINUED | OUTPATIENT
Start: 2023-11-05 | End: 2023-11-05

## 2023-11-05 RX ORDER — POTASSIUM CHLORIDE 7.45 MG/ML
10 INJECTION INTRAVENOUS ONCE
Status: COMPLETED | OUTPATIENT
Start: 2023-11-05 | End: 2023-11-05

## 2023-11-05 RX ORDER — FIBRINOGEN (HUMAN) 700-1300MG
1100 KIT INTRAVENOUS ONCE
Qty: 1100 MG | Refills: 0 | Status: COMPLETED | OUTPATIENT
Start: 2023-11-05 | End: 2023-11-05

## 2023-11-05 RX ORDER — LIDOCAINE HYDROCHLORIDE 20 MG/ML
INJECTION, SOLUTION INFILTRATION; PERINEURAL PRN
Status: DISCONTINUED | OUTPATIENT
Start: 2023-11-05 | End: 2023-11-05

## 2023-11-05 RX ORDER — VALGANCICLOVIR HYDROCHLORIDE 50 MG/ML
450 POWDER, FOR SOLUTION ORAL EVERY OTHER DAY
Status: DISCONTINUED | OUTPATIENT
Start: 2023-11-06 | End: 2023-11-07

## 2023-11-05 RX ORDER — NICOTINE POLACRILEX 4 MG
15-30 LOZENGE BUCCAL
Status: DISCONTINUED | OUTPATIENT
Start: 2023-11-05 | End: 2023-11-06

## 2023-11-05 RX ORDER — TROMETHAMINE IN STERILE WATER 1.8 G/50ML
SYRINGE (ML) INTRAVENOUS PRN
Status: DISCONTINUED | OUTPATIENT
Start: 2023-11-05 | End: 2023-11-05

## 2023-11-05 RX ORDER — DEXTROSE MONOHYDRATE 25 G/50ML
25-50 INJECTION, SOLUTION INTRAVENOUS
Status: DISCONTINUED | OUTPATIENT
Start: 2023-11-05 | End: 2023-11-08

## 2023-11-05 RX ORDER — DEXTROSE MONOHYDRATE 25 G/50ML
25-50 INJECTION, SOLUTION INTRAVENOUS
Status: DISCONTINUED | OUTPATIENT
Start: 2023-11-05 | End: 2023-11-05

## 2023-11-05 RX ORDER — MYCOPHENOLATE MOFETIL 200 MG/ML
750 POWDER, FOR SUSPENSION ORAL
Status: DISCONTINUED | OUTPATIENT
Start: 2023-11-05 | End: 2023-11-08

## 2023-11-05 RX ORDER — NOREPINEPHRINE BITARTRATE 0.06 MG/ML
.01-.6 INJECTION, SOLUTION INTRAVENOUS CONTINUOUS
Status: DISCONTINUED | OUTPATIENT
Start: 2023-11-05 | End: 2023-11-08

## 2023-11-05 RX ORDER — SODIUM CHLORIDE, SODIUM LACTATE, POTASSIUM CHLORIDE, CALCIUM CHLORIDE 600; 310; 30; 20 MG/100ML; MG/100ML; MG/100ML; MG/100ML
INJECTION, SOLUTION INTRAVENOUS CONTINUOUS
Status: DISCONTINUED | OUTPATIENT
Start: 2023-11-05 | End: 2023-11-07

## 2023-11-05 RX ORDER — CALCIUM CHLORIDE 100 MG/ML
INJECTION INTRAVENOUS; INTRAVENTRICULAR PRN
Status: DISCONTINUED | OUTPATIENT
Start: 2023-11-05 | End: 2023-11-05

## 2023-11-05 RX ORDER — VALGANCICLOVIR 450 MG/1
450 TABLET, FILM COATED ORAL EVERY OTHER DAY
Status: DISCONTINUED | OUTPATIENT
Start: 2023-11-06 | End: 2023-11-07

## 2023-11-05 RX ORDER — PROPOFOL 10 MG/ML
INJECTION, EMULSION INTRAVENOUS PRN
Status: DISCONTINUED | OUTPATIENT
Start: 2023-11-05 | End: 2023-11-05

## 2023-11-05 RX ORDER — NICOTINE POLACRILEX 4 MG
15-30 LOZENGE BUCCAL
Status: DISCONTINUED | OUTPATIENT
Start: 2023-11-05 | End: 2023-11-08

## 2023-11-05 RX ADMIN — NOREPINEPHRINE BITARTRATE 6.4 MCG: 1 INJECTION, SOLUTION, CONCENTRATE INTRAVENOUS at 17:08

## 2023-11-05 RX ADMIN — CALCIUM CHLORIDE 1 G/HR: 100 INJECTION, SOLUTION INTRAVENOUS at 17:08

## 2023-11-05 RX ADMIN — Medication 50 MG: at 13:30

## 2023-11-05 RX ADMIN — FENTANYL CITRATE 150 MCG: 50 INJECTION INTRAMUSCULAR; INTRAVENOUS at 15:28

## 2023-11-05 RX ADMIN — PANTOPRAZOLE SODIUM 40 MG: 40 TABLET, DELAYED RELEASE ORAL at 08:03

## 2023-11-05 RX ADMIN — VASOPRESSIN 1 UNITS/HR: 20 INJECTION, SOLUTION INTRAMUSCULAR; SUBCUTANEOUS at 16:38

## 2023-11-05 RX ADMIN — Medication 1 DROP: at 22:00

## 2023-11-05 RX ADMIN — Medication 50 MCG/HR: at 13:36

## 2023-11-05 RX ADMIN — PIPERACILLIN AND TAZOBACTAM 3.38 G: 3; .375 INJECTION, POWDER, LYOPHILIZED, FOR SOLUTION INTRAVENOUS at 22:25

## 2023-11-05 RX ADMIN — FIBRINOGEN (HUMAN) 1100 MG: KIT INTRAVENOUS at 14:20

## 2023-11-05 RX ADMIN — Medication 50 MG: at 15:07

## 2023-11-05 RX ADMIN — MIDODRINE HYDROCHLORIDE 15 MG: 5 TABLET ORAL at 08:02

## 2023-11-05 RX ADMIN — SODIUM BICARBONATE 650 MG TABLET 1300 MG: at 21:59

## 2023-11-05 RX ADMIN — SODIUM CHLORIDE, POTASSIUM CHLORIDE, SODIUM LACTATE AND CALCIUM CHLORIDE: 600; 310; 30; 20 INJECTION, SOLUTION INTRAVENOUS at 15:31

## 2023-11-05 RX ADMIN — CALCIUM CHLORIDE INJECTION 0.5 G: 100 INJECTION, SOLUTION INTRAVENOUS at 16:37

## 2023-11-05 RX ADMIN — CIPROFLOXACIN HYDROCHLORIDE 250 MG: 250 TABLET, FILM COATED ORAL at 08:26

## 2023-11-05 RX ADMIN — TROMETHAMINE 200 ML: 3.6 INJECTION, SOLUTION INTRAVENOUS at 16:38

## 2023-11-05 RX ADMIN — PROPOFOL 50 MCG/KG/MIN: 10 INJECTION, EMULSION INTRAVENOUS at 20:39

## 2023-11-05 RX ADMIN — METHYLPREDNISOLONE SODIUM SUCCINATE 1000 MG: 1 INJECTION INTRAMUSCULAR; INTRAVENOUS at 18:39

## 2023-11-05 RX ADMIN — POTASSIUM CHLORIDE 10 MEQ: 7.46 INJECTION, SOLUTION INTRAVENOUS at 08:22

## 2023-11-05 RX ADMIN — RIFAXIMIN 550 MG: 550 TABLET ORAL at 08:02

## 2023-11-05 RX ADMIN — CALCIUM CHLORIDE 1 G/HR: 100 INJECTION, SOLUTION INTRAVENOUS at 15:04

## 2023-11-05 RX ADMIN — TROMETHAMINE 100 ML: 3.6 INJECTION, SOLUTION INTRAVENOUS at 15:35

## 2023-11-05 RX ADMIN — Medication 50 MCG/HR: at 20:46

## 2023-11-05 RX ADMIN — GABAPENTIN 100 MG: 100 CAPSULE ORAL at 22:00

## 2023-11-05 RX ADMIN — INSULIN HUMAN 10 UNITS/HR: 1 INJECTION, SOLUTION INTRAVENOUS at 20:34

## 2023-11-05 RX ADMIN — FENTANYL CITRATE 100 MCG: 50 INJECTION INTRAMUSCULAR; INTRAVENOUS at 16:15

## 2023-11-05 RX ADMIN — CALCIUM CHLORIDE INJECTION 1 G: 100 INJECTION, SOLUTION INTRAVENOUS at 13:50

## 2023-11-05 RX ADMIN — FIBRINOGEN (HUMAN) 1100 MG: KIT INTRAVENOUS at 17:58

## 2023-11-05 RX ADMIN — INSULIN HUMAN 2 UNITS/HR: 1 INJECTION, SOLUTION INTRAVENOUS at 14:34

## 2023-11-05 RX ADMIN — Medication 1 DROP: at 08:02

## 2023-11-05 RX ADMIN — PIPERACILLIN AND TAZOBACTAM 3.38 G: 3; .375 INJECTION, POWDER, FOR SOLUTION INTRAVENOUS at 16:46

## 2023-11-05 RX ADMIN — PROPOFOL 50 MG: 10 INJECTION, EMULSION INTRAVENOUS at 19:22

## 2023-11-05 RX ADMIN — SODIUM CHLORIDE, SODIUM GLUCONATE, SODIUM ACETATE, POTASSIUM CHLORIDE AND MAGNESIUM CHLORIDE: 526; 502; 368; 37; 30 INJECTION, SOLUTION INTRAVENOUS at 17:54

## 2023-11-05 RX ADMIN — THIAMINE HCL TAB 100 MG 100 MG: 100 TAB at 08:02

## 2023-11-05 RX ADMIN — EPINEPHRINE 0.03 MCG/KG/MIN: 1 INJECTION INTRAMUSCULAR; INTRAVENOUS; SUBCUTANEOUS at 16:37

## 2023-11-05 RX ADMIN — SODIUM BICARBONATE 650 MG TABLET 1300 MG: at 08:03

## 2023-11-05 RX ADMIN — Medication 50 MG: at 18:08

## 2023-11-05 RX ADMIN — LACTULOSE 30 ML: 20 SOLUTION ORAL at 08:22

## 2023-11-05 RX ADMIN — FENTANYL CITRATE 150 MCG: 50 INJECTION INTRAMUSCULAR; INTRAVENOUS at 12:58

## 2023-11-05 RX ADMIN — SODIUM CHLORIDE, SODIUM GLUCONATE, SODIUM ACETATE, POTASSIUM CHLORIDE AND MAGNESIUM CHLORIDE: 526; 502; 368; 37; 30 INJECTION, SOLUTION INTRAVENOUS at 15:00

## 2023-11-05 RX ADMIN — Medication 50 MG: at 16:23

## 2023-11-05 RX ADMIN — CHLORHEXIDINE GLUCONATE 15 ML: 1.2 SOLUTION ORAL at 22:00

## 2023-11-05 RX ADMIN — SODIUM CHLORIDE, POTASSIUM CHLORIDE, SODIUM LACTATE AND CALCIUM CHLORIDE: 600; 310; 30; 20 INJECTION, SOLUTION INTRAVENOUS at 02:37

## 2023-11-05 RX ADMIN — FENTANYL CITRATE 100 MCG: 50 INJECTION INTRAMUSCULAR; INTRAVENOUS at 12:26

## 2023-11-05 RX ADMIN — EPINEPHRINE 40 MCG: 1 INJECTION INTRAMUSCULAR; INTRAVENOUS; SUBCUTANEOUS at 16:41

## 2023-11-05 RX ADMIN — NOREPINEPHRINE BITARTRATE 12.8 MCG: 1 INJECTION, SOLUTION, CONCENTRATE INTRAVENOUS at 16:41

## 2023-11-05 RX ADMIN — Medication 50 MG: at 12:21

## 2023-11-05 RX ADMIN — MIDAZOLAM 2 MG: 1 INJECTION INTRAMUSCULAR; INTRAVENOUS at 12:19

## 2023-11-05 RX ADMIN — FOLIC ACID 1 MG: 1 TABLET ORAL at 08:03

## 2023-11-05 RX ADMIN — PROPOFOL 130 MG: 10 INJECTION, EMULSION INTRAVENOUS at 12:18

## 2023-11-05 RX ADMIN — CALCIUM CHLORIDE INJECTION 1 G: 100 INJECTION, SOLUTION INTRAVENOUS at 14:41

## 2023-11-05 RX ADMIN — RIFAXIMIN 550 MG: 550 TABLET ORAL at 22:00

## 2023-11-05 RX ADMIN — PROPOFOL 30 MCG/KG/MIN: 10 INJECTION, EMULSION INTRAVENOUS at 23:00

## 2023-11-05 RX ADMIN — CALCIUM CHLORIDE 1 G/HR: 100 INJECTION, SOLUTION INTRAVENOUS at 16:02

## 2023-11-05 RX ADMIN — PIPERACILLIN AND TAZOBACTAM 3.38 G: 3; .375 INJECTION, POWDER, FOR SOLUTION INTRAVENOUS at 13:13

## 2023-11-05 RX ADMIN — FLUCONAZOLE IN SODIUM CHLORIDE 400 MG: 2 INJECTION, SOLUTION INTRAVENOUS at 13:06

## 2023-11-05 RX ADMIN — Medication 1 ML: at 13:35

## 2023-11-05 RX ADMIN — SODIUM CHLORIDE, SODIUM GLUCONATE, SODIUM ACETATE, POTASSIUM CHLORIDE AND MAGNESIUM CHLORIDE: 526; 502; 368; 37; 30 INJECTION, SOLUTION INTRAVENOUS at 12:13

## 2023-11-05 RX ADMIN — CALCIUM CHLORIDE INJECTION 0.5 G: 100 INJECTION, SOLUTION INTRAVENOUS at 15:53

## 2023-11-05 RX ADMIN — MYCOPHENOLATE MOFETIL 750 MG: 250 CAPSULE ORAL at 21:59

## 2023-11-05 RX ADMIN — DEXTROSE MONOHYDRATE AND SODIUM CHLORIDE: 5; .45 INJECTION, SOLUTION INTRAVENOUS at 13:03

## 2023-11-05 RX ADMIN — NOREPINEPHRINE BITARTRATE 6.4 MCG: 1 INJECTION, SOLUTION, CONCENTRATE INTRAVENOUS at 17:04

## 2023-11-05 RX ADMIN — CALCIUM CHLORIDE INJECTION 1 G: 100 INJECTION, SOLUTION INTRAVENOUS at 13:28

## 2023-11-05 RX ADMIN — FIBRINOGEN (HUMAN) 1100 MG: KIT INTRAVENOUS at 12:45

## 2023-11-05 RX ADMIN — TACROLIMUS 2 MG: 1 CAPSULE ORAL at 21:59

## 2023-11-05 RX ADMIN — GABAPENTIN 100 MG: 100 CAPSULE ORAL at 08:02

## 2023-11-05 RX ADMIN — Medication 50 MCG/HR: at 13:05

## 2023-11-05 RX ADMIN — PROTHROMBIN, COAGULATION FACTOR VII HUMAN, COAGULATION FACTOR IX HUMAN, COAGULATION FACTOR X HUMAN, PROTEIN C, PROTEIN S HUMAN, AND WATER 1077 UNITS: KIT at 12:53

## 2023-11-05 RX ADMIN — LACTULOSE 30 ML: 20 SOLUTION ORAL at 22:00

## 2023-11-05 RX ADMIN — NOREPINEPHRINE BITARTRATE 0.05 MCG/KG/MIN: 0.06 INJECTION, SOLUTION INTRAVENOUS at 17:05

## 2023-11-05 RX ADMIN — Medication 1 UNITS/HR: at 17:18

## 2023-11-05 RX ADMIN — PIPERACILLIN AND TAZOBACTAM 3.38 G: 3; .375 INJECTION, POWDER, FOR SOLUTION INTRAVENOUS at 14:37

## 2023-11-05 RX ADMIN — SUGAMMADEX 200 MG: 100 INJECTION, SOLUTION INTRAVENOUS at 20:45

## 2023-11-05 RX ADMIN — LIDOCAINE HYDROCHLORIDE 100 MG: 20 INJECTION, SOLUTION INFILTRATION; PERINEURAL at 12:18

## 2023-11-05 RX ADMIN — SODIUM CHLORIDE, POTASSIUM CHLORIDE, SODIUM LACTATE AND CALCIUM CHLORIDE: 600; 310; 30; 20 INJECTION, SOLUTION INTRAVENOUS at 21:32

## 2023-11-05 RX ADMIN — FUROSEMIDE 60 MG: 10 INJECTION, SOLUTION INTRAVENOUS at 17:22

## 2023-11-05 ASSESSMENT — ACTIVITIES OF DAILY LIVING (ADL)
ADLS_ACUITY_SCORE: 34

## 2023-11-05 NOTE — CONSULTS
SURGICAL ICU ADMISSION NOTE  11/05/2023      PRIMARY TEAM: Liver Transplant  PRIMARY PHYSICIAN: Dr. Lowe  REASON FOR CRITICAL CARE ADMISSION: Hemodynamic, respiratory monitoring   ADMITTING PHYSICIAN: Dr. Villavicencio  Date of Service (when I saw the patient): 11/05/2023    ASSESSMENT:  Lola Milner is a 33 year old female with a PMH of MDD, prior tobacco use, gout, polyneuropathy, decompensated cirrhosis secondary to alcohol use complicated by hepatic encephalopathy, esophageal varices status post banding, recurrent ascites, and hepatorenal syndrome who was admitted on 10/31/2023 for evaluation for liver transplantation. She was admitted to SICU s/p uncomplicated liver transplantation on 11/5.    PLAN:    Neurological:  # Acute pain   # Sedation for mechanical ventilation  - Monitor neurological status. Delirium preventions and precautions.   - Pain:    Gtt: Fentanyl + bolus while intubated   PRN: acetaminophen (2gm max daily dose)  - Sedation plan:   - Gtt: Propofol + bolus while intuabted   - RASS goal -1 to -2    # Hx MDD  # Hx polyneuropathy  - Continue PTA Neurontin 100 mg TID, melatonin at HS PRN when able    Pulmonary:   # Post operative ventilatory support  - VENT: 400/12/5/40%.   - Continue full vent support. PST when meets criteria.  Ventilatory bundle.  - Supplemental oxygen to keep saturation above 92 %.  - Incentive spirometer every 15- 30 minutes.     Cardiovascular:    # Hypotension   # Shock hypovolemic from hemorrhage   - Monitor hemodynamic status. Goal MAP >65.   - Norepinephrine infusion to maintain goal BP, wean as tolerated  - Midodrine on hold -- was 15 mg TID prior to transplantation  - EBL: 3500 cc  - Intraoperative resuscitation: 5u pRBCs, 10u FFP, 4u Plt, cellsaver 880cc, 3.75L crystalloid, 400cc albumin, fibriga 3, PCC 1000cc    Gastroenterology/Nutrition:  # S/p liver transplantation 11/5/23  # Decompensated alcohol cirrhosis c/b HRS, HE, recurrent ascites, esophageal varices s/p banding  (9/23)  # Hyperbilirubinemia  - Liver transplant team primary  - Trend hepatic labs  - Immediate post-op liver US pending  - Pantoprazole for ppx  - Transplant immunosuppressants and prophylaxis as below.     # Severe protein calorie deficit malnutrition    - RD consult. Appreciate cares and recommendations.   - Will consider starting enteral nutrition if unable to take by mouth POD2    Renal/Fluids/Electrolytes:   # Hyponatremia  # Acute kidney injury   # Hepatorenal syndrome  # CKD and EDGAR  - Transplant nephrology following, appreciate support.  - Baseline Cr ~ 0.8-1.0  - Will discuss need for CRRT post-op with transplant nephrology  - Urine output is 600cc . Will continue to monitor intake and output.   - LR for mIVF    Endocrine:   # Stress hyperglycemia   - Insulin infusion.   - Goal to keep BG< 180 for optimal wound healing       ID:  # immunosuppression   - induction steroids per transplant, steroid taper, MMF, and tacrolimus per transplant   # immunoprophylaxis  - Valcyte, Fluconazole    # perioperative antibiotics: zosyn    Heme:     # Acute blood loss anemia   # Coagulopathy due to cirrhosis and surgical blood loss    # Thrombocytopenia   # Anemia of critical illness   # Chronic normocytic anemia  - Hemoglobin prior to transplant ~7  - Platelet baseline 70-80 prior to transplant  - Transfusion parameters;   - Plt >20k if not bleeding   - Hgb > 8   - INR < 2   - Fibrinogen > 200        Musculoskeletal:  # Weakness and deconditioning of critical illness   - Physical and occupational therapy consult   - Utilized a walker at baseline    Skin:  - Diligent skin cares to prevent breakdown    General Cares/Prophylaxis:    DVT Prophylaxis: Defer to primary service. Chemical prophylaxis None   GI Prophylaxis: PPI  Restraints: Restraints for medical healing needed: NO    Lines/ tubes/ drains:  - RIJ CVC with PA catheter  - L axillary A-line  - PIV  - ETT  - 2x HARLEY drain  - NG  - ETT    Disposition:  -  Surgical  "ICU    Patient seen, findings and plan discussed with surgical ICU staff, Dr. Villavicencio.    Antonio Barnett MD  Surgery Resident    Clinically Significant Risk Factors        # Hypokalemia: Lowest K = 2.9 mmol/L in last 2 days, will replace as needed  # Hyponatremia: Lowest Na = 123 mmol/L in last 2 days, will monitor as appropriate  # Hypocalcemia: Lowest iCa = 4.1 mg/dL in last 2 days, will monitor and replace as appropriate  # Hypercalcemia: Highest iCa = 5.7 mg/dL in last 2 days, will monitor as appropriate       # Coagulation Defect: INR = 1.60 (Ref range: 0.85 - 1.15) and/or PTT = 37 Seconds (Ref range: 22 - 38 Seconds), will monitor for bleeding  # Thrombocytopenia: Lowest platelets = 73 in last 2 days, will monitor for bleeding          # Overweight: Estimated body mass index is 28.09 kg/m  as calculated from the following:    Height as of an earlier encounter on 10/31/23: 1.6 m (5' 3\").    Weight as of this encounter: 71.9 kg (158 lb 9.6 oz).   # Severe Malnutrition: based on nutrition assessment      # Financial/Environmental Concerns: none (Pt reports her SO is able to pay rent and pay her bills)              - - - - - - - - - - - - - - - - - - - - - - - - - - - - - - - - - - - - - - - - - - - - - -   HISTORY PRESENTING ILLNESS: Lola Milner is a 33 year old female with a PMH of MDD, prior tobacco use (quit 6/2023), gout, polyneuropathy, decompensated cirrhosis secondary to alcohol use (last drink 6/13/23) complicated by hepatic encephalopathy, esophageal varices status post banding, recurrent ascites, and hepatorenal syndrome who was admitted on 10/31/2023 for evaluation for liver transplantation from [facility].  She was admitted to SICU s/p liver transplantation on 11/5    REVIEW OF SYSTEMS: 10 point ROS neg other than the symptoms noted above in the HPI.    PAST MEDICAL HISTORY:   Past Medical History:   Diagnosis Date    Alcoholic cirrhosis of liver with ascites (H) 08/30/2023    History of " alcohol abuse     Formatting of this note might be different from the original. In remission as of late 2023       SURGICAL HISTORY:   No past surgical history on file.    SOCIAL HISTORY:   Social History     Socioeconomic History    Marital status: Single   Tobacco Use    Smoking status: Former     Types: Cigarettes     Quit date: 2023     Years since quittin.4    Smokeless tobacco: Never   Substance and Sexual Activity    Alcohol use: Not Currently     Comment: last ETOH use 2023    Drug use: Never     FAMILY HISTORY: No bleeding/clotting disorders nor problems with anesthesia.     ALLERGIES:   No Known Allergies    MEDICATIONS:  No current facility-administered medications on file prior to encounter.  gabapentin (NEURONTIN) 100 MG capsule, Take 100 mg by mouth 3 times daily  lactulose (CHRONULAC) 10 GM/15ML solution, Take 30 mLs by mouth 3 times daily  melatonin 3 MG tablet, Take 3 mg by mouth nightly as needed  midodrine (PROAMATINE) 5 MG tablet, Take 15 mg by mouth 3 times daily  Multiple Vitamins-Minerals (THERA-TABS M) TABS, Take 1 tablet by mouth daily  omeprazole (PRILOSEC) 20 MG DR capsule, Take 20 mg by mouth daily  sodium bicarbonate 650 MG tablet, Take 1,300 mg by mouth 2 times daily For 10 days        PHYSICAL EXAMINATION:  Temp:  [97.9  F (36.6  C)] 97.9  F (36.6  C)  Pulse:  [78] 78  Resp:  [16] 16  BP: (94)/(60) 94/60  FiO2 (%):  [60 %] 60 %  SpO2:  [87 %-94 %] 93 %  General: Intubated, sedated  HEENT: CVC in RIJ, NCAT, ETT with NG in place  Neuro: Sedated  Pulm/Resp: Intubated on above vent settings  CV: RRR, requiring pressor to maintain MAP  Abdomen: Soft, moderately, non-tender, 2x HARLEY drain with serosanguinous drainage  :  aguilar catheter in place, urine yellow and clear  Incisions/Skin: C/D/I with minimal strikethrough  MSK/Extremities: no peripheral edema, extremities well perfused.    LABS: Reviewed.   Arterial Blood Gases   Recent Labs   Lab 23  1851 23  8153  11/05/23 1656 11/05/23  1635   PH 7.39 7.41 7.36 7.36   PCO2 37 35 33* 33*   PO2 134* 108* 119* 164*   HCO3 22 22 19* 19*     Complete Blood Count   Recent Labs   Lab 11/05/23 1854 11/05/23 1851 11/05/23  1751 11/05/23  1740 11/05/23  1656 11/05/23  1310 11/05/23  0520 11/04/23  1821 11/04/23  1049 11/04/23  0713 11/04/23  0535   WBC 9.1  --   --   --   --   --  9.7  --  9.1  --  8.9   HGB 9.0* 8.9* 9.6*  --  9.7*   < > 8.2*   < > 6.8*   < > 6.8*     --   --  111*  --   --  73*  --  75*  --  73*    < > = values in this interval not displayed.     Basic Metabolic Panel  Recent Labs   Lab 11/05/23 2101 11/05/23 2033 11/05/23 2005 11/05/23 1851 11/05/23 1751 11/05/23 1656 11/05/23  1635 11/05/23  0621 11/05/23  0520 11/04/23  1049 11/04/23  0535 11/03/23  0530   NA  --   --   --  127* 124* 124* 127*   < > 124* 125* 126* 129*   POTASSIUM  --   --   --  2.9* 3.4* 3.5 3.5   < > 3.3* 3.6 3.3* 3.6   CHLORIDE  --   --   --   --   --   --   --   --  88* 89* 90* 92*   CO2  --   --   --   --   --   --   --   --  19* 18* 19* 17*   BUN  --   --   --   --   --   --   --   --  46.4* 44.2* 43.6* 39.7*   CR  --   --   --   --   --   --   --   --  3.10* 2.72* 3.07* 2.88*   * 339* 337* 307* 280* 250* 135*   < > 81 85 91 68*    < > = values in this interval not displayed.     Liver Function Tests  Recent Labs   Lab 11/05/23  1854 11/05/23  1740 11/05/23  0520 11/04/23  1049 11/04/23  0535 11/03/23  0530   AST  --   --  31 32 29 33   ALT  --   --  11 11 11 11   ALKPHOS  --   --  82 83 77 80   BILITOTAL  --   --  36.9* 35.9* 33.2* 36.4*   ALBUMIN  --   --  4.2 4.3 4.1 4.4   INR 1.60* 1.89* 4.35* 4.31* 4.72* 4.23*     Pancreatic Enzymes  Recent Labs   Lab 11/04/23  1049   AMYLASE 46     Coagulation Profile  Recent Labs   Lab 11/05/23  1854 11/05/23 1740 11/05/23  0520 11/04/23  1049   INR 1.60* 1.89* 4.35* 4.31*   PTT 37 54*  --  84*       IMAGING:  Recent Results (from the past 24 hour(s))   XR Chest Port 1 View     Impression    RESIDENT PRELIMINARY INTERPRETATION  IMPRESSION:   1. Right IJ [place] catheter tip terminates in the right main  pulmonary artery.  2. Endotracheal tube is approximately 2 cm from the farshad.  3. Post surgical changes of liver transplantation.  4. Slightly increased retrocardiac opacity, likely atelectasis.   XR Abdomen 1 View    Impression    RESIDENT PRELIMINARY INTERPRETATION  IMPRESSION: Enteric tube tip and sidehole project over the expected  location of the stomach.

## 2023-11-05 NOTE — ANESTHESIA PROCEDURE NOTES
Central Line/PA Catheter Placement    Pre-Procedure   Staff -        Anesthesiologist:  Braulio Carrion MD       Resident/Fellow: Lorene Pham MD       Performed By: anesthesiologist and resident       Location: OR       Pre-Anesthestic Checklist: patient identified, IV checked, site marked, risks and benefits discussed, informed consent, monitors and equipment checked, pre-op evaluation and at physician/surgeon's request  Timeout:       Correct Patient: Yes        Correct Procedure: Yes        Correct Site: Yes        Correct Position: Yes        Correct Laterality: Yes   Line Placement:   This line was placed Post Induction    Procedure   Procedure: central line       Laterality: right       Insertion Site: internal jugular.  Sterile Prep        All elements of maximal sterile barrier technique followed       Patient Prep/Sterile Barriers: draped, hand hygiene, gloves , hat , mask , draped, gown, sterile gel and probe cover       Skin prep: Chloraprep  Insertion/Injection        Technique: ultrasound guided        1. Ultrasound was used to evaluate the access site.       2. Vein evaluated via ultrasound for patency/adequacy.       3. Using real-time ultrasound the needle/catheter was observed entering the artery/vein.       Introducer Type: 9 Fr, 2-lumen MAC        Type: PA/CVC with Introducer       Catheter Size: 9 Fr       Catheter Length: 15       Number of Lumens: double lumen       PA Catheter Type: CCO         Appropriate RV, RA and PA waveforms noted:  Yes            Balloon down at end of the procedure:   Narrative         Secured by: suture       Tegaderm and Biopatch dressing used.       Complications: None apparent,        blood aspirated from all lumens,        All lumens flushed: Yes       Verification method: Ultrasound       Tip termination: right atrium

## 2023-11-05 NOTE — PLAN OF CARE
Pre-op checklist complete. VSS. Patient left in stable condition and transferred to anesthesia for OR prep around 1100. Morning medications administered with sips of water. Potassium replaced per order. Patient left nervous, anxious, and hopeful. Parents were at bedside and transferred with patient.

## 2023-11-05 NOTE — ANESTHESIA PROCEDURE NOTES
Arterial Line Procedure Note    Pre-Procedure   Staff -        Anesthesiologist:  Braulio Carrion MD       Resident/Fellow: Romeo Pal       Performed By: resident       Location: OR       Pre-Anesthestic Checklist: patient identified, IV checked, risks and benefits discussed, informed consent, monitors and equipment checked, pre-op evaluation and at physician/surgeon's request  Timeout:       Correct Patient: Yes        Correct Procedure: Yes        Correct Site: Yes        Correct Position: Yes   Line Placement:   This line was placed Post Induction  Procedure   Procedure: arterial line       Laterality: left       Insertion site: axillary.  Sterile Prep        Standard elements of sterile barrier followed       Skin prep: Chloraprep  Insertion/Injection        Technique: ultrasound guided        1. Ultrasound was used to evaluate the access site.       2. Artery evaluated via ultrasound for patency/adequacy.       3. Using real-time ultrasound the needle/catheter was observed entering the artery/vein.       Catheter Type/Size: 20 G, 12 cm  Narrative         Secured by: suture       Tegaderm and Biopatch dressing used.       Complications: None apparent,        Arterial waveform: Yes        IBP within 10% of NIBP: Yes

## 2023-11-05 NOTE — PLAN OF CARE
VS: BP 94/60 (BP Location: Right arm)   Pulse 78   Temp 97.9  F (36.6  C) (Oral)   Resp 16   Wt 71.9 kg (158 lb 9.6 oz)   SpO2 93%   BMI 28.09 kg/m      Cares: 1900 - 0730     Neuro: Aox4 / [place] Scores: 0   Cardiac: softer pressures 90/50s, afebrile   Respiratory: RA - 1L O2 via NC, pt requires O2 while sleeping  GI/: oliguric, LBM 11-4 (loose)   Skin: jaundiced, scattered bruising   Diet: NPO since 0000   Labs: Hgb: 8.2, Na: 124, Tbili: 36.9, creatinine: 2.72, K: 3.3  BG: none   LDA: Right PIV SL   Mobility: SBA w/ walker   Pain/Nausea: denies pain or nausea   PRN medications: none given   Plan of Care: plan for liver tx w/ OR time 11am today. Continue with current POC and update MD with any changes

## 2023-11-05 NOTE — PROGRESS NOTES
[facility]    Medicine Progress Note - Hospitalist Service, GOLD TEAM 10    Date of Admission:  10/31/2023    Assessment & Plan   Lola Milner is a 33 year old female admitted on 10/31/2023. She has a past medical history significant for MDD, prior tobacco use, gout, polyneuropathy and decompensated cirrhosis secondary to alcohol use c/b hepatic encephalopathy, esophageal varices s/p banding, recurrent ascities and hepatorenal syndrome.      Recently admitted to [place] 10/23-10/29 and referred for liver transplant eval at [facility]. She was seen in Hepatology and Transplant clinic day of admission. Referred to the ED due to worsening renal function and leukocytosis, to be considered for transplant and undergoing eval. Liver transplant listed at 11/3.    Today:  --KCl 10mEq replacement for gentle K replacement (given EDGAR)  --Plan to go to OR today at 11am for anticipated transplant today-->after surgery patient will transfer off Medicine Service     Decompensated alcohol cirrhosis c/b HRS, HE, recurrent ascites, esophageal varices s/p banding (9/2023)  Hyperbilirubinemia   Coagulopathy  Seen in clinic by Dr. Guajardo and Dr. Watson prior to admission. Initially presented with decompensated liver disease in 6/2023. Course complicated by insurance challenges. Recently started chem dep. Requires intermittent paracentesis, last on 10/23. Tbili 44.4 on admission with direct bili >40. Appears Tbili has been in 40s for past few weeks. Recent RUQ US with doppler was negative for thrombus. Some concern for possible SBP on admission with worsening renal function and abdominal pain. Ideally would obtain paracentesis however unable to this evening, will empirically cover.   - Hepatology consulted, appreciate recommendations  - CAPS consulted for dx paracentesis--> no evidence of SBP on cell count and differential, fluid culture in process  - De-escalation of antibiotics 11/2, stopped  ceftriaxone and started ciprofloxacin SBP prophylaxis  - Trending MELD labs daily  - lactulose twice daily (decreased  given frequent stooling) + [place] protocol  - rifaxmin 550 mg BID (not taking outpatient, awaiting prior auth)  - Transplant eval:    ::PETH ordered and <10    ::EBV IgG positive, CMV negative, treponema negative    ::HIV negative, TB quant indeterminate    ::TTE completed     ::Pap smear/HPV testing in process (completed , appreciate Gyn team assistance)     MELD 3.0: 40 at 2023 10:49 AM  MELD-Na: 44 at 2023 10:49 AM  Calculated from:  Serum Creatinine: 2.72 mg/dL at 2023 10:49 AM  Serum Sodium: 125 mmol/L at 2023 10:49 AM  Total Bilirubin: 35.9 mg/dL at 2023 10:49 AM  Serum Albumin: 4.3 g/dL (Using max of 3.5 g/dL) at 2023 10:49 AM  INR(ratio): 4.31 at 2023 10:49 AM  Age at listin years  Sex: Female at 2023 10:49 AM        Leukocytosis: Resolved  WBC 18.4 on admission. BP softer on admission, though in setting of cirrhosis and unclear true baseline. CXR without focal consolidation and no respiratory complaints. No dysuria but not voiding well for past 48 hours. She is having new onset abdominal pain, raising highest concern for SBP. Did have PICC line recently so would also worry about bacteremia, though afebrile.   -De-escalation of antibiotics  as above, stopped ceftriaxone and started ciprofloxacin SBP prophylaxis  - BCx2 NGTD  - UA not indicative of infection, UC in process  - CAPS consulted for dx paracentesis and this was completed , appreciate assistance     EDGAR   AGMA   Hepatorenal syndrome  Cr 2.76, AG 19 on admission. Recent baseline from prior discharge appears ~1.5-1.7. Was seen by Nephrology during admission at [place] with concern for HRS, Cr improved with albumin and midodrine. Lasix and spironolactone held at time of discharge. Assessed for SBP. Has had decreased urine output.   - Albumin 100g 10/31 (1.5 g/kg)  followed by albumin 70g daily x 2 days (1g/kg)--this completed 11/2  - Continue PTA midodrine 15 mg TID  - Continue PTA sodium bicarbonate 1300 mg BID  - Daily weights, I/Os  - SBP work-up completed per above     Hyponatremia: asymptomatic  Na 131 on admission. Na was 135 at time of recent discharge.  In setting of EDGAR and advanced liver cirrhosis.  Sodium today 124, no mental status changes.  - Albumin challenge completed as above  - Trending BMP     Acute thrombocytopenia  Plts 115 on admission down trended to 71 today although some component of dilution likely. Recent baseline appears to be 210s. Unable to tell heparin exposure but patient does report receiving twice daily abdominal injections during recent admission, raising c/f heparin exposure. 4T score is intermediate. Likely due to worsening liver disease.   - HIT panel ordered and negative  - Hold/avoid heparin products  - Trend Plts      Chronic normocytic anemia  Hgb 8.0 on admission. Recent baseline appears around 7.  Hemoglobin 5.1 11/1, likely some component of dilution with large volume albumin administration over previous last 24 hours, responded to a little over a unit packed RBC transfusion on 11/1 with repeat hemoglobin just above 7.  -Transfuse 1 unit packed RBC today 11/4 with recheck hemoglobin this evening  -Daily CBC     Blurred vision  Developed blurred vision to both eyes in past 72 hours before admission with associated burning. Seems to be better in AM and worsening throughout the day. Improves with visine. Having trouble reading any labels. Wears glasses at baseline.  - Ophthalmology consulted  -Per Optho recs:   Discontinue using visine   Preservative free artificial tears four times a day scheduled, up to q2h as needed  Erythromycin ointment bid for 3 days  Ophtho will perform surface check on 11/03, since the patient is going to be admitted and see if her vision has improved in the interim     Chest discomfort: Resolved 11/1  Endorses  "\"squeezing\" chest pain for the past few days. Occasional racing heart/skipped beats that come and go. Located along sternum. Improves with palpation on exam and HR in NSR. Very low suspicion for cardiac etiology though certainly could have PVCs at times. Family wonders if having MSK symptoms due to using walker which is new. Admission EKG without focal ischemic changes, TTE without wall motion abnormalities noted.     Generalized weakness  Frail and deconditioned at baseline. Uses a walker. Undergoing transplant eval.   - PT/OT  - RD consult      Polyneuropathy   - Okay to continue gabapentin 100 mg TID for now, will need to hold if worsening EDGAR                Diet: Snacks/Supplements Adult: Other; Chocolate Magic cup TID. Ensure clear once daily.; Between Meals  NPO for Medical/Clinical Reasons Except for: Meds    DVT Prophylaxis: VTE Prophylaxis contraindicated due to coagulopathy of liver disease and need for blood product transfusion this admission     Izaguirre Catheter: PRESENT, indication:    Lines: PRESENT             Cardiac Monitoring: None  Code Status: Full Code      Clinically Significant Risk Factors        # Hypokalemia: Lowest K = 3.3 mmol/L in last 2 days, will replace as needed  # Hyponatremia: Lowest Na = 123 mmol/L in last 2 days, will monitor as appropriate  # Hypocalcemia: Lowest iCa = 4.1 mg/dL in last 2 days, will monitor and replace as appropriate  # Hypercalcemia: Highest iCa = 5.5 mg/dL in last 2 days, will monitor as appropriate     # Coagulation Defect: INR = 4.35 (Ref range: 0.85 - 1.15) and/or PTT = 84 Seconds (Ref range: 22 - 38 Seconds), will monitor for bleeding  # Thrombocytopenia: Lowest platelets = 73 in last 2 days, will monitor for bleeding          # Overweight: Estimated body mass index is 28.09 kg/m  as calculated from the following:    Height as of an earlier encounter on 10/31/23: 1.6 m (5' 3\").    Weight as of this encounter: 71.9 kg (158 lb 9.6 oz).   # Severe Malnutrition: " based on nutrition assessment    # Financial/Environmental Concerns: none (Pt reports her SO is able to pay rent and pay her bills)         Disposition Plan             Amalia Preston MD  Hospitalist Service, GOLD TEAM 10  [facility]  Securely message with [employer] (more info)  Text page via [employer] Paging/Directory   See signed in provider for up to date coverage information  ______________________________________________________________________    Interval History   No acute events overnight. Pt excited and nervous for possible liver transplant surgery today. No complaints, no pain, nausea, dyspnea or foggy mentation reported. Family coming in soon to wait with patient.    Physical Exam   Vital Signs: Temp: 97.9  F (36.6  C) Temp src: Oral BP: 94/60 Pulse: 78   Resp: 16 SpO2: 93 % O2 Device: None (Room air) Oxygen Delivery: 2 LPM  Weight: 158 lbs 9.6 oz    Gen: NAD, lying comfortably in bed, pleasant and cooperative with interview and exam  HEENT: normocephalic, marked scleral icterus, no perioral lesions, oral mucosa moist  CV: RRR at time of exam  Pulm: good air movement bilaterally without wheezing  Abd: soft, moderately distended with fluid wave  LE: trace edema bilaterally  Skin: marked jaundice on limited exam  Neuro: AOx3  Psych: mood-affect congruent      Medical Decision Making             Data     I have personally reviewed the following data over the past 24 hrs:    9.7  \   9.7 (L)   / 73 (L)     124 (L) 88 (L) 46.4 (H) /  250 (H)   3.5 19 (L) 3.10 (H) \     ALT: 11 AST: 31 AP: 82 TBILI: 36.9 (HH)   ALB: 4.2 TOT PROTEIN: N/A LIPASE: N/A     Procal: N/A CRP: N/A Lactic Acid: 3.5 (H)       INR:  4.35 (H) PTT:  N/A   D-dimer:  N/A Fibrinogen:  N/A

## 2023-11-05 NOTE — ANESTHESIA PROCEDURE NOTES
Airway       Patient location during procedure: OR       Procedure Start/Stop Times: 11/5/2023 12:22 PM  Staff -        Anesthesiologist:  Braulio Carrion MD       Resident/Fellow: Lorene Pham MD       Performed By: resident  Consent for Airway        Urgency: emergent  Indications and Patient Condition       Indications for airway management: juan alberto-procedural       Induction type:intravenous       Mask difficulty assessment: 1 - vent by mask    Final Airway Details       Final airway type: endotracheal airway       Successful airway: ETT - single and Oral  Endotracheal Airway Details        ETT size (mm): 7.0       Cuffed: yes       Successful intubation technique: direct laryngoscopy       DL Blade Type: MAC 3       Grade View of Cords: 1       Adjucts: stylet       Position: Right       Measured from: lips       Secured at (cm): 23       Bite block used: None    Post intubation assessment        Number of attempts at approach: 1       Number of other approaches attempted: 0       Secured with: plastic tape       Ease of procedure: easy       Dentition: Intact and Unchanged    Medication(s) Administered   Medication Administration Time: 11/5/2023 12:22 PM

## 2023-11-05 NOTE — CONSULTS
[facility]  Transplant Nephrology Consult  Date of Admission:  10/31/2023  Today's Date: 11/05/2023  Requesting physician: No att. providers found    Recommendations:  - high likelyhood of CRRT post transplant especially to correct Na+ (will likely be overcorrected from this morning). Should be net even or zero fluid removal depending on patients hemodynamic status.  -Goal serum Na tmrw AM is no higher than 130. This may necessitate IV hypotonic fluid.  Please contact us if CRRT needed intra-op.  -Recommend intra op temp dialysis line placement      RECOMMEND PLACING A TEMP LINE IN THE OR     Assessment & Plan   # CKD and EDGAR: Trend up in SCr.  Will likely need CRRT post transplant    - Baseline Creatinine: ~ 0.8-1.0   - Proteinuria: Normal (<0.2 grams)   - Kidney Tx Biopsy: No    -Etiology of EDGAR likely 2/2 HRS and possibly bile cast nephropathy   -Consented for RRT. Consent placed in chart. Recommend intra op temp dialysis line placement    # Liver Tx: scheduled today    # Immunosuppression: Tacrolimus immediate release (goal 8-10) and Mycophenolate mofetil (dose 750 mg every 12 hours)   - Patient is in an immunosuppressed state and will continue to monitor for efficacy and toxicity of immunosuppression medications.   - Changes: Not at this time    # Infection Prophylaxis:    - PJP: Sulfa/TMP (Bactrim)- will be started post-txp   - CMV: Valganciclovir (Valcyte)-will be started post-txp    # Hypotension: Hypotensive requiring midodrine;  Goal BP: > 100, but < 130 systolic   - Volume status: Moderately hypervolemic     - Changes: Yes - likely CRRT post txp. Would hold midodrine immediately post op    # Anemia in Chronic Renal Disease: Hgb: Stable      NIRMAL: No   - Iron studies: Not checked recently    # Mineral Bone Disorder:    - Secondary renal hyperparathyroidism; PTH level: Not checked recently        On treatment: None   - Vitamin D; level: Low        On supplement:  No   - Calcium; level: Normal        On supplement: No   - Phosphorus; level: Normal        On supplement: No    # Electrolytes:   - Potassium; level: Low        On supplement:  replaced IV today  - Magnesium; level: Normal        On supplement: No  - Bicarbonate; level: Low        On supplement: Yes  - Sodium; level: Low. Will modulate with dialysis     #Depression: well managed. Defer to primary team     # Transplant History:  Etiology of Kidney Failure: Hepatorenal syndrome (HRS)  Tx: CKD and liver transplant  Transplant: N/A  Crossmatch at time of Tx: pending  Significant changes in immunosuppression: None  Significant transplant-related complications: None    Recommendations were communicated to the primary team verbally.    Seen and discussed with Dr. Yanci Chew NP  Pager: [phone]      Physician Attestation     I saw and evaluated Lola Milner as part of a shared APRN/PA visit.     I personally reviewed the vital signs, medications, labs and imaging.    I personally performed the substantive portion of the medical decision making for this visit - please see the JENNIFER's documentation for full details.    Key management decisions made by me and carried out under my direction: I recommend placement of temporary dialysis catheter Intra-Op.  Please contact me if the patient requires Intra-Op CRRT.  The patient serum sodium should rise no higher than to 130 at 5 AM on 11/6.  The patient will likely require IV hypotonic fluids postsurgery.  It is unclear what the patient's hemodynamic status will be and if she requires CRRT she could either end up at I=O versus true zero. Patient's consent for renal replacement therapy was obtained at the bedside and placed in the chart.    I personally performed the substantive portion of the history for this visit - please see the JENNIFER's documentation for full details.  Key additional history findings made by me: 33-year-old female with a history of depression,  alcoholic cirrhosis with a MELD score of 45 on presentation, T. bili greater than 40 on admission, baseline serum creatinine 0.8-0.9 with last time at baseline on 2023 with multiple admissions to the hospital recently for decompensated cirrhosis and EDGAR, EDGAR thought due to hepatorenal syndrome as well as potentially bile cast nephropathy, urinalyses have been bland without blood or protein, patient has been hypotensive requiring midodrine 15 mg p.o. every 8 hours, she is currently hyponatremic, hypokalemic, worsening EDGAR on CKD, transplant nephrology consulted due to high likelihood of requiring renal replacement therapy post transplant.  The patient is slated for a liver transplant later today.  Dr. Lowe told me yesterday that he did not anticipate needing Intra-Op CRRT but would likely require it postoperatively.    Gerry Pete MD  Date of Service (when I saw the patient): 23    REASON FOR CONSULT   Liver transplant    History of Present Illness   Lola Milner is a 33 year old female past medical history significant for end stage liver disease, decompensated alcoholic cirrhosis with history of hepatic encephalopathy, esophageal varices s/p banding 2023, recurrent ascites, MDD and tobacco use who was admitted  for liver failure.     She is now scheduled to have a  donor liver transplant.    She denies any recent illness.  She denies any hematuria, dysuria or frequency.     She states she still produces urine.      Review of Systems    The 10 point Review of Systems is negative other than noted in the HPI or here.     Past Medical History    I have reviewed this patient's medical history and updated it with pertinent information if needed.   Past Medical History:   Diagnosis Date     Alcoholic cirrhosis of liver with ascites (H) 2023     History of alcohol abuse     Formatting of this note might be different from the original. In remission as of late 2023       Past Surgical  History   I have reviewed this patient's surgical history and updated it with pertinent information if needed.  No past surgical history on file.    Family History   I have reviewed this patient's family history and updated it with pertinent information if needed.   No family history on file.    Social History   I have reviewed this patient's social history and updated it with pertinent information if needed. Lola Milner  reports that she quit smoking about 5 months ago. Her smoking use included cigarettes. She has never used smokeless tobacco. She reports that she does not currently use alcohol. She reports that she does not use drugs.    Allergies   No Known Allergies  Prior to Admission Medications     albumin human  50 g Irrigation Once     artificial tears  1 drop Both Eyes 4x Daily     cholecalciferol  1,250 mcg Oral Q7 Days     ciprofloxacin  250 mg Oral Q24H FRANCIS     fluconazole  400 mg Intravenous Once     folic acid  1 mg Oral Daily     gabapentin  100 mg Oral TID     lactulose  30 mL Oral BID     methylPREDNISolone  1,000 mg Intravenous Once     midodrine  15 mg Oral TID     multivitamin, therapeutic  1 tablet Oral Daily     pantoprazole  40 mg Oral QAM AC     piperacillin-tazobactam  3.375 g Intravenous Once     potassium chloride  10 mEq Intravenous Once     rifaximin  550 mg Oral BID     sodium bicarbonate  1,300 mg Oral BID     thiamine  100 mg Oral Daily       - MEDICATION INSTRUCTIONS -       lactated ringers 100 mL/hr at 23 0237       Physical Exam   Temp  Av.9  F (36.6  C)  Min: 97.3  F (36.3  C)  Max: 98.3  F (36.8  C)      Pulse  Av.5  Min: 66  Max: 86 Resp  Av.7  Min: 13  Max: 27  SpO2  Av.9 %  Min: 87 %  Max: 100 %     BP 94/60 (BP Location: Right arm)   Pulse 78   Temp 97.9  F (36.6  C) (Oral)   Resp 16   Wt 71.9 kg (158 lb 9.6 oz)   SpO2 93%   BMI 28.09 kg/m      Admit Weight: 68.6 kg (151 lb 4.8 oz)     GENERAL APPEARANCE: alert and no distress  HENT: mouth  without ulcers or lesions  RESP: lungs clear to auscultation - no rales, rhonchi or wheezes  CV: regular rhythm, normal rate, no rub, no murmur  EDEMA: 2+ LE edema bilaterally  ABDOMEN: soft, nondistended, nontender, bowel sounds normal  MS: extremities normal - no gross deformities noted, no evidence of inflammation in joints, no muscle tenderness  SKIN: no rash    Data   CMP  Recent Labs   Lab 11/05/23  0621 11/05/23  0520 11/04/23  1049 11/04/23  0535 11/03/23  0530   NA  --  124* 125* 126* 129*   POTASSIUM  --  3.3* 3.6 3.3* 3.6   CHLORIDE  --  88* 89* 90* 92*   CO2  --  19* 18* 19* 17*   ANIONGAP  --  17* 18* 17* 20*   GLC 74 81 85 91 68*   BUN  --  46.4* 44.2* 43.6* 39.7*   CR  --  3.10* 2.72* 3.07* 2.88*   GFRESTIMATED  --  20* 23* 20* 22*   GEE  --  9.5 9.7 9.5 9.9   MAG  --   --  1.8  --   --    PHOS  --   --  4.0  --   --    ALBUMIN  --  4.2 4.3 4.1 4.4   BILITOTAL  --  36.9* 35.9* 33.2* 36.4*   ALKPHOS  --  82 83 77 80   AST  --  31 32 29 33   ALT  --  11 11 11 11     CBC  Recent Labs   Lab 11/05/23  0520 11/04/23  1821 11/04/23  1049 11/04/23  0713 11/04/23  0535 11/03/23  0530   HGB 8.2* 8.3* 6.8* 6.8* 6.8* 7.5*   WBC 9.7  --  9.1  --  8.9 10.6   RBC 2.52*  --  2.10*  --  2.10* 2.29*   HCT 22.8*  --  19.1*  --  19.2* 21.3*   MCV 91  --  91  --  91 93   MCH 32.5  --  32.4  --  32.4 32.8   MCHC 36.0  --  35.6  --  35.4 35.2   RDW 19.8*  --  21.0*  --  20.9* 21.5*   PLT 73*  --  75*  --  73* 81*     INR  Recent Labs   Lab 11/05/23  0520 11/04/23  1049 11/04/23  0535 11/03/23  0530   INR 4.35* 4.31* 4.72* 4.23*   PTT  --  84*  --   --      ABGNo lab results found in last 7 days.   Urine Studies  Recent Labs   Lab Test 11/04/23  2309 10/31/23  2100   COLOR Dark Yellow* Orange*   APPEARANCE Slightly Cloudy* Cloudy*   URINEGLC Negative Negative   URINEBILI Moderate* Large*   URINEKETONE Negative Negative   SG 1.016 1.019   UBLD Trace* Trace*   URINEPH 5.5 5.5   PROTEIN 10* 30*   NITRITE Negative Negative    LEUKEST Small* Small*   RBCU 2 4*   WBCU 5 25*     No lab results found.  PTH  No lab results found.  Iron Studies  No lab results found.    IMAGING:  All imaging studies reviewed by me.

## 2023-11-06 ENCOUNTER — APPOINTMENT (OUTPATIENT)
Dept: PHYSICAL THERAPY | Facility: CLINIC | Age: 33
End: 2023-11-06
Attending: STUDENT IN AN ORGANIZED HEALTH CARE EDUCATION/TRAINING PROGRAM
Payer: COMMERCIAL

## 2023-11-06 DIAGNOSIS — Z94.4 LIVER REPLACED BY TRANSPLANT (H): Primary | ICD-10-CM

## 2023-11-06 DIAGNOSIS — Z13.220 LIPID SCREENING: ICD-10-CM

## 2023-11-06 DIAGNOSIS — K70.31 ALCOHOLIC CIRRHOSIS OF LIVER WITH ASCITES (H): ICD-10-CM

## 2023-11-06 LAB
ALBUMIN SERPL BCG-MCNC: 2.9 G/DL (ref 3.5–5.2)
ALP SERPL-CCNC: 75 U/L (ref 35–104)
ALT SERPL W P-5'-P-CCNC: 113 U/L (ref 0–50)
AMMONIA PLAS-SCNC: 17 UMOL/L (ref 11–51)
AMYLASE SERPL-CCNC: 28 U/L (ref 28–100)
ANION GAP SERPL CALCULATED.3IONS-SCNC: 10 MMOL/L (ref 7–15)
ANION GAP SERPL CALCULATED.3IONS-SCNC: 10 MMOL/L (ref 7–15)
ANION GAP SERPL CALCULATED.3IONS-SCNC: 14 MMOL/L (ref 7–15)
AST SERPL W P-5'-P-CCNC: 182 U/L (ref 0–45)
ATRIAL RATE - MUSE: 73 BPM
BACTERIA FLD CULT: NO GROWTH
BACTERIA SPEC CULT: NORMAL
BASOPHILS # BLD AUTO: 0 10E3/UL (ref 0–0.2)
BASOPHILS # BLD AUTO: 0 10E3/UL (ref 0–0.2)
BASOPHILS NFR BLD AUTO: 0 %
BASOPHILS NFR BLD AUTO: 0 %
BILIRUB DIRECT SERPL-MCNC: 4.43 MG/DL (ref 0–0.3)
BILIRUB SERPL-MCNC: 6.6 MG/DL
BKR LAB AP GYN ADEQUACY: NORMAL
BKR LAB AP GYN INTERPRETATION: NORMAL
BKR LAB AP HPV REFLEX: NORMAL
BKR LAB AP PREVIOUS ABNORMAL: NORMAL
BUN SERPL-MCNC: 42.6 MG/DL (ref 6–20)
BUN SERPL-MCNC: 42.6 MG/DL (ref 6–20)
BUN SERPL-MCNC: 44.7 MG/DL (ref 6–20)
CA-I BLD-MCNC: 5.8 MG/DL (ref 4.4–5.2)
CALCIUM SERPL-MCNC: 10 MG/DL (ref 8.6–10)
CALCIUM SERPL-MCNC: 10.5 MG/DL (ref 8.6–10)
CALCIUM SERPL-MCNC: 10.5 MG/DL (ref 8.6–10)
CHLORIDE SERPL-SCNC: 95 MMOL/L (ref 98–107)
CHLORIDE SERPL-SCNC: 96 MMOL/L (ref 98–107)
CHLORIDE SERPL-SCNC: 96 MMOL/L (ref 98–107)
CMV IGG SERPL IA-ACNC: <0.2 U/ML
CMV IGG SERPL IA-ACNC: NORMAL
CMV IGM SERPL IA-ACNC: <8 AU/ML
CMV IGM SERPL IA-ACNC: NEGATIVE
CREAT SERPL-MCNC: 2.5 MG/DL (ref 0.51–0.95)
CREAT SERPL-MCNC: 2.5 MG/DL (ref 0.51–0.95)
CREAT SERPL-MCNC: 2.83 MG/DL (ref 0.51–0.95)
DEPRECATED HCO3 PLAS-SCNC: 21 MMOL/L (ref 22–29)
DEPRECATED HCO3 PLAS-SCNC: 24 MMOL/L (ref 22–29)
DEPRECATED HCO3 PLAS-SCNC: 24 MMOL/L (ref 22–29)
DIASTOLIC BLOOD PRESSURE - MUSE: NORMAL MMHG
EBV VCA IGG SER IA-ACNC: 58.8 U/ML
EBV VCA IGG SER IA-ACNC: POSITIVE
EBV VCA IGM SER IA-ACNC: <10 U/ML
EBV VCA IGM SER IA-ACNC: NORMAL
EGFRCR SERPLBLD CKD-EPI 2021: 22 ML/MIN/1.73M2
EGFRCR SERPLBLD CKD-EPI 2021: 26 ML/MIN/1.73M2
EGFRCR SERPLBLD CKD-EPI 2021: 26 ML/MIN/1.73M2
EOSINOPHIL # BLD AUTO: 0 10E3/UL (ref 0–0.7)
EOSINOPHIL # BLD AUTO: 0 10E3/UL (ref 0–0.7)
EOSINOPHIL NFR BLD AUTO: 0 %
EOSINOPHIL NFR BLD AUTO: 0 %
ERYTHROCYTE [DISTWIDTH] IN BLOOD BY AUTOMATED COUNT: 17.4 % (ref 10–15)
ERYTHROCYTE [DISTWIDTH] IN BLOOD BY AUTOMATED COUNT: 17.7 % (ref 10–15)
GLUCOSE BLDC GLUCOMTR-MCNC: 103 MG/DL (ref 70–99)
GLUCOSE BLDC GLUCOMTR-MCNC: 105 MG/DL (ref 70–99)
GLUCOSE BLDC GLUCOMTR-MCNC: 113 MG/DL (ref 70–99)
GLUCOSE BLDC GLUCOMTR-MCNC: 115 MG/DL (ref 70–99)
GLUCOSE BLDC GLUCOMTR-MCNC: 116 MG/DL (ref 70–99)
GLUCOSE BLDC GLUCOMTR-MCNC: 117 MG/DL (ref 70–99)
GLUCOSE BLDC GLUCOMTR-MCNC: 122 MG/DL (ref 70–99)
GLUCOSE BLDC GLUCOMTR-MCNC: 137 MG/DL (ref 70–99)
GLUCOSE BLDC GLUCOMTR-MCNC: 141 MG/DL (ref 70–99)
GLUCOSE BLDC GLUCOMTR-MCNC: 144 MG/DL (ref 70–99)
GLUCOSE BLDC GLUCOMTR-MCNC: 151 MG/DL (ref 70–99)
GLUCOSE BLDC GLUCOMTR-MCNC: 95 MG/DL (ref 70–99)
GLUCOSE BLDC GLUCOMTR-MCNC: 98 MG/DL (ref 70–99)
GLUCOSE SERPL-MCNC: 103 MG/DL (ref 70–99)
GLUCOSE SERPL-MCNC: 103 MG/DL (ref 70–99)
GLUCOSE SERPL-MCNC: 122 MG/DL (ref 70–99)
HBV DNA SERPL QL NAA+PROBE: NORMAL
HCT VFR BLD AUTO: 33 % (ref 35–47)
HCT VFR BLD AUTO: 33.5 % (ref 35–47)
HCV RNA SERPL QL NAA+PROBE: NORMAL
HGB BLD-MCNC: 11.6 G/DL (ref 11.7–15.7)
HGB BLD-MCNC: 11.8 G/DL (ref 11.7–15.7)
HGB BLD-MCNC: 12 G/DL (ref 11.7–15.7)
HGB BLD-MCNC: 8.3 G/DL (ref 11.7–15.7)
HIV1+2 RNA SERPL QL NAA+PROBE: NORMAL
IMM GRANULOCYTES # BLD: 0 10E3/UL
IMM GRANULOCYTES # BLD: 0.1 10E3/UL
IMM GRANULOCYTES NFR BLD: 1 %
IMM GRANULOCYTES NFR BLD: 1 %
INR PPP: 1.21 (ref 0.85–1.15)
INR PPP: 1.31 (ref 0.85–1.15)
INTERPRETATION ECG - MUSE: NORMAL
LACTATE SERPL-SCNC: 0.8 MMOL/L (ref 0.7–2)
LACTATE SERPL-SCNC: 0.9 MMOL/L (ref 0.7–2)
LACTATE SERPL-SCNC: 1.1 MMOL/L (ref 0.7–2)
LIPASE SERPL-CCNC: 11 U/L (ref 13–60)
LYMPHOCYTES # BLD AUTO: 0.2 10E3/UL (ref 0.8–5.3)
LYMPHOCYTES # BLD AUTO: 0.7 10E3/UL (ref 0.8–5.3)
LYMPHOCYTES NFR BLD AUTO: 8 %
LYMPHOCYTES NFR BLD AUTO: 8 %
MAGNESIUM SERPL-MCNC: 1.6 MG/DL (ref 1.7–2.3)
MAGNESIUM SERPL-MCNC: 2.8 MG/DL (ref 1.7–2.3)
MCH RBC QN AUTO: 30.2 PG (ref 26.5–33)
MCH RBC QN AUTO: 30.9 PG (ref 26.5–33)
MCHC RBC AUTO-ENTMCNC: 35.2 G/DL (ref 31.5–36.5)
MCHC RBC AUTO-ENTMCNC: 35.2 G/DL (ref 31.5–36.5)
MCV RBC AUTO: 86 FL (ref 78–100)
MCV RBC AUTO: 88 FL (ref 78–100)
MONOCYTES # BLD AUTO: 0.1 10E3/UL (ref 0–1.3)
MONOCYTES # BLD AUTO: 0.1 10E3/UL (ref 0–1.3)
MONOCYTES NFR BLD AUTO: 1 %
MONOCYTES NFR BLD AUTO: 4 %
MRSA DNA SPEC QL NAA+PROBE: NEGATIVE
NEUTROPHILS # BLD AUTO: 2.7 10E3/UL (ref 1.6–8.3)
NEUTROPHILS # BLD AUTO: 7.6 10E3/UL (ref 1.6–8.3)
NEUTROPHILS NFR BLD AUTO: 87 %
NEUTROPHILS NFR BLD AUTO: 90 %
NRBC # BLD AUTO: 0 10E3/UL
NRBC # BLD AUTO: 0 10E3/UL
NRBC BLD AUTO-RTO: 0 /100
NRBC BLD AUTO-RTO: 0 /100
P AXIS - MUSE: 58 DEGREES
PATH REPORT.COMMENTS IMP SPEC: NORMAL
PATH REPORT.COMMENTS IMP SPEC: NORMAL
PATH REPORT.RELEVANT HX SPEC: NORMAL
PHOSPHATE SERPL-MCNC: 3.3 MG/DL (ref 2.5–4.5)
PLATELET # BLD AUTO: 141 10E3/UL (ref 150–450)
PLATELET # BLD AUTO: 163 10E3/UL (ref 150–450)
POTASSIUM SERPL-SCNC: 3.1 MMOL/L (ref 3.4–5.3)
POTASSIUM SERPL-SCNC: 3.1 MMOL/L (ref 3.4–5.3)
POTASSIUM SERPL-SCNC: 4 MMOL/L (ref 3.4–5.3)
PR INTERVAL - MUSE: 154 MS
PROT SERPL-MCNC: 4.1 G/DL (ref 6.4–8.3)
QRS DURATION - MUSE: 108 MS
QT - MUSE: 448 MS
QTC - MUSE: 493 MS
R AXIS - MUSE: 52 DEGREES
RBC # BLD AUTO: 3.82 10E6/UL (ref 3.8–5.2)
RBC # BLD AUTO: 3.84 10E6/UL (ref 3.8–5.2)
SA TARGET DNA: NEGATIVE
SODIUM SERPL-SCNC: 130 MMOL/L (ref 135–145)
SYSTOLIC BLOOD PRESSURE - MUSE: NORMAL MMHG
T AXIS - MUSE: 41 DEGREES
VENTRICULAR RATE- MUSE: 73 BPM
WBC # BLD AUTO: 3 10E3/UL (ref 4–11)
WBC # BLD AUTO: 8.5 10E3/UL (ref 4–11)

## 2023-11-06 PROCEDURE — 250N000013 HC RX MED GY IP 250 OP 250 PS 637: Performed by: EMERGENCY MEDICINE

## 2023-11-06 PROCEDURE — 999N000253 HC STATISTIC WEANING TRIALS

## 2023-11-06 PROCEDURE — 85018 HEMOGLOBIN: CPT | Performed by: STUDENT IN AN ORGANIZED HEALTH CARE EDUCATION/TRAINING PROGRAM

## 2023-11-06 PROCEDURE — 83735 ASSAY OF MAGNESIUM: CPT | Performed by: STUDENT IN AN ORGANIZED HEALTH CARE EDUCATION/TRAINING PROGRAM

## 2023-11-06 PROCEDURE — 258N000003 HC RX IP 258 OP 636: Performed by: PHYSICIAN ASSISTANT

## 2023-11-06 PROCEDURE — 94003 VENT MGMT INPAT SUBQ DAY: CPT

## 2023-11-06 PROCEDURE — P9012 CRYOPRECIPITATE EACH UNIT: HCPCS

## 2023-11-06 PROCEDURE — 97530 THERAPEUTIC ACTIVITIES: CPT | Mod: GP

## 2023-11-06 PROCEDURE — 258N000003 HC RX IP 258 OP 636

## 2023-11-06 PROCEDURE — 97164 PT RE-EVAL EST PLAN CARE: CPT | Mod: GP

## 2023-11-06 PROCEDURE — 250N000009 HC RX 250

## 2023-11-06 PROCEDURE — 250N000012 HC RX MED GY IP 250 OP 636 PS 637: Performed by: STUDENT IN AN ORGANIZED HEALTH CARE EDUCATION/TRAINING PROGRAM

## 2023-11-06 PROCEDURE — 85610 PROTHROMBIN TIME: CPT | Performed by: STUDENT IN AN ORGANIZED HEALTH CARE EDUCATION/TRAINING PROGRAM

## 2023-11-06 PROCEDURE — P9045 ALBUMIN (HUMAN), 5%, 250 ML: HCPCS | Mod: JZ

## 2023-11-06 PROCEDURE — 250N000011 HC RX IP 250 OP 636: Mod: JZ | Performed by: STUDENT IN AN ORGANIZED HEALTH CARE EDUCATION/TRAINING PROGRAM

## 2023-11-06 PROCEDURE — 250N000011 HC RX IP 250 OP 636: Mod: JZ

## 2023-11-06 PROCEDURE — 250N000013 HC RX MED GY IP 250 OP 250 PS 637

## 2023-11-06 PROCEDURE — 999N000157 HC STATISTIC RCP TIME EA 10 MIN

## 2023-11-06 PROCEDURE — 250N000013 HC RX MED GY IP 250 OP 250 PS 637: Performed by: TRANSPLANT SURGERY

## 2023-11-06 PROCEDURE — 83735 ASSAY OF MAGNESIUM: CPT | Performed by: TRANSPLANT SURGERY

## 2023-11-06 PROCEDURE — 82330 ASSAY OF CALCIUM: CPT | Performed by: STUDENT IN AN ORGANIZED HEALTH CARE EDUCATION/TRAINING PROGRAM

## 2023-11-06 PROCEDURE — 99233 SBSQ HOSP IP/OBS HIGH 50: CPT | Mod: 24 | Performed by: INTERNAL MEDICINE

## 2023-11-06 PROCEDURE — 83690 ASSAY OF LIPASE: CPT | Performed by: STUDENT IN AN ORGANIZED HEALTH CARE EDUCATION/TRAINING PROGRAM

## 2023-11-06 PROCEDURE — 82150 ASSAY OF AMYLASE: CPT | Performed by: STUDENT IN AN ORGANIZED HEALTH CARE EDUCATION/TRAINING PROGRAM

## 2023-11-06 PROCEDURE — 250N000013 HC RX MED GY IP 250 OP 250 PS 637: Performed by: PHYSICIAN ASSISTANT

## 2023-11-06 PROCEDURE — 87641 MR-STAPH DNA AMP PROBE: CPT | Performed by: STUDENT IN AN ORGANIZED HEALTH CARE EDUCATION/TRAINING PROGRAM

## 2023-11-06 PROCEDURE — 85025 COMPLETE CBC W/AUTO DIFF WBC: CPT | Performed by: STUDENT IN AN ORGANIZED HEALTH CARE EDUCATION/TRAINING PROGRAM

## 2023-11-06 PROCEDURE — 82248 BILIRUBIN DIRECT: CPT | Performed by: STUDENT IN AN ORGANIZED HEALTH CARE EDUCATION/TRAINING PROGRAM

## 2023-11-06 PROCEDURE — 250N000013 HC RX MED GY IP 250 OP 250 PS 637: Performed by: STUDENT IN AN ORGANIZED HEALTH CARE EDUCATION/TRAINING PROGRAM

## 2023-11-06 PROCEDURE — 80053 COMPREHEN METABOLIC PANEL: CPT | Performed by: PHYSICIAN ASSISTANT

## 2023-11-06 PROCEDURE — 250N000011 HC RX IP 250 OP 636: Mod: JZ | Performed by: PHYSICIAN ASSISTANT

## 2023-11-06 PROCEDURE — 82140 ASSAY OF AMMONIA: CPT

## 2023-11-06 PROCEDURE — 250N000011 HC RX IP 250 OP 636

## 2023-11-06 PROCEDURE — 258N000003 HC RX IP 258 OP 636: Performed by: STUDENT IN AN ORGANIZED HEALTH CARE EDUCATION/TRAINING PROGRAM

## 2023-11-06 PROCEDURE — 250N000013 HC RX MED GY IP 250 OP 250 PS 637: Performed by: INTERNAL MEDICINE

## 2023-11-06 PROCEDURE — 80048 BASIC METABOLIC PNL TOTAL CA: CPT | Performed by: STUDENT IN AN ORGANIZED HEALTH CARE EDUCATION/TRAINING PROGRAM

## 2023-11-06 PROCEDURE — 83605 ASSAY OF LACTIC ACID: CPT

## 2023-11-06 PROCEDURE — 200N000002 HC R&B ICU UMMC

## 2023-11-06 PROCEDURE — C9113 INJ PANTOPRAZOLE SODIUM, VIA: HCPCS | Mod: JZ

## 2023-11-06 PROCEDURE — 84100 ASSAY OF PHOSPHORUS: CPT | Performed by: STUDENT IN AN ORGANIZED HEALTH CARE EDUCATION/TRAINING PROGRAM

## 2023-11-06 RX ORDER — MAGNESIUM SULFATE HEPTAHYDRATE 40 MG/ML
4 INJECTION, SOLUTION INTRAVENOUS ONCE
Status: COMPLETED | OUTPATIENT
Start: 2023-11-06 | End: 2023-11-06

## 2023-11-06 RX ORDER — ACETAMINOPHEN 325 MG/1
325 TABLET ORAL EVERY 4 HOURS
Status: DISCONTINUED | OUTPATIENT
Start: 2023-11-06 | End: 2023-11-15 | Stop reason: HOSPADM

## 2023-11-06 RX ORDER — SULFAMETHOXAZOLE AND TRIMETHOPRIM 200; 40 MG/5ML; MG/5ML
10 SUSPENSION ORAL DAILY
Status: DISCONTINUED | OUTPATIENT
Start: 2023-11-06 | End: 2023-11-06

## 2023-11-06 RX ORDER — SULFAMETHOXAZOLE AND TRIMETHOPRIM 400; 80 MG/1; MG/1
1 TABLET ORAL DAILY
Status: DISCONTINUED | OUTPATIENT
Start: 2023-11-06 | End: 2023-11-07

## 2023-11-06 RX ORDER — FLUCONAZOLE 2 MG/ML
200 INJECTION, SOLUTION INTRAVENOUS EVERY 24 HOURS
Status: DISCONTINUED | OUTPATIENT
Start: 2023-11-07 | End: 2023-11-08

## 2023-11-06 RX ORDER — POTASSIUM CHLORIDE 1.5 G/1.58G
20 POWDER, FOR SOLUTION ORAL ONCE
Status: COMPLETED | OUTPATIENT
Start: 2023-11-06 | End: 2023-11-06

## 2023-11-06 RX ADMIN — TACROLIMUS 2 MG: 5 CAPSULE ORAL at 07:57

## 2023-11-06 RX ADMIN — Medication 1 DROP: at 07:55

## 2023-11-06 RX ADMIN — PANTOPRAZOLE SODIUM 40 MG: 40 INJECTION, POWDER, FOR SOLUTION INTRAVENOUS at 07:55

## 2023-11-06 RX ADMIN — VALGANCICLOVIR HYDROCHLORIDE 450 MG: 50 POWDER, FOR SOLUTION ORAL at 08:00

## 2023-11-06 RX ADMIN — MYCOPHENOLATE MOFETIL 750 MG: 200 POWDER, FOR SUSPENSION ORAL at 07:57

## 2023-11-06 RX ADMIN — ALBUMIN HUMAN 25 G: 0.05 INJECTION, SOLUTION INTRAVENOUS at 22:21

## 2023-11-06 RX ADMIN — SODIUM CHLORIDE, POTASSIUM CHLORIDE, SODIUM LACTATE AND CALCIUM CHLORIDE: 600; 310; 30; 20 INJECTION, SOLUTION INTRAVENOUS at 22:41

## 2023-11-06 RX ADMIN — SODIUM CHLORIDE 20 MG: 9 INJECTION, SOLUTION INTRAVENOUS at 11:05

## 2023-11-06 RX ADMIN — CHLORHEXIDINE GLUCONATE 15 ML: 1.2 SOLUTION ORAL at 19:53

## 2023-11-06 RX ADMIN — ACETAMINOPHEN 325 MG: 325 TABLET, FILM COATED ORAL at 22:29

## 2023-11-06 RX ADMIN — SODIUM CHLORIDE, POTASSIUM CHLORIDE, SODIUM LACTATE AND CALCIUM CHLORIDE 500 ML: 600; 310; 30; 20 INJECTION, SOLUTION INTRAVENOUS at 09:42

## 2023-11-06 RX ADMIN — FOLIC ACID 1 MG: 1 TABLET ORAL at 07:55

## 2023-11-06 RX ADMIN — THIAMINE HCL TAB 100 MG 100 MG: 100 TAB at 07:56

## 2023-11-06 RX ADMIN — SULFAMETHOXAZOLE AND TRIMETHOPRIM 1 TABLET: 400; 80 TABLET ORAL at 07:59

## 2023-11-06 RX ADMIN — ACETAMINOPHEN 325 MG: 325 TABLET, FILM COATED ORAL at 17:27

## 2023-11-06 RX ADMIN — THERA TABS 1 TABLET: TAB at 11:05

## 2023-11-06 RX ADMIN — NOREPINEPHRINE BITARTRATE 0.01 MCG/KG/MIN: 0.06 INJECTION, SOLUTION INTRAVENOUS at 00:58

## 2023-11-06 RX ADMIN — Medication 1 DROP: at 16:20

## 2023-11-06 RX ADMIN — SODIUM BICARBONATE 650 MG TABLET 1300 MG: at 08:01

## 2023-11-06 RX ADMIN — TACROLIMUS 2 MG: 5 CAPSULE ORAL at 17:28

## 2023-11-06 RX ADMIN — ACETAMINOPHEN 325 MG: 325 TABLET, FILM COATED ORAL at 13:31

## 2023-11-06 RX ADMIN — PIPERACILLIN AND TAZOBACTAM 3.38 G: 3; .375 INJECTION, POWDER, LYOPHILIZED, FOR SOLUTION INTRAVENOUS at 10:37

## 2023-11-06 RX ADMIN — CHLORHEXIDINE GLUCONATE 15 ML: 1.2 SOLUTION ORAL at 07:55

## 2023-11-06 RX ADMIN — PIPERACILLIN AND TAZOBACTAM 3.38 G: 3; .375 INJECTION, POWDER, LYOPHILIZED, FOR SOLUTION INTRAVENOUS at 22:20

## 2023-11-06 RX ADMIN — ALBUMIN HUMAN 25 G: 0.05 INJECTION, SOLUTION INTRAVENOUS at 14:06

## 2023-11-06 RX ADMIN — GABAPENTIN 100 MG: 100 CAPSULE ORAL at 07:56

## 2023-11-06 RX ADMIN — MAGNESIUM SULFATE IN WATER 4 G: 40 INJECTION, SOLUTION INTRAVENOUS at 08:34

## 2023-11-06 RX ADMIN — Medication 1 DROP: at 11:04

## 2023-11-06 RX ADMIN — SODIUM CHLORIDE, POTASSIUM CHLORIDE, SODIUM LACTATE AND CALCIUM CHLORIDE: 600; 310; 30; 20 INJECTION, SOLUTION INTRAVENOUS at 12:08

## 2023-11-06 RX ADMIN — PIPERACILLIN AND TAZOBACTAM 3.38 G: 3; .375 INJECTION, POWDER, LYOPHILIZED, FOR SOLUTION INTRAVENOUS at 16:20

## 2023-11-06 RX ADMIN — OXYCODONE HYDROCHLORIDE 2.5 MG: 5 TABLET ORAL at 16:58

## 2023-11-06 RX ADMIN — Medication 1 DROP: at 19:53

## 2023-11-06 RX ADMIN — FLUCONAZOLE IN SODIUM CHLORIDE 400 MG: 2 INJECTION, SOLUTION INTRAVENOUS at 12:58

## 2023-11-06 RX ADMIN — GABAPENTIN 100 MG: 100 CAPSULE ORAL at 19:53

## 2023-11-06 RX ADMIN — MYCOPHENOLATE MOFETIL 750 MG: 200 POWDER, FOR SUSPENSION ORAL at 17:28

## 2023-11-06 RX ADMIN — METHYLPREDNISOLONE SODIUM SUCCINATE 200 MG: 125 INJECTION, POWDER, LYOPHILIZED, FOR SOLUTION INTRAMUSCULAR; INTRAVENOUS at 07:56

## 2023-11-06 RX ADMIN — GABAPENTIN 100 MG: 100 CAPSULE ORAL at 13:00

## 2023-11-06 RX ADMIN — POTASSIUM CHLORIDE 20 MEQ: 1.5 POWDER, FOR SOLUTION ORAL at 09:42

## 2023-11-06 RX ADMIN — CIPROFLOXACIN HYDROCHLORIDE 250 MG: 250 TABLET, FILM COATED ORAL at 07:55

## 2023-11-06 RX ADMIN — PIPERACILLIN AND TAZOBACTAM 3.38 G: 3; .375 INJECTION, POWDER, LYOPHILIZED, FOR SOLUTION INTRAVENOUS at 04:40

## 2023-11-06 ASSESSMENT — ACTIVITIES OF DAILY LIVING (ADL)
ADLS_ACUITY_SCORE: 36

## 2023-11-06 NOTE — PROGRESS NOTES
[facility]  Transplant Nephrology Progress notes  Date of Admission:  10/31/2023  Today's Date: 11/06/2023  Requesting physician: No att. providers found    Recommendations:  - no acute indications for RRT today based on labs  - agree with fluid/albumin resuscitation with given increased drain out put  - anticipate needing RRT with given decline in UOP, so please inform nephrology with any change in lab/ physical changes.   - Holding high dose vitamin D insetting of hypercalcemia    RECOMMEND PLACING A TEMP LINE IN THE OR     Assessment & Plan   # CKD and EDGAR: Trend up in SCr.  Baseline prior to liver transplant was at ~1 but prior to transplant have had some EDGAR with creatinine of 2.6-2.7 and peaked at ~3, currently at 2.5. UOP in last 24 hours is ~950 but trended down today to 333 ml in last 12 hours. But also noted to have increased drain out put from 750 ml yesterday to 2.2 L in last 12 hours. EDGAR related to significant volume loss. No acute indications for dialysis based on electrolytes.    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 Consent placed in chart. Recommend intra op temp dialysis line placement    # Liver Tx: LFT's were improving.     # Immunosuppression: Tacrolimus immediate release (goal 8-10) and Mycophenolate mofetil (dose 750 mg every 12 hours)   - Patient is in an immunosuppressed state and will continue to monitor for efficacy and toxicity of immunosuppression medications.   - Changes: Not at this time, per liver team    # Infection Prophylaxis:    - PJP: Sulfa/TMP (Bactrim)- will be started post-txp   - CMV: Valganciclovir (Valcyte)-will be started post-txp    # Hypotension: Hypotensive requiring midodrine;  Goal BP: > 100, but < 130 systolic   - Volume status: Mildly hypervolemic     - Changes: No.    #  Anemia in Chronic Renal Disease: Hgb: Stable      NIRMAL: No   - Iron studies: Not checked recently    # Mineral Bone Disorder:    - Secondary renal hyperparathyroidism; PTH level: Not checked recently        On treatment: None   - Vitamin D; level: Low        On supplement: No, received high dose once, but holding further doses with given hypercalcemia.   - Calcium; level: High        On supplement: No   - Phosphorus; level: Normal        On supplement: No    # Electrolytes:   - Potassium; level: Low        On supplement:  replace as needed to goal of 3.5 to 4 meq  - Magnesium; level: Low        On supplement: Yes  - Bicarbonate; level: Normal        On supplement: No  - Sodium; level: Low.     #Depression: well managed. Defer to primary team     # Transplant History:  Etiology of Kidney Failure: Hepatorenal syndrome (HRS)  Tx: CKD and liver transplant  Transplant: 11/5/2023 (Liver)  Crossmatch at time of Tx: pending  Significant changes in immunosuppression: None  Significant transplant-related complications: None    Recommendations were communicated to the primary team verbally.    Ely Trujillo MD  Pager: [phone]    REASON FOR CONSULT   Liver transplant    History of Present Illness   Pt s/p liver transplant, continue to be intubated and sedated. Stable hemodynamics.    Review of Systems    Was not able to review as she was intubated and sedated this morning    Past Medical History    I have reviewed this patient's medical history and updated it with pertinent information if needed.   Past Medical History:   Diagnosis Date    Alcoholic cirrhosis of liver with ascites (H) 08/30/2023    History of alcohol abuse     Formatting of this note might be different from the original. In remission as of late June 2023       Past Surgical History   I have reviewed this patient's surgical history and updated it with pertinent information if needed.  Past Surgical History:   Procedure Laterality Date    BENCH LIVER   2023    Procedure: Bench liver;  Surgeon: Aime Lowe MD;  Location: [facility] OR    TRANSPLANT LIVER RECIPIENT  DONOR N/A 2023    Procedure: Transplant liver recipient  donor;  Surgeon: Aime Lowe MD;  Location: [facility] OR       Family History   I have reviewed this patient's family history and updated it with pertinent information if needed.   No family history on file.    Social History   I have reviewed this patient's social history and updated it with pertinent information if needed. Lola Milner  reports that she quit smoking about 5 months ago. Her smoking use included cigarettes. She has never used smokeless tobacco. She reports that she does not currently use alcohol. She reports that she does not use drugs.    Allergies   No Known Allergies  Prior to Admission Medications    [START ON 2023] basiliximab (SIMULECT) 20 mg in sodium chloride 0.9 % 50 mL infusion  20 mg Intravenous Once    artificial tears  1 drop Both Eyes 4x Daily    chlorhexidine  15 mL Mouth/Throat Q12H    cholecalciferol  1,250 mcg Oral Q7 Days    fluconazole  400 mg Intravenous Q24H    folic acid  1 mg Oral Daily    gabapentin  100 mg Oral TID    [START ON 2023] methylPREDNISolone  100 mg Intravenous Once    Followed by    [START ON 2023] methylPREDNISolone  50 mg Intravenous Once    Followed by    [START ON 2023] predniSONE  25 mg Oral Once    Followed by    [START ON 11/10/2023] predniSONE  10 mg Oral Once    [Held by provider] midodrine  15 mg Oral TID    multivitamin, therapeutic  1 tablet Oral Daily    mycophenolate  750 mg Oral BID IS    Or    mycophenolate  750 mg Oral or NG Tube BID IS    pantoprazole  40 mg Per Feeding Tube QAM AC    piperacillin-tazobactam  3.375 g Intravenous Q6H    prothrombin 4 factor complex concentrate  1,100 Units Intravenous Once    sodium chloride (PF)  3 mL Intravenous Q8H    sulfamethoxazole-trimethoprim  1 tablet Oral or Feeding Tube Daily    tacrolimus  2 mg  Oral BID IS    Or    tacrolimus  2 mg Oral or NG Tube BID IS    thiamine  100 mg Oral Daily    valGANciclovir  450 mg Oral Every Other Day    Or    valGANciclovir  450 mg Oral or NG Tube Every Other Day      - MEDICATION INSTRUCTIONS -      dextrose      fentaNYL Stopped (23 0911)    insulin regular Stopped (23 1000)    lactated ringers 100 mL/hr at 23 1208    NaCl 0.45 % 1,000 mL infusion      norepinephrine Stopped (23 0230)    propofol Stopped (23 0842)    BETA BLOCKER NOT PRESCRIBED         Physical Exam   Temp  Av.9  F (36.6  C)  Min: 97.3  F (36.3  C)  Max: 98.3  F (36.8  C)      Pulse  Av.5  Min: 66  Max: 86 Resp  Av.7  Min: 13  Max: 27  SpO2  Av.9 %  Min: 87 %  Max: 100 %     BP 94/60 (BP Location: Right arm)   Pulse 58   Temp 97.5  F (36.4  C) (Bladder)   Resp 14   Wt 69.3 kg (152 lb 12.5 oz)   SpO2 96%   BMI 27.06 kg/m      Admit Weight: 68.6 kg (151 lb 4.8 oz)     GENERAL APPEARANCE: sedated and intubated  HENT: no gross abnormalities noted  RESP: lungs clear to auscultation - no rales, rhonchi or wheezes  CV: regular rhythm, normal rate, no rub, no murmur  EDEMA: 2+ LE edema bilaterally  ABDOMEN: soft, nondistended, nontender, bowel sounds normal  MS: extremities normal - no gross deformities noted, no evidence of inflammation in joints, no muscle tenderness  SKIN: no rash  Neuro : intubated and sedated    Data   CMP  Recent Labs   Lab 23  1200 23  1007 23  0905 23  0824 23  0502 23  0425 23  2217 23  2148 23  2005 23  1851 23  1751 23  0621 23  0520 23  1049 23  0622 10/31/23  0814   NA  --   --   --   --   --  130*  130*  --  128*  --  127* 124*   < > 124* 125*   < > 131*   POTASSIUM  --   --   --   --   --  3.1*  3.1*  --  3.0*  --  2.9* 3.4*   < > 3.3* 3.6   < > 3.7   CHLORIDE  --   --   --   --   --  96*  96*  --  94*  --   --   --   --  88* 89*   < >  92*   CO2  --   --   --   --   --  24  24  --  22  --   --   --   --  19* 18*   < > 20*   ANIONGAP  --   --   --   --   --  10  10  --  12  --   --   --   --  17* 18*   < > 19*   * 116* 141* 137*   < > 103*  103*   < > 301*   < > 307* 280*   < > 81 85   < > 67*   BUN  --   --   --   --   --  42.6*  42.6*  --  40.5*  --   --   --   --  46.4* 44.2*   < > 28.2*   CR  --   --   --   --   --  2.50*  2.50*  --  2.31*  --   --   --   --  3.10* 2.72*   < > 2.76*   GFRESTIMATED  --   --   --   --   --  26*  26*  --  28*  --   --   --   --  20* 23*   < > 23*   GEE  --   --   --   --   --  10.5*  10.5*  --  10.9*  --   --   --   --  9.5 9.7   < > 9.6   MAG  --   --   --   --   --  1.6*  --   --   --   --   --   --   --  1.8  --   --    PHOS  --   --   --   --   --  3.3  --   --   --   --   --   --   --  4.0  --   --    PROTTOTAL  --   --   --   --   --  4.1*  --  4.1*  --   --   --   --   --   --   --   --    ALBUMIN  --   --   --   --   --  2.9*  --  3.0*  --   --   --   --  4.2 4.3   < > 4.0   BILITOTAL  --   --   --   --   --  6.6*  --  13.4*  --   --   --   --  36.9* 35.9*   < > 44.4*   ALKPHOS  --   --   --   --   --  75  --  80  --   --   --   --  82 83   < > 137*   AST  --   --   --   --   --  182*  --  246*  --   --   --   --  31 32   < > 62*   ALT  --   --   --   --   --  113*  --  137*  --   --   --   --  11 11   < > 19    < > = values in this interval not displayed.     CBC  Recent Labs   Lab 11/06/23  1003 11/06/23  0425 11/06/23  0052 11/05/23  2148 11/05/23  1854 11/05/23  1751 11/05/23  1740 11/05/23  1310 11/05/23  0520   HGB 12.0 11.6* 8.3* 11.0* 9.0*   < >  --    < > 8.2*   WBC  --  3.0*  --  5.4 9.1  --   --   --  9.7   RBC  --  3.84  --  3.55* 2.86*  --   --   --  2.52*   HCT  --  33.0*  --  31.6* 25.8*  --   --   --  22.8*   MCV  --  86  --  89 90  --   --   --  91   MCH  --  30.2  --  31.0 31.5  --   --   --  32.5   MCHC  --  35.2  --  34.8 34.9  --   --   --  36.0   RDW  --  17.4*  --   16.8* 16.3*  --   --   --  19.8*   PLT  --  163  --  162 168  --  111*  --  73*    < > = values in this interval not displayed.     INR  Recent Labs   Lab 11/06/23  0425 11/05/23 2148 11/05/23  1854 11/05/23  1740 11/05/23  0520 11/04/23  1049   INR 1.21* 1.44* 1.60* 1.89*   < > 4.31*   PTT  --  36 37 54*  --  84*    < > = values in this interval not displayed.     ABG  Recent Labs   Lab 11/05/23 2147 11/05/23  1851 11/05/23  1751 11/05/23  1656   PH 7.31* 7.39 7.41 7.36   PCO2 49* 37 35 33*   PO2 167* 134* 108* 119*   HCO3 25 22 22 19*   O2PER 40 52.0 53.0 53.0      Urine Studies  Recent Labs   Lab Test 11/04/23  2309 10/31/23  2100   COLOR Dark Yellow* Orange*   APPEARANCE Slightly Cloudy* Cloudy*   URINEGLC Negative Negative   URINEBILI Moderate* Large*   URINEKETONE Negative Negative   SG 1.016 1.019   UBLD Trace* Trace*   URINEPH 5.5 5.5   PROTEIN 10* 30*   NITRITE Negative Negative   LEUKEST Small* Small*   RBCU 2 4*   WBCU 5 25*     IMAGING:  All imaging studies reviewed by me.

## 2023-11-06 NOTE — PROGRESS NOTES
Pt arrived to 4E intubated with 7.0 ETT secured 25 @ lip and was placed on initial ventilator settings of CMV 12 400 60% +5   no

## 2023-11-06 NOTE — PROGRESS NOTES
Transplant Social Work Services Progress Note    Date of Initial Social Work Evaluation: 10/31/2023. Transplant psychosocial assessment was completed the day of admission outpatient.   Collaborated with: Patient's mother and step-father, accomodation. Patient could not participate in conversation     Data: Lola underwent a  donor liver transplant on 2023  Intervention: I met with Thao and John at bedside. They were surprised how fast she was transplanted. They are thankful and hopeful for a fast recovery. They confirmed that between her supports she will have 24/7 caregiving post transplantation, and who will start will depend on when she is medically ready for discharge. I reviewed [facility], referral was made. Test claim indicating no cost to transplant medications. I also offered our in-person support group this week. I reviewed potential discharge needs, and that patient will likely not have home care d/t her insurance.   Assessment: No new assessment at this time   Education provided by SW: Transplant hospitalization, post cares  Plan:    Discharge Plans in Progress: TBD    Barriers to d/c plan: Medical stability    Follow up Plan: This writer will continue to follow patient inpatient for psychosocial needs and discharge to ARU/TCU if needed.     ABEBA Rosas, [facility]  Liver Transplant   [facility]  Phone: [phone]  Pager: [phone]

## 2023-11-06 NOTE — PROGRESS NOTES
Admitted/transferred from: OR to SICU room 4513 at   Reason for admission/transfer: S/P Liver transplant from  donor  2 RN skin assessment: completed by: Lina RN, Shelley RN  Result of skin assessment and interventions/actions: Bilateral shin bruises, scabs, scratches, Jaundice, scleral edema and scleral jaundice, reddened buttocks, lips cracked, dry blood  Height, weight, drug calc weight: Done  Patient belongings (see Flowsheet)  MDRO education added to care plan: n/a  ?

## 2023-11-06 NOTE — PROGRESS NOTES
SURGICAL ICU ADMISSION NOTE  11/06/2023      PRIMARY TEAM: Liver Transplant  PRIMARY PHYSICIAN: Dr. Lowe  REASON FOR CRITICAL CARE ADMISSION: Hemodynamic, respiratory monitoring   ADMITTING PHYSICIAN: Dr. Villavicencio  Date of Service (when I saw the patient): 11/06/2023    ASSESSMENT:  Lola Milner is a 33 year old female with a PMH of MDD, prior tobacco use, gout, polyneuropathy, decompensated cirrhosis secondary to alcohol use complicated by hepatic encephalopathy, esophageal varices status post banding, recurrent ascites, and hepatorenal syndrome who was admitted on 10/31/2023 for evaluation for liver transplantation. She was admitted to SICU s/p uncomplicated liver transplantation on 11/5.    PLAN:  - Off propofol - wean to extubation  - Continue maintenance LR at 100 ml/hr  - Bolus 500 LR for low urine output  - Bedside swallow eval post-extubation - advance diet as tolerated  - Discontinue fentanyl and start Oxy 2.5-5 Q4 PRN  - K and Mg replacement  - Tylenol (2g max) PRN    Neurological:  # Acute pain   # Sedation for mechanical ventilation  - Monitor neurological status. Delirium preventions and precautions.   - Pain:    Gtt: Oxy 2.5-5 Q4 PRN; wean fentanyl   PRN: acetaminophen (2gm max daily dose)  - Sedation plan:   - Gtt: Off Propofol   - RASS goal 0 to -1 for extubation    # Hx MDD  # Hx polyneuropathy  - Continue PTA Gabapentin 100 mg TID, melatonin at HS PRN when able    Pulmonary:   # Post operative ventilatory support  - VENT: 400/14/5/40%.   - Off propofol; trial pressure support for extubation today.  - Supplemental oxygen to keep saturation above 92 %.  - Incentive spirometer every 15- 30 minutes.     Cardiovascular:    # Hypotension   # Shock hypovolemic from hemorrhage   - Monitor hemodynamic status. Goal MAP >65.   - Off pressors  - Midodrine on hold -- was 15 mg TID prior to transplantation  - EBL: 3500 cc  - Intraoperative resuscitation: 5u pRBCs, 10u FFP, 4u Plt, cellsaver 880cc, 3.75L  crystalloid, 400cc albumin, fibriga 3, PCC 1000cc    Gastroenterology/Nutrition:  # S/p liver transplantation 11/5/23  # Decompensated alcohol cirrhosis c/b HRS, HE, recurrent ascites, esophageal varices s/p banding (9/23)  # Hyperbilirubinemia  - Liver transplant team primary  - Trend hepatic labs; AST/ALT and T.Bili downtrending  - Immediate post-op liver US - prelim read   - Unremarkable appearance of liver   - Perihepatic 6.6 cm fluid collection in hepatorenal space (drain in place)   - small left pleural effusion  - Pantoprazole for ppx  - Transplant immunosuppressants and prophylaxis as below.   - Trial pressure support and wean to extubation    # Severe protein calorie deficit malnutrition    - RD consult. Appreciate cares and recommendations.   - Bedside swallow eval; advance diet as tolerated  - Will consider starting enteral nutrition if unable to take by mouth POD2    Renal/Fluids/Electrolytes:   # Hyponatremia  # Acute kidney injury   # Hepatorenal syndrome  # CKD and EDGAR  - Transplant nephrology following, appreciate support.   - Na no higher than 130 this AM (11/6); titrate IV hypotonic fluids as needed  - Baseline Cr ~ 0.8-1.0; Cr 2.5 on 11/6  - Will discuss need for CRRT post-op with transplant nephrology - No indication at this time  - Urine output is ~875 post op. Will continue to monitor intake and output.   - LR for mIVF @ 100 ml/hr  - Bolus 500 ml LR  - Mg 1.6, K 3.1 on 11/6 AM; replace electrolytes    Endocrine:   # Stress hyperglycemia   - Insulin infusion. Currently at 0.5u/hr  - Goal to keep BG< 180 for optimal wound healing.       ID:  # immunosuppression   - induction steroids per transplant, steroid taper, MMF, and tacrolimus per transplant   # immunoprophylaxis  - Valcyte, Fluconazole    # perioperative antibiotics: zosyn      Heme:     # Acute blood loss anemia   # Coagulopathy due to cirrhosis and surgical blood loss    # Thrombocytopenia   # Anemia of critical illness   # Chronic  normocytic anemia  - Hemoglobin prior to transplant ~7; Hgb 11.6 this AM   - 1u cryoprecipitate for fibrinogen of 177 on 11/6 AM  - Platelet baseline 70-80 prior to transplant; Plt 163 on 11/6 AM  - Transfusion parameters;   - Plt >20k if not bleeding   - Hgb > 8   - INR < 2   - Fibrinogen > 200      Musculoskeletal:  # Weakness and deconditioning of critical illness   - Physical and occupational therapy consult   - Utilized a walker at baseline    Skin:  - Diligent skin cares to prevent breakdown    General Cares/Prophylaxis:    DVT Prophylaxis: Defer to primary service. Chemical prophylaxis None   GI Prophylaxis: PPI  Restraints: Restraints for medical healing needed: NO    Lines/ tubes/ drains:  - RIJ CVC  - PA catheter (through RIJ CVC) - PA Cath REMOVED  - L axillary A-line  - PIV  - ETT  - 2x AHRLEY drain  - NG removal pending bedside swallow eval    Disposition:  -  Surgical ICU    Patient seen, findings and plan discussed with surgical ICU staff, Dr. Garcia.    Kulwant Avalos, MS4  [employer]    Clinically Significant Risk Factors        # Hypokalemia: Lowest K = 2.9 mmol/L in last 2 days, will replace as needed  # Hyponatremia: Lowest Na = 123 mmol/L in last 2 days, will monitor as appropriate  # Hypocalcemia: Lowest iCa = 4.1 mg/dL in last 2 days, will monitor and replace as appropriate  # Hypercalcemia: Highest Ca = 10.9 mg/dL in last 2 days, will monitor as appropriate  # Hypomagnesemia: Lowest Mg = 1.6 mg/dL in last 2 days, will replace as needed   # Hypoalbuminemia: Lowest albumin = 2.9 g/dL at 11/6/2023  4:25 AM, will monitor as appropriate    # Coagulation Defect: INR = 1.21 (Ref range: 0.85 - 1.15) and/or PTT = 36 Seconds (Ref range: 22 - 38 Seconds), will monitor for bleeding  # Thrombocytopenia: Lowest platelets = 73 in last 2 days, will monitor for bleeding          # Overweight: Estimated body mass index is 27.06 kg/m  as calculated from the following:    Height as of an earlier encounter  "on 10/31/23: 1.6 m (5' 3\").    Weight as of this encounter: 69.3 kg (152 lb 12.5 oz).   # Severe Malnutrition: based on nutrition assessment      # Financial/Environmental Concerns: none (Pt reports her SO is able to pay rent and pay her bills)              - - - - - - - - - - - - - - - - - - - - - - - - - - - - - - - - - - - - - - - - - - - - - -   INTERVAL HISTORY:  No acute events overnight. Patient lying in bed comfortably, intubated. Opens eyes to voice, denies pain, moves extremities bilaterally. Minimal urine output this AM (10-12 /hr). Discussed fluid bolus with team. Nursing attempted to wean off propofol early this AM and patient became agitated. Was put back on 5 of propofol, but patient was successfully weaned off later this morning. Remains somnolent. No other concerns at this time.     HISTORY PRESENTING ILLNESS: Lola Milner is a 33 year old female with a PMH of MDD, prior tobacco use (quit 2023), gout, polyneuropathy, decompensated cirrhosis secondary to alcohol use (last drink 23) complicated by hepatic encephalopathy, esophageal varices status post banding, recurrent ascites, and hepatorenal syndrome who was admitted on 10/31/2023 for evaluation for liver transplantation from .  She was admitted to SICU s/p liver transplantation on     REVIEW OF SYSTEMS: 10 point ROS neg other than the symptoms noted above in the HPI.    PAST MEDICAL HISTORY:   Past Medical History:   Diagnosis Date    Alcoholic cirrhosis of liver with ascites (H) 2023    History of alcohol abuse     Formatting of this note might be different from the original. In remission as of late 2023       SURGICAL HISTORY:   Past Surgical History:   Procedure Laterality Date    BENCH LIVER  2023    Procedure: Bench liver;  Surgeon: Aime Lowe MD;  Location: [facility] OR    TRANSPLANT LIVER RECIPIENT  DONOR N/A 2023    Procedure: Transplant liver recipient  donor;  Surgeon: " Aime Lowe MD;  Location:  OR       SOCIAL HISTORY:   Social History     Socioeconomic History    Marital status: Single   Tobacco Use    Smoking status: Former     Types: Cigarettes     Quit date: 2023     Years since quittin.4    Smokeless tobacco: Never   Substance and Sexual Activity    Alcohol use: Not Currently     Comment: last ETOH use 2023    Drug use: Never     FAMILY HISTORY: No bleeding/clotting disorders nor problems with anesthesia.     ALLERGIES:   No Known Allergies    MEDICATIONS:  No current facility-administered medications on file prior to encounter.  gabapentin (NEURONTIN) 100 MG capsule, Take 100 mg by mouth 3 times daily  lactulose (CHRONULAC) 10 GM/15ML solution, Take 30 mLs by mouth 3 times daily  melatonin 3 MG tablet, Take 3 mg by mouth nightly as needed  midodrine (PROAMATINE) 5 MG tablet, Take 15 mg by mouth 3 times daily  Multiple Vitamins-Minerals (THERA-TABS M) TABS, Take 1 tablet by mouth daily  omeprazole (PRILOSEC) 20 MG DR capsule, Take 20 mg by mouth daily  sodium bicarbonate 650 MG tablet, Take 1,300 mg by mouth 2 times daily For 10 days        PHYSICAL EXAMINATION:  Temp:  [97.2  F (36.2  C)-98.2  F (36.8  C)] 97.5  F (36.4  C)  Pulse:  [52-78] 57  Resp:  [12-14] 14  MAP:  [64 mmHg-79 mmHg] 69 mmHg  Arterial Line BP: ()/(46-58) 97/52  FiO2 (%):  [30 %-60 %] 30 %  SpO2:  [93 %-100 %] 93 %  General: Intubated, sedated  HEENT: CVC in RIJ, NCAT, ETT with NG in place  Neuro: Opens eyes to voice, following commands briskly in all four extremities  Pulm/Resp: Intubated on above vent settings  CV: RRR, maintaining MAPs off pressors  Abdomen: Soft, non-tender, 2x HARLEY drain with serosanguinous drainage  :  Izaguirre catheter in place, urine yellow and clear  Incisions/Skin: C/D/I with minimal strikethrough  MSK/Extremities: No peripheral edema, extremities well perfused, DP pulses present bilaterally    LABS: Reviewed.   Arterial Blood Gases   Recent Labs   Lab  11/05/23 2147 11/05/23 1851 11/05/23  1751 11/05/23  1656   PH 7.31* 7.39 7.41 7.36   PCO2 49* 37 35 33*   PO2 167* 134* 108* 119*   HCO3 25 22 22 19*     Complete Blood Count   Recent Labs   Lab 11/06/23  1003 11/06/23  0425 11/06/23  0052 11/05/23 2148 11/05/23 1854 11/05/23 1751 11/05/23  1740 11/05/23  1310 11/05/23 0520   WBC  --  3.0*  --  5.4 9.1  --   --   --  9.7   HGB 12.0 11.6* 8.3* 11.0* 9.0*   < >  --    < > 8.2*   PLT  --  163  --  162 168  --  111*  --  73*    < > = values in this interval not displayed.     Basic Metabolic Panel  Recent Labs   Lab 11/06/23  1007 11/06/23  0905 11/06/23  0824 11/06/23  0659 11/06/23  0502 11/06/23  0425 11/05/23 2217 11/05/23 2148 11/05/23  2005 11/05/23  1851 11/05/23  1751 11/05/23  0621 11/05/23  0520 11/04/23  1049   NA  --   --   --   --   --  130*  130*  --  128*  --  127* 124*   < > 124* 125*   POTASSIUM  --   --   --   --   --  3.1*  3.1*  --  3.0*  --  2.9* 3.4*   < > 3.3* 3.6   CHLORIDE  --   --   --   --   --  96*  96*  --  94*  --   --   --   --  88* 89*   CO2  --   --   --   --   --  24  24  --  22  --   --   --   --  19* 18*   BUN  --   --   --   --   --  42.6*  42.6*  --  40.5*  --   --   --   --  46.4* 44.2*   CR  --   --   --   --   --  2.50*  2.50*  --  2.31*  --   --   --   --  3.10* 2.72*   * 141* 137* 115*   < > 103*  103*   < > 301*   < > 307* 280*   < > 81 85    < > = values in this interval not displayed.     Liver Function Tests  Recent Labs   Lab 11/06/23 0425 11/05/23  2148 11/05/23  1854 11/05/23  1740 11/05/23  0520 11/04/23  1049   * 246*  --   --  31 32   * 137*  --   --  11 11   ALKPHOS 75 80  --   --  82 83   BILITOTAL 6.6* 13.4*  --   --  36.9* 35.9*   ALBUMIN 2.9* 3.0*  --   --  4.2 4.3   INR 1.21* 1.44* 1.60* 1.89* 4.35* 4.31*     Pancreatic Enzymes  Recent Labs   Lab 11/06/23 0425 11/04/23  1049   LIPASE 11*  --    AMYLASE 28 46     Coagulation Profile  Recent Labs   Lab 11/06/23 0425  11/05/23  2148 11/05/23  1854 11/05/23  1740 11/05/23  0520 11/04/23  1049   INR 1.21* 1.44* 1.60* 1.89*   < > 4.31*   PTT  --  36 37 54*  --  84*    < > = values in this interval not displayed.       IMAGING:  Recent Results (from the past 24 hour(s))   XR Chest Port 1 View    Narrative    EXAM: XR CHEST PORT 1 VIEW  11/5/2023 8:37 PM      HISTORY: cvc    COMPARISON: 11/4/2023    FINDINGS: Single view of the chest. Right IJ [place] catheter tip  terminates in the right main pulmonary artery. Endotracheal tube tip  terminates approximately 2 cm from the farshad. Enteric tube courses  inferiorly below the diaphragm and out of the field of view.  Postsurgical changes of liver transplantation with surgical clips  projecting over the right upper quadrant. 2 partially visualized right  upper quadrant surgical drains are present. Trachea is midline. Stable  enlargement of the cardiac silhouette. Dense retrocardiac opacity. New  patchy opacity in the left upper lobe. Silhouetting of the left  hemidiaphragm. No pleural effusion or pneumothorax.      Impression    IMPRESSION:   1. Right IJ [place] catheter tip terminates in the right main  pulmonary artery.  2. Endotracheal tube is approximately 2 cm from the farshad.  3. Post surgical changes of liver transplantation.  4. Left lower lobe atelectasis atelectasis.   5. Patchy opacity in the left upper lobe likely atelectasis. Early  consolidative pneumonia or aspiration should be considered. Attention  on follow-up.    I have personally reviewed the examination and initial interpretation  and I agree with the findings.    VAHID BECKHAM MD         SYSTEM ID:  [id number]   XR Abdomen 1 View    Narrative    EXAMINATION:  XR ABDOMEN 1 VIEW 11/5/2023     COMPARISON: none..    HISTORY: confirm Ng tube placement.    TECHNIQUE: Frontal supine view of the abdomen.    FINDINGS: Enteric tube tip and sidehole projected over the expected  location of the stomach. Postsurgical changes of  liver transplant with  surgical clips projecting over the right upper quadrant. 2 right upper  quadrant drains are present. No abnormally dilated loops of bowel,  free air, or pneumatosis in the visualized abdomen.. No portal venous  gas.  The lung bases are unremarkable.       Impression    IMPRESSION: Enteric tube tip and sidehole project over the expected  location of the stomach.    I have personally reviewed the examination and initial interpretation  and I agree with the findings.    VAHID BECKHAM MD         SYSTEM ID:  [id number]   US Liver Transplant    Narrative    EXAMINATION: US LIVER TRANSPLANT, 11/5/2023 9:31 PM     COMPARISON: None.    HISTORY: Liver transplant.    TECHNIQUE:  Gray-scale, color Doppler and spectral flow analysis.    FINDINGS:   There is no ascites.    Liver:   The liver demonstrates normal homogeneous echotexture. No  evidence of a focal hepatic mass.  There is a perihepatic fluid  collection measuring 6.6 x 2.2 cm x 6.0 with surgical drain in place.    Bile Ducts: Both the intra- and extrahepatic biliary system are of  normal caliber.  The common bile duct measures 5 mm in diameter.    Gallbladder: The gallbladder is surgically absent.    Kidneys:   Right kidney:  The right kidney demonstrates normal echotexture with  no evidence of a shadowing stone, focal mass or hydronephrosis.   12.6  cm in long axis dimension.  Left kidney:  The left kidney demonstrates normal echotexture with no  evidence of a shadowing stone, focal mass or hydronephrosis.   13.7 cm  in long axis dimension.    Pancreas: Not visualized.    Spleen:  The spleen is enlarged, measuring 15.8 cm.    Visualized portions of the aorta are unremarkable.    LIVER DOPPLER:  Splenic vein:  Patent continuous normal antegrade direction flow  towards the liver, 36 cm/sec.  Extrahepatic portal vein:  Patent continuous antegrade flow, 47  cm/sec.  Portal vein at anastomosis: Patent continuous antegrade flow,  60  cm/sec.  Intrahepatic portal vein:  Patent continuous antegrade flow, 65  cm/sec.  Right portal vein flow is antegrade, measuring 47 cm/sec.  Left portal vein flow is antegrade, measuring 64 cm/sec.    Inferior vena cava: patent with flow toward the heart throughout..  IVC above anastomosis:  76 cm/sec.  IVC at anastomosis:  165 cm/sec.  Intrahepatic IVC:  59 cm/sec.  Extrahepatic IVC:  31 cm/sec.    Right, mid, left hepatic veins: Patent with flow towards the inferior  vena cava.    Extrahepatic hepatic artery: Low resistance waveform with flow towards  the liver. 85 cm/sec with resistive index 0.73.  Right hepatic artery: 45 cm/sec with resistive index 0.60.  Left hepatic artery: 115 cm/sec with resistive index 0.7.    Small left pleural effusion.      Impression    Impression:   1.  Unremarkable appearance of the transplant liver.  2.  Perihepatic fluid collection in the hepatorenal space measuring up  to 6.6 cm with surgical drain in place.  3.  Small left pleural effusion.    I have personally reviewed the examination and initial interpretation  and I agree with the findings.    VAHID BECKHAM MD         SYSTEM ID:  [id number]

## 2023-11-06 NOTE — PROGRESS NOTES
CLINICAL NUTRITION SERVICES - BRIEF NOTE      Reason for RD note: Provider consult for TF (part of post-op orderset)    New Findings/Chart Review:  Pt s/p DDLT on 11/5 with potential to extubate today.     Interventions:  Per bedside RN, plan in rounds was no TF/FT and for pt to extubation and advance diet.     Nutrition will continue to follow per protocol.    Perla Damon RD, LD  Pager: [phone]; Ascom: *[phone]

## 2023-11-06 NOTE — ANESTHESIA POSTPROCEDURE EVALUATION
Patient: Lola Milner    Procedure: Procedure(s):  Transplant liver recipient  donor       Anesthesia Type:  General    Note:  Disposition: ICU; Inpatient            ICU Sign Out: Anesthesiologist/ICU physician sign out WAS performed   Postop Pain Control:             Sign Out: Well controlled pain   PONV:    Neuro/Psych: Uneventful            Sign Out: PLANNED postop sedation   Airway/Respiratory: Uneventful            Sign Out: AIRWAY IN SITU/Resp. Support               Airway in situ/Resp. Support: ETT   CV/Hemodynamics: Uneventful            Sign Out: Acceptable CV status   Other NRE: NONE   DID A NON-ROUTINE EVENT OCCUR? YES    Event details/Postop Comments:  RICC infiltrated in the Right upper arm / axilla. Realized immediately. Removed and dressing applied.            Last vitals:  Vitals:    23 2130 23 0617   BP:   94/60   Pulse:   78   Resp:   16   Temp:   36.6  C (97.9  F)   SpO2: (!) 87% 94% 93%       Electronically Signed By: Braulio Carrion MD  2023  8:28 PM

## 2023-11-06 NOTE — TELEPHONE ENCOUNTER
Organ Offer Encounter Information    Organ Offer Information  Organ offer date & time: 11/4/2023  7:49 AM  Coordinator/Fellow/Attending name: Flor Leiva RN   Organ(s):  Organ UNOS ID Match Run ID Comment Organ Laterality   Liver [id number] MIOP          Recent hospitalizations?: Yes (Comment: Currently admitted)       Discussed risk category with Patient/Other: N/A  Organ offer decision status Patient/Other: Accepted Offer  Organ disposition: Transplanted  Additional Comments: 11/4/2023 7:51 AM  Liver: Whole liver  MD: Mynor/González  OPO Contact: Chan [phone]  Donor/Recip HCV Status: Negative/Neg  (HCV+ Donors - Discuss HCV genotyping/quant testing with MD & send Epic in basket message to [facility] HCV POOL - Include Donor UNOS ID)  Donor Nutritional Status: Dextrose IV fluids at 125ml/hr  Donor Meets Risk Criteria for HIV/HCV/HBV: No  Plan: Patient currently hospitalized.  MD to speak to patient and/or family.  Admitted on 7A, Dr. Claudio to discuss with pt today.     Admissions: Already admitted to 7A  Unit: 11:44 AM Brielle Charge updated, 11/05/23 4:15 AM Updated Virgie Charge of OR time of 1100.  Add Organ: 11/4/23 8:30 Added  Research: ON HOLD  Inpatient Lab (COVID Testing [phone], Option 2): Provider will place STAT  Blood Bank: 11/04/23 3:59 PM Gurpreet   TransNet/ABO Verification: 11/04/23 4:05 PM  Book OR: 11/04/23 3:57 PM Spoke to [facility] and booked OR for 11/5/23 at 1100  Update Provider Entering Orders (XM Plan & COVID Testing):  11/04/23 12:43 PM Page sent to Gladys Carmen NP for preop orders.  Vessel Storage Confirmation (PA/KP/LI): OK to store  Immunology: 11/04/23 4:27 PM Janelle notified.    Donor OR Time: 11/4 at 0800  Procuring MD: Bill Harper  Contact in the OR: TBD  Organs Being Procured: KI, KP, Liver, Lung, Heart  Expected Delays: No  Flush Solution: [facility]  Biopsy: pending visual  Pump: n/a  Special Requests (Special blood tubes, nodes, waivers): Cassi BROWN for Visualization:   Mynor/Dr. Claudio  Transportation Details: Liver team  11/5/23 4:55 AM CST from [address] [facility]  Drop Off [employer], Wheels up 11/5/23  5:15 AM CST, landing 11/5/23 7:35 AM EST, destination [place]. Tail #: [id number]    11/04/23 11:39 AM  Spoke to [employer] line to set up Import Liver plane for procurement. Will update once procuring team information is known.  Flor Leiva RN    11/04/23 4:37 PM  Dr. Harper confirmed that he would be procuring, with Janett Salazar. [employer] updated.  Flor Leiva RN  Attestation I have discussed all of the above with the Patient/Legal Guardian/Caregiver regarding this organ offer.: Yes  Coordinator/Fellow/Attending name: Flor Leiav RN

## 2023-11-06 NOTE — BRIEF OP NOTE
[facility] Brief Operative Note    Pre-operative diagnosis: End stage liver disease (H) [K72.10] 2/2 etoh   Post-operative diagnosis same   Procedure: Procedure(s):  Transplant liver recipient  donor   Surgeon(s): Surgeon(s) and Role:     * Aime Lowe MD - Primary     * Vicente Claudio MD   Estimated blood loss: 3500 mL    Specimens: ID Type Source Tests Collected by Time Destination   1 : Hepatic Artery Lymph Node Tissue Other SURGICAL PATHOLOGY EXAM Aime Lowe MD 2023  2:57 PM    2 : Donor Gallbladder Tissue Gallbladder SURGICAL PATHOLOGY EXAM Aime Lowe MD 2023  5:38 PM    3 : Native Liver & Gallbladder Tissue Liver SURGICAL PATHOLOGY EXAM Aime Lowe MD 2023  6:14 PM       Findings: Large cirrhotic liver. Standard anatomy. EFREM to EFREM. Traditional piggyback. No biliary stent. HA flow 600ml/min. PV flow 3L/min.

## 2023-11-06 NOTE — PROGRESS NOTES
Patient removed from OS waitlist after  donor liver transplant. OS ID [id number].      Donor Has Risk Criteria for Transmission of HIV/HCV/HBV: No  Recipient Notified of Risk Criteria: NA

## 2023-11-06 NOTE — ANESTHESIA CARE TRANSFER NOTE
Patient: Lola Milner    Procedure: Procedure(s):  Transplant liver recipient  donor       Diagnosis: End stage liver disease (H) [K72.10]  Diagnosis Additional Information: No value filed.    Anesthesia Type:   General     Note:    Oropharynx: endotracheal tube in place  Level of Consciousness: iatrogenic sedation      Independent Airway: airway patency not satisfactory and stable  Dentition: dentition unchanged  Vital Signs Stable: post-procedure vital signs reviewed and stable  Report to RN Given: handoff report given  Patient transferred to: ICU    ICU Handoff: Call for PAUSE to initiate/utilize ICU HANDOFF, Identified Patient, Identified Responsible Provider, Reviewed the Pertinent Medical History, Discussed Surgical Course, Reviewed Intra-OP Anesthesia Management and Issues during Anesthesia, Set Expectations for Post Procedure Period and Allowed Opportunity for Questions and Acknowledgement of Understanding      Vitals:  Vitals Value Taken Time   /67 23   Temp     Pulse 79 23   Resp 12 23   SpO2 100 % 23   Vitals shown include unfiled device data.    Electronically Signed By: Lorene Pham MD  2023  8:36 PM

## 2023-11-06 NOTE — PROGRESS NOTES
Neuro: Pupils 4-5 mm. Round, equal, sluggish. Patient withdraws from pain, sedated on 5mcg/kg/min of propofol and 50mcg/hr of Fentanyl.  CV: Pressors off since 0230, MAPs maintaining >65. Radial pulses palpable, Dorsal pulses deplorable. CVPs <10, PAPs 20s-30s/ 10s  Resp: CMV/AC 40%/400/5/14, minimal ETT thick yellowish secretions, satts 100%. Tmax 98.2, Lungs clear diminished in bases.  GI/: Izaguirre catheter-UOP lower at 0400 and 0600- SICU updated, no BM  Lines: R FA PIV, L FA PIV, R internal jugular CVC w/MAC, PA catheter, single lumen  Gtts: Propofol at 5mcg/kg/min, Fentanyl at 50mcg/hr, Insulin at 0.5u/hr, LR at 100ml/hr  Mitts restraints bilateral wrist initiated at 0600  Skin: Clamshell incision dressing marked, no changes, R Abd HARLEY drains x2 w/serous output.    Plan: Pressure support today, anticipate extubation. Continue to monitor. Update team with any acute changes in patient condition.    For vital signs and complete assessments, please see documentation flowsheets.

## 2023-11-06 NOTE — PROGRESS NOTES
Transplant Surgery  Inpatient Daily Progress Note  11/06/2023    Assessment & Plan: 33 year old female admitted on 10/31/2023 for liver transplant evaluation. MELD Na 45. Past medical history significant for MDD, prior tobacco use, gout, polyneuropathy and decompensated cirrhosis secondary to alcohol use c/b hepatic encephalopathy, esophageal varices s/p banding, recurrent ascities and hepatorenal syndrome. Patient listed for liver transplant on 11/3/23. Now status post liver transplant on 11/5/23. Surgeon: Dr. Aime Lowe.     Graft function:   Liver transplant: POD: 1. TB/alk phos/ALT/AST decreased. Lactic acid normal. INR 1.21.  POD 0 US showed patent doppler.   -HARLEY x 2 with serosanguinous output, drain to suction.   -Check hepatic panel daily.    Immunosuppression management:   Simulect POD 1 and 4, due to renal function and lower tac goal  Steroid taper per protocol.   Mycophenolate 750 mg BID  Tacrolimus 2 mg BID. Tac goal 5-10. Drug interaction with diflucan.     Hematology:   Acute blood loss anemia: Hgb 12.6, last transfused in 11/5. Transfuse for Hgb < 8. Monitor Hgb q12 hrs.   Thrombocytopenia: . No transfusion needed.   Coagulapathy: INR < 1.5, Fibrinogen 177. No transfusion needed.    Cardiorespiratory:   Circulatory failure requiring pressor support: Pressor discontinued ~ 0230.    Hx hypotension: PTA on Midodrine 15 TID. Not restarted.  Mechanical ventilation perioperative: Management per SICU. Plan to extubate today    GI/Nutrition:   Diet: Place [place]. Start tube feeding.     Endocrine:   Steroid hyperglycemia: insulin gtt     Fluid/Electrolytes:   EDGAR: EDGAR secondary to HRS, possible bile cast nephropathy. Cr 2.5 (2.3). UO trending down, now 10 ml/hr.  -Replace HARLEY output with albumin 25g every 8 hours today.     : Continue aguilar for strict I&O    Infectious disease: afebrile.    Prophylaxis: DVT (mechanical), viral (Valcyte), PJP (Bactrim), zosyn x 48 hours, diflucan x 7 days,  nystatin.    Disposition: SICU         JENNIFER/Fellow/Resident Provider: Mary Bullard PA-C     Faculty: Jagjit Santiago M.D.    __________________________________________________________________  Transplant History: Admitted 10/31/2023 for liver transplant.   The patient has a history of liver failure due to decompensated cirrhosis secondary to alcohol use.    11/5/2023 (Liver), Postoperative day: 1     Interval History:   Overnight events: Off pressor. Weaning sedation this morning.    ROS:   A 10-point review of systems was negative except as noted above.    Curent Meds:   basiliximab (SIMULECT) 20 mg in sodium chloride 0.9 % 50 mL infusion  20 mg Intravenous Once    [START ON 11/9/2023] basiliximab (SIMULECT) 20 mg in sodium chloride 0.9 % 50 mL infusion  20 mg Intravenous Once    artificial tears  1 drop Both Eyes 4x Daily    chlorhexidine  15 mL Mouth/Throat Q12H    cholecalciferol  1,250 mcg Oral Q7 Days    fluconazole  400 mg Intravenous Q24H    folic acid  1 mg Oral Daily    gabapentin  100 mg Oral TID    [START ON 11/7/2023] methylPREDNISolone  100 mg Intravenous Once    Followed by    [START ON 11/8/2023] methylPREDNISolone  50 mg Intravenous Once    Followed by    [START ON 11/9/2023] predniSONE  25 mg Oral Once    Followed by    [START ON 11/10/2023] predniSONE  10 mg Oral Once    [Held by provider] midodrine  15 mg Oral TID    multivitamin, therapeutic  1 tablet Oral Daily    mycophenolate  750 mg Oral BID IS    Or    mycophenolate  750 mg Oral or NG Tube BID IS    pantoprazole  40 mg Per Feeding Tube QAM AC    piperacillin-tazobactam  3.375 g Intravenous Q6H    prothrombin 4 factor complex concentrate  1,100 Units Intravenous Once    sodium chloride (PF)  3 mL Intravenous Q8H    sulfamethoxazole-trimethoprim  1 tablet Oral or Feeding Tube Daily    tacrolimus  2 mg Oral BID IS    Or    tacrolimus  2 mg Oral or NG Tube BID IS    thiamine  100 mg Oral Daily    valGANciclovir  450 mg Oral Every Other Day     Or    valGANciclovir  450 mg Oral or NG Tube Every Other Day       Physical Exam:     Admit Weight: 68.6 kg (151 lb 4.8 oz)    Current Vitals:   BP 94/60 (BP Location: Right arm)   Pulse 58   Temp 97.7  F (36.5  C)   Resp 14   Wt 69.3 kg (152 lb 12.5 oz)   SpO2 96%   BMI 27.06 kg/m        Vital sign ranges:    Temp:  [97.2  F (36.2  C)-98.2  F (36.8  C)] 97.7  F (36.5  C)  Pulse:  [52-78] 58  Resp:  [12-14] 14  MAP:  [64 mmHg-79 mmHg] 73 mmHg  Arterial Line BP: ()/(46-58) 100/52  FiO2 (%):  [30 %-60 %] 30 %  SpO2:  [95 %-100 %] 96 %  Patient Vitals for the past 24 hrs:   Temp Temp src Pulse Resp SpO2 Weight   11/06/23 1000 97.7  F (36.5  C) -- 58 14 96 % --   11/06/23 0911 97.9  F (36.6  C) -- 58 14 95 % --   11/06/23 0900 97.9  F (36.6  C) -- 56 14 95 % --   11/06/23 0800 98.1  F (36.7  C) Bladder 55 14 98 % --   11/06/23 0700 97.9  F (36.6  C) -- 53 14 100 % --   11/06/23 0600 98.1  F (36.7  C) -- 52 14 100 % --   11/06/23 0500 98.1  F (36.7  C) -- 52 14 100 % --   11/06/23 0400 98.2  F (36.8  C) Bladder 52 14 100 % --   11/06/23 0300 -- -- 56 14 100 % --   11/06/23 0200 -- -- 56 14 100 % --   11/06/23 0100 -- -- 57 14 100 % --   11/06/23 0000 97.9  F (36.6  C) Oral 58 14 100 % --   11/05/23 2300 -- -- 63 14 100 % --   11/05/23 2200 -- -- 65 12 100 % 69.3 kg (152 lb 12.5 oz)   11/05/23 2100 -- -- 74 13 100 % --   11/05/23 2048 97.2  F (36.2  C) Oral 78 12 100 % --     General Appearance: in no apparent distress.   Skin: normal, warm, jaundice  Heart: NSR  Lungs: CMV.   Abdomen:  Soft, non distended.  The wound is covered with surgical dressing. HARLEY x 2 serosanguinous   :  aguilar  Extremities: no edema  Neurologic: sedated.    Frailty Scores           No data to display                Data:   CMP  Recent Labs   Lab 11/06/23  1007 11/06/23  0905 11/06/23  0502 11/06/23  0425 11/05/23  2217 11/05/23  2148 11/05/23  2005 11/05/23  1851 11/05/23  0520 11/04/23  1049   NA  --   --   --  130*  130*  --   128*  --  127*   < > 125*   POTASSIUM  --   --   --  3.1*  3.1*  --  3.0*  --  2.9*   < > 3.6   CHLORIDE  --   --   --  96*  96*  --  94*  --   --    < > 89*   CO2  --   --   --  24  24  --  22  --   --    < > 18*   * 141*   < > 103*  103*   < > 301*   < > 307*   < > 85   BUN  --   --   --  42.6*  42.6*  --  40.5*  --   --    < > 44.2*   CR  --   --   --  2.50*  2.50*  --  2.31*  --   --    < > 2.72*   GFRESTIMATED  --   --   --  26*  26*  --  28*  --   --    < > 23*   GEE  --   --   --  10.5*  10.5*  --  10.9*  --   --    < > 9.7   ICAW  --   --   --  5.8*  --   --   --  5.4*   < >  --    MAG  --   --   --  1.6*  --   --   --   --   --  1.8   PHOS  --   --   --  3.3  --   --   --   --   --  4.0   AMYLASE  --   --   --  28  --   --   --   --   --  46   LIPASE  --   --   --  11*  --   --   --   --   --   --    ALBUMIN  --   --   --  2.9*  --  3.0*  --   --    < > 4.3   BILITOTAL  --   --   --  6.6*  --  13.4*  --   --    < > 35.9*   ALKPHOS  --   --   --  75  --  80  --   --    < > 83   AST  --   --   --  182*  --  246*  --   --    < > 32   ALT  --   --   --  113*  --  137*  --   --    < > 11    < > = values in this interval not displayed.     CBC  Recent Labs   Lab 11/06/23  1003 11/06/23  0425 11/06/23  0052 11/05/23  5258   HGB 12.0 11.6*   < > 11.0*   WBC  --  3.0*  --  5.4   PLT  --  163  --  162    < > = values in this interval not displayed.     COAGS  Recent Labs   Lab 11/06/23  0425 11/05/23  2148 11/05/23  1854   INR 1.21* 1.44* 1.60*   PTT  --  36 37      Urinalysis  Recent Labs   Lab Test 11/04/23  2309 10/31/23  2100   COLOR Dark Yellow* Orange*   APPEARANCE Slightly Cloudy* Cloudy*   URINEGLC Negative Negative   URINEBILI Moderate* Large*   URINEKETONE Negative Negative   SG 1.016 1.019   UBLD Trace* Trace*   URINEPH 5.5 5.5   PROTEIN 10* 30*   NITRITE Negative Negative   LEUKEST Small* Small*   RBCU 2 4*   WBCU 5 25*     Virology:  Hepatitis C Antibody   Date Value Ref Range Status    11/04/2023 Nonreactive Nonreactive Final

## 2023-11-06 NOTE — PHARMACY-TRANSPLANT NOTE
Adult Liver Transplant Post Operative Note    33 year old female s/p  donor liver transplant on 23 for Alcoholic liver disease.      Planned immunosuppression regimen to include:   INDUCTION with: basiliximab on POD #0 and POD #4 due to poor kidney function which is defined as hemodialysis, SCr > 4 mg/dL or UO < 30 cc/hr and methylprednisolone/prednisone taper per liver transplant protocol.  MAINTENANCE to include mycophenolate and tacrolimus with initial goal trough levels of 5-10 mcg/L for 0-3 months post-transplant for patients with poor kidney function (hemodialysis, GFR < 30, or UO < 30 cc/hr).  Patient may continue prednisone at 5 mg PO daily until tacrolimus level is therapeutic.     Surgical prophylaxis includes: piperacillin-tazobactam IV for 48 hours and fluconazole IV/PO for 7 days .      Opportunistic pathogen prophylaxis includes: trimethoprim/sulfamethoxazole, valganciclovir, and topical antifungal coverage to begin once systemic antifungal therapy is complete.    Patient is not enrolled in medication study.    Pharmacy will monitor for medication interactions and immunosuppression levels in conjunction with the team. Medication therapy needs for discharge planning will continue to be addressed throughout the current admission via multidisciplinary rounds and order review.  Pharmacy will make recommendations as appropriate.

## 2023-11-06 NOTE — INTERIM SUMMARY
OPHTHALMOLOGY BRIEF INTERIM NOTE  11/06/23    Patient: Lola Milner      ASSESSMENT/PLAN:     Attempted to repeat assessment of visual acuity at bedside but the patient was intubated. Please page [email or web address] for repeat surface check and Visual acuity exam when she is extubated and able to participate in an exam (needs to be reading letters, following directions). Please continue ongoing dry eye care:     - Preservative free artificial tears four times a day scheduled, up to q1h as needed      Kerwin Hightower MD  Resident Physician, PGY2  Department of Ophthalmology  11/06/23 5:09 PM

## 2023-11-06 NOTE — PROGRESS NOTES
11/06/23 1500   Appointment Info   Signing Clinician's Name / Credentials (PT) Asia Wheatley, PT, DPT   Rehab Comments (PT) Re-eval   Living Environment   People in Home significant other   Current Living Arrangements apartment   Home Accessibility no concerns   Transportation Anticipated family or friend will provide   Living Environment Comments Pt lives with her fiance in an apartment with elavator access.   Self-Care   Usual Activity Tolerance good   Current Activity Tolerance moderate   Equipment Currently Used at Home raised toilet seat;grab bar, toilet;shower chair;walker, rolling   Fall history within last six months yes   Number of times patient has fallen within last six months 4   Activity/Exercise/Self-Care Comment Pt IND with ADL's prior to admission, since admission now is post op liver transplant   General Information   Onset of Illness/Injury or Date of Surgery 11/05/23   Patient/Family Therapy Goals Statement (PT) Pt wants to go home   Pertinent History of Current Problem (include personal factors and/or comorbidities that impact the POC) 33 year old female with a PMH of MDD, prior tobacco use, gout, polyneuropathy, decompensated cirrhosis secondary to alcohol use complicated by hepatic encephalopathy, esophageal varices status post banding, recurrent ascites, and hepatorenal syndrome who was admitted on 10/31/2023 for evaluation for liver transplantation. She was admitted to SICU s/p uncomplicated liver transplantation on 11/5.   Existing Precautions/Restrictions fall;abdominal   Weight-Bearing Status - LUE full weight-bearing   Weight-Bearing Status - RUE full weight-bearing   Weight-Bearing Status - LLE full weight-bearing   Weight-Bearing Status - RLE full weight-bearing   General Observations Activity: up with assist   Cognition   Affect/Mental Status (Cognition) low arousal/lethargic   Orientation Status (Cognition) oriented x 3   Follows Commands (Cognition) follows one-step  commands;75-90% accuracy   Cognitive Status Comments Pt intubated and coming of sedation, still lethargic, mits for safety with lines due to confusion/agitation   Integumentary/Edema   Integumentary/Edema Comments Slight B LE edema   Posture    Posture Protracted shoulders;Forward head position   Range of Motion (ROM)   Range of Motion ROM is WFL   Strength (Manual Muscle Testing)   Strength Comments Generalized weakness in B LE's   Bed Mobility   Comment, (Bed Mobility) Unable to assess, per RN mod-maxA x 2   Transfers   Comment, (Transfers) Unable to assess due to being lethargic/poor direction following   Gait/Stairs (Locomotion)   Comment, (Gait/Stairs) Unable to assess s/p transplant   Balance   Balance Comments Min-modA for sittting edge of chair, slight posterior lean   Sensory Examination   Sensory Perception patient reports no sensory changes   Coordination   Coordination no deficits were identified   Muscle Tone   Muscle Tone no deficits were identified   Clinical Impression   Criteria for Skilled Therapeutic Intervention Yes, treatment indicated   PT Diagnosis (PT) Impaired functional mobility   Influenced by the following impairments Decreased strength, balance and activity tolerance   Functional limitations due to impairments Inability to complete functional mobility at baseline level of functioning   Clinical Presentation (PT Evaluation Complexity) evolving   Clinical Presentation Rationale Clinical judgement, O2 needs   Clinical Decision Making (Complexity) low complexity   Planned Therapy Interventions (PT) balance training;bed mobility training;gait training;home exercise program;neuromuscular re-education;patient/family education;postural re-education;ROM (range of motion);strengthening;transfer training;home program guidelines   Risk & Benefits of therapy have been explained evaluation/treatment results reviewed;care plan/treatment goals reviewed;risks/benefits reviewed;current/potential barriers  reviewed;participants voiced agreement with care plan;participants included;patient;mother;father   Clinical Impression Comments Pt would benefit from IP PT to progress strength and IND with mobilit within precautions.   PT Total Evaluation Time   PT Eval, Re-eval Minutes (19491) 10   Physical Therapy Goals   PT Frequency 6x/week   PT Predicted Duration/Target Date for Goal Attainment 11/20/23   PT Goals Bed Mobility;Transfers;Gait   PT: Bed Mobility Independent;Within precautions   PT: Transfers Independent   PT: Gait Independent;Greater than 200 feet   Therapeutic Activity   Therapeutic Activities: dynamic activities to improve functional performance Minutes (35478) 25   Symptoms Noted During/After Treatment Fatigue;Increased pain   Treatment Detail/Skilled Intervention PT: Post-reeval focus on IND with sitting and direction following.  Pt remaining inubated, mits on for safety through out session.  A x 2 for management with ETT, VSS through out.  Pt maxA for sitting forawrd off back of chair, sitting forawrd x 1-2 minutes x 3 bouts, poor forawrd flexed posture, able to corect posture for short bouts of time.  Hips scooted to EOC for LE WB on floor.  Pt neeeding cues for keep eyes open and remain alert.  Pt scooted back in chair at end of session, unable to trial stand today due to being lethargic.  Parents educated on maintainng alertness during the day, keeping lights on, etc to minimiz delerium. Pt left sitting in chair all needs met.   PT Discharge Planning   PT Plan PT: EOB/EOC sitting balance, trial A x 2 sit<>stand, up to chair   PT Discharge Recommendation (DC Rec) [facility] patient will benefit from intensive, interdisciplinary therapy.  Anticipate will be able to tolerate 3 hours of therapy per day;home with assist   PT Rationale for DC Rec Pt post op and needing increased assist for mobility, would benefit from intensive therapy to progress IND with mobility as pt was IND prior to  admission.   PT Brief overview of current status ModA for EOC sitting, A x 2 for bed mobility, OH lift for transfers   Total Session Time   Timed Code Treatment Minutes 25   Total Session Time (sum of timed and untimed services) 35

## 2023-11-07 ENCOUNTER — APPOINTMENT (OUTPATIENT)
Dept: GENERAL RADIOLOGY | Facility: CLINIC | Age: 33
End: 2023-11-07
Payer: COMMERCIAL

## 2023-11-07 LAB
ALBUMIN SERPL BCG-MCNC: 3 G/DL (ref 3.5–5.2)
ALBUMIN SERPL BCG-MCNC: 3.8 G/DL (ref 3.5–5.2)
ALLEN'S TEST: ABNORMAL
ALP SERPL-CCNC: 48 U/L (ref 35–104)
ALT SERPL W P-5'-P-CCNC: 46 U/L (ref 0–50)
ANION GAP SERPL CALCULATED.3IONS-SCNC: 13 MMOL/L (ref 7–15)
ANION GAP SERPL CALCULATED.3IONS-SCNC: 13 MMOL/L (ref 7–15)
ANION GAP SERPL CALCULATED.3IONS-SCNC: 14 MMOL/L (ref 7–15)
ANION GAP SERPL CALCULATED.3IONS-SCNC: 16 MMOL/L (ref 7–15)
AST SERPL W P-5'-P-CCNC: 61 U/L (ref 0–45)
BASE EXCESS BLDA CALC-SCNC: -1.4 MMOL/L (ref -9–1.8)
BASOPHILS # BLD AUTO: 0 10E3/UL (ref 0–0.2)
BASOPHILS NFR BLD AUTO: 0 %
BILIRUB DIRECT SERPL-MCNC: 2.3 MG/DL (ref 0–0.3)
BILIRUB SERPL-MCNC: 3.6 MG/DL
BUN SERPL-MCNC: 45.2 MG/DL (ref 6–20)
BUN SERPL-MCNC: 50 MG/DL (ref 6–20)
BUN SERPL-MCNC: 50 MG/DL (ref 6–20)
BUN SERPL-MCNC: 51.2 MG/DL (ref 6–20)
CA-I BLD-MCNC: 4.5 MG/DL (ref 4.4–5.2)
CA-I BLD-MCNC: 5 MG/DL (ref 4.4–5.2)
CALCIUM SERPL-MCNC: 8.4 MG/DL (ref 8.6–10)
CALCIUM SERPL-MCNC: 9.1 MG/DL (ref 8.6–10)
CALCIUM SERPL-MCNC: 9.2 MG/DL (ref 8.6–10)
CALCIUM SERPL-MCNC: 9.2 MG/DL (ref 8.6–10)
CHLORIDE SERPL-SCNC: 94 MMOL/L (ref 98–107)
CHLORIDE SERPL-SCNC: 94 MMOL/L (ref 98–107)
CHLORIDE SERPL-SCNC: 95 MMOL/L (ref 98–107)
CHLORIDE SERPL-SCNC: 96 MMOL/L (ref 98–107)
CREAT SERPL-MCNC: 2.88 MG/DL (ref 0.51–0.95)
CREAT SERPL-MCNC: 3.18 MG/DL (ref 0.51–0.95)
CREAT SERPL-MCNC: 3.18 MG/DL (ref 0.51–0.95)
CREAT SERPL-MCNC: 3.25 MG/DL (ref 0.51–0.95)
DEPRECATED HCO3 PLAS-SCNC: 20 MMOL/L (ref 22–29)
DEPRECATED HCO3 PLAS-SCNC: 21 MMOL/L (ref 22–29)
EGFRCR SERPLBLD CKD-EPI 2021: 19 ML/MIN/1.73M2
EGFRCR SERPLBLD CKD-EPI 2021: 22 ML/MIN/1.73M2
EOSINOPHIL # BLD AUTO: 0 10E3/UL (ref 0–0.7)
EOSINOPHIL NFR BLD AUTO: 0 %
ERYTHROCYTE [DISTWIDTH] IN BLOOD BY AUTOMATED COUNT: 18.6 % (ref 10–15)
GLUCOSE BLDC GLUCOMTR-MCNC: 114 MG/DL (ref 70–99)
GLUCOSE BLDC GLUCOMTR-MCNC: 115 MG/DL (ref 70–99)
GLUCOSE BLDC GLUCOMTR-MCNC: 127 MG/DL (ref 70–99)
GLUCOSE BLDC GLUCOMTR-MCNC: 130 MG/DL (ref 70–99)
GLUCOSE BLDC GLUCOMTR-MCNC: 130 MG/DL (ref 70–99)
GLUCOSE BLDC GLUCOMTR-MCNC: 136 MG/DL (ref 70–99)
GLUCOSE BLDC GLUCOMTR-MCNC: 141 MG/DL (ref 70–99)
GLUCOSE BLDC GLUCOMTR-MCNC: 143 MG/DL (ref 70–99)
GLUCOSE BLDC GLUCOMTR-MCNC: 144 MG/DL (ref 70–99)
GLUCOSE BLDC GLUCOMTR-MCNC: 147 MG/DL (ref 70–99)
GLUCOSE BLDC GLUCOMTR-MCNC: 148 MG/DL (ref 70–99)
GLUCOSE BLDC GLUCOMTR-MCNC: 149 MG/DL (ref 70–99)
GLUCOSE BLDC GLUCOMTR-MCNC: 154 MG/DL (ref 70–99)
GLUCOSE BLDC GLUCOMTR-MCNC: 161 MG/DL (ref 70–99)
GLUCOSE BLDC GLUCOMTR-MCNC: 171 MG/DL (ref 70–99)
GLUCOSE SERPL-MCNC: 127 MG/DL (ref 70–99)
GLUCOSE SERPL-MCNC: 150 MG/DL (ref 70–99)
HCO3 BLD-SCNC: 23 MMOL/L (ref 21–28)
HCT VFR BLD AUTO: 31.5 % (ref 35–47)
HGB BLD-MCNC: 11.3 G/DL (ref 11.7–15.7)
IMM GRANULOCYTES # BLD: 0.1 10E3/UL
IMM GRANULOCYTES NFR BLD: 1 %
INR PPP: 1.31 (ref 0.85–1.15)
LYMPHOCYTES # BLD AUTO: 0.8 10E3/UL (ref 0.8–5.3)
LYMPHOCYTES NFR BLD AUTO: 10 %
MAGNESIUM SERPL-MCNC: 2.5 MG/DL (ref 1.7–2.3)
MAGNESIUM SERPL-MCNC: 2.7 MG/DL (ref 1.7–2.3)
MCH RBC QN AUTO: 31 PG (ref 26.5–33)
MCHC RBC AUTO-ENTMCNC: 35.9 G/DL (ref 31.5–36.5)
MCV RBC AUTO: 86 FL (ref 78–100)
MONOCYTES # BLD AUTO: 0.2 10E3/UL (ref 0–1.3)
MONOCYTES NFR BLD AUTO: 3 %
NEUTROPHILS # BLD AUTO: 7.2 10E3/UL (ref 1.6–8.3)
NEUTROPHILS NFR BLD AUTO: 86 %
NRBC # BLD AUTO: 0 10E3/UL
NRBC BLD AUTO-RTO: 0 /100
O2/TOTAL GAS SETTING VFR VENT: 30 %
OXYHGB MFR BLD: 97 % (ref 92–100)
PCO2 BLD: 34 MM HG (ref 35–45)
PH BLD: 7.43 [PH] (ref 7.35–7.45)
PHOSPHATE SERPL-MCNC: 6.7 MG/DL (ref 2.5–4.5)
PHOSPHATE SERPL-MCNC: 7.3 MG/DL (ref 2.5–4.5)
PLATELET # BLD AUTO: 127 10E3/UL (ref 150–450)
PO2 BLD: 103 MM HG (ref 80–105)
POTASSIUM SERPL-SCNC: 4.1 MMOL/L (ref 3.4–5.3)
POTASSIUM SERPL-SCNC: 4.2 MMOL/L (ref 3.4–5.3)
POTASSIUM SERPL-SCNC: 4.3 MMOL/L (ref 3.4–5.3)
POTASSIUM SERPL-SCNC: 4.3 MMOL/L (ref 3.4–5.3)
PROT SERPL-MCNC: 4.1 G/DL (ref 6.4–8.3)
RBC # BLD AUTO: 3.65 10E6/UL (ref 3.8–5.2)
SODIUM SERPL-SCNC: 128 MMOL/L (ref 135–145)
SODIUM SERPL-SCNC: 128 MMOL/L (ref 135–145)
SODIUM SERPL-SCNC: 131 MMOL/L (ref 135–145)
SODIUM SERPL-SCNC: 131 MMOL/L (ref 135–145)
WBC # BLD AUTO: 8.3 10E3/UL (ref 4–11)

## 2023-11-07 PROCEDURE — 82330 ASSAY OF CALCIUM: CPT | Performed by: INTERNAL MEDICINE

## 2023-11-07 PROCEDURE — P9045 ALBUMIN (HUMAN), 5%, 250 ML: HCPCS | Mod: JZ | Performed by: PHYSICIAN ASSISTANT

## 2023-11-07 PROCEDURE — 71045 X-RAY EXAM CHEST 1 VIEW: CPT | Mod: 26 | Performed by: RADIOLOGY

## 2023-11-07 PROCEDURE — 999N000253 HC STATISTIC WEANING TRIALS

## 2023-11-07 PROCEDURE — 82805 BLOOD GASES W/O2 SATURATION: CPT

## 2023-11-07 PROCEDURE — 82248 BILIRUBIN DIRECT: CPT | Performed by: STUDENT IN AN ORGANIZED HEALTH CARE EDUCATION/TRAINING PROGRAM

## 2023-11-07 PROCEDURE — 250N000011 HC RX IP 250 OP 636: Mod: JZ | Performed by: PHYSICIAN ASSISTANT

## 2023-11-07 PROCEDURE — 80069 RENAL FUNCTION PANEL: CPT | Performed by: STUDENT IN AN ORGANIZED HEALTH CARE EDUCATION/TRAINING PROGRAM

## 2023-11-07 PROCEDURE — 250N000011 HC RX IP 250 OP 636: Mod: JZ

## 2023-11-07 PROCEDURE — 250N000011 HC RX IP 250 OP 636: Mod: JZ | Performed by: TRANSPLANT SURGERY

## 2023-11-07 PROCEDURE — 250N000011 HC RX IP 250 OP 636: Mod: JZ | Performed by: STUDENT IN AN ORGANIZED HEALTH CARE EDUCATION/TRAINING PROGRAM

## 2023-11-07 PROCEDURE — 250N000012 HC RX MED GY IP 250 OP 636 PS 637: Performed by: STUDENT IN AN ORGANIZED HEALTH CARE EDUCATION/TRAINING PROGRAM

## 2023-11-07 PROCEDURE — 83735 ASSAY OF MAGNESIUM: CPT | Performed by: STUDENT IN AN ORGANIZED HEALTH CARE EDUCATION/TRAINING PROGRAM

## 2023-11-07 PROCEDURE — 250N000013 HC RX MED GY IP 250 OP 250 PS 637: Performed by: INTERNAL MEDICINE

## 2023-11-07 PROCEDURE — 94003 VENT MGMT INPAT SUBQ DAY: CPT

## 2023-11-07 PROCEDURE — P9045 ALBUMIN (HUMAN), 5%, 250 ML: HCPCS | Mod: JZ

## 2023-11-07 PROCEDURE — 82330 ASSAY OF CALCIUM: CPT | Performed by: STUDENT IN AN ORGANIZED HEALTH CARE EDUCATION/TRAINING PROGRAM

## 2023-11-07 PROCEDURE — 85025 COMPLETE CBC W/AUTO DIFF WBC: CPT | Performed by: STUDENT IN AN ORGANIZED HEALTH CARE EDUCATION/TRAINING PROGRAM

## 2023-11-07 PROCEDURE — 84100 ASSAY OF PHOSPHORUS: CPT | Performed by: INTERNAL MEDICINE

## 2023-11-07 PROCEDURE — 99291 CRITICAL CARE FIRST HOUR: CPT | Mod: GC | Performed by: SURGERY

## 2023-11-07 PROCEDURE — 99233 SBSQ HOSP IP/OBS HIGH 50: CPT | Mod: 24 | Performed by: INTERNAL MEDICINE

## 2023-11-07 PROCEDURE — 90947 DIALYSIS REPEATED EVAL: CPT

## 2023-11-07 PROCEDURE — 999N000065 XR CHEST PORT 1 VIEW

## 2023-11-07 PROCEDURE — 250N000013 HC RX MED GY IP 250 OP 250 PS 637

## 2023-11-07 PROCEDURE — 85610 PROTHROMBIN TIME: CPT | Performed by: STUDENT IN AN ORGANIZED HEALTH CARE EDUCATION/TRAINING PROGRAM

## 2023-11-07 PROCEDURE — 80053 COMPREHEN METABOLIC PANEL: CPT | Performed by: STUDENT IN AN ORGANIZED HEALTH CARE EDUCATION/TRAINING PROGRAM

## 2023-11-07 PROCEDURE — 250N000013 HC RX MED GY IP 250 OP 250 PS 637: Performed by: PHYSICIAN ASSISTANT

## 2023-11-07 PROCEDURE — 250N000013 HC RX MED GY IP 250 OP 250 PS 637: Performed by: EMERGENCY MEDICINE

## 2023-11-07 PROCEDURE — 83735 ASSAY OF MAGNESIUM: CPT | Performed by: INTERNAL MEDICINE

## 2023-11-07 PROCEDURE — 200N000002 HC R&B ICU UMMC

## 2023-11-07 PROCEDURE — 999N000157 HC STATISTIC RCP TIME EA 10 MIN

## 2023-11-07 PROCEDURE — 250N000009 HC RX 250: Performed by: INTERNAL MEDICINE

## 2023-11-07 PROCEDURE — 80048 BASIC METABOLIC PNL TOTAL CA: CPT | Performed by: PHYSICIAN ASSISTANT

## 2023-11-07 RX ORDER — VALGANCICLOVIR 450 MG/1
450 TABLET, FILM COATED ORAL EVERY OTHER DAY
Status: DISCONTINUED | OUTPATIENT
Start: 2023-11-08 | End: 2023-11-08

## 2023-11-07 RX ORDER — SULFAMETHOXAZOLE AND TRIMETHOPRIM 400; 80 MG/1; MG/1
1 TABLET ORAL DAILY
Status: DISCONTINUED | OUTPATIENT
Start: 2023-11-08 | End: 2023-11-09

## 2023-11-07 RX ORDER — CALCIUM CHLORIDE, MAGNESIUM CHLORIDE, SODIUM CHLORIDE, SODIUM BICARBONATE, POTASSIUM CHLORIDE AND SODIUM PHOSPHATE DIBASIC DIHYDRATE 3.68; 3.05; 6.34; 3.09; .314; .187 G/L; G/L; G/L; G/L; G/L; G/L
12.5 INJECTION INTRAVENOUS CONTINUOUS
Status: DISCONTINUED | OUTPATIENT
Start: 2023-11-07 | End: 2023-11-09

## 2023-11-07 RX ORDER — SULFAMETHOXAZOLE AND TRIMETHOPRIM 400; 80 MG/1; MG/1
1 TABLET ORAL
Status: DISCONTINUED | OUTPATIENT
Start: 2023-11-08 | End: 2023-11-07

## 2023-11-07 RX ORDER — CALCIUM CHLORIDE, MAGNESIUM CHLORIDE, SODIUM CHLORIDE, SODIUM BICARBONATE, POTASSIUM CHLORIDE AND SODIUM PHOSPHATE DIBASIC DIHYDRATE 3.68; 3.05; 6.34; 3.09; .314; .187 G/L; G/L; G/L; G/L; G/L; G/L
INJECTION INTRAVENOUS CONTINUOUS
Status: DISCONTINUED | OUTPATIENT
Start: 2023-11-07 | End: 2023-11-09

## 2023-11-07 RX ORDER — POTASSIUM CHLORIDE 29.8 MG/ML
20 INJECTION INTRAVENOUS EVERY 8 HOURS PRN
Status: DISCONTINUED | OUTPATIENT
Start: 2023-11-07 | End: 2023-11-09

## 2023-11-07 RX ORDER — VALGANCICLOVIR 450 MG/1
450 TABLET, FILM COATED ORAL
Status: DISCONTINUED | OUTPATIENT
Start: 2023-11-09 | End: 2023-11-07

## 2023-11-07 RX ORDER — PIPERACILLIN SODIUM, TAZOBACTAM SODIUM 2; .25 G/10ML; G/10ML
2.25 INJECTION, POWDER, LYOPHILIZED, FOR SOLUTION INTRAVENOUS EVERY 6 HOURS
Status: COMPLETED | OUTPATIENT
Start: 2023-11-07 | End: 2023-11-07

## 2023-11-07 RX ORDER — HYDROMORPHONE HCL IN WATER/PF 6 MG/30 ML
0.4 PATIENT CONTROLLED ANALGESIA SYRINGE INTRAVENOUS ONCE
Status: COMPLETED | OUTPATIENT
Start: 2023-11-07 | End: 2023-11-07

## 2023-11-07 RX ORDER — VALGANCICLOVIR HYDROCHLORIDE 50 MG/ML
450 POWDER, FOR SOLUTION ORAL EVERY OTHER DAY
Status: DISCONTINUED | OUTPATIENT
Start: 2023-11-08 | End: 2023-11-08

## 2023-11-07 RX ORDER — CALCIUM GLUCONATE 20 MG/ML
2 INJECTION, SOLUTION INTRAVENOUS EVERY 8 HOURS PRN
Status: DISCONTINUED | OUTPATIENT
Start: 2023-11-07 | End: 2023-11-09

## 2023-11-07 RX ORDER — VALGANCICLOVIR HYDROCHLORIDE 50 MG/ML
450 POWDER, FOR SOLUTION ORAL
Status: DISCONTINUED | OUTPATIENT
Start: 2023-11-09 | End: 2023-11-07

## 2023-11-07 RX ORDER — MAGNESIUM SULFATE HEPTAHYDRATE 40 MG/ML
2 INJECTION, SOLUTION INTRAVENOUS EVERY 8 HOURS PRN
Status: DISCONTINUED | OUTPATIENT
Start: 2023-11-07 | End: 2023-11-09

## 2023-11-07 RX ORDER — HYDROMORPHONE HCL IN WATER/PF 6 MG/30 ML
PATIENT CONTROLLED ANALGESIA SYRINGE INTRAVENOUS
Status: COMPLETED
Start: 2023-11-07 | End: 2023-11-07

## 2023-11-07 RX ADMIN — GABAPENTIN 100 MG: 100 CAPSULE ORAL at 07:42

## 2023-11-07 RX ADMIN — THIAMINE HCL TAB 100 MG 100 MG: 100 TAB at 07:42

## 2023-11-07 RX ADMIN — CALCIUM CHLORIDE, MAGNESIUM CHLORIDE, SODIUM CHLORIDE, SODIUM BICARBONATE, POTASSIUM CHLORIDE AND SODIUM PHOSPHATE DIBASIC DIHYDRATE 12.5 ML/KG/HR: 3.68; 3.05; 6.34; 3.09; .314; .187 INJECTION INTRAVENOUS at 21:45

## 2023-11-07 RX ADMIN — CALCIUM CHLORIDE, MAGNESIUM CHLORIDE, SODIUM CHLORIDE, SODIUM BICARBONATE, POTASSIUM CHLORIDE AND SODIUM PHOSPHATE DIBASIC DIHYDRATE 12.5 ML/KG/HR: 3.68; 3.05; 6.34; 3.09; .314; .187 INJECTION INTRAVENOUS at 15:53

## 2023-11-07 RX ADMIN — Medication 1 DROP: at 20:27

## 2023-11-07 RX ADMIN — ACETAMINOPHEN 325 MG: 325 TABLET, FILM COATED ORAL at 21:45

## 2023-11-07 RX ADMIN — TACROLIMUS 2 MG: 5 CAPSULE ORAL at 17:35

## 2023-11-07 RX ADMIN — HYDROMORPHONE HYDROCHLORIDE 0.2 MG: 0.2 INJECTION, SOLUTION INTRAMUSCULAR; INTRAVENOUS; SUBCUTANEOUS at 15:08

## 2023-11-07 RX ADMIN — Medication 1 DROP: at 12:20

## 2023-11-07 RX ADMIN — CHLORHEXIDINE GLUCONATE 15 ML: 1.2 SOLUTION ORAL at 07:42

## 2023-11-07 RX ADMIN — Medication 0.2 MG: at 15:08

## 2023-11-07 RX ADMIN — FOLIC ACID 1 MG: 1 TABLET ORAL at 07:42

## 2023-11-07 RX ADMIN — PIPERACILLIN AND TAZOBACTAM 3.38 G: 3; .375 INJECTION, POWDER, LYOPHILIZED, FOR SOLUTION INTRAVENOUS at 04:41

## 2023-11-07 RX ADMIN — PIPERACILLIN SODIUM AND TAZOBACTAM SODIUM 2.25 G: 2; .25 INJECTION, POWDER, LYOPHILIZED, FOR SOLUTION INTRAVENOUS at 10:24

## 2023-11-07 RX ADMIN — ALBUMIN HUMAN 50 G: 0.05 INJECTION, SOLUTION INTRAVENOUS at 23:42

## 2023-11-07 RX ADMIN — FLUCONAZOLE 200 MG: 2 INJECTION, SOLUTION INTRAVENOUS at 12:44

## 2023-11-07 RX ADMIN — Medication 1 DROP: at 07:42

## 2023-11-07 RX ADMIN — CHLORHEXIDINE GLUCONATE 15 ML: 1.2 SOLUTION ORAL at 20:27

## 2023-11-07 RX ADMIN — TACROLIMUS 2 MG: 5 CAPSULE ORAL at 07:42

## 2023-11-07 RX ADMIN — MYCOPHENOLATE MOFETIL 750 MG: 200 POWDER, FOR SUSPENSION ORAL at 17:35

## 2023-11-07 RX ADMIN — Medication 1 DROP: at 15:54

## 2023-11-07 RX ADMIN — CALCIUM CHLORIDE, MAGNESIUM CHLORIDE, SODIUM CHLORIDE, SODIUM BICARBONATE, POTASSIUM CHLORIDE AND SODIUM PHOSPHATE DIBASIC DIHYDRATE: 3.68; 3.05; 6.34; 3.09; .314; .187 INJECTION INTRAVENOUS at 15:53

## 2023-11-07 RX ADMIN — Medication 40 MG: at 07:25

## 2023-11-07 RX ADMIN — ALBUMIN HUMAN 25 G: 0.05 INJECTION, SOLUTION INTRAVENOUS at 06:16

## 2023-11-07 RX ADMIN — ACETAMINOPHEN 325 MG: 325 TABLET, FILM COATED ORAL at 10:24

## 2023-11-07 RX ADMIN — ACETAMINOPHEN 325 MG: 325 TABLET, FILM COATED ORAL at 17:35

## 2023-11-07 RX ADMIN — THERA TABS 1 TABLET: TAB at 12:19

## 2023-11-07 RX ADMIN — METHYLPREDNISOLONE SODIUM SUCCINATE 100 MG: 125 INJECTION, POWDER, FOR SOLUTION INTRAMUSCULAR; INTRAVENOUS at 07:36

## 2023-11-07 RX ADMIN — ACETAMINOPHEN 325 MG: 325 TABLET, FILM COATED ORAL at 06:16

## 2023-11-07 RX ADMIN — ALBUMIN HUMAN 50 G: 0.05 INJECTION, SOLUTION INTRAVENOUS at 17:49

## 2023-11-07 RX ADMIN — PIPERACILLIN SODIUM AND TAZOBACTAM SODIUM 2.25 G: 2; .25 INJECTION, POWDER, LYOPHILIZED, FOR SOLUTION INTRAVENOUS at 15:53

## 2023-11-07 RX ADMIN — ACETAMINOPHEN 325 MG: 325 TABLET, FILM COATED ORAL at 14:04

## 2023-11-07 RX ADMIN — MYCOPHENOLATE MOFETIL 750 MG: 200 POWDER, FOR SUSPENSION ORAL at 07:41

## 2023-11-07 RX ADMIN — ACETAMINOPHEN 325 MG: 325 TABLET, FILM COATED ORAL at 03:01

## 2023-11-07 RX ADMIN — ALBUMIN HUMAN 50 G: 0.05 INJECTION, SOLUTION INTRAVENOUS at 12:15

## 2023-11-07 ASSESSMENT — ACTIVITIES OF DAILY LIVING (ADL)
ADLS_ACUITY_SCORE: 36

## 2023-11-07 NOTE — PROGRESS NOTES
Attempted PS trial x4 today. Patient failed each time due to apneic periods. Will attempt again when patient is more alert and awake.    11/6/2023  Jayda Moreno, RT

## 2023-11-07 NOTE — PLAN OF CARE
Major Shift Events:  Pt remains intubated. Pt follows commands and moves all extremities. Pt remains hemodynamically stable. PS trial x 3. Pt becomes apneic. Pt was more awake during PS trial at 1100 and lasted for 20 minutes. Pt's UO 0-5 ml q 2hrs. RIJ trialysis line placed at 1530 and verified by CXR. CRRT started at 1654. Goals of therapy Is=Os. HARLEY output replaced ml/ml with Albumin 5% IV. Pt up to chair with mech lift x 2hrs.Bilat soft mitts on due to risk of pt dislodging ETT and other tubes. Pt's family here and updated.  Plan: Continue plan of care. Notify MD of any changes. Reassess need for restraints.  For vital signs and complete assessments, please see documentation flowsheets.       Goal Outcome Evaluation:      Plan of Care Reviewed With: patient, family

## 2023-11-07 NOTE — PROGRESS NOTES
SURGICAL ICU ADMISSION NOTE  11/07/2023      PRIMARY TEAM: Liver Transplant  PRIMARY PHYSICIAN: Dr. Lowe  REASON FOR CRITICAL CARE ADMISSION: Hemodynamic, respiratory monitoring   ADMITTING PHYSICIAN: Dr. Villavicencio  Date of Service (when I saw the patient): 11/07/2023    ASSESSMENT:  Lola Milner is a 33 year old female with a PMH of MDD, prior tobacco use, gout, polyneuropathy, decompensated cirrhosis secondary to alcohol use complicated by hepatic encephalopathy, esophageal varices status post banding, recurrent ascites, and hepatorenal syndrome who was admitted on 10/31/2023 for evaluation for liver transplantation. She was admitted to SICU s/p uncomplicated liver transplantation on 11/5.    PLAN:  - Off propofol - wean to extubation  - Bedside swallow eval post-extubation - advance diet as tolerated  - Discontinue maintenance LR  - Start CRRT per transplant nephrology    Neurological:  # Acute pain   # Sedation for mechanical ventilation  - Monitor neurological status. Delirium preventions and precautions.   - Pain:    Gtt: Oxy 2.5-5 Q4 PRN   PRN: acetaminophen (2gm max daily dose)  - Sedation plan:   - Gtt: Off Propofol   - RASS goal 0 to -1 for extubation    # Hx MDD  # Hx polyneuropathy  - Continue PTA Gabapentin 100 mg TID, HOLD melatonin at HS PRN    Pulmonary:   # Post operative ventilatory support  - VENT: CPAP 10/5 on 30% FiO2  - Multiple failed attempts at pressure support; attempt extubation following CRRT initiation  - Supplemental oxygen to keep saturation above 92 %.  - Incentive spirometer every 15- 30 minutes.     Cardiovascular:    # Hypotension   # Shock hypovolemic from hemorrhage   - Monitor hemodynamic status. Goal MAP >65.   - Off pressors  - Midodrine on hold -- was 15 mg TID prior to transplantation  - EBL: 3500 cc  - Intraoperative resuscitation: 5u pRBCs, 10u FFP, 4u Plt, cellsaver 880cc, 3.75L crystalloid, 400cc albumin, fibriga 3, PCC 1000cc    Gastroenterology/Nutrition:  # S/p liver  transplantation 11/5/23  # Decompensated alcohol cirrhosis c/b HRS, HE, recurrent ascites, esophageal varices s/p banding (9/23)  # Hyperbilirubinemia  - Liver transplant team primary  - Trend hepatic labs; AST/ALT and T.Bili downtrending  - Immediate post-op liver US - prelim read   - Unremarkable appearance of liver   - Perihepatic 6.6 cm fluid collection in hepatorenal space (drain in place)   - small left pleural effusion  - Pantoprazole for ppx  - Transplant immunosuppressants and prophylaxis as below.     # Severe protein calorie deficit malnutrition    - RD consult. Appreciate cares and recommendations.   - Bedside swallow eval; advance diet as tolerated  - Will consider starting enteral nutrition if unable to take by mouth POD2    Renal/Fluids/Electrolytes:   # Hyponatremia  # Acute kidney injury   # Hepatorenal syndrome  # CKD and EDGAR  - Transplant nephrology following, appreciate support.   - Hold IV fluids   - Replace RIJ CVC with Trialysis Catheter  - Start CRRT today  - Baseline Cr ~ 0.8-1.0; Cr 3.18 on 11/7  - Urine output is ~125 last 24 hours. Will continue to monitor intake and output.   - HOLD mIVF   - Mg 2.7, K 4.3, Phos 7.3 on 11/7 AM    Endocrine:   # Stress hyperglycemia   - Insulin infusion. Currently at 0.5u/hr  - Goal to keep BG< 180 for optimal wound healing.       ID:  # immunosuppression   - induction steroids per transplant, steroid taper, MMF, and tacrolimus per transplant   # immunoprophylaxis  - Valcyte, Fluconazole    # perioperative antibiotics: zosyn      Heme:     # Acute blood loss anemia   # Coagulopathy due to cirrhosis and surgical blood loss    # Thrombocytopenia   # Anemia of critical illness   # Chronic normocytic anemia  - Hemoglobin prior to transplant ~7; Hgb 11.3 this AM   - 1u cryoprecipitate for fibrinogen of 177 on 11/6 AM  - Platelet baseline 70-80 prior to transplant; Plt 127 on 11/6 AM  - Transfusion parameters;   - Plt >20k if not bleeding   - Hgb > 8   - INR <  "2   - Fibrinogen > 200      Musculoskeletal:  # Weakness and deconditioning of critical illness   - Physical and occupational therapy consult   - Utilized a walker at baseline    Skin:  - Diligent skin cares to prevent breakdown    General Cares/Prophylaxis:    DVT Prophylaxis: Defer to primary service. Chemical prophylaxis None   GI Prophylaxis: PPI  Restraints: Restraints for medical healing needed: NO    Lines/ tubes/ drains:  - RIJ CVC - exchange for right trialysis catheter  - L axillary A-line  - PIV  - ETT  - 2x HARLEY drain  - NG removal pending bedside swallow eval    Disposition:  -  Surgical ICU    Patient seen, findings and plan discussed with surgical ICU staff, Dr. Garcia.    Kulwant Avalos, MS4  [employer]    Clinically Significant Risk Factors        # Hypokalemia: Lowest K = 2.9 mmol/L in last 2 days, will replace as needed  # Hyponatremia: Lowest Na = 123 mmol/L in last 2 days, will monitor as appropriate  # Hypocalcemia: Lowest iCa = 4.1 mg/dL in last 2 days, will monitor and replace as appropriate  # Hypercalcemia: Highest Ca = 10.9 mg/dL in last 2 days, will monitor as appropriate  # Hypomagnesemia: Lowest Mg = 1.6 mg/dL in last 2 days, will replace as needed   # Hypoalbuminemia: Lowest albumin = 2.9 g/dL at 11/6/2023  4:25 AM, will monitor as appropriate    # Coagulation Defect: INR = 1.31 (Ref range: 0.85 - 1.15) and/or PTT = 36 Seconds (Ref range: 22 - 38 Seconds), will monitor for bleeding  # Thrombocytopenia: Lowest platelets = 111 in last 2 days, will monitor for bleeding  # Acute Kidney Injury, unspecified: based on a >150% or 0.3 mg/dL increase in last creatinine compared to past 90 day average, will monitor renal function         # Overweight: Estimated body mass index is 28.16 kg/m  as calculated from the following:    Height as of an earlier encounter on 10/31/23: 1.6 m (5' 3\").    Weight as of this encounter: 72.1 kg (158 lb 15.2 oz).   # Severe Malnutrition: based on nutrition " assessment      # Financial/Environmental Concerns: none (Pt reports her SO is able to pay rent and pay her bills)              - - - - - - - - - - - - - - - - - - - - - - - - - - - - - - - - - - - - - - - - - - - - - -   INTERVAL HISTORY:  No acute events overnight. Patient sitting in chair comfortable this AM, intubated. Reports some abdominal pain, moves extremities bilaterally. Minimal urine output this AM (5-7 /hr). Discussed starting CRRT with transplant nephro. Nursing attempted to pressure support x3 for extubation this AM and patient became apneic each time. Remains somewhat somnolent, but interactive and discussing her needs by writing on paper. No other concerns at this time.     HISTORY PRESENTING ILLNESS: Lola Milner is a 33 year old female with a PMH of MDD, prior tobacco use (quit 2023), gout, polyneuropathy, decompensated cirrhosis secondary to alcohol use (last drink 23) complicated by hepatic encephalopathy, esophageal varices status post banding, recurrent ascites, and hepatorenal syndrome who was admitted on 10/31/2023 for evaluation for liver transplantation from [facility].  She was admitted to SICU s/p liver transplantation on     REVIEW OF SYSTEMS: 10 point ROS neg other than the symptoms noted above in the HPI.    PAST MEDICAL HISTORY:   Past Medical History:   Diagnosis Date    Alcoholic cirrhosis of liver with ascites (H) 2023    History of alcohol abuse     Formatting of this note might be different from the original. In remission as of late 2023       SURGICAL HISTORY:   Past Surgical History:   Procedure Laterality Date    BENCH LIVER  2023    Procedure: Bench liver;  Surgeon: Aime Lowe MD;  Location: [facility] OR    TRANSPLANT LIVER RECIPIENT  DONOR N/A 2023    Procedure: Transplant liver recipient  donor;  Surgeon: Aime Lowe MD;  Location:  OR       SOCIAL HISTORY:   Social History     Socioeconomic History    Marital  status: Single   Tobacco Use    Smoking status: Former     Types: Cigarettes     Quit date: 2023     Years since quittin.4    Smokeless tobacco: Never   Substance and Sexual Activity    Alcohol use: Not Currently     Comment: last ETOH use 2023    Drug use: Never     FAMILY HISTORY: No bleeding/clotting disorders nor problems with anesthesia.     ALLERGIES:   No Known Allergies    MEDICATIONS:  No current facility-administered medications on file prior to encounter.  gabapentin (NEURONTIN) 100 MG capsule, Take 100 mg by mouth 3 times daily  lactulose (CHRONULAC) 10 GM/15ML solution, Take 30 mLs by mouth 3 times daily  melatonin 3 MG tablet, Take 3 mg by mouth nightly as needed  midodrine (PROAMATINE) 5 MG tablet, Take 15 mg by mouth 3 times daily  Multiple Vitamins-Minerals (THERA-TABS M) TABS, Take 1 tablet by mouth daily  omeprazole (PRILOSEC) 20 MG DR capsule, Take 20 mg by mouth daily  sodium bicarbonate 650 MG tablet, Take 1,300 mg by mouth 2 times daily For 10 days        PHYSICAL EXAMINATION:  Temp:  [96.6  F (35.9  C)-98.1  F (36.7  C)] 97.3  F (36.3  C)  Pulse:  [45-61] 46  Resp:  [9-16] 14  BP: (114-120)/(56-62) 114/56  MAP:  [69 mmHg-85 mmHg] 79 mmHg  Arterial Line BP: ()/(51-61) 113/56  FiO2 (%):  [30 %-40 %] 30 %  SpO2:  [93 %-100 %] 100 %  General: Intubated, sedated  HEENT: CVC in RIJ, NCAT, ETT with NG in place  Neuro: Opens eyes to voice, following commands briskly in all four extremities  Pulm/Resp: Intubated on above vent settings  CV: RRR, maintaining MAPs off pressors  Abdomen: Soft, non-tender, 2x HARLEY drain with serosanguinous drainage  :  Izaguirre catheter in place, minimal urine  Incisions/Skin: C/D/I with minimal strikethrough  MSK/Extremities: No peripheral edema, extremities well perfused, DP pulses present bilaterally    LABS: Reviewed.   Arterial Blood Gases   Recent Labs   Lab 23  2147 23  1851 23  1751 23  1656   PH 7.31* 7.39 7.41 7.36   PCO2  49* 37 35 33*   PO2 167* 134* 108* 119*   HCO3 25 22 22 19*     Complete Blood Count   Recent Labs   Lab 11/07/23  0505 11/06/23  1624 11/06/23  1003 11/06/23  0425 11/06/23  0052 11/05/23 2148   WBC 8.3 8.5  --  3.0*  --  5.4   HGB 11.3* 11.8 12.0 11.6*   < > 11.0*   * 141*  --  163  --  162    < > = values in this interval not displayed.     Basic Metabolic Panel  Recent Labs   Lab 11/07/23  0608 11/07/23  0505 11/07/23  0444 11/07/23  0004 11/06/23  1737 11/06/23  1624 11/06/23  0502 11/06/23 0425 11/05/23 2217 11/05/23 2148   NA  --  128*  128*  --   --   --  130*  --  130*  130*  --  128*   POTASSIUM  --  4.3  4.3  --   --   --  4.0  --  3.1*  3.1*  --  3.0*   CHLORIDE  --  94*  94*  --   --   --  95*  --  96*  96*  --  94*   CO2  --  21*  21*  --   --   --  21*  --  24  24  --  22   BUN  --  50.0*  50.0*  --   --   --  44.7*  --  42.6*  42.6*  --  40.5*   CR  --  3.18*  3.18*  --   --   --  2.83*  --  2.50*  2.50*  --  2.31*   * 150*  150* 149* 130*   < > 122*   < > 103*  103*   < > 301*    < > = values in this interval not displayed.     Liver Function Tests  Recent Labs   Lab 11/07/23  0505 11/06/23  1624 11/06/23  0425 11/05/23 2148 11/05/23  1740 11/05/23  0520   AST 61*  --  182* 246*  --  31   ALT 46  --  113* 137*  --  11   ALKPHOS 48  --  75 80  --  82   BILITOTAL 3.6*  --  6.6* 13.4*  --  36.9*   ALBUMIN 3.0*  --  2.9* 3.0*  --  4.2   INR 1.31* 1.31* 1.21* 1.44*   < > 4.35*    < > = values in this interval not displayed.     Pancreatic Enzymes  Recent Labs   Lab 11/06/23  0425 11/04/23  1049   LIPASE 11*  --    AMYLASE 28 46     Coagulation Profile  Recent Labs   Lab 11/07/23  0505 11/06/23  1624 11/06/23  0425 11/05/23  2148 11/05/23  1854 11/05/23  1740 11/05/23  0520 11/04/23  1049   INR 1.31* 1.31* 1.21* 1.44* 1.60* 1.89*   < > 4.31*   PTT  --   --   --  36 37 54*  --  84*    < > = values in this interval not displayed.       IMAGING:  No results found for this or  any previous visit (from the past 24 hour(s)).

## 2023-11-07 NOTE — PROGRESS NOTES
Neuro: Pupils  round, equal and reactive. Patient opens eyes to voice, follows commands, able to nod or shake head to answer yes and no. Patient moves all extremities, BUE stronger than BLE. No sedation overnight.  CV: MAPs maintaining >65. Pulses palpable. CVPs <10.  Resp: CMV/AC 30%/400/5/14, minimal ETT thick yellowish secretions, satts 100%. Afebrile, Lungs clear diminished in bases.  SICU wanted patient to pressure support this morning. RT stated patient failed three times- was going apneic. Will try later today.  GI/: Izaguirre catheter-UOP between 0-5ml q2hrs, SICU updated, no BM  Lines: R FA PIV, L FA PIV, R internal jugular CVC w/MAC, and single lumen  Gtts: Insulin restarted at 0447, currently at 1u/hr, LR at 100ml/hr  Mitts restraints bilateral wrist on, patient keeps reaching for breathing tube, responds to redirection.  Skin: Clamshell incision dressing marked, no changes, R Abd HARLEY drains x2 w/serous output.    Plan: Continue to monitor, try pressure support again later. Anticipate extubation if pressure support is successful.    For vital signs and complete assessments, please see documentation flowsheets.

## 2023-11-07 NOTE — PLAN OF CARE
Major Shift Events: PS trial X 4 today, failed due to apnea each time.   Neuro- Opens eyes to voice or gentle touch. PERRL. Alert to self, family, place. Moves all extremities on command. Communicates by nodding/shaking head. All sedating medications held from 0920 to 1800. At 1800 PRN Oxycodone 2.5 mg given for pain.   CV- SB without ectopy. Afebrile. MAP > 65 without intervention. Generalized +1-2 edema.   Resp- Lung sounds clear/dim. Minimal vent settings. Scant yellow thick secretions.   GI- NPO. NG to LIS with scan output.   - Izaguirre with UO 7.5-10 mL/hr.   HARLEY drain x2, output replaced 2 mL HARLEY to 1 mL of Albumin per order.   Social- Updated Mom Thao and Step dad at bedside. Patient has cool tattoo of her cat on R forearm.   Plan: Continue cares per orders.   For vital signs and complete assessments, please see documentation flowsheets.

## 2023-11-07 NOTE — PROGRESS NOTES
[facility]  Transplant Nephrology Progress notes  Date of Admission:  10/31/2023  Today's Date: 11/07/2023  Requesting physician: No att. providers found    Recommendations:  - initiating on CRRT with given anuria  - will maintain UF at I=O for now  - Agree with fluid/albumin resuscitation with given increased drain out put  - Holding high dose vitamin D insetting of hypercalcemia  - Obtained consent for RRT and placed in pt's chart  - Hyponatremia, concerned about over correction with given CRRT, will monitor closely and if sodium is > 135 meq in next 24 hours, will start her on D5W to avoid over correction.    RECOMMEND PLACING A TEMP LINE IN THE OR     Assessment & Plan   # CKD and EDGAR: Trend up in SCr.  Baseline prior to liver transplant was at ~1 but prior to transplant have had some EDGAR with creatinine of 2.6-2.7 and peaked at ~3, and become anuric since midnight.  EDGAR related to significant volume loss.    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     # Liver Tx: LFT's were improving.     # Immunosuppression: Tacrolimus immediate release (goal 8-10) and Mycophenolate mofetil (dose 750 mg every 12 hours)   - Patient is in an immunosuppressed state and will continue to monitor for efficacy and toxicity of immunosuppression medications.   - Changes: Not at this time, per liver team    # Infection Prophylaxis:    - PJP: Sulfa/TMP (Bactrim)- will be started post-txp   - CMV: Valganciclovir (Valcyte)-will be started post-txp    # Hypotension: Hypotensive requiring midodrine;  Goal BP: > 100, but < 130 systolic   - Volume status: Mildly hypervolemic     - Changes: No.    # Anemia in Chronic Renal Disease: Hgb: Stable      NIRMAL: No   - Iron studies: Not checked recently    # Mineral Bone Disorder:    - Secondary renal  hyperparathyroidism; PTH level: Not checked recently        On treatment: None   - Vitamin D; level: Low        On supplement: No, received high dose once, but holding further doses with given hypercalcemia.   - Calcium; level: High        On supplement: No   - Phosphorus; level: Normal        On supplement: No    # Electrolytes:   - Potassium; level: Low        On supplement:  replace as needed to goal of 3.5 to 4 meq  - Magnesium; level: Low        On supplement: Yes  - Bicarbonate; level: Normal        On supplement: No  - Sodium; level: Low. Will monitor for over correction.     #Depression: well managed. Defer to primary team     # Transplant History:  Etiology of Kidney Failure: Hepatorenal syndrome (HRS)  Tx: CKD and liver transplant  Transplant: 11/5/2023 (Liver)  Crossmatch at time of Tx: pending  Significant changes in immunosuppression: None  Significant transplant-related complications: None    Recommendations were communicated to the primary team verbally.    Ely Trujillo MD  Pager: [phone]    REASON FOR CONSULT   Liver transplant    History of Present Illness   Pt s/p liver transplant, continue to be intubated. Stable hemodynamics. Able to communicate with writing. No significant symptoms noted today. She is awake and will undergo trials for her extubation.    Review of Systems    Was not able to review all as she was intubated and sedated this morning    Past Medical History    I have reviewed this patient's medical history and updated it with pertinent information if needed.   Past Medical History:   Diagnosis Date    Alcoholic cirrhosis of liver with ascites (H) 08/30/2023    History of alcohol abuse     Formatting of this note might be different from the original. In remission as of late June 2023       Past Surgical History   I have reviewed this patient's surgical history and updated it with pertinent information if needed.  Past Surgical History:   Procedure Laterality Date    BENCH LIVER   2023    Procedure: Bench liver;  Surgeon: Aime Lowe MD;  Location: [facility] OR    TRANSPLANT LIVER RECIPIENT  DONOR N/A 2023    Procedure: Transplant liver recipient  donor;  Surgeon: Aime Lowe MD;  Location: [facility] OR       Family History   I have reviewed this patient's family history and updated it with pertinent information if needed.   No family history on file.    Social History   I have reviewed this patient's social history and updated it with pertinent information if needed. Lola Milner  reports that she quit smoking about 5 months ago. Her smoking use included cigarettes. She has never used smokeless tobacco. She reports that she does not currently use alcohol. She reports that she does not use drugs.    Allergies   No Known Allergies  Prior to Admission Medications    acetaminophen  325 mg Oral or Feeding Tube Q4H    albumin human  0-50 g Intravenous Q6H    [START ON 2023] basiliximab (SIMULECT) 20 mg in sodium chloride 0.9 % 50 mL infusion  20 mg Intravenous Once    artificial tears  1 drop Both Eyes 4x Daily    chlorhexidine  15 mL Mouth/Throat Q12H    fluconazole  200 mg Intravenous Q24H    [START ON 2023] methylPREDNISolone  50 mg Intravenous Once    Followed by    [START ON 2023] predniSONE  25 mg Oral Once    Followed by    [START ON 11/10/2023] predniSONE  10 mg Oral Once    multivitamin, therapeutic  1 tablet Oral Daily    mycophenolate  750 mg Oral BID IS    Or    mycophenolate  750 mg Oral or NG Tube BID IS    pantoprazole  40 mg Per Feeding Tube QAM AC    piperacillin-tazobactam  2.25 g Intravenous Q6H    sodium chloride (PF)  3 mL Intravenous Q8H    [START ON 2023] sulfamethoxazole-trimethoprim  1 tablet Oral or Feeding Tube Once per day on     tacrolimus  2 mg Oral BID IS    Or    tacrolimus  2 mg Oral or NG Tube BID IS    [START ON 2023] valGANciclovir  450 mg Oral Once per day on     Or    [START ON  2023] valGANciclovir  450 mg Oral or NG Tube Once per day on       - MEDICATION INSTRUCTIONS -      dextrose      [Held by provider] fentaNYL Stopped (23 0911)    insulin regular 1 Units/hr (23 1200)    NaCl 0.45 % 1,000 mL infusion      norepinephrine Stopped (23 0230)    [Held by provider] propofol Stopped (23 0842)    BETA BLOCKER NOT PRESCRIBED         Physical Exam   Temp  Av.9  F (36.6  C)  Min: 97.3  F (36.3  C)  Max: 98.3  F (36.8  C)      Pulse  Av.5  Min: 66  Max: 86 Resp  Av.7  Min: 13  Max: 27  SpO2  Av.9 %  Min: 87 %  Max: 100 %     /56   Pulse (!) 49   Temp 97.3  F (36.3  C) (Axillary)   Resp 14   Wt 72.1 kg (158 lb 15.2 oz)   SpO2 100%   BMI 28.16 kg/m      Admit Weight: 68.6 kg (151 lb 4.8 oz)     GENERAL APPEARANCE: sedated and intubated  HENT: no gross abnormalities noted  RESP: lungs clear to auscultation - no rales, rhonchi or wheezes  CV: regular rhythm, normal rate, no rub, no murmur  EDEMA: 1+ LE edema bilaterally  ABDOMEN: soft, nondistended, nontender, bowel sounds normal  MS: extremities normal - no gross deformities noted, no evidence of inflammation in joints, no muscle tenderness  SKIN: no rash  Neuro : intubated and sedated    Data   CMP  Recent Labs   Lab 23  1227 23  1129 23  1033 23  0810 23  0608 23  0505 23  1737 23  1624 23  0502 23  0425 23  2217 23  2148 23  0621 23  0520 23  1049   NA  --   --   --   --   --  128*  128*  --  130*  --  130*  130*  --  128*   < > 124* 125*   POTASSIUM  --   --   --   --   --  4.3  4.3  --  4.0  --  3.1*  3.1*  --  3.0*   < > 3.3* 3.6   CHLORIDE  --   --   --   --   --  94*  94*  --  95*  --  96*  96*  --  94*  --  88* 89*   CO2  --   --   --   --   --  21*  21*  --  21*  --  24  24  --  22  --  19* 18*   ANIONGAP  --   --   --   --   --  13  13  --  14  --  10  10  --  12   --  17* 18*   * 161* 171* 148*   < > 150*  150*   < > 122*   < > 103*  103*   < > 301*   < > 81 85   BUN  --   --   --   --   --  50.0*  50.0*  --  44.7*  --  42.6*  42.6*  --  40.5*  --  46.4* 44.2*   CR  --   --   --   --   --  3.18*  3.18*  --  2.83*  --  2.50*  2.50*  --  2.31*  --  3.10* 2.72*   GFRESTIMATED  --   --   --   --   --  19*  19*  --  22*  --  26*  26*  --  28*  --  20* 23*   GEE  --   --   --   --   --  9.2  9.2  --  10.0  --  10.5*  10.5*  --  10.9*  --  9.5 9.7   MAG  --   --   --   --   --  2.7*  --  2.8*  --  1.6*  --   --   --   --  1.8   PHOS  --   --   --   --   --  7.3*  --   --   --  3.3  --   --   --   --  4.0   PROTTOTAL  --   --   --   --   --  4.1*  --   --   --  4.1*  --  4.1*  --   --   --    ALBUMIN  --   --   --   --   --  3.0*  --   --   --  2.9*  --  3.0*  --  4.2 4.3   BILITOTAL  --   --   --   --   --  3.6*  --   --   --  6.6*  --  13.4*  --  36.9* 35.9*   ALKPHOS  --   --   --   --   --  48  --   --   --  75  --  80  --  82 83   AST  --   --   --   --   --  61*  --   --   --  182*  --  246*  --  31 32   ALT  --   --   --   --   --  46  --   --   --  113*  --  137*  --  11 11    < > = values in this interval not displayed.     CBC  Recent Labs   Lab 11/07/23  0505 11/06/23  1624 11/06/23  1003 11/06/23  0425 11/06/23  0052 11/05/23  2148   HGB 11.3* 11.8 12.0 11.6*   < > 11.0*   WBC 8.3 8.5  --  3.0*  --  5.4   RBC 3.65* 3.82  --  3.84  --  3.55*   HCT 31.5* 33.5*  --  33.0*  --  31.6*   MCV 86 88  --  86  --  89   MCH 31.0 30.9  --  30.2  --  31.0   MCHC 35.9 35.2  --  35.2  --  34.8   RDW 18.6* 17.7*  --  17.4*  --  16.8*   * 141*  --  163  --  162    < > = values in this interval not displayed.     INR  Recent Labs   Lab 11/07/23  0505 11/06/23  1624 11/06/23  0425 11/05/23  2148 11/05/23  1854 11/05/23  1740 11/05/23  0520 11/04/23  1049   INR 1.31* 1.31* 1.21* 1.44* 1.60* 1.89*   < > 4.31*   PTT  --   --   --  36 37 54*  --  84*    < > = values in  this interval not displayed.     ABG  Recent Labs   Lab 11/07/23  0701 11/05/23  2147 11/05/23  1851 11/05/23  1751   PH 7.43 7.31* 7.39 7.41   PCO2 34* 49* 37 35   PO2 103 167* 134* 108*   HCO3 23 25 22 22   O2PER 30 40 52.0 53.0      Urine Studies  Recent Labs   Lab Test 11/04/23  2309 10/31/23  2100   COLOR Dark Yellow* Orange*   APPEARANCE Slightly Cloudy* Cloudy*   URINEGLC Negative Negative   URINEBILI Moderate* Large*   URINEKETONE Negative Negative   SG 1.016 1.019   UBLD Trace* Trace*   URINEPH 5.5 5.5   PROTEIN 10* 30*   NITRITE Negative Negative   LEUKEST Small* Small*   RBCU 2 4*   WBCU 5 25*     IMAGING:  All imaging studies reviewed by me.

## 2023-11-07 NOTE — PROGRESS NOTES
Transplant Surgery  Inpatient Daily Progress Note  11/07/2023    Assessment & Plan: 33 year old female admitted on 10/31/2023 for liver transplant evaluation. MELD Na 45. Past medical history significant for MDD, prior tobacco use, gout, polyneuropathy and decompensated cirrhosis secondary to alcohol use c/b hepatic encephalopathy, esophageal varices s/p banding, recurrent ascities and hepatorenal syndrome. Patient listed for liver transplant on 11/3/23. Now status post liver transplant on 11/5/23. Surgeon: Dr. Aime Lowe.     Graft function:   Liver transplant: POD: 2. TB/alk phos/ALT/AST trending down. POD 0 US showed patent doppler.   -HARLEY x 2 with serosanguinous output, drain to suction.     Immunosuppression management:   Simulect POD 1 and 4, due to renal function and lower tac goal  Steroid taper per protocol.   Mycophenolate 750 mg BID  Tacrolimus 2 mg BID. Tac goal 5-10. Drug interaction with diflucan (ppx course 11/5-11/12).     Hematology:   Acute blood loss anemia: Hgb 11.3. Last transfused in 11/5. Transfuse for Hgb < 8.   Thrombocytopenia: . No transfusion needed.   Coagulapathy: INR < 1.5. No transfusion needed.    Cardiorespiratory:   Circulatory failure requiring pressor support: Pressor discontinued 11/6.  Hx hypotension: PTA on Midodrine 15 TID. Not restarted.  Mechanical ventilation perioperative: Management per SICU. Off sedation. PS trial today.    GI/Nutrition:   Diet: Place [place]. Start tube feeding.     Endocrine:   Steroid hyperglycemia: insulin gtt     Fluid/Electrolytes:   EDGAR: EDGAR secondary to HRS, possible bile cast nephropathy. Cr increased 3.2. UO now 0-5 ml/hr. Check  -Per nephrology--replace HARLEY output with albumin 25% 1ml for every 1ml drain output every 6 hours today. Recheck BMP at 1330. Nephrology will review labs and decide if RRT indicated.     : Continue aguilar for strict I&O    Infectious disease: afebrile.    Prophylaxis: DVT (mechanical), viral (Valcyte x 6 months),  PJP (Bactrim), zosyn x 48 hours, diflucan x 7 days, nystatin.    Disposition: SICU    JENNIFER/Fellow/Resident Provider: Mary Bullard PA-C     Faculty: Jagjit Santiago M.D.    __________________________________________________________________  Transplant History: Admitted 10/31/2023 for liver transplant.   The patient has a history of liver failure due to decompensated cirrhosis secondary to alcohol use.    11/5/2023 (Liver), Postoperative day: 2     Interval History:   Overnight events: Off sedation. PS trial x 3, failed due to apnea.     ROS:   A 10-point review of systems was negative except as noted above.    Curent Meds:   acetaminophen  325 mg Oral or Feeding Tube Q4H    albumin human  0-50 g Intravenous Q6H    [START ON 11/9/2023] basiliximab (SIMULECT) 20 mg in sodium chloride 0.9 % 50 mL infusion  20 mg Intravenous Once    artificial tears  1 drop Both Eyes 4x Daily    chlorhexidine  15 mL Mouth/Throat Q12H    [Held by provider] cholecalciferol  1,250 mcg Oral Q7 Days    fluconazole  200 mg Intravenous Q24H    gabapentin  100 mg Oral TID    [START ON 11/8/2023] methylPREDNISolone  50 mg Intravenous Once    Followed by    [START ON 11/9/2023] predniSONE  25 mg Oral Once    Followed by    [START ON 11/10/2023] predniSONE  10 mg Oral Once    [Held by provider] midodrine  15 mg Oral TID    multivitamin, therapeutic  1 tablet Oral Daily    mycophenolate  750 mg Oral BID IS    Or    mycophenolate  750 mg Oral or NG Tube BID IS    pantoprazole  40 mg Per Feeding Tube QAM AC    piperacillin-tazobactam  2.25 g Intravenous Q6H    sodium chloride (PF)  3 mL Intravenous Q8H    [START ON 11/8/2023] sulfamethoxazole-trimethoprim  1 tablet Oral or Feeding Tube Once per day on Monday Wednesday Friday    tacrolimus  2 mg Oral BID IS    Or    tacrolimus  2 mg Oral or NG Tube BID IS    [START ON 11/9/2023] valGANciclovir  450 mg Oral Once per day on Monday Thursday    Or    [START ON 11/9/2023] valGANciclovir  450 mg Oral or  NG Tube Once per day on Monday Thursday       Physical Exam:     Admit Weight: 68.6 kg (151 lb 4.8 oz)    Current Vitals:   /56   Pulse (!) 49   Temp 97.3  F (36.3  C) (Axillary)   Resp 14   Wt 72.1 kg (158 lb 15.2 oz)   SpO2 100%   BMI 28.16 kg/m        Vital sign ranges:    Temp:  [96.6  F (35.9  C)-97.5  F (36.4  C)] 97.3  F (36.3  C)  Pulse:  [45-61] 49  Resp:  [9-21] 14  BP: (114-120)/(56-62) 114/56  MAP:  [68 mmHg-91 mmHg] 78 mmHg  Arterial Line BP: ()/(51-66) 112/55  FiO2 (%):  [30 %] 30 %  SpO2:  [93 %-100 %] 100 %  Patient Vitals for the past 24 hrs:   BP Temp Temp src Pulse Resp SpO2 Weight   11/07/23 0900 -- -- -- (!) 49 14 100 % --   11/07/23 0800 -- 97.3  F (36.3  C) Axillary 56 21 100 % --   11/07/23 0700 -- -- -- (!) 47 14 100 % --   11/07/23 0610 114/56 -- -- -- -- -- --   11/07/23 0600 -- -- -- (!) 46 14 100 % --   11/07/23 0500 -- -- -- 53 14 100 % --   11/07/23 0400 -- 97.3  F (36.3  C) Oral (!) 47 10 100 % 72.1 kg (158 lb 15.2 oz)   11/07/23 0300 -- -- -- (!) 46 14 100 % --   11/07/23 0200 -- -- -- (!) 46 14 100 % --   11/07/23 0100 -- -- -- (!) 47 14 100 % --   11/07/23 0000 -- 97.2  F (36.2  C) Oral (!) 47 14 100 % --   11/06/23 2300 -- -- -- (!) 47 14 100 % --   11/06/23 2200 -- -- -- (!) 46 14 99 % --   11/06/23 2100 -- -- -- (!) 47 14 100 % --   11/06/23 2000 -- 97  F (36.1  C) Oral (!) 49 14 100 % --   11/06/23 1900 -- (!) 96.6  F (35.9  C) -- (!) 45 14 99 % --   11/06/23 1800 -- 96.8  F (36  C) -- (!) 49 14 100 % --   11/06/23 1700 -- 97  F (36.1  C) -- 56 15 99 % --   11/06/23 1635 120/62 97  F (36.1  C) -- 52 (!) 9 97 % --   11/06/23 1600 -- 97  F (36.1  C) Bladder 55 16 96 % --   11/06/23 1500 -- 97  F (36.1  C) -- 55 13 98 % --   11/06/23 1400 -- 97.3  F (36.3  C) -- 58 16 95 % --   11/06/23 1308 -- 97.5  F (36.4  C) -- 61 14 96 % --   11/06/23 1300 -- 97.5  F (36.4  C) -- 57 14 96 % --   11/06/23 1200 -- 97.5  F (36.4  C) Bladder 58 14 96 % --   11/06/23 1100 -- 97.5   F (36.4  C) -- 57 14 93 % --     Examined by fellow:  General Appearance: in no apparent distress.   Skin: normal, warm, jaundice  Heart: NSR  Lungs: CMV.   Abdomen:  Soft, non distended.  The wound is covered with surgical dressing. HARLEY x 2 serosanguinous   :  aguilar  Extremities: no edema  Neurologic: alert, awake    Frailty Scores           No data to display                Data:   CMP  Recent Labs   Lab 11/07/23  0810 11/07/23  0700 11/07/23  0608 11/07/23  0505 11/06/23  1737 11/06/23  1624 11/06/23  0502 11/06/23  0425 11/05/23  0520 11/04/23  1049   NA  --   --   --  128*  128*  --  130*  --  130*  130*   < > 125*   POTASSIUM  --   --   --  4.3  4.3  --  4.0  --  3.1*  3.1*   < > 3.6   CHLORIDE  --   --   --  94*  94*  --  95*  --  96*  96*   < > 89*   CO2  --   --   --  21*  21*  --  21*  --  24  24   < > 18*   * 143*   < > 150*  150*   < > 122*   < > 103*  103*   < > 85   BUN  --   --   --  50.0*  50.0*  --  44.7*  --  42.6*  42.6*   < > 44.2*   CR  --   --   --  3.18*  3.18*  --  2.83*  --  2.50*  2.50*   < > 2.72*   GFRESTIMATED  --   --   --  19*  19*  --  22*  --  26*  26*   < > 23*   GEE  --   --   --  9.2  9.2  --  10.0  --  10.5*  10.5*   < > 9.7   ICAW  --   --   --  5.0  --   --   --  5.8*   < >  --    MAG  --   --   --  2.7*  --  2.8*  --  1.6*  --  1.8   PHOS  --   --   --  7.3*  --   --   --  3.3  --  4.0   AMYLASE  --   --   --   --   --   --   --  28  --  46   LIPASE  --   --   --   --   --   --   --  11*  --   --    ALBUMIN  --   --   --  3.0*  --   --   --  2.9*   < > 4.3   BILITOTAL  --   --   --  3.6*  --   --   --  6.6*   < > 35.9*   ALKPHOS  --   --   --  48  --   --   --  75   < > 83   AST  --   --   --  61*  --   --   --  182*   < > 32   ALT  --   --   --  46  --   --   --  113*   < > 11    < > = values in this interval not displayed.     CBC  Recent Labs   Lab 11/07/23  0505 11/06/23  1624   HGB 11.3* 11.8   WBC 8.3 8.5   * 141*     COAGS  Recent Labs    Lab 11/07/23  0505 11/06/23  1624 11/06/23  0425 11/05/23  2148 11/05/23  1854   INR 1.31* 1.31*   < > 1.44* 1.60*   PTT  --   --   --  36 37    < > = values in this interval not displayed.      Urinalysis  Recent Labs   Lab Test 11/04/23  2309 10/31/23  2100   COLOR Dark Yellow* Orange*   APPEARANCE Slightly Cloudy* Cloudy*   URINEGLC Negative Negative   URINEBILI Moderate* Large*   URINEKETONE Negative Negative   SG 1.016 1.019   UBLD Trace* Trace*   URINEPH 5.5 5.5   PROTEIN 10* 30*   NITRITE Negative Negative   LEUKEST Small* Small*   RBCU 2 4*   WBCU 5 25*     Virology:  Hepatitis C Antibody   Date Value Ref Range Status   11/04/2023 Nonreactive Nonreactive Final

## 2023-11-07 NOTE — PROGRESS NOTES
Patient insulin drip has been off since 10am, BG have been between 100-120. Per SICU MD, no need to restart insulin drip if pt BG remain stable.

## 2023-11-07 NOTE — PROGRESS NOTES
CRRT INITIATION NOTE    Consent for CRRT Completed:  YES  Patient s Vascular Access: Catheter              Placement Confirmed: YES      DATA:  Procedure:  CRRT Initiation  Start Time:  1643  Machine#:  [id number]  Filter:    Blood Flow:  180  ML/min  Pre-Replacement Solution:  4K  Post-Replacement Solution:  4K  Dialysate Solution:  4K  Pre-Replacement Solution Rate:  900 mL/hr  Post-Replacement Solution Rate:  200 mL/hr  Dialysate Flow Rate:  900 mL/hr   Patient Removal Rate:  0 mL/hr  Anticoagulation Type and Rate:  None    ASSESSMENT:  How Patient Tolerated Initiation:   Vital Signs:  B/P: 114/56, T: 97.3, P: 50, R: 16   Initial Pressures:  Access:  -53   Filter:  155  Return:  83  TMP:  24  Change in Filter Pressure:  33      INTERVENTIONS:  CRRT initiated. Line placement confirmed by SICU team.    PLAN:  Therapy goal intake=output.

## 2023-11-08 ENCOUNTER — APPOINTMENT (OUTPATIENT)
Dept: OCCUPATIONAL THERAPY | Facility: CLINIC | Age: 33
End: 2023-11-08
Attending: STUDENT IN AN ORGANIZED HEALTH CARE EDUCATION/TRAINING PROGRAM
Payer: COMMERCIAL

## 2023-11-08 ENCOUNTER — APPOINTMENT (OUTPATIENT)
Dept: PHYSICAL THERAPY | Facility: CLINIC | Age: 33
End: 2023-11-08
Payer: COMMERCIAL

## 2023-11-08 LAB
ALBUMIN SERPL BCG-MCNC: 3.7 G/DL (ref 3.5–5.2)
ALBUMIN SERPL BCG-MCNC: 3.8 G/DL (ref 3.5–5.2)
ALP SERPL-CCNC: 28 U/L (ref 35–104)
ALT SERPL W P-5'-P-CCNC: 19 U/L (ref 0–50)
ANION GAP SERPL CALCULATED.3IONS-SCNC: 12 MMOL/L (ref 7–15)
ANION GAP SERPL CALCULATED.3IONS-SCNC: 13 MMOL/L (ref 7–15)
ANION GAP SERPL CALCULATED.3IONS-SCNC: 13 MMOL/L (ref 7–15)
ANION GAP SERPL CALCULATED.3IONS-SCNC: 9 MMOL/L (ref 7–15)
AST SERPL W P-5'-P-CCNC: 26 U/L (ref 0–45)
BASOPHILS # BLD AUTO: ABNORMAL 10*3/UL
BASOPHILS # BLD MANUAL: 0 10E3/UL (ref 0–0.2)
BASOPHILS NFR BLD AUTO: ABNORMAL %
BASOPHILS NFR BLD MANUAL: 0 %
BILIRUB DIRECT SERPL-MCNC: 1.38 MG/DL (ref 0–0.3)
BILIRUB SERPL-MCNC: 2.3 MG/DL
BUN SERPL-MCNC: 26.3 MG/DL (ref 6–20)
BUN SERPL-MCNC: 33.1 MG/DL (ref 6–20)
BUN SERPL-MCNC: 37.2 MG/DL (ref 6–20)
BUN SERPL-MCNC: 37.2 MG/DL (ref 6–20)
BURR CELLS BLD QL SMEAR: ABNORMAL
CA-I BLD-MCNC: 4.3 MG/DL (ref 4.4–5.2)
CA-I BLD-MCNC: 4.4 MG/DL (ref 4.4–5.2)
CA-I BLD-MCNC: 4.4 MG/DL (ref 4.4–5.2)
CALCIUM SERPL-MCNC: 7.4 MG/DL (ref 8.6–10)
CALCIUM SERPL-MCNC: 7.9 MG/DL (ref 8.6–10)
CALCIUM SERPL-MCNC: 8 MG/DL (ref 8.6–10)
CALCIUM SERPL-MCNC: 8 MG/DL (ref 8.6–10)
CF REDUC 60M P MA LENFR BLD TEG: 1.3 % (ref 0–15)
CFT BLD TEG: 6.8 MINUTE (ref 1–3)
CHLORIDE SERPL-SCNC: 101 MMOL/L (ref 98–107)
CHLORIDE SERPL-SCNC: 102 MMOL/L (ref 98–107)
CHLORIDE SERPL-SCNC: 99 MMOL/L (ref 98–107)
CHLORIDE SERPL-SCNC: 99 MMOL/L (ref 98–107)
CI (COAGULATION INDEX)(Z) NON NATIVE: -7.1 (ref -3–3)
CLOT ANGLE BLD TEG: 37.7 DEGREES (ref 53–72)
CLOT INIT BLD TEG: 5.8 MINUTE (ref 5–10)
CLOT LYSIS 30M P MA LENFR BLD TEG: 0 % (ref 0–8)
CLOT STRENGTH BLD TEG: 2.6 KD/SC (ref 4.5–11)
CREAT SERPL-MCNC: 1.86 MG/DL (ref 0.51–0.95)
CREAT SERPL-MCNC: 2.31 MG/DL (ref 0.51–0.95)
CREAT SERPL-MCNC: 2.47 MG/DL (ref 0.51–0.95)
CREAT SERPL-MCNC: 2.47 MG/DL (ref 0.51–0.95)
DEPRECATED HCO3 PLAS-SCNC: 22 MMOL/L (ref 22–29)
DEPRECATED HCO3 PLAS-SCNC: 23 MMOL/L (ref 22–29)
DEPRECATED HCO3 PLAS-SCNC: 23 MMOL/L (ref 22–29)
DEPRECATED HCO3 PLAS-SCNC: 24 MMOL/L (ref 22–29)
EGFRCR SERPLBLD CKD-EPI 2021: 26 ML/MIN/1.73M2
EGFRCR SERPLBLD CKD-EPI 2021: 26 ML/MIN/1.73M2
EGFRCR SERPLBLD CKD-EPI 2021: 28 ML/MIN/1.73M2
EGFRCR SERPLBLD CKD-EPI 2021: 37 ML/MIN/1.73M2
EOSINOPHIL # BLD AUTO: ABNORMAL 10*3/UL
EOSINOPHIL # BLD MANUAL: 0 10E3/UL (ref 0–0.7)
EOSINOPHIL NFR BLD AUTO: ABNORMAL %
EOSINOPHIL NFR BLD MANUAL: 0 %
ERYTHROCYTE [DISTWIDTH] IN BLOOD BY AUTOMATED COUNT: 18 % (ref 10–15)
ERYTHROCYTE [DISTWIDTH] IN BLOOD BY AUTOMATED COUNT: 18 % (ref 10–15)
ERYTHROCYTE [DISTWIDTH] IN BLOOD BY AUTOMATED COUNT: 18.1 % (ref 10–15)
GLUCOSE BLDC GLUCOMTR-MCNC: 106 MG/DL (ref 70–99)
GLUCOSE BLDC GLUCOMTR-MCNC: 107 MG/DL (ref 70–99)
GLUCOSE BLDC GLUCOMTR-MCNC: 108 MG/DL (ref 70–99)
GLUCOSE BLDC GLUCOMTR-MCNC: 112 MG/DL (ref 70–99)
GLUCOSE BLDC GLUCOMTR-MCNC: 116 MG/DL (ref 70–99)
GLUCOSE BLDC GLUCOMTR-MCNC: 133 MG/DL (ref 70–99)
GLUCOSE BLDC GLUCOMTR-MCNC: 143 MG/DL (ref 70–99)
GLUCOSE BLDC GLUCOMTR-MCNC: 149 MG/DL (ref 70–99)
GLUCOSE BLDC GLUCOMTR-MCNC: 98 MG/DL (ref 70–99)
GLUCOSE SERPL-MCNC: 114 MG/DL (ref 70–99)
GLUCOSE SERPL-MCNC: 114 MG/DL (ref 70–99)
GLUCOSE SERPL-MCNC: 139 MG/DL (ref 70–99)
GLUCOSE SERPL-MCNC: 176 MG/DL (ref 70–99)
HBA1C MFR BLD: 5.3 %
HCT VFR BLD AUTO: 28.7 % (ref 35–47)
HCT VFR BLD AUTO: 29.1 % (ref 35–47)
HCT VFR BLD AUTO: 30.1 % (ref 35–47)
HGB BLD-MCNC: 10 G/DL (ref 11.7–15.7)
HGB BLD-MCNC: 10.3 G/DL (ref 11.7–15.7)
HGB BLD-MCNC: 9.9 G/DL (ref 11.7–15.7)
HUMAN PAPILLOMA VIRUS 16 DNA: NEGATIVE
HUMAN PAPILLOMA VIRUS 18 DNA: NEGATIVE
HUMAN PAPILLOMA VIRUS FINAL DIAGNOSIS: NORMAL
HUMAN PAPILLOMA VIRUS OTHER HR: NEGATIVE
IMM GRANULOCYTES # BLD: ABNORMAL 10*3/UL
IMM GRANULOCYTES NFR BLD: ABNORMAL %
INR PPP: 1.88 (ref 0.85–1.15)
LYMPHOCYTES # BLD AUTO: ABNORMAL 10*3/UL
LYMPHOCYTES # BLD MANUAL: 0.4 10E3/UL (ref 0.8–5.3)
LYMPHOCYTES NFR BLD AUTO: ABNORMAL %
LYMPHOCYTES NFR BLD MANUAL: 7 %
MAGNESIUM SERPL-MCNC: 2.3 MG/DL (ref 1.7–2.3)
MAGNESIUM SERPL-MCNC: 2.4 MG/DL (ref 1.7–2.3)
MAGNESIUM SERPL-MCNC: 2.5 MG/DL (ref 1.7–2.3)
MCF BLD TEG: 34.3 MM (ref 50–70)
MCH RBC QN AUTO: 31 PG (ref 26.5–33)
MCH RBC QN AUTO: 31 PG (ref 26.5–33)
MCH RBC QN AUTO: 31.1 PG (ref 26.5–33)
MCHC RBC AUTO-ENTMCNC: 34.2 G/DL (ref 31.5–36.5)
MCHC RBC AUTO-ENTMCNC: 34.4 G/DL (ref 31.5–36.5)
MCHC RBC AUTO-ENTMCNC: 34.5 G/DL (ref 31.5–36.5)
MCV RBC AUTO: 90 FL (ref 78–100)
MCV RBC AUTO: 90 FL (ref 78–100)
MCV RBC AUTO: 91 FL (ref 78–100)
MONOCYTES # BLD AUTO: ABNORMAL 10*3/UL
MONOCYTES # BLD MANUAL: 0.1 10E3/UL (ref 0–1.3)
MONOCYTES NFR BLD AUTO: ABNORMAL %
MONOCYTES NFR BLD MANUAL: 2 %
NEUTROPHILS # BLD AUTO: ABNORMAL 10*3/UL
NEUTROPHILS # BLD MANUAL: 5.6 10E3/UL (ref 1.6–8.3)
NEUTROPHILS NFR BLD AUTO: ABNORMAL %
NEUTROPHILS NFR BLD MANUAL: 91 %
NRBC # BLD AUTO: 0 10E3/UL
NRBC BLD AUTO-RTO: 0 /100
PATH REPORT.ADDENDUM SPEC: NORMAL
PATH REPORT.COMMENTS IMP SPEC: NORMAL
PATH REPORT.FINAL DX SPEC: NORMAL
PATH REPORT.GROSS SPEC: NORMAL
PATH REPORT.MICROSCOPIC SPEC OTHER STN: NORMAL
PATH REPORT.RELEVANT HX SPEC: NORMAL
PHOSPHATE SERPL-MCNC: 4.7 MG/DL (ref 2.5–4.5)
PHOSPHATE SERPL-MCNC: 5.5 MG/DL (ref 2.5–4.5)
PHOSPHATE SERPL-MCNC: 6 MG/DL (ref 2.5–4.5)
PHOTO IMAGE: NORMAL
PLAT MORPH BLD: ABNORMAL
PLATELET # BLD AUTO: 80 10E3/UL (ref 150–450)
PLATELET # BLD AUTO: 82 10E3/UL (ref 150–450)
PLATELET # BLD AUTO: 96 10E3/UL (ref 150–450)
POTASSIUM SERPL-SCNC: 3.6 MMOL/L (ref 3.4–5.3)
POTASSIUM SERPL-SCNC: 3.7 MMOL/L (ref 3.4–5.3)
POTASSIUM SERPL-SCNC: 4.2 MMOL/L (ref 3.4–5.3)
POTASSIUM SERPL-SCNC: 4.2 MMOL/L (ref 3.4–5.3)
PROT SERPL-MCNC: 4.2 G/DL (ref 6.4–8.3)
RBC # BLD AUTO: 3.18 10E6/UL (ref 3.8–5.2)
RBC # BLD AUTO: 3.23 10E6/UL (ref 3.8–5.2)
RBC # BLD AUTO: 3.32 10E6/UL (ref 3.8–5.2)
RBC MORPH BLD: ABNORMAL
SODIUM SERPL-SCNC: 135 MMOL/L (ref 135–145)
TACROLIMUS BLD-MCNC: 7.3 UG/L (ref 5–15)
TME LAST DOSE: NORMAL H
TME LAST DOSE: NORMAL H
WBC # BLD AUTO: 6.2 10E3/UL (ref 4–11)
WBC # BLD AUTO: 7.1 10E3/UL (ref 4–11)
WBC # BLD AUTO: 7.2 10E3/UL (ref 4–11)

## 2023-11-08 PROCEDURE — 258N000003 HC RX IP 258 OP 636: Performed by: PHYSICIAN ASSISTANT

## 2023-11-08 PROCEDURE — 94003 VENT MGMT INPAT SUBQ DAY: CPT

## 2023-11-08 PROCEDURE — 80197 ASSAY OF TACROLIMUS: CPT | Performed by: PHYSICIAN ASSISTANT

## 2023-11-08 PROCEDURE — 85007 BL SMEAR W/DIFF WBC COUNT: CPT | Performed by: STUDENT IN AN ORGANIZED HEALTH CARE EDUCATION/TRAINING PROGRAM

## 2023-11-08 PROCEDURE — 97530 THERAPEUTIC ACTIVITIES: CPT | Mod: GP

## 2023-11-08 PROCEDURE — 250N000013 HC RX MED GY IP 250 OP 250 PS 637

## 2023-11-08 PROCEDURE — 250N000012 HC RX MED GY IP 250 OP 636 PS 637: Performed by: STUDENT IN AN ORGANIZED HEALTH CARE EDUCATION/TRAINING PROGRAM

## 2023-11-08 PROCEDURE — 250N000013 HC RX MED GY IP 250 OP 250 PS 637: Performed by: TRANSPLANT SURGERY

## 2023-11-08 PROCEDURE — 250N000009 HC RX 250: Performed by: INTERNAL MEDICINE

## 2023-11-08 PROCEDURE — 82040 ASSAY OF SERUM ALBUMIN: CPT | Performed by: PHYSICIAN ASSISTANT

## 2023-11-08 PROCEDURE — 200N000002 HC R&B ICU UMMC

## 2023-11-08 PROCEDURE — 85610 PROTHROMBIN TIME: CPT | Performed by: PHYSICIAN ASSISTANT

## 2023-11-08 PROCEDURE — 97168 OT RE-EVAL EST PLAN CARE: CPT | Mod: GO

## 2023-11-08 PROCEDURE — 97110 THERAPEUTIC EXERCISES: CPT | Mod: GP

## 2023-11-08 PROCEDURE — 250N000013 HC RX MED GY IP 250 OP 250 PS 637: Performed by: PHYSICIAN ASSISTANT

## 2023-11-08 PROCEDURE — 250N000011 HC RX IP 250 OP 636: Mod: JZ

## 2023-11-08 PROCEDURE — 250N000011 HC RX IP 250 OP 636: Mod: JZ | Performed by: INTERNAL MEDICINE

## 2023-11-08 PROCEDURE — 84100 ASSAY OF PHOSPHORUS: CPT | Performed by: INTERNAL MEDICINE

## 2023-11-08 PROCEDURE — 99233 SBSQ HOSP IP/OBS HIGH 50: CPT | Mod: 24 | Performed by: INTERNAL MEDICINE

## 2023-11-08 PROCEDURE — 999N000259 HC STATISTIC EXTUBATION

## 2023-11-08 PROCEDURE — 82330 ASSAY OF CALCIUM: CPT | Performed by: STUDENT IN AN ORGANIZED HEALTH CARE EDUCATION/TRAINING PROGRAM

## 2023-11-08 PROCEDURE — 85396 CLOTTING ASSAY WHOLE BLOOD: CPT

## 2023-11-08 PROCEDURE — 82330 ASSAY OF CALCIUM: CPT | Performed by: INTERNAL MEDICINE

## 2023-11-08 PROCEDURE — 83036 HEMOGLOBIN GLYCOSYLATED A1C: CPT

## 2023-11-08 PROCEDURE — 250N000013 HC RX MED GY IP 250 OP 250 PS 637: Performed by: INTERNAL MEDICINE

## 2023-11-08 PROCEDURE — 97530 THERAPEUTIC ACTIVITIES: CPT | Mod: GO

## 2023-11-08 PROCEDURE — 97535 SELF CARE MNGMENT TRAINING: CPT | Mod: GO

## 2023-11-08 PROCEDURE — 83735 ASSAY OF MAGNESIUM: CPT | Performed by: INTERNAL MEDICINE

## 2023-11-08 PROCEDURE — 82248 BILIRUBIN DIRECT: CPT | Performed by: INTERNAL MEDICINE

## 2023-11-08 PROCEDURE — 90947 DIALYSIS REPEATED EVAL: CPT

## 2023-11-08 PROCEDURE — P9045 ALBUMIN (HUMAN), 5%, 250 ML: HCPCS | Mod: JZ

## 2023-11-08 PROCEDURE — 99231 SBSQ HOSP IP/OBS SF/LOW 25: CPT | Mod: 25 | Performed by: SURGERY

## 2023-11-08 PROCEDURE — 250N000011 HC RX IP 250 OP 636: Mod: JZ | Performed by: PHYSICIAN ASSISTANT

## 2023-11-08 PROCEDURE — P9045 ALBUMIN (HUMAN), 5%, 250 ML: HCPCS | Mod: JZ | Performed by: PHYSICIAN ASSISTANT

## 2023-11-08 PROCEDURE — 999N000157 HC STATISTIC RCP TIME EA 10 MIN

## 2023-11-08 PROCEDURE — 999N000253 HC STATISTIC WEANING TRIALS

## 2023-11-08 PROCEDURE — 250N000011 HC RX IP 250 OP 636: Mod: JZ | Performed by: STUDENT IN AN ORGANIZED HEALTH CARE EDUCATION/TRAINING PROGRAM

## 2023-11-08 PROCEDURE — 85027 COMPLETE CBC AUTOMATED: CPT | Performed by: INTERNAL MEDICINE

## 2023-11-08 PROCEDURE — 85027 COMPLETE CBC AUTOMATED: CPT | Performed by: STUDENT IN AN ORGANIZED HEALTH CARE EDUCATION/TRAINING PROGRAM

## 2023-11-08 RX ORDER — DEXTROSE MONOHYDRATE 25 G/50ML
25-50 INJECTION, SOLUTION INTRAVENOUS
Status: DISCONTINUED | OUTPATIENT
Start: 2023-11-08 | End: 2023-11-15 | Stop reason: HOSPADM

## 2023-11-08 RX ORDER — LANOLIN ALCOHOL/MO/W.PET/CERES
3 CREAM (GRAM) TOPICAL
Status: DISCONTINUED | OUTPATIENT
Start: 2023-11-08 | End: 2023-11-09

## 2023-11-08 RX ORDER — PANTOPRAZOLE SODIUM 40 MG/1
40 TABLET, DELAYED RELEASE ORAL
Status: DISCONTINUED | OUTPATIENT
Start: 2023-11-09 | End: 2023-11-15 | Stop reason: HOSPADM

## 2023-11-08 RX ORDER — VALGANCICLOVIR 450 MG/1
450 TABLET, FILM COATED ORAL DAILY
Status: DISCONTINUED | OUTPATIENT
Start: 2023-11-09 | End: 2023-11-09

## 2023-11-08 RX ORDER — GABAPENTIN 300 MG/1
300 CAPSULE ORAL 3 TIMES DAILY PRN
Status: DISCONTINUED | OUTPATIENT
Start: 2023-11-08 | End: 2023-11-08

## 2023-11-08 RX ORDER — GABAPENTIN 100 MG/1
100 CAPSULE ORAL 3 TIMES DAILY
Status: DISCONTINUED | OUTPATIENT
Start: 2023-11-08 | End: 2023-11-10

## 2023-11-08 RX ORDER — NICOTINE POLACRILEX 4 MG
15-30 LOZENGE BUCCAL
Status: DISCONTINUED | OUTPATIENT
Start: 2023-11-08 | End: 2023-11-15 | Stop reason: HOSPADM

## 2023-11-08 RX ORDER — GABAPENTIN 100 MG/1
100 CAPSULE ORAL 3 TIMES DAILY PRN
Status: DISCONTINUED | OUTPATIENT
Start: 2023-11-08 | End: 2023-11-08

## 2023-11-08 RX ORDER — GABAPENTIN 300 MG/1
300 CAPSULE ORAL 3 TIMES DAILY
Status: DISCONTINUED | OUTPATIENT
Start: 2023-11-08 | End: 2023-11-08

## 2023-11-08 RX ORDER — POLYETHYLENE GLYCOL 3350 17 G/17G
17 POWDER, FOR SOLUTION ORAL DAILY
Status: DISCONTINUED | OUTPATIENT
Start: 2023-11-08 | End: 2023-11-15 | Stop reason: HOSPADM

## 2023-11-08 RX ADMIN — ACETAMINOPHEN 325 MG: 325 TABLET, FILM COATED ORAL at 01:55

## 2023-11-08 RX ADMIN — CALCIUM CHLORIDE, MAGNESIUM CHLORIDE, SODIUM CHLORIDE, SODIUM BICARBONATE, POTASSIUM CHLORIDE AND SODIUM PHOSPHATE DIBASIC DIHYDRATE 12.5 ML/KG/HR: 3.68; 3.05; 6.34; 3.09; .314; .187 INJECTION INTRAVENOUS at 21:46

## 2023-11-08 RX ADMIN — TACROLIMUS 2 MG: 1 CAPSULE ORAL at 17:08

## 2023-11-08 RX ADMIN — ACETAMINOPHEN 325 MG: 325 TABLET, FILM COATED ORAL at 09:41

## 2023-11-08 RX ADMIN — CALCIUM CHLORIDE, MAGNESIUM CHLORIDE, SODIUM CHLORIDE, SODIUM BICARBONATE, POTASSIUM CHLORIDE AND SODIUM PHOSPHATE DIBASIC DIHYDRATE 12.5 ML/KG/HR: 3.68; 3.05; 6.34; 3.09; .314; .187 INJECTION INTRAVENOUS at 09:40

## 2023-11-08 RX ADMIN — CALCIUM GLUCONATE 2 G: 20 INJECTION, SOLUTION INTRAVENOUS at 23:05

## 2023-11-08 RX ADMIN — CALCIUM CHLORIDE, MAGNESIUM CHLORIDE, SODIUM CHLORIDE, SODIUM BICARBONATE, POTASSIUM CHLORIDE AND SODIUM PHOSPHATE DIBASIC DIHYDRATE 12.5 ML/KG/HR: 3.68; 3.05; 6.34; 3.09; .314; .187 INJECTION INTRAVENOUS at 03:44

## 2023-11-08 RX ADMIN — SODIUM CHLORIDE 100 MG: 9 INJECTION, SOLUTION INTRAVENOUS at 13:05

## 2023-11-08 RX ADMIN — Medication 1 DROP: at 07:45

## 2023-11-08 RX ADMIN — TACROLIMUS 2 MG: 5 CAPSULE ORAL at 07:39

## 2023-11-08 RX ADMIN — CALCIUM CHLORIDE, MAGNESIUM CHLORIDE, SODIUM CHLORIDE, SODIUM BICARBONATE, POTASSIUM CHLORIDE AND SODIUM PHOSPHATE DIBASIC DIHYDRATE 12.5 ML/KG/HR: 3.68; 3.05; 6.34; 3.09; .314; .187 INJECTION INTRAVENOUS at 16:24

## 2023-11-08 RX ADMIN — MYCOPHENOLATE MOFETIL 750 MG: 200 POWDER, FOR SUSPENSION ORAL at 07:39

## 2023-11-08 RX ADMIN — SULFAMETHOXAZOLE AND TRIMETHOPRIM 1 TABLET: 400; 80 TABLET ORAL at 07:38

## 2023-11-08 RX ADMIN — ACETAMINOPHEN 325 MG: 325 TABLET, FILM COATED ORAL at 06:16

## 2023-11-08 RX ADMIN — Medication 1 DROP: at 20:07

## 2023-11-08 RX ADMIN — ALBUMIN HUMAN 50 G: 0.05 INJECTION, SOLUTION INTRAVENOUS at 05:51

## 2023-11-08 RX ADMIN — Medication 1 DROP: at 13:05

## 2023-11-08 RX ADMIN — CALCIUM CHLORIDE, MAGNESIUM CHLORIDE, SODIUM CHLORIDE, SODIUM BICARBONATE, POTASSIUM CHLORIDE AND SODIUM PHOSPHATE DIBASIC DIHYDRATE: 3.68; 3.05; 6.34; 3.09; .314; .187 INJECTION INTRAVENOUS at 19:14

## 2023-11-08 RX ADMIN — THERA TABS 1 TABLET: TAB at 13:05

## 2023-11-08 RX ADMIN — ACETAMINOPHEN 325 MG: 325 TABLET, FILM COATED ORAL at 21:45

## 2023-11-08 RX ADMIN — ALBUMIN HUMAN 25 G: 0.05 INJECTION, SOLUTION INTRAVENOUS at 18:11

## 2023-11-08 RX ADMIN — Medication 40 MG: at 07:39

## 2023-11-08 RX ADMIN — MYCOPHENOLATE MOFETIL 750 MG: 250 CAPSULE ORAL at 17:07

## 2023-11-08 RX ADMIN — ALBUMIN HUMAN 37.5 G: 0.05 INJECTION, SOLUTION INTRAVENOUS at 13:04

## 2023-11-08 RX ADMIN — CALCIUM CHLORIDE, MAGNESIUM CHLORIDE, SODIUM CHLORIDE, SODIUM BICARBONATE, POTASSIUM CHLORIDE AND SODIUM PHOSPHATE DIBASIC DIHYDRATE 12.5 ML/KG/HR: 3.68; 3.05; 6.34; 3.09; .314; .187 INJECTION INTRAVENOUS at 21:47

## 2023-11-08 RX ADMIN — Medication 1 DROP: at 16:18

## 2023-11-08 RX ADMIN — GABAPENTIN 100 MG: 100 CAPSULE ORAL at 17:07

## 2023-11-08 RX ADMIN — METHYLPREDNISOLONE SODIUM SUCCINATE 50 MG: 125 INJECTION, POWDER, FOR SOLUTION INTRAMUSCULAR; INTRAVENOUS at 07:38

## 2023-11-08 RX ADMIN — VALGANCICLOVIR 450 MG: 450 TABLET, FILM COATED ORAL at 09:41

## 2023-11-08 RX ADMIN — ACETAMINOPHEN 325 MG: 325 TABLET, FILM COATED ORAL at 17:07

## 2023-11-08 ASSESSMENT — ACTIVITIES OF DAILY LIVING (ADL)
ADLS_ACUITY_SCORE: 36
PREVIOUS_RESPONSIBILITIES: MEAL PREP;HOUSEKEEPING;LAUNDRY;SHOPPING;MEDICATION MANAGEMENT;FINANCES;DRIVING
ADLS_ACUITY_SCORE: 36
ADLS_ACUITY_SCORE: 35
ADLS_ACUITY_SCORE: 36
ADLS_ACUITY_SCORE: 35
ADLS_ACUITY_SCORE: 36

## 2023-11-08 NOTE — PROGRESS NOTES
CLINICAL NUTRITION SERVICES - REASSESSMENT NOTE     Nutrition Prescription    RECOMMENDATIONS FOR MDs/PROVIDERS TO ORDER:  None currently.     Malnutrition Status:    Severe malnutrition in the context of acute illness/disease    Recommendations already ordered by Registered Dietitian (RD):  Resuming patient's preferred ONS of Ensure Clear daily and chocolate magic cup    Future/Additional Recommendations:  Consider calorie counts once on regular diet  Closely monitor PO, low threshold to recommend enteral nutrition support d/t poor PO and weight loss ongoing since prior to transplant.      EVALUATION OF THE PROGRESS TOWARD GOALS   Diet: Clear Liquid ADAT to regular diet.   Prior to transplant, was on regular diet (liberalized from 2 gm Na d/t very poor PO) with variety of ONS    Intake/Tolerance: NPO x 3 days. Now FL diet, ADAT to regular diet.  Pt with poor intake/appetite and significant N/V PTA.   No enteral nutrition support this admission.      NEW FINDINGS   Pt extubated this AM. Oral diet resumed today (CL with plan to ADAT). Pt tired at visit. Encouraged trying to drink some fluids as no nutrition for at least 3 days (and poor PO prior to that). Encouraged pt to let writer know if she is having GI upset once she tries PO (difficulty with N/V PTA)     GI:  Last BM: 11/08/23  NGT to LIS    Weight:  Most Recent Weight: 73.3 kg (161 lb 9.6 oz)  on 11/8/23 via Bed scale  Body mass index is 28.63 kg/m .  Wt up 4.7 kg from admission. + 10 L from admission per I/O.     Meds:  Multivitamin  Mycophenolate  Protonix  Prednisone  Bactrim   Tacrolimus  Valcyte  Insulin infusion    Labs:   11/07/23 05:05 11/07/23 13:02 11/07/23 19:58 11/08/23 03:55   Sodium 128 (L)  128 (L) 131 (L) 131 (L) 135  135   Potassium 4.3  4.3 4.1 4.2 4.2  4.2   Chloride 94 (L)  94 (L) 95 (L) 96 (L) 99  99   Carbon Dioxide (CO2) 21 (L)  21 (L) 20 (L) 21 (L) 23  23   Urea Nitrogen 50.0 (H)  50.0 (H) 51.2 (H) 45.2 (H) 37.2 (H)  37.2 (H)    Creatinine 3.18 (H)  3.18 (H) 3.25 (H) 2.88 (H) 2.47 (H)  2.47 (H)   GFR Estimate 19 (L)  19 (L) 19 (L) 22 (L) 26 (L)  26 (L)   Calcium 9.2  9.2 9.1 8.4 (L) 8.0 (L)  8.0 (L)   Anion Gap 13  13 16 (H) 14 13  13   Magnesium 2.7 (H)  2.5 (H) 2.5 (H)   Phosphorus 7.3 (H)  6.7 (H) 6.0 (H)   Albumin 3.0 (L)  3.8 3.7  3.7   Protein Total 4.1 (L)   4.2 (L)   Alkaline Phosphatase 48   28 (L)   ALT 46   19   AST 61 (H)   26   Bilirubin Direct 2.30 (H)   1.38 (H)   Bilirubin Total 3.6 (H)   2.3 (H)   Calcium Ionized Whole Blood 5.0  4.5 4.4   Glucose 150 (H)  150 (H) 150 (H) 127 (H) 114 (H)  114 (H)       Renal:  CRRT    MALNUTRITION  % Intake: </= 50% for >/= 5 days (severe)  % Weight Loss: > 5% in 1 month (severe malnutrition)  Subcutaneous Fat Loss: Upper arm:  mild and Lower arm:  mild   Muscle Loss: Thoracic region (clavicle, acromium bone, deltoid, trapezius, pectoral):  moderate, Upper arm (bicep, tricep):  mild, Lower arm  (forearm):  mild, and Dorsal hand:  mild  Fluid Accumulation/Edema: Does not meet criteria  Malnutrition Diagnosis: Severe malnutrition in the context of acute illness/disease    Previous Goals   Patient to consume % of nutritionally adequate meal trays TID, or the equivalent with supplements/snacks.  Evaluation: Not met    Previous Nutrition Diagnosis  Inadequate oral intake related to poor appetite, N/V as evidenced by patient report, chart review, weight loss.     Evaluation: No change    CURRENT NUTRITION DIAGNOSIS  Inadequate oral intake related to poor appetite, N/V, period of NPO as evidenced by NPO x 3 days, chart review, patient report.       INTERVENTIONS  Implementation  Medical food supplement therapy     Goals  Patient to consume % of nutritionally adequate meal trays TID, or the equivalent with supplements/snacks.    Monitoring/Evaluation  Progress toward goals will be monitored and evaluated per protocol.      Jo-Ann Arce RDN, LD    [address] SICU RD pager:  [phone]  Ascom: [id number]  Weekend/Holiday RD pager: [phone]

## 2023-11-08 NOTE — PROGRESS NOTES
SURGICAL ICU Progress NOTE  11/08/2023      PRIMARY TEAM: Liver Transplant  PRIMARY PHYSICIAN: Dr. Lowe  REASON FOR CRITICAL CARE ADMISSION: Hemodynamic, respiratory monitoring   ADMITTING PHYSICIAN: Dr. Villavicencio  Date of Service (when I saw the patient): 11/08/2023    ASSESSMENT:  Lola Milner is a 33 year old female with a PMH of MDD, prior tobacco use, gout, polyneuropathy, decompensated cirrhosis secondary to alcohol use complicated by hepatic encephalopathy, esophageal varices status post banding, recurrent ascites, and hepatorenal syndrome who was admitted on 10/31/2023 for evaluation for liver transplantation. She was admitted to SICU s/p uncomplicated liver transplantation on 11/5.    PLAN:  - Extubated  - Passed bedside swallow eval; NG tube out  - advance diet as tolerated  - aguilar out  - CRRT I=O  - Gabapentin 100 TID started  - Start medium sliding scale insulin    Neurological:  # Acute pain   # Sedation for mechanical ventilation  - Monitor neurological status. Delirium preventions and precautions.   - Pain:    Gtt: Oxy 2.5-5 Q4 PRN   PRN: acetaminophen (2gm max daily dose)  - Sedation plan:   - Gtt: Off Propofol   - Extubated    # Hx MDD  # Hx polyneuropathy  - Continue PTA Gabapentin 100 mg TID, melatonin at HS PRN    Pulmonary:   # Post operative ventilatory support  - Extubated this AM  - Supplemental oxygen to keep saturation above 92 %.  - Incentive spirometer every 15- 30 minutes.     Cardiovascular:    # Hypotension   # Shock hypovolemic from hemorrhage   - Monitor hemodynamic status. Goal MAP >65.   - Off pressors; maintaining map goals  - Evaluate coag labs (TEG); if normal, start SQH  - Midodrine on hold -- was 15 mg TID prior to transplantation  - EBL: 3500 cc  - Intraoperative resuscitation: 5u pRBCs, 10u FFP, 4u Plt, cellsaver 880cc, 3.75L crystalloid, 400cc albumin, fibriga 3, PCC 1000cc    Gastroenterology/Nutrition:  # S/p liver transplantation 11/5/23  # Decompensated alcohol  cirrhosis c/b HRS, HE, recurrent ascites, esophageal varices s/p banding (9/23)  # Hyperbilirubinemia  - Liver transplant team primary  - Trend hepatic labs; AST/ALT and T.Bili downtrending  - Immediate post-op liver US - prelim read   - Unremarkable appearance of liver   - Perihepatic 6.6 cm fluid collection in hepatorenal space (drain in place)   - small left pleural effusion  - Pantoprazole for ppx  - Transplant immunosuppressants and prophylaxis as below.     # Severe protein calorie deficit malnutrition    - RD consult. Appreciate cares and recommendations.   - Bedside swallow eval passed  - Advance diet as tolerated    Renal/Fluids/Electrolytes:   # Hyponatremia  # Acute kidney injury   # Hepatorenal syndrome  # CKD and EDGAR  - Replace HARLEY output 2:1 with Albumin 5% per transplant  - Transplant nephrology following, appreciate recs   - Hold IV fluids   - Replace RIJ CVC with Trialysis Catheter  - Start CRRT 11/7, IHD if CRRT clots off  - Baseline Cr ~ 0.8-1.0; Cr downtrending to 2.47 today  - Urine output is ~43 last 24 hours  - Remove Izaguirre  - HOLD mIVF   - Mg 2.5, K 4.2, Phos 6.0 on 11/7 AM    Endocrine:   # Stress hyperglycemia   - Start insulin medium sliding scale  - Goal to keep BG< 180 for optimal wound healing.     ID:  # immunosuppression   - induction steroids per transplant, steroid taper, MMF, and tacrolimus per transplant   # immunoprophylaxis  - Bactrim start 11/8, Valcyte, Fluconazole  - Micafungin 14 days for CRRT  # perioperative antibiotics: zosyn completed    Heme:     # Acute blood loss anemia   # Coagulopathy due to cirrhosis and surgical blood loss    # Thrombocytopenia   # Anemia of critical illness   # Chronic normocytic anemia  - Hemoglobin prior to transplant ~7; Hgb 10 this AM   - 1u cryoprecipitate for fibrinogen of 177 on 11/6 AM  - Platelet baseline 70-80 prior to transplant; Plt 80 on 11/8 AM  - Transfusion parameters;   - Plt >20k if not bleeding   - Hgb > 8   - INR < 2   -  "Fibrinogen > 200      Musculoskeletal:  # Weakness and deconditioning of critical illness   - Physical and occupational therapy consult   - Utilized a walker at baseline    Skin:  - Diligent skin cares to prevent breakdown    General Cares/Prophylaxis:    DVT Prophylaxis: Defer to primary service. Chemical prophylaxis None   GI Prophylaxis: PPI  Restraints: Restraints for medical healing needed: NO    Subcutaneous heparin per tx pending TEG within normal range    Lines/ tubes/ drains:  - RIJ Trialysis catheter  - L axillary A-line  - PIV  - 2x HARLEY drain    Disposition:  -  Surgical ICU    Patient seen, findings and plan discussed with surgical ICU staff, Dr. Garcia.    Kulwant Avalos, MS4  [employer]    MARIA ELENA GONZALEZ MD  11/08/23 2:12 PM    Clinically Significant Risk Factors         # Hyponatremia: Lowest Na = 128 mmol/L in last 2 days, will monitor as appropriate   # Hypercalcemia: corrected calcium is >10.1, will monitor as appropriate    # Hypoalbuminemia: Lowest albumin = 2.9 g/dL at 11/6/2023  4:25 AM, will monitor as appropriate    # Coagulation Defect: INR = 1.88 (Ref range: 0.85 - 1.15) and/or PTT = 36 Seconds (Ref range: 22 - 38 Seconds), will monitor for bleeding  # Thrombocytopenia: Lowest platelets = 80 in last 2 days, will monitor for bleeding          # Overweight: Estimated body mass index is 28.63 kg/m  as calculated from the following:    Height as of an earlier encounter on 10/31/23: 1.6 m (5' 3\").    Weight as of this encounter: 73.3 kg (161 lb 9.6 oz).   # Severe Malnutrition: based on nutrition assessment      # Financial/Environmental Concerns: none (Pt reports her SO is able to pay rent and pay her bills)              - - - - - - - - - - - - - - - - - - - - - - - - - - - - - - - - - - - - - - - - - - - - - -   INTERVAL HISTORY:  No acute events overnight. Patient sitting in bed comfortable this AM, intubated on pressure support. Reports no pain, moves extremities bilaterally. Minimal " urine output. On CRRT.   Extubated after tolerating pressure support for 2 hours. Patient is able to discuss her needs and overall feels well. No other concerns at this time.     HISTORY PRESENTING ILLNESS: Lola Milner is a 33 year old female with a PMH of MDD, prior tobacco use (quit 2023), gout, polyneuropathy, decompensated cirrhosis secondary to alcohol use (last drink 23) complicated by hepatic encephalopathy, esophageal varices status post banding, recurrent ascites, and hepatorenal syndrome who was admitted on 10/31/2023 for evaluation for liver transplantation from [facility].  She was admitted to SICU s/p liver transplantation on     REVIEW OF SYSTEMS: 10 point ROS neg other than the symptoms noted above in the HPI.    PAST MEDICAL HISTORY:   Past Medical History:   Diagnosis Date    Alcoholic cirrhosis of liver with ascites (H) 2023    History of alcohol abuse     Formatting of this note might be different from the original. In remission as of late 2023       SURGICAL HISTORY:   Past Surgical History:   Procedure Laterality Date    BENCH LIVER  2023    Procedure: Bench liver;  Surgeon: Aime Lowe MD;  Location: [facility] OR    TRANSPLANT LIVER RECIPIENT  DONOR N/A 2023    Procedure: Transplant liver recipient  donor;  Surgeon: Aime Lowe MD;  Location:  OR       SOCIAL HISTORY:   Social History     Socioeconomic History    Marital status: Single   Tobacco Use    Smoking status: Former     Types: Cigarettes     Quit date: 2023     Years since quittin.4    Smokeless tobacco: Never   Substance and Sexual Activity    Alcohol use: Not Currently     Comment: last ETOH use 2023    Drug use: Never     FAMILY HISTORY: No bleeding/clotting disorders nor problems with anesthesia.     ALLERGIES:   No Known Allergies    MEDICATIONS:  No current facility-administered medications on file prior to encounter.  gabapentin (NEURONTIN) 100 MG capsule,  Take 100 mg by mouth 3 times daily  lactulose (CHRONULAC) 10 GM/15ML solution, Take 30 mLs by mouth 3 times daily  melatonin 3 MG tablet, Take 3 mg by mouth nightly as needed  midodrine (PROAMATINE) 5 MG tablet, Take 15 mg by mouth 3 times daily  Multiple Vitamins-Minerals (THERA-TABS M) TABS, Take 1 tablet by mouth daily  omeprazole (PRILOSEC) 20 MG DR capsule, Take 20 mg by mouth daily  sodium bicarbonate 650 MG tablet, Take 1,300 mg by mouth 2 times daily For 10 days        PHYSICAL EXAMINATION:  Temp:  [97.3  F (36.3  C)-97.7  F (36.5  C)] 97.7  F (36.5  C)  Pulse:  [47-69] 69  Resp:  [12-23] 13  MAP:  [75 mmHg-245 mmHg] 81 mmHg  Arterial Line BP: ()/() 107/60  FiO2 (%):  [30 %] 30 %  SpO2:  [100 %] 100 %  General: Extubated, sitting in bed comfortably  HEENT: Trialysis cath in RIJ, NCAT  Neuro: Mentating normally, following commands briskly in all four extremities  Pulm/Resp: Regular rate and work of breathing with nasal canula in place  CV: RRR, maintaining MAPs off pressors  Abdomen: Soft, non-tender, 2x HARLEY drain with serosanguinous drainage  :  Izaguirre catheter removed, minimal urine  Incisions/Skin: C/D/I with minimal strikethrough  MSK/Extremities: 1+ peripheral edema, extremities well perfused, DP pulses present bilaterally    LABS: Reviewed.   Arterial Blood Gases   Recent Labs   Lab 11/07/23  0701 11/05/23  2147 11/05/23  1851 11/05/23  1751   PH 7.43 7.31* 7.39 7.41   PCO2 34* 49* 37 35   PO2 103 167* 134* 108*   HCO3 23 25 22 22     Complete Blood Count   Recent Labs   Lab 11/08/23  0355 11/07/23  0505 11/06/23  1624 11/06/23  1003 11/06/23  0425   WBC 6.2 8.3 8.5  --  3.0*   HGB 10.0* 11.3* 11.8 12.0 11.6*   PLT 80* 127* 141*  --  163     Basic Metabolic Panel  Recent Labs   Lab 11/08/23  0950 11/08/23  0751 11/08/23  0652 11/08/23  0533 11/08/23  0355 11/07/23 2001 11/07/23  1958 11/07/23  1309 11/07/23  1302 11/07/23  0608 11/07/23  0505   NA  --   --   --   --  135  135  --  131*  --   131*  --  128*  128*   POTASSIUM  --   --   --   --  4.2  4.2  --  4.2  --  4.1  --  4.3  4.3   CHLORIDE  --   --   --   --  99  99  --  96*  --  95*  --  94*  94*   CO2  --   --   --   --  23 23  --  21*  --  20*  --  21*  21*   BUN  --   --   --   --  37.2*  37.2*  --  45.2*  --  51.2*  --  50.0*  50.0*   CR  --   --   --   --  2.47*  2.47*  --  2.88*  --  3.25*  --  3.18*  3.18*   * 98 108* 106* 112*  114*  114*   < > 127*   < > 150*   < > 150*  150*    < > = values in this interval not displayed.     Liver Function Tests  Recent Labs   Lab 11/08/23 0355 11/07/23 1958 11/07/23 0505 11/06/23 1624 11/06/23 0425 11/05/23 2148   AST 26  --  61*  --  182* 246*   ALT 19  --  46  --  113* 137*   ALKPHOS 28*  --  48  --  75 80   BILITOTAL 2.3*  --  3.6*  --  6.6* 13.4*   ALBUMIN 3.7  3.7 3.8 3.0*  --  2.9* 3.0*   INR 1.88*  --  1.31* 1.31* 1.21* 1.44*     Pancreatic Enzymes  Recent Labs   Lab 11/06/23 0425 11/04/23  1049   LIPASE 11*  --    AMYLASE 28 46     Coagulation Profile  Recent Labs   Lab 11/08/23 0355 11/07/23 0505 11/06/23 1624 11/06/23 0425 11/05/23 2148 11/05/23  1854 11/05/23  1740 11/05/23  0520 11/04/23  1049   INR 1.88* 1.31* 1.31* 1.21* 1.44* 1.60* 1.89*   < > 4.31*   PTT  --   --   --   --  36 37 54*  --  84*    < > = values in this interval not displayed.       IMAGING:  Recent Results (from the past 24 hour(s))   XR Chest Port 1 View    Narrative    Exam: XR CHEST PORT 1 VIEW, 11/7/2023 4:53 PM    Comparison: Chest x-ray 11/5/2023    History: Right IJ CVC rewired to Trialysis catheter, verify placement    Findings:  Single portable AP view of the chest with patient in 90 degrees. Right  IJ CVC tip projects over the low SVC. Enteric tube tip and sidehole  project over the stomach. Right basilar chest tube is stable in  position. Interval removal of right [place] catheter. Endotracheal  tube tip projects over the mid thoracic trachea. Trachea is  midline.  Cardiomediastinal silhouette is mildly enlarged. Dense retrocardiac  opacity stable. Continued silhouetting of the left hemidiaphragm.  Improved aeration of the left upper lobe.. No pneumothorax or pleural  effusion. Surgical clips project over the right upper quadrant. The  upper abdomen is otherwise unremarkable.. No acute osseous  abnormalities.      Impression    Impression:   1. Right IJ CVC tip projects over the low SVC. Other devices as above.  2. Improved aeration of the left upper lobe. Persistent dense  retrocardiac opacity and silhouetting of left hemidiaphragm, likely  atelectasis.    I have personally reviewed the examination and initial interpretation  and I agree with the findings.    YAZ IRVIN MD         SYSTEM ID:  [id number]                  36.9

## 2023-11-08 NOTE — PROGRESS NOTES
11/08/23 1147   Appointment Info   Signing Clinician's Name / Credentials (OT) Fabiola Dye OTRL   Rehab Comments (OT) Re-eval post-op, pawel   Living Environment   People in Home significant other  (fiance)   Current Living Arrangements apartment   Home Accessibility no concerns   Transportation Anticipated car, drives self;family or friend will provide   Living Environment Comments See initial eval 11/1   Self-Care   Usual Activity Tolerance good   Current Activity Tolerance moderate   Equipment Currently Used at Home walker, rolling;raised toilet seat;shower chair   Fall history within last six months yes   Number of times patient has fallen within last six months 4   Activity/Exercise/Self-Care Comment See initial eval   Instrumental Activities of Daily Living (IADL)   Previous Responsibilities meal prep;housekeeping;laundry;shopping;medication management;finances;driving   IADL Comments Pt is IND w/ IADLs at baseline, is primarily responsible for cooking and cleaning. Pt is a . Pt is not currently working. Pt's fiance works as an ICU RN.   General Information   Onset of Illness/Injury or Date of Surgery 11/05/23   Referring Physician Vicente Claudio MD   Patient/Family Therapy Goal Statement (OT) To go home   Additional Occupational Profile Info/Pertinent History of Current Problem 33 year old female admitted on 10/31/2023 for liver transplant evaluation. MELD Na 45. Past medical history significant for MDD, prior tobacco use, gout, polyneuropathy and decompensated cirrhosis secondary to alcohol use c/b hepatic encephalopathy, esophageal varices s/p banding, recurrent ascities and hepatorenal syndrome. Patient listed for liver transplant on 11/3/23. Now status post liver transplant on 11/5/23.   Existing Precautions/Restrictions fall;abdominal   Cognitive Status Examination   Orientation Status orientation to person, place and time   Follows Commands WFL   Cognitive Status Comments Appears intact    Visual Perception   Visual Impairment/Limitations blurry vision;corrective lenses full-time   Sensory   Sensory Comments Pt reports baseline neuropathy   Posture   Posture not impaired   Range of Motion Comprehensive   General Range of Motion bilateral upper extremity ROM WFL   Strength Comprehensive (MMT)   Comment, General Manual Muscle Testing (MMT) Assessment BUE WFL, generalized weakness noted grossly   Coordination   Upper Extremity Coordination No deficits were identified   Bed Mobility   Bed Mobility rolling left   Supine-Sit [place] (Bed Mobility) moderate assist (50% patient effort)   Transfers   Transfers sit-stand transfer;toilet transfer;shower transfer   Sit-Stand Transfer   Sit-Stand [place] (Transfers) minimum assist (75% patient effort)   Assistive Device (Sit-Stand Transfers) walker, front-wheeled   Shower Transfer   Type (Shower Transfer) lateral   [place] Level (Shower Transfer) minimum assist (75% patient effort)   Shower Transfer Comments per clinical judgement   Toilet Transfer   Type (Toilet Transfer) sit-stand   [place] Level (Toilet Transfer) minimum assist (75% patient effort)   Balance   Balance Comments good seated balance, static standing balance with SBA   Activities of Daily Living   BADL Assessment/Intervention bathing;upper body dressing;lower body dressing;grooming;toileting   Bathing Assessment/Intervention   [place] Level (Bathing) maximum assist (25% patient effort)   Comment, (Bathing) per clinical judgement   Lower Body Dressing Assessment/Training   Comment, (Lower Body Dressing) per clinical judgement   [place] Level (Lower Body Dressing) minimum assist (75% patient effort)   Toileting   [place] Level (Toileting) maximum assist (25% patient effort)   Clinical Impression   Criteria for Skilled Therapeutic Interventions Met (OT) Yes, treatment indicated   OT Diagnosis Pt is below baseline for ADLs   OT Problem List-Impairments impacting ADL  problems related to;activity tolerance impaired;balance;pain;post-surgical precautions;strength;mobility   Assessment of Occupational Performance 3-5 Performance Deficits   Identified Performance Deficits LB dressing, bathing, toileting, home management, activity tolerance   Planned Therapy Interventions (OT) ADL retraining;IADL retraining;home program guidelines;progressive activity/exercise;bed mobility training;strengthening;transfer training   Clinical Decision Making Complexity (OT) problem focused assessment/low complexity   Risk & Benefits of therapy have been explained evaluation/treatment results reviewed;care plan/treatment goals reviewed;risks/benefits reviewed;participants voiced agreement with care plan;participants included;patient   Clinical Impression Comments Pt presents below baseline, limited by overall deconditioning, pain, post-surgical precautions. Pt will benefit from skilled OT to progress toward PLOF   OT Total Evaluation Time   OT Eval, Low Complexity Minutes (53275) 7   OT Goals   Therapy Frequency (OT) 5 times/week   OT Predicted Duration/Target Date for Goal Attainment 11/30/23   OT Goals Lower Body Dressing;Lower Body Bathing;Toilet Transfer/Toileting;Transfers   OT: Lower Body Dressing Modified independent;within precautions   OT: Lower Body Bathing Modified independent;with precautions   OT: Transfer Modified independent;within precautions  (tub/shower)   OT: Toilet Transfer/Toileting toilet transfer;cleaning and garment management;using adaptive equipment;Modified independent   Self-Care/Home Management   Self-Care/Home Mgmt/ADL, Compensatory, Meal Prep Minutes (37769) 10   Symptoms Noted During/After Treatment (Meal Preparation/Planning Training) fatigue   Treatment Detail/Skilled Intervention Facilitated toileting to progress pt activity tolernance and IND with ADLs. Pt noting possible need for BM w/movement. Th ordering commode to room. Pt noting too urgent, agreebale to sitting  "on bedpan to \"simulate toilet\" vs. supine in bed. Pt STS from recliner with min A and FWW, bedpan placed. Pt STS from recliner once again, CGA-min A w/FWW, tolerating standing for approx 3 mins for pericares.   Therapeutic Activities   Therapeutic Activity Minutes (66869) 25   Symptoms noted during/after treatment fatigue   Treatment Detail/Skilled Intervention Facilitated functional transfers and short distance ambulation to progress pt activity tolerance for ADLs. Due to pt stature, determined to utilize OH lift for bed > chair and then STS from chair. Time spent setting up room/managing lines to increase pt IND/safety. Pt educated on abdominal precautions with pt expressing understanding, reminders/reinforcment provided throughout activities. pt rolling x 2 with mod A for sling placement. Pt transferred from bed > chair via OH lift and Ax2. Once seated in chair, pt sitting forward with CGA, endorsing mild dizziness though passing w/seated rest, BP stable. Pt STS from recliner x 2 with CGA-min A and FWW. pt progressinging from static stand > stepping in place > 6x 4-5 steps forward and backward with close SBA-CGA and FWW. Pt tolerating well. Pt returning to seated at end of session, able to scoot hips posteriorly w/VCs for technique. Pt VSS throughout on RA, 1-2 short instances of desatting into high 80s though recovering quickly.   OT Discharge Planning   OT Plan Progress short distances, standing ADLs, commode transfers, review precautions   OT Discharge Recommendation (DC Rec) home with assist;home with home care occupational therapy   OT Rationale for DC Rec Pt significantly below baseline, however mobilizing well, anticipate will progress to be safe to d/c home when medically ready w/family A and HH therapies.   OT Brief overview of current status STS w/min A and FWW   Total Session Time   Timed Code Treatment Minutes 35   Total Session Time (sum of timed and untimed services) 42     "

## 2023-11-08 NOTE — PROGRESS NOTES
Pt placed on PS trial 5/+5, positive cuff leak  Pt was extubated to nasal cannula, SATs 100%, absence of stridor and good cough present.  Pt able to vocalize and clear secretions.    Will follow

## 2023-11-08 NOTE — PROGRESS NOTES
Transplant Surgery  Inpatient Daily Progress Note  11/08/2023    Assessment & Plan: 33 year old female admitted on 10/31/2023 for liver transplant evaluation. MELD Na 45. Past medical history significant for MDD, prior tobacco use, gout, polyneuropathy and decompensated cirrhosis secondary to alcohol use c/b hepatic encephalopathy, esophageal varices s/p banding, recurrent ascities and hepatorenal syndrome. Patient listed for liver transplant on 11/3/23. Now status post liver transplant on 11/5/23. Surgeon: Dr. Aime Lowe.     Graft function:   Liver transplant: POD: 3. TB/alk phos/ALT/AST trending down. POD 0 US showed patent doppler.   -HARLEY x 2 with serosanguinous output, drain to suction.     Immunosuppression management:   Simulect POD 1 and 4, due to renal function and lower tac goal  Steroid taper per protocol.   Mycophenolate 750 mg BID  Tacrolimus 2 mg BID. Tac goal 5-10. Drug interaction with diflucan (ppx course 11/5-11/7). Level in process.    Neuro:   Acute surgical pain: Oxycodone PRN  Neuropathic pain: PTA gabapentin    Hematology:   Acute blood loss anemia: Hgb 10 (11.3). Last transfused in 11/5. Transfuse for Hgb < 7.   Thrombocytopenia: PLT 80 (127). Trending down. Monitor.  No transfusion needed.   Coagulapathy: INR < 1.5. No transfusion needed.    Cardiorespiratory:   Circulatory failure requiring pressor support: Pressor discontinued 11/6.  Hx hypotension: PTA on Midodrine 15 TID. Not restarted.  Mechanical ventilation perioperative: Unable to extubate due to apneic episodes during PS trial on POD 1 and 2. Extubated POD 3. Stable on NC.    GI/Nutrition:   Diet: Place NJ. Start tube feeding.     Endocrine:   Steroid hyperglycemia: insulin gtt.    Fluid/Electrolytes:   EDGAR: EGDAR secondary to HRS, possible bile cast nephropathy. Cr increased to 3.2. Anuric. Started on CRRT POD 2. I=O.   -Continue to replace HARLEY output with albumin 5% 1ml for every 1ml drain output every 6 hours today.      : Would  consider removing aguilar today. Monitor UO.     Infectious disease: afebrile.    Prophylaxis: DVT (mechanical), viral (Valcyte x 6 months), PJP (Bactrim), zosyn x 48 hours, switched from diflucan to micafungin x 14 day d/t RRT, nystatin.    Disposition: SICU    JENNIFER/Fellow/Resident Provider: Mary Bullard PA-C     Faculty: Jagjit Santiago M.D.    __________________________________________________________________  Transplant History: Admitted 10/31/2023 for liver transplant.   The patient has a history of liver failure due to decompensated cirrhosis secondary to alcohol use.    11/5/2023 (Liver), Postoperative day: 3     Interval History:   Overnight events: Extubated this AM. Pain control adequate.      ROS:   A 10-point review of systems was negative except as noted above.    Curent Meds:   acetaminophen  325 mg Oral or Feeding Tube Q4H    albumin human  0-50 g Intravenous Q6H    [START ON 11/9/2023] basiliximab (SIMULECT) 20 mg in sodium chloride 0.9 % 50 mL infusion  20 mg Intravenous Once    artificial tears  1 drop Both Eyes 4x Daily    fluconazole  200 mg Intravenous Q24H    multivitamin, therapeutic  1 tablet Oral Daily    mycophenolate  750 mg Oral BID IS    Or    mycophenolate  750 mg Oral or NG Tube BID IS    pantoprazole  40 mg Per Feeding Tube QAM AC    [START ON 11/9/2023] predniSONE  25 mg Oral Once    Followed by    [START ON 11/10/2023] predniSONE  10 mg Oral Once    sodium chloride (PF)  3 mL Intravenous Q8H    sulfamethoxazole-trimethoprim  1 tablet Oral or Feeding Tube Daily    tacrolimus  2 mg Oral BID IS    Or    tacrolimus  2 mg Oral or NG Tube BID IS    valGANciclovir  450 mg Oral Every Other Day    Or    valGANciclovir  450 mg Oral or NG Tube Every Other Day       Physical Exam:     Admit Weight: 68.6 kg (151 lb 4.8 oz)    Current Vitals:   /56   Pulse 61   Temp 97.7  F (36.5  C) (Oral)   Resp 12   Wt 73.3 kg (161 lb 9.6 oz)   SpO2 100%   BMI 28.63 kg/m        Vital sign  ranges:    Temp:  [97.3  F (36.3  C)-97.7  F (36.5  C)] 97.7  F (36.5  C)  Pulse:  [47-65] 61  Resp:  [12-23] 12  MAP:  [75 mmHg-245 mmHg] 75 mmHg  Arterial Line BP: ()/() 99/54  FiO2 (%):  [30 %] 30 %  SpO2:  [100 %] 100 %  Patient Vitals for the past 24 hrs:   Temp Temp src Pulse Resp SpO2 Weight   11/08/23 0800 97.7  F (36.5  C) Oral 61 12 100 % --   11/08/23 0700 -- -- 63 14 100 % --   11/08/23 0612 -- -- -- -- -- 73.3 kg (161 lb 9.6 oz)   11/08/23 0602 -- -- 59 -- 100 % --   11/08/23 0600 -- -- 60 12 100 % --   11/08/23 0500 -- -- 59 13 100 % --   11/08/23 0400 97.3  F (36.3  C) Bladder 55 14 100 % --   11/08/23 0300 -- -- 53 12 100 % --   11/08/23 0200 -- -- 55 12 100 % --   11/08/23 0100 -- -- 61 12 100 % --   11/08/23 0051 -- -- 58 -- 100 % --   11/08/23 0000 97.3  F (36.3  C) Axillary 55 12 100 % --   11/07/23 2300 -- -- 58 14 100 % --   11/07/23 2200 -- -- 65 14 100 % --   11/07/23 2100 -- -- 58 16 100 % --   11/07/23 2059 -- -- 57 -- 100 % --   11/07/23 2000 97.3  F (36.3  C) Axillary 61 12 100 % --   11/07/23 1900 -- -- 61 12 100 % --   11/07/23 1800 -- -- 58 23 100 % --   11/07/23 1700 -- -- 51 14 100 % --   11/07/23 1630 -- -- 57 21 100 % --   11/07/23 1600 97.3  F (36.3  C) Axillary 50 16 100 % --   11/07/23 1555 -- -- (!) 49 12 100 % --   11/07/23 1500 -- -- 50 14 100 % --   11/07/23 1400 -- -- (!) 47 12 100 % --   11/07/23 1300 -- -- 53 14 100 % --   11/07/23 1200 97.3  F (36.3  C) Axillary (!) 49 14 100 % --   11/07/23 1100 -- -- 53 18 100 % --   11/07/23 1000 -- -- (!) 48 14 100 % --     Examined by fellow:  General Appearance: in no apparent distress.   Skin: normal, warm, jaundice  Heart: NSR  Lungs: NLB on 2L NC  Abdomen:  Soft, non distended, non tender.  The wound is intact, dry. HARLEY x 2 serosanguinous   :  aguilar  Extremities: b/l pedal edema  Neurologic: alert, awake    Frailty Scores           No data to display                Data:   CMP  Recent Labs   Lab 11/08/23  6911  11/08/23  0652 11/08/23  0533 11/08/23  0355 11/07/23 2001 11/07/23  1958 11/07/23  0608 11/07/23  0505 11/06/23  0502 11/06/23  0425 11/05/23  0520 11/04/23  1049   NA  --   --   --  135  135  --  131*   < > 128*  128*   < > 130*  130*   < > 125*   POTASSIUM  --   --   --  4.2  4.2  --  4.2   < > 4.3  4.3   < > 3.1*  3.1*   < > 3.6   CHLORIDE  --   --   --  99  99  --  96*   < > 94*  94*   < > 96*  96*   < > 89*   CO2  --   --   --  23  23  --  21*   < > 21*  21*   < > 24  24   < > 18*   GLC 98 108*   < > 112*  114*  114*   < > 127*   < > 150*  150*   < > 103*  103*   < > 85   BUN  --   --   --  37.2*  37.2*  --  45.2*   < > 50.0*  50.0*   < > 42.6*  42.6*   < > 44.2*   CR  --   --   --  2.47*  2.47*  --  2.88*   < > 3.18*  3.18*   < > 2.50*  2.50*   < > 2.72*   GFRESTIMATED  --   --   --  26*  26*  --  22*   < > 19*  19*   < > 26*  26*   < > 23*   GEE  --   --   --  8.0*  8.0*  --  8.4*   < > 9.2  9.2   < > 10.5*  10.5*   < > 9.7   ICAW  --   --   --  4.4  --  4.5  --  5.0  --  5.8*   < >  --    MAG  --   --   --  2.5*  --  2.5*  --  2.7*   < > 1.6*  --  1.8   PHOS  --   --   --  6.0*  --  6.7*  --  7.3*  --  3.3  --  4.0   AMYLASE  --   --   --   --   --   --   --   --   --  28  --  46   LIPASE  --   --   --   --   --   --   --   --   --  11*  --   --    ALBUMIN  --   --   --  3.7  3.7  --  3.8  --  3.0*  --  2.9*   < > 4.3   BILITOTAL  --   --   --  2.3*  --   --   --  3.6*  --  6.6*   < > 35.9*   ALKPHOS  --   --   --  28*  --   --   --  48  --  75   < > 83   AST  --   --   --  26  --   --   --  61*  --  182*   < > 32   ALT  --   --   --  19  --   --   --  46  --  113*   < > 11    < > = values in this interval not displayed.     CBC  Recent Labs   Lab 11/08/23 0355 11/07/23  0505   HGB 10.0* 11.3*   WBC 6.2 8.3   PLT 80* 127*     COAGS  Recent Labs   Lab 11/08/23  0355 11/07/23  0505 11/06/23  0425 11/05/23  2148 11/05/23  1854   INR 1.88* 1.31*   < > 1.44* 1.60*   PTT  --   --    --  36 37    < > = values in this interval not displayed.      Urinalysis  Recent Labs   Lab Test 11/04/23  2309 10/31/23  2100   COLOR Dark Yellow* Orange*   APPEARANCE Slightly Cloudy* Cloudy*   URINEGLC Negative Negative   URINEBILI Moderate* Large*   URINEKETONE Negative Negative   SG 1.016 1.019   UBLD Trace* Trace*   URINEPH 5.5 5.5   PROTEIN 10* 30*   NITRITE Negative Negative   LEUKEST Small* Small*   RBCU 2 4*   WBCU 5 25*     Virology:  Hepatitis C Antibody   Date Value Ref Range Status   11/04/2023 Nonreactive Nonreactive Final

## 2023-11-08 NOTE — PROGRESS NOTES
[facility]  Transplant Nephrology Progress notes  Date of Admission:  10/31/2023  Today's Date: 11/08/2023  Requesting physician: No att. providers found    Recommendations:  - continue on CRRT with given anuria  - will maintain UF at I=O for now  - will discontinue CRRT in the morning or if she clots again tonight.  - Agree with fluid/albumin resuscitation with given increased drain out put  - Holding high dose vitamin D insetting of hypercalcemia  - Obtained consent for RRT and placed in pt's chart    RECOMMEND PLACING A TEMP LINE IN THE OR     Assessment & Plan   # CKD and EDGAR: Trend up in SCr.  Baseline prior to liver transplant was at ~1 but prior to transplant have had some EDGAR with creatinine of 2.6-2.7 and peaked at ~3, and become anuric since midnight.  EDGAR related to significant volume loss.    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     # Liver Tx: LFT's were improving.     # Immunosuppression: Tacrolimus immediate release (goal 8-10) and Mycophenolate mofetil (dose 750 mg every 12 hours)   - Patient is in an immunosuppressed state and will continue to monitor for efficacy and toxicity of immunosuppression medications.   - Changes: Not at this time, per liver team    # Infection Prophylaxis:    - PJP: Sulfa/TMP (Bactrim)- will be started post-txp   - CMV: Valganciclovir (Valcyte)-will be started post-txp    # Hypotension: Hypotensive requiring midodrine;  Goal BP: > 100, but < 130 systolic   - Volume status: Mildly hypervolemic     - Changes: No.    # Anemia in Chronic Renal Disease: Hgb: Stable      NIRMAL: No   - Iron studies: Not checked recently    # Mineral Bone Disorder:    - Secondary renal hyperparathyroidism; PTH level: Not checked recently        On treatment: None   - Vitamin D; level: Low        On  supplement: No, received high dose once, but holding further doses with given hypercalcemia.   - Calcium; level: High        On supplement: No   - Phosphorus; level: Normal        On supplement: No    # Electrolytes:   - Potassium; level: Low        On supplement:  replace as needed to goal of 3.5 to 4 meq  - Magnesium; level: Low        On supplement: Yes  - Bicarbonate; level: Normal        On supplement: No  - Sodium; level: Low. Will monitor for over correction.     #Depression: well managed. Defer to primary team     # Transplant History:  Etiology of Kidney Failure: Hepatorenal syndrome (HRS)  Tx: CKD and liver transplant  Transplant: 2023 (Liver)  Crossmatch at time of Tx: pending  Significant changes in immunosuppression: None  Significant transplant-related complications: None    Recommendations were communicated to the primary team verbally.    Ely Trujillo MD  Pager: [phone]    REASON FOR CONSULT   Liver transplant    History of Present Illness   Pt s/p liver transplant, extubated today. Stable hemodynamics. No significant symptoms noted today.     Review of Systems    ROS were negative    Past Medical History    I have reviewed this patient's medical history and updated it with pertinent information if needed.   Past Medical History:   Diagnosis Date    Alcoholic cirrhosis of liver with ascites (H) 2023    History of alcohol abuse     Formatting of this note might be different from the original. In remission as of late 2023       Past Surgical History   I have reviewed this patient's surgical history and updated it with pertinent information if needed.  Past Surgical History:   Procedure Laterality Date    BENCH LIVER  2023    Procedure: Bench liver;  Surgeon: Aime Lowe MD;  Location: [facility] OR    TRANSPLANT LIVER RECIPIENT  DONOR N/A 2023    Procedure: Transplant liver recipient  donor;  Surgeon: Aime Lowe MD;  Location: [facility] OR       Family History    I have reviewed this patient's family history and updated it with pertinent information if needed.   No family history on file.    Social History   I have reviewed this patient's social history and updated it with pertinent information if needed. Lola Milner  reports that she quit smoking about 5 months ago. Her smoking use included cigarettes. She has never used smokeless tobacco. She reports that she does not currently use alcohol. She reports that she does not use drugs.    Allergies   No Known Allergies  Prior to Admission Medications    acetaminophen  325 mg Oral or Feeding Tube Q4H    albumin human  0-50 g Intravenous Q6H    [START ON 2023] basiliximab (SIMULECT) 20 mg in sodium chloride 0.9 % 50 mL infusion  20 mg Intravenous Once    artificial tears  1 drop Both Eyes 4x Daily    gabapentin  100 mg Oral or Feeding Tube TID    insulin aspart  1-7 Units Subcutaneous TID AC    insulin aspart  1-5 Units Subcutaneous At Bedtime    micafungin  100 mg Intravenous Q24H    multivitamin, therapeutic  1 tablet Oral Daily    mycophenolate  750 mg Oral BID IS    [START ON 2023] pantoprazole  40 mg Oral QAM AC    polyethylene glycol  17 g Oral or Feeding Tube Daily    [START ON 2023] predniSONE  25 mg Oral Once    Followed by    [START ON 11/10/2023] predniSONE  10 mg Oral Once    sodium chloride (PF)  3 mL Intravenous Q8H    sulfamethoxazole-trimethoprim  1 tablet Oral or Feeding Tube Daily    tacrolimus  2 mg Oral BID IS    [START ON 2023] valGANciclovir  450 mg Oral Daily      - MEDICATION INSTRUCTIONS -      CRRT replacement solution 12.5 mL/kg/hr (23 1624)    - MEDICATION INSTRUCTIONS -      CRRT replacement solution 200 mL/hr at 23 1553    CRRT replacement solution 12.5 mL/kg/hr (23 1624)    BETA BLOCKER NOT PRESCRIBED         Physical Exam   Temp  Av.9  F (36.6  C)  Min: 97.3  F (36.3  C)  Max: 98.3  F (36.8  C)      Pulse  Av.5  Min: 66  Max: 86 Resp  Av.7   Min: 13  Max: 27  SpO2  Av.9 %  Min: 87 %  Max: 100 %     /56   Pulse 66   Temp 97.7  F (36.5  C) (Oral)   Resp 15   Wt 73.3 kg (161 lb 9.6 oz)   SpO2 100%   BMI 28.63 kg/m      Admit Weight: 68.6 kg (151 lb 4.8 oz)     GENERAL APPEARANCE: awake and alert  HENT: no gross abnormalities noted  RESP: lungs clear to auscultation - no rales, rhonchi or wheezes  CV: regular rhythm, normal rate, no rub, no murmur  EDEMA: 1+ LE edema bilaterally  ABDOMEN: soft, nondistended, nontender, bowel sounds normal  MS: extremities normal - no gross deformities noted, no evidence of inflammation in joints, no muscle tenderness  SKIN: no rash  Neuro : mentation and speech intact    Data   CMP  Recent Labs   Lab 23  1203 23  1146 23  0950 23  0751 23  0533 23  0355 23  2001 23  1958 23  1309 23  1302 23  0608 23  0505 23  0502 23  0425 23  2217 23  2148   NA  --  135  --   --   --  135  135  --  131*  --  131*  --  128*  128*   < > 130*  130*  --  128*   POTASSIUM  --  3.7  --   --   --  4.2  4.2  --  4.2  --  4.1  --  4.3  4.3   < > 3.1*  3.1*  --  3.0*   CHLORIDE  --  101  --   --   --  99  99  --  96*  --  95*  --  94*  94*   < > 96*  96*  --  94*   CO2  --  22  --   --   --  23  23  --  21*  --  20*  --  21*  21*   < > 24  24  --  22   ANIONGAP  --  12  --   --   --  13  13  --  14  --  16*  --  13  13   < > 10  10  --  12   * 139* 107* 98   < > 112*  114*  114*   < > 127*   < > 150*   < > 150*  150*   < > 103*  103*   < > 301*   BUN  --  33.1*  --   --   --  37.2*  37.2*  --  45.2*  --  51.2*  --  50.0*  50.0*   < > 42.6*  42.6*  --  40.5*   CR  --  2.31*  --   --   --  2.47*  2.47*  --  2.88*  --  3.25*  --  3.18*  3.18*   < > 2.50*  2.50*  --  2.31*   GFRESTIMATED  --  28*  --   --   --  26*  26*  --  22*  --  19*  --  19*  19*   < > 26*  26*  --  28*   GEE  --  7.9*  --   --   --   8.0*  8.0*  --  8.4*  --  9.1  --  9.2  9.2   < > 10.5*  10.5*  --  10.9*   MAG  --  2.4*  --   --   --  2.5*  --  2.5*  --   --   --  2.7*   < > 1.6*  --   --    PHOS  --  5.5*  --   --   --  6.0*  --  6.7*  --   --   --  7.3*  --  3.3  --   --    PROTTOTAL  --   --   --   --   --  4.2*  --   --   --   --   --  4.1*  --  4.1*  --  4.1*   ALBUMIN  --  3.7  --   --   --  3.7  3.7  --  3.8  --   --   --  3.0*  --  2.9*  --  3.0*   BILITOTAL  --   --   --   --   --  2.3*  --   --   --   --   --  3.6*  --  6.6*  --  13.4*   ALKPHOS  --   --   --   --   --  28*  --   --   --   --   --  48  --  75  --  80   AST  --   --   --   --   --  26  --   --   --   --   --  61*  --  182*  --  246*   ALT  --   --   --   --   --  19  --   --   --   --   --  46  --  113*  --  137*    < > = values in this interval not displayed.     CBC  Recent Labs   Lab 11/08/23  1146 11/08/23  0355 11/07/23  0505 11/06/23  1624   HGB 10.3* 10.0* 11.3* 11.8   WBC 7.1 6.2 8.3 8.5   RBC 3.32* 3.23* 3.65* 3.82   HCT 30.1* 29.1* 31.5* 33.5*   MCV 91 90 86 88   MCH 31.0 31.0 31.0 30.9   MCHC 34.2 34.4 35.9 35.2   RDW 18.1* 18.0* 18.6* 17.7*   PLT 96* 80* 127* 141*     INR  Recent Labs   Lab 11/08/23  0355 11/07/23  0505 11/06/23  1624 11/06/23  0425 11/05/23  2148 11/05/23  1854 11/05/23  1740 11/05/23  0520 11/04/23  1049   INR 1.88* 1.31* 1.31* 1.21* 1.44* 1.60* 1.89*   < > 4.31*   PTT  --   --   --   --  36 37 54*  --  84*    < > = values in this interval not displayed.     ABG  Recent Labs   Lab 11/07/23  0701 11/05/23  2147 11/05/23  1851 11/05/23  1751   PH 7.43 7.31* 7.39 7.41   PCO2 34* 49* 37 35   PO2 103 167* 134* 108*   HCO3 23 25 22 22   O2PER 30 40 52.0 53.0      Urine Studies  Recent Labs   Lab Test 11/04/23  2309 10/31/23  2100   COLOR Dark Yellow* Orange*   APPEARANCE Slightly Cloudy* Cloudy*   URINEGLC Negative Negative   URINEBILI Moderate* Large*   URINEKETONE Negative Negative   SG 1.016 1.019   UBLD Trace* Trace*   URINEPH 5.5 5.5    PROTEIN 10* 30*   NITRITE Negative Negative   LEUKEST Small* Small*   RBCU 2 4*   WBCU 5 25*     IMAGING:  All imaging studies reviewed by me.

## 2023-11-08 NOTE — PLAN OF CARE
No acute events overnight.    Neuro-alert and oriented, able to make needs known by writing on communication board, moves all extremities, no sedation required, scheduled low dose tylenol for pain  CV-HR 50s-70s with no ectopy, BP stable-no pressors required, afebrile, pulses palpable throughout, dependent edema   Pulm-minimal ventilator settings, PS trial this AM-tolerating well, lungs clear throughout  GI-NPO, NG tube to LIS with no output, passing gas, small smear, able to sit on bedpan  -aguilar in place with nearly no output-consider pulling today and bladder scan Q shift.  SKin-no acute changes, clamshell incision intact with no extension of drainage, JPx2 with moderate serous fluid out Q1H  Lines-triflow internal jugular for CRRT, PIV x2  Gtts-insulin    P: Extubate patient when RT available. Continue on CRRT per order.  Notify SICU of any acute changes or concerns.

## 2023-11-08 NOTE — PROGRESS NOTES
CRRT STATUS NOTE    DATA:  Time:  5:22 AM  Pressures WNL:  YES, pressures rising  Filter Status:  Clotting    Problems Reported/Alarms Noted: Intermittent access alarms    Supplies Present:  YES    ASSESSMENT:  Patient Net Fluid Balance:  +957 mL 11/7, +17 mL since MN  Vital Signs:  Temp:  [97.3  F (36.3  C)] 97.3  F (36.3  C)  Pulse:  [46-65] 55  Resp:  [12-23] 14  BP: (114)/(56) 114/56  MAP:  [68 mmHg-245 mmHg] 81 mmHg  Arterial Line BP: (102-256)/() 109/59  FiO2 (%):  [30 %] 30 %  SpO2:  [100 %] 100 %    Labs:    Lab Results   Component Value Date    WBC 6.2 11/08/2023    HGB 10.0 (L) 11/08/2023    HCT 29.1 (L) 11/08/2023    PLT 80 (L) 11/08/2023     11/08/2023     11/08/2023    POTASSIUM 4.2 11/08/2023    POTASSIUM 4.2 11/08/2023    CHLORIDE 99 11/08/2023    CHLORIDE 99 11/08/2023    CO2 23 11/08/2023    CO2 23 11/08/2023    BUN 37.2 (H) 11/08/2023    BUN 37.2 (H) 11/08/2023    CR 2.47 (H) 11/08/2023    CR 2.47 (H) 11/08/2023     (H) 11/08/2023     (H) 11/08/2023     (H) 11/08/2023    AST 26 11/08/2023    ALT 19 11/08/2023    ALKPHOS 28 (L) 11/08/2023    BILITOTAL 2.3 (H) 11/08/2023    TONIO 17 11/06/2023    INR 1.88 (H) 11/08/2023       Goals of Therapy:  I=O    INTERVENTIONS:   None needed.     PLAN:  Continue plan of care. Contact CRRT Resource via [employer] with any questions or concerns.

## 2023-11-09 ENCOUNTER — APPOINTMENT (OUTPATIENT)
Dept: PHYSICAL THERAPY | Facility: CLINIC | Age: 33
End: 2023-11-09
Payer: COMMERCIAL

## 2023-11-09 ENCOUNTER — APPOINTMENT (OUTPATIENT)
Dept: OCCUPATIONAL THERAPY | Facility: CLINIC | Age: 33
End: 2023-11-09
Payer: COMMERCIAL

## 2023-11-09 LAB
ALBUMIN SERPL BCG-MCNC: 3.6 G/DL (ref 3.5–5.2)
ALBUMIN SERPL BCG-MCNC: 3.6 G/DL (ref 3.5–5.2)
ALBUMIN SERPL BCG-MCNC: 3.7 G/DL (ref 3.5–5.2)
ALP SERPL-CCNC: 26 U/L (ref 35–104)
ALT SERPL W P-5'-P-CCNC: 14 U/L (ref 0–50)
ANION GAP SERPL CALCULATED.3IONS-SCNC: 11 MMOL/L (ref 7–15)
ANION GAP SERPL CALCULATED.3IONS-SCNC: 8 MMOL/L (ref 7–15)
ANION GAP SERPL CALCULATED.3IONS-SCNC: 8 MMOL/L (ref 7–15)
AST SERPL W P-5'-P-CCNC: 22 U/L (ref 0–45)
BASOPHILS # BLD AUTO: 0 10E3/UL (ref 0–0.2)
BASOPHILS NFR BLD AUTO: 0 %
BILIRUB DIRECT SERPL-MCNC: 1.08 MG/DL (ref 0–0.3)
BILIRUB SERPL-MCNC: 1.9 MG/DL
BLD PROD TYP BPU: NORMAL
BLD PROD TYP BPU: NORMAL
BLOOD COMPONENT TYPE: NORMAL
BLOOD COMPONENT TYPE: NORMAL
BUN SERPL-MCNC: 21.9 MG/DL (ref 6–20)
BUN SERPL-MCNC: 21.9 MG/DL (ref 6–20)
BUN SERPL-MCNC: 25 MG/DL (ref 6–20)
CA-I BLD-MCNC: 4.5 MG/DL (ref 4.4–5.2)
CA-I BLD-MCNC: 4.5 MG/DL (ref 4.4–5.2)
CALCIUM SERPL-MCNC: 7.6 MG/DL (ref 8.6–10)
CALCIUM SERPL-MCNC: 7.6 MG/DL (ref 8.6–10)
CALCIUM SERPL-MCNC: 7.9 MG/DL (ref 8.6–10)
CF REDUC 60M P MA LENFR BLD TEG: 0.5 % (ref 0–15)
CFT BLD TEG: 7.6 MINUTE (ref 1–3)
CHLORIDE SERPL-SCNC: 103 MMOL/L (ref 98–107)
CHLORIDE SERPL-SCNC: 104 MMOL/L (ref 98–107)
CHLORIDE SERPL-SCNC: 104 MMOL/L (ref 98–107)
CI (COAGULATION INDEX)(Z) NON NATIVE: -7.5 (ref -3–3)
CLOT ANGLE BLD TEG: 33.8 DEGREES (ref 53–72)
CLOT INIT BLD TEG: 5.5 MINUTE (ref 5–10)
CLOT LYSIS 30M P MA LENFR BLD TEG: 0 % (ref 0–8)
CLOT STRENGTH BLD TEG: 2.6 KD/SC (ref 4.5–11)
CODING SYSTEM: NORMAL
CODING SYSTEM: NORMAL
CREAT SERPL-MCNC: 1.71 MG/DL (ref 0.51–0.95)
CREAT SERPL-MCNC: 1.71 MG/DL (ref 0.51–0.95)
CREAT SERPL-MCNC: 2 MG/DL (ref 0.51–0.95)
DEPRECATED HCO3 PLAS-SCNC: 22 MMOL/L (ref 22–29)
DEPRECATED HCO3 PLAS-SCNC: 24 MMOL/L (ref 22–29)
DEPRECATED HCO3 PLAS-SCNC: 24 MMOL/L (ref 22–29)
EGFRCR SERPLBLD CKD-EPI 2021: 33 ML/MIN/1.73M2
EGFRCR SERPLBLD CKD-EPI 2021: 40 ML/MIN/1.73M2
EGFRCR SERPLBLD CKD-EPI 2021: 40 ML/MIN/1.73M2
EOSINOPHIL # BLD AUTO: 0 10E3/UL (ref 0–0.7)
EOSINOPHIL NFR BLD AUTO: 0 %
ERYTHROCYTE [DISTWIDTH] IN BLOOD BY AUTOMATED COUNT: 17.9 % (ref 10–15)
ERYTHROCYTE [DISTWIDTH] IN BLOOD BY AUTOMATED COUNT: 17.9 % (ref 10–15)
FIBRINOGEN PPP-MCNC: <61 MG/DL (ref 170–490)
GLUCOSE BLDC GLUCOMTR-MCNC: 193 MG/DL (ref 70–99)
GLUCOSE BLDC GLUCOMTR-MCNC: 193 MG/DL (ref 70–99)
GLUCOSE BLDC GLUCOMTR-MCNC: 97 MG/DL (ref 70–99)
GLUCOSE SERPL-MCNC: 100 MG/DL (ref 70–99)
GLUCOSE SERPL-MCNC: 100 MG/DL (ref 70–99)
GLUCOSE SERPL-MCNC: 156 MG/DL (ref 70–99)
HCT VFR BLD AUTO: 29.5 % (ref 35–47)
HCT VFR BLD AUTO: 29.6 % (ref 35–47)
HGB BLD-MCNC: 10.2 G/DL (ref 11.7–15.7)
HGB BLD-MCNC: 9.8 G/DL (ref 11.7–15.7)
IMM GRANULOCYTES # BLD: 0.1 10E3/UL
IMM GRANULOCYTES NFR BLD: 1 %
INR PPP: 2.18 (ref 0.85–1.15)
ISSUE DATE AND TIME: NORMAL
ISSUE DATE AND TIME: NORMAL
LYMPHOCYTES # BLD AUTO: 1.2 10E3/UL (ref 0.8–5.3)
LYMPHOCYTES NFR BLD AUTO: 17 %
MAGNESIUM SERPL-MCNC: 2.2 MG/DL (ref 1.7–2.3)
MAGNESIUM SERPL-MCNC: 2.3 MG/DL (ref 1.7–2.3)
MCF BLD TEG: 34.2 MM (ref 50–70)
MCH RBC QN AUTO: 30.9 PG (ref 26.5–33)
MCH RBC QN AUTO: 31.7 PG (ref 26.5–33)
MCHC RBC AUTO-ENTMCNC: 33.1 G/DL (ref 31.5–36.5)
MCHC RBC AUTO-ENTMCNC: 34.6 G/DL (ref 31.5–36.5)
MCV RBC AUTO: 92 FL (ref 78–100)
MCV RBC AUTO: 93 FL (ref 78–100)
MONOCYTES # BLD AUTO: 0.4 10E3/UL (ref 0–1.3)
MONOCYTES NFR BLD AUTO: 6 %
NEUTROPHILS # BLD AUTO: 5.5 10E3/UL (ref 1.6–8.3)
NEUTROPHILS NFR BLD AUTO: 76 %
NRBC # BLD AUTO: 0 10E3/UL
NRBC BLD AUTO-RTO: 0 /100
PHOSPHATE SERPL-MCNC: 4.4 MG/DL (ref 2.5–4.5)
PHOSPHATE SERPL-MCNC: 4.9 MG/DL (ref 2.5–4.5)
PLATELET # BLD AUTO: 89 10E3/UL (ref 150–450)
PLATELET # BLD AUTO: 90 10E3/UL (ref 150–450)
POTASSIUM SERPL-SCNC: 3.4 MMOL/L (ref 3.4–5.3)
POTASSIUM SERPL-SCNC: 3.4 MMOL/L (ref 3.4–5.3)
POTASSIUM SERPL-SCNC: 3.5 MMOL/L (ref 3.4–5.3)
PROT SERPL-MCNC: 4 G/DL (ref 6.4–8.3)
RBC # BLD AUTO: 3.17 10E6/UL (ref 3.8–5.2)
RBC # BLD AUTO: 3.22 10E6/UL (ref 3.8–5.2)
SODIUM SERPL-SCNC: 136 MMOL/L (ref 135–145)
TACROLIMUS BLD-MCNC: 5.6 UG/L (ref 5–15)
TME LAST DOSE: NORMAL H
TME LAST DOSE: NORMAL H
UNIT ABO/RH: NORMAL
UNIT ABO/RH: NORMAL
UNIT NUMBER: NORMAL
UNIT NUMBER: NORMAL
UNIT STATUS: NORMAL
UNIT STATUS: NORMAL
UNIT TYPE ISBT: 6200
UNIT TYPE ISBT: 6200
WBC # BLD AUTO: 7.2 10E3/UL (ref 4–11)
WBC # BLD AUTO: 7.6 10E3/UL (ref 4–11)

## 2023-11-09 PROCEDURE — 250N000013 HC RX MED GY IP 250 OP 250 PS 637

## 2023-11-09 PROCEDURE — 250N000013 HC RX MED GY IP 250 OP 250 PS 637: Performed by: PHYSICIAN ASSISTANT

## 2023-11-09 PROCEDURE — 250N000011 HC RX IP 250 OP 636: Mod: JZ | Performed by: PHYSICIAN ASSISTANT

## 2023-11-09 PROCEDURE — 80197 ASSAY OF TACROLIMUS: CPT | Performed by: PHYSICIAN ASSISTANT

## 2023-11-09 PROCEDURE — 90947 DIALYSIS REPEATED EVAL: CPT

## 2023-11-09 PROCEDURE — 85384 FIBRINOGEN ACTIVITY: CPT

## 2023-11-09 PROCEDURE — 82040 ASSAY OF SERUM ALBUMIN: CPT | Performed by: PHYSICIAN ASSISTANT

## 2023-11-09 PROCEDURE — 250N000012 HC RX MED GY IP 250 OP 636 PS 637: Performed by: STUDENT IN AN ORGANIZED HEALTH CARE EDUCATION/TRAINING PROGRAM

## 2023-11-09 PROCEDURE — P9047 ALBUMIN (HUMAN), 25%, 50ML: HCPCS | Performed by: NURSE PRACTITIONER

## 2023-11-09 PROCEDURE — P9012 CRYOPRECIPITATE EACH UNIT: HCPCS

## 2023-11-09 PROCEDURE — 250N000013 HC RX MED GY IP 250 OP 250 PS 637: Performed by: INTERNAL MEDICINE

## 2023-11-09 PROCEDURE — 258N000003 HC RX IP 258 OP 636: Performed by: PHYSICIAN ASSISTANT

## 2023-11-09 PROCEDURE — 97116 GAIT TRAINING THERAPY: CPT | Mod: GP | Performed by: PHYSICAL THERAPIST

## 2023-11-09 PROCEDURE — 85027 COMPLETE CBC AUTOMATED: CPT | Performed by: INTERNAL MEDICINE

## 2023-11-09 PROCEDURE — 82330 ASSAY OF CALCIUM: CPT | Performed by: INTERNAL MEDICINE

## 2023-11-09 PROCEDURE — 250N000011 HC RX IP 250 OP 636: Mod: JZ

## 2023-11-09 PROCEDURE — 85396 CLOTTING ASSAY WHOLE BLOOD: CPT

## 2023-11-09 PROCEDURE — 84100 ASSAY OF PHOSPHORUS: CPT | Performed by: INTERNAL MEDICINE

## 2023-11-09 PROCEDURE — 82248 BILIRUBIN DIRECT: CPT | Performed by: INTERNAL MEDICINE

## 2023-11-09 PROCEDURE — 90937 HEMODIALYSIS REPEATED EVAL: CPT

## 2023-11-09 PROCEDURE — 99232 SBSQ HOSP IP/OBS MODERATE 35: CPT | Mod: 25 | Performed by: SURGERY

## 2023-11-09 PROCEDURE — 85610 PROTHROMBIN TIME: CPT | Performed by: PHYSICIAN ASSISTANT

## 2023-11-09 PROCEDURE — 83735 ASSAY OF MAGNESIUM: CPT | Performed by: INTERNAL MEDICINE

## 2023-11-09 PROCEDURE — 250N000012 HC RX MED GY IP 250 OP 636 PS 637

## 2023-11-09 PROCEDURE — 85025 COMPLETE CBC W/AUTO DIFF WBC: CPT | Performed by: STUDENT IN AN ORGANIZED HEALTH CARE EDUCATION/TRAINING PROGRAM

## 2023-11-09 PROCEDURE — 200N000002 HC R&B ICU UMMC

## 2023-11-09 PROCEDURE — 250N000011 HC RX IP 250 OP 636: Performed by: NURSE PRACTITIONER

## 2023-11-09 PROCEDURE — 97530 THERAPEUTIC ACTIVITIES: CPT | Mod: GP | Performed by: PHYSICAL THERAPIST

## 2023-11-09 PROCEDURE — 99233 SBSQ HOSP IP/OBS HIGH 50: CPT | Mod: 24 | Performed by: INTERNAL MEDICINE

## 2023-11-09 PROCEDURE — 250N000009 HC RX 250: Performed by: INTERNAL MEDICINE

## 2023-11-09 PROCEDURE — 97535 SELF CARE MNGMENT TRAINING: CPT | Mod: GO

## 2023-11-09 PROCEDURE — P9045 ALBUMIN (HUMAN), 5%, 250 ML: HCPCS | Mod: JZ

## 2023-11-09 PROCEDURE — P9059 PLASMA, FRZ BETWEEN 8-24HOUR: HCPCS

## 2023-11-09 PROCEDURE — 250N000013 HC RX MED GY IP 250 OP 250 PS 637: Performed by: TRANSPLANT SURGERY

## 2023-11-09 PROCEDURE — 258N000003 HC RX IP 258 OP 636: Performed by: INTERNAL MEDICINE

## 2023-11-09 RX ORDER — ALBUMIN (HUMAN) 12.5 G/50ML
25 SOLUTION INTRAVENOUS 3 TIMES DAILY
Status: COMPLETED | OUTPATIENT
Start: 2023-11-09 | End: 2023-11-10

## 2023-11-09 RX ORDER — VALGANCICLOVIR 450 MG/1
450 TABLET, FILM COATED ORAL
Status: DISCONTINUED | OUTPATIENT
Start: 2023-11-10 | End: 2023-11-15 | Stop reason: HOSPADM

## 2023-11-09 RX ORDER — SULFAMETHOXAZOLE AND TRIMETHOPRIM 400; 80 MG/1; MG/1
1 TABLET ORAL
Status: DISCONTINUED | OUTPATIENT
Start: 2023-11-10 | End: 2023-11-15 | Stop reason: HOSPADM

## 2023-11-09 RX ADMIN — CALCIUM CHLORIDE, MAGNESIUM CHLORIDE, SODIUM CHLORIDE, SODIUM BICARBONATE, POTASSIUM CHLORIDE AND SODIUM PHOSPHATE DIBASIC DIHYDRATE 12.5 ML/KG/HR: 3.68; 3.05; 6.34; 3.09; .314; .187 INJECTION INTRAVENOUS at 03:02

## 2023-11-09 RX ADMIN — ALBUMIN HUMAN 25 G: 0.25 SOLUTION INTRAVENOUS at 19:41

## 2023-11-09 RX ADMIN — TACROLIMUS 2 MG: 1 CAPSULE ORAL at 07:58

## 2023-11-09 RX ADMIN — SODIUM CHLORIDE 100 MG: 9 INJECTION, SOLUTION INTRAVENOUS at 11:55

## 2023-11-09 RX ADMIN — GABAPENTIN 100 MG: 100 CAPSULE ORAL at 19:41

## 2023-11-09 RX ADMIN — ACETAMINOPHEN 325 MG: 325 TABLET, FILM COATED ORAL at 11:50

## 2023-11-09 RX ADMIN — ACETAMINOPHEN 325 MG: 325 TABLET, FILM COATED ORAL at 17:59

## 2023-11-09 RX ADMIN — OXYCODONE HYDROCHLORIDE 2.5 MG: 5 TABLET ORAL at 07:57

## 2023-11-09 RX ADMIN — PANTOPRAZOLE SODIUM 40 MG: 40 TABLET, DELAYED RELEASE ORAL at 07:58

## 2023-11-09 RX ADMIN — Medication 1 DROP: at 19:41

## 2023-11-09 RX ADMIN — SODIUM CHLORIDE 300 ML: 9 INJECTION, SOLUTION INTRAVENOUS at 18:15

## 2023-11-09 RX ADMIN — ACETAMINOPHEN 325 MG: 325 TABLET, FILM COATED ORAL at 06:07

## 2023-11-09 RX ADMIN — THERA TABS 1 TABLET: TAB at 11:50

## 2023-11-09 RX ADMIN — ACETAMINOPHEN 325 MG: 325 TABLET, FILM COATED ORAL at 01:59

## 2023-11-09 RX ADMIN — MYCOPHENOLATE MOFETIL 750 MG: 250 CAPSULE ORAL at 17:59

## 2023-11-09 RX ADMIN — GABAPENTIN 100 MG: 100 CAPSULE ORAL at 14:21

## 2023-11-09 RX ADMIN — ALBUMIN HUMAN 37.5 G: 0.05 INJECTION, SOLUTION INTRAVENOUS at 06:07

## 2023-11-09 RX ADMIN — ALBUMIN HUMAN 25 G: 0.25 SOLUTION INTRAVENOUS at 14:21

## 2023-11-09 RX ADMIN — Medication 1 DROP: at 07:58

## 2023-11-09 RX ADMIN — INSULIN ASPART 2 UNITS: 100 INJECTION, SOLUTION INTRAVENOUS; SUBCUTANEOUS at 18:08

## 2023-11-09 RX ADMIN — TACROLIMUS 2 MG: 1 CAPSULE ORAL at 17:59

## 2023-11-09 RX ADMIN — ALBUMIN HUMAN 37.5 G: 0.05 INJECTION, SOLUTION INTRAVENOUS at 00:04

## 2023-11-09 RX ADMIN — SODIUM CHLORIDE 250 ML: 9 INJECTION, SOLUTION INTRAVENOUS at 18:14

## 2023-11-09 RX ADMIN — SODIUM CHLORIDE 20 MG: 9 INJECTION, SOLUTION INTRAVENOUS at 11:52

## 2023-11-09 RX ADMIN — PREDNISONE 25 MG: 20 TABLET ORAL at 07:57

## 2023-11-09 RX ADMIN — SULFAMETHOXAZOLE AND TRIMETHOPRIM 1 TABLET: 400; 80 TABLET ORAL at 07:57

## 2023-11-09 RX ADMIN — ACETAMINOPHEN 325 MG: 325 TABLET, FILM COATED ORAL at 14:21

## 2023-11-09 RX ADMIN — GABAPENTIN 100 MG: 100 CAPSULE ORAL at 07:57

## 2023-11-09 RX ADMIN — VALGANCICLOVIR 450 MG: 450 TABLET, FILM COATED ORAL at 07:57

## 2023-11-09 RX ADMIN — Medication 1 DROP: at 11:50

## 2023-11-09 RX ADMIN — ACETAMINOPHEN 325 MG: 325 TABLET, FILM COATED ORAL at 21:46

## 2023-11-09 RX ADMIN — MYCOPHENOLATE MOFETIL 750 MG: 250 CAPSULE ORAL at 07:57

## 2023-11-09 ASSESSMENT — ACTIVITIES OF DAILY LIVING (ADL)
ADLS_ACUITY_SCORE: 35
ADLS_ACUITY_SCORE: 33
ADLS_ACUITY_SCORE: 35
ADLS_ACUITY_SCORE: 33
ADLS_ACUITY_SCORE: 35
ADLS_ACUITY_SCORE: 33
ADLS_ACUITY_SCORE: 35

## 2023-11-09 NOTE — PROGRESS NOTES
CRRT STATUS NOTE    DATA:  Time:  6:19 PM  Pressures WNL:  YES  Filter Status:  WDL    Problems Reported/Alarms Noted:  TMP and Filter pressure increasing    Supplies Present:  YES    ASSESSMENT:  Patient Net Fluid Balance:  -1.3 L since midnight  Vital Signs:  /56   Pulse 66   Temp 97.3  F (36.3  C) (Oral)   Resp 15   Wt 73.3 kg (161 lb 9.6 oz)   SpO2 100%   BMI 28.63 kg/m    Labs:  creatinine 2.31  Goals of Therapy:  I=O    INTERVENTIONS:   Restarted at 1247    PLAN:  Continue with plan of care. Contact CRRT resource RN with any questions and/or concerns.

## 2023-11-09 NOTE — PLAN OF CARE
No acute events overnight.    Neuro-alert and oriented, moves all extremities, able to position herself in bed, mild pain controlled with tylenol and gabapentin  CV-HR 50s-70s with no ectopy, left axillary arterial line, BP stable with no pressor use, afebrile, pulses palpable, edema significantly decreased  Pulm-RA, lung sounds clear  GI-Full liquid diet, has appetite, active bowel sounds, bowel movementx2, sliding scale insulin with meals and HS  -anueric, bladder scan Q shift; CRRT goal I=O; slightly net negative due to HARLEY output  Skin-no acute changes, JPs continue to dump 50-200cc/hr  Lines-PIV, trialysis line for CRRT  Gtts-None    Plan:  Discontinue CRRT when filter clots;  Transition to HD; orders to 7A when appropriate. Notify SICU of any acute changes/concerns.

## 2023-11-09 NOTE — PLAN OF CARE
Major Shift Events:    Neuro intact. Stand with assist of 1 and walker, AxO X4. Extubated @0815, currently room air satting in high 90s. MAPs 65-70. Izaguirre removed.  1:2 drain: albumin replacement, large quantity of drain output.   Multiple small loose BMs.   CRRT I=O, currently not pulling anything because drain output is so negative  Plan: CRRT to be turned off in morning or if filter clots. Floor orders following  For vital signs and complete assessments, please see documentation flowsheets.

## 2023-11-09 NOTE — PROGRESS NOTES
CRRT STATUS NOTE    DATA:  Time:  0600  Pressures WNL:  YES  Filter Status:  WDL    Problems Reported/Alarms Noted:  none    Supplies Present:  YES    ASSESSMENT:    Patient Net Fluid Balance:  -539 mL net since midnight       Intake/Output Summary (Last 24 hours) at 11/9/2023 0500  Last data filed at 11/9/2023 0400  Gross per 24 hour   Intake 3634 ml   Output 6004 ml   Net -2370 ml       Vital Signs: Temp:  [97.3  F (36.3  C)-97.7  F (36.5  C)] 97.6  F (36.4  C)  Pulse:  [55-75] 66  Resp:  [8-21] 15  MAP:  [35 mmHg-94 mmHg] 74 mmHg  Arterial Line BP: ()/(45-71) 98/54  FiO2 (%):  [30 %] 30 %  SpO2:  [95 %-100 %] 96 %       Most Recent BMP's:  Recent Labs   Lab Test 11/09/23 0335 11/08/23 1959 11/08/23 1146 11/08/23 0355 11/07/23 1958 11/07/23  1302     136 135 135 135  135 131* 131*   POTASSIUM 3.4  3.4 3.6 3.7 4.2  4.2 4.2 4.1   CHLORIDE 104  104 102 101 99  99 96* 95*   CO2 24  24 24 22 23  23 21* 20*   BUN 21.9*  21.9* 26.3* 33.1* 37.2*  37.2* 45.2* 51.2*   CR 1.71*  1.71* 1.86* 2.31* 2.47*  2.47* 2.88* 3.25*   ANIONGAP 8  8 9 12 13  13 14 16*   GEE 7.6*  7.6* 7.4* 7.9* 8.0*  8.0* 8.4* 9.1     Most Recent CBC's:  Recent Labs   Lab Test 11/09/23 0335 11/08/23 1959 11/08/23 1146 11/08/23 0355   WBC 7.2 7.2 7.1 6.2   HGB 9.8* 9.9* 10.3* 10.0*   MCV 93 90 91 90   PLT 89* 82* 96* 80*     Most Recent ABG's:  Recent Labs   Lab Test 11/07/23  0701 11/05/23  2147 11/05/23  1851   PH 7.43 7.31* 7.39   PO2 103 167* 134*   PCO2 34* 49* 37   HCO3 23 25 22   BRIANA -1.4 -1.9 -2.5       Goals of Therapy:  I=O Surpassing goals at this time.     INTERVENTIONS:   Dressing changed this shift with CLT and CRRT RN. Set taken down @ 0645 as the TMPs were > 300 with no plans to restart.    PLAN:  Continue with current plan of care please contact CRRT resource with any questions or concerns. If CRRT circuit clots, no plans to  restart, plan to transition to HD.

## 2023-11-09 NOTE — PROGRESS NOTES
CLINICAL NUTRITION SERVICES - BRIEF NOTE      Reason for RD note: Following up on nutrition POC, diet tolerance    New Findings/Chart Review:  Enteral Access: 14 Fr NGT    Oral Diet/Intake: Regular + Ensure Clear @ 2pm + Magic Cup chocolate @ HS    Interventions:  SICU rounds    Ordered kcal cts 11/10-11/12    Continue supplements as ordered    Future/Additional Recommendations:  Recommend pt on average meets at least 60% minimum assessed needs (~1250 kcal, 65 g pro daily) via kcal cts to avoid additional nutrition interventions such as additional scheduled snacks/supplements vs EN support.     Nutrition will continue to follow per protocol.    Perla Damon RD, LD  Pager: [phone]; Ascom: *[phone]

## 2023-11-09 NOTE — PROGRESS NOTES
[facility]  Transplant Nephrology Progress notes  Date of Admission:  10/31/2023  Today's Date: 11/09/2023  Requesting physician: No att. providers found    Recommendations:  - discontinued CRRT on 11/9 morning, trial of iHD today with no UF   - continued to be anuric  - continue to have high drain out put  - Holding high dose vitamin D insetting of hypercalcemia  - Obtained consent for RRT and placed in pt's chart    Assessment & Plan   # CKD and EDGAR: Trend up in SCr.  Baseline prior to liver transplant was at ~1 but prior to transplant have had some EDGAR with creatinine of 2.6-2.7 and peaked at ~3, and become anuric since midnight.  EDGAR related to significant volume loss.    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     # Liver Tx: LFT's were improving.     # Immunosuppression: Tacrolimus immediate release (goal 8-10) and Mycophenolate mofetil (dose 750 mg every 12 hours)   - Patient is in an immunosuppressed state and will continue to monitor for efficacy and toxicity of immunosuppression medications.   - Changes: Not at this time, per liver team    # Infection Prophylaxis:    - PJP: Sulfa/TMP (Bactrim)- will be started post-txp   - CMV: Valganciclovir (Valcyte)-will be started post-txp    # Hypotension: Hypotensive at times  Goal BP: > 100, but < 130 systolic   - Volume status: Mildly hypervolemic     - Changes: No.    # Anemia in Chronic Renal Disease: Hgb: Stable      NIRMAL: No   - Iron studies: Not checked recently    # Mineral Bone Disorder:    - Secondary renal hyperparathyroidism; PTH level: Not checked recently        On treatment: None   - Vitamin D; level: Low        On supplement: No, received high dose once, but holding further doses with given hypercalcemia.   - Calcium; level: High        On supplement: No   -  Phosphorus; level: Normal        On supplement: No    # Electrolytes:   - Potassium; level: Low        On supplement: No  - Magnesium; level: Low        On supplement: Yes  - Bicarbonate; level: Normal        On supplement: No  - Sodium; level: Normal.    #Depression: well managed. Defer to primary team     # Transplant History:  Etiology of Kidney Failure: Hepatorenal syndrome (HRS)  Tx: CKD and liver transplant  Transplant: 2023 (Liver)  Crossmatch at time of Tx: pending  Significant changes in immunosuppression: None  Significant transplant-related complications: None    Recommendations were communicated to the primary team verbally.    Ely Trujillo MD  Pager: [phone]    REASON FOR CONSULT   Liver transplant    History of Present Illness   Pt s/p liver transplant. Stable hemodynamics. No significant symptoms noted today.     Review of Systems    ROS were negative, denied chest pain, SOB, significant abdominal pain. Appetite is slowly coming back and able to eat with out vomiting.    Past Medical History    I have reviewed this patient's medical history and updated it with pertinent information if needed.   Past Medical History:   Diagnosis Date    Alcoholic cirrhosis of liver with ascites (H) 2023    History of alcohol abuse     Formatting of this note might be different from the original. In remission as of late 2023       Past Surgical History   I have reviewed this patient's surgical history and updated it with pertinent information if needed.  Past Surgical History:   Procedure Laterality Date    BENCH LIVER  2023    Procedure: Bench liver;  Surgeon: Aime Lowe MD;  Location: [facility] OR    TRANSPLANT LIVER RECIPIENT  DONOR N/A 2023    Procedure: Transplant liver recipient  donor;  Surgeon: Aime Lowe MD;  Location: [facility] OR       Family History   I have reviewed this patient's family history and updated it with pertinent information if needed.   No family  history on file.    Social History   I have reviewed this patient's social history and updated it with pertinent information if needed. Lola Milner  reports that she quit smoking about 5 months ago. Her smoking use included cigarettes. She has never used smokeless tobacco. She reports that she does not currently use alcohol. She reports that she does not use drugs.    Allergies   No Known Allergies  Prior to Admission Medications    acetaminophen  325 mg Oral or Feeding Tube Q4H    albumin human  25 g Intravenous TID    artificial tears  1 drop Both Eyes 4x Daily    gabapentin  100 mg Oral or Feeding Tube TID    insulin aspart  1-7 Units Subcutaneous TID AC    insulin aspart  1-5 Units Subcutaneous At Bedtime    micafungin  100 mg Intravenous Q24H    multivitamin, therapeutic  1 tablet Oral Daily    mycophenolate  750 mg Oral BID IS    - MEDICATION INSTRUCTIONS -   Does not apply Once    pantoprazole  40 mg Oral QAM AC    polyethylene glycol  17 g Oral or Feeding Tube Daily    [START ON 11/10/2023] predniSONE  10 mg Oral Once    sodium chloride (PF)  3 mL Intravenous Q8H    sodium chloride (PF)  9 mL Intracatheter During Dialysis/CRRT (from stock)    sodium chloride (PF)  9 mL Intracatheter During Dialysis/CRRT (from stock)    sodium chloride 0.9%  250 mL Intravenous Once in dialysis/CRRT    sodium chloride 0.9%  300 mL Hemodialysis Machine Once    sulfamethoxazole-trimethoprim  1 tablet Oral or Feeding Tube Daily    tacrolimus  2 mg Oral BID IS    valGANciclovir  450 mg Oral Daily      - MEDICATION INSTRUCTIONS -      CRRT replacement solution 12.5 mL/kg/hr (23)    - MEDICATION INSTRUCTIONS -      CRRT replacement solution 200 mL/hr at 23    CRRT replacement solution 12.5 mL/kg/hr (23)    BETA BLOCKER NOT PRESCRIBED         Physical Exam   Temp  Av.9  F (36.6  C)  Min: 97.3  F (36.3  C)  Max: 98.3  F (36.8  C)      Pulse  Av.5  Min: 66  Max: 86 Resp  Av.7  Min: 13   Max: 27  SpO2  Av.9 %  Min: 87 %  Max: 100 %     /59   Pulse 59   Temp 97.4  F (36.3  C) (Oral)   Resp 14   Wt 70.4 kg (155 lb 3.3 oz)   SpO2 100%   BMI 27.49 kg/m      Admit Weight: 68.6 kg (151 lb 4.8 oz)     GENERAL APPEARANCE: awake and alert  HENT: no gross abnormalities noted  RESP: lungs clear to auscultation - no rales, rhonchi or wheezes  CV: regular rhythm, normal rate, no rub, no murmur  EDEMA: 1+ LE edema bilaterally  ABDOMEN: soft, nondistended, nontender, bowel sounds normal  MS: extremities normal - no gross deformities noted, no evidence of inflammation in joints, no muscle tenderness  SKIN: no rash  Neuro : mentation and speech intact    Data   CMP  Recent Labs   Lab 23  0806 23  0335 23  2144 23  1959 23  1203 23  1146 23  0533 23  0355 23  0608 23  0505 23  0502 23  0425   NA  --  136  136  --  135  --  135  --  135  135   < > 128*  128*   < > 130*  130*   POTASSIUM  --  3.4  3.4  --  3.6  --  3.7  --  4.2  4.2   < > 4.3  4.3   < > 3.1*  3.1*   CHLORIDE  --  104  104  --  102  --  101  --  99  99   < > 94*  94*   < > 96*  96*   CO2  --  24  24  --  24  --  22  --  23  23   < > 21*  21*   < > 24  24   ANIONGAP  --  8  8  --  9  --  12  --  13  13   < > 13  13   < > 10  10   GLC 97 100*  100* 149* 176*   < > 139*   < > 112*  114*  114*   < > 150*  150*   < > 103*  103*   BUN  --  21.9*  21.9*  --  26.3*  --  33.1*  --  37.2*  37.2*   < > 50.0*  50.0*   < > 42.6*  42.6*   CR  --  1.71*  1.71*  --  1.86*  --  2.31*  --  2.47*  2.47*   < > 3.18*  3.18*   < > 2.50*  2.50*   GFRESTIMATED  --  40*  40*  --  37*  --  28*  --  26*  26*   < > 19*  19*   < > 26*  26*   GEE  --  7.6*  7.6*  --  7.4*  --  7.9*  --  8.0*  8.0*   < > 9.2  9.2   < > 10.5*  10.5*   MAG  --  2.3  --  2.3  --  2.4*  --  2.5*   < > 2.7*   < > 1.6*   PHOS  --  4.4  --  4.7*  --  5.5*  --  6.0*   < > 7.3*  --   3.3   PROTTOTAL  --  4.0*  --   --   --   --   --  4.2*  --  4.1*  --  4.1*   ALBUMIN  --  3.6  3.6  --  3.8  --  3.7  --  3.7  3.7   < > 3.0*  --  2.9*   BILITOTAL  --  1.9*  --   --   --   --   --  2.3*  --  3.6*  --  6.6*   ALKPHOS  --  26*  --   --   --   --   --  28*  --  48  --  75   AST  --  22  --   --   --   --   --  26  --  61*  --  182*   ALT  --  14  --   --   --   --   --  19  --  46  --  113*    < > = values in this interval not displayed.     CBC  Recent Labs   Lab 11/09/23  0335 11/08/23  1959 11/08/23  1146 11/08/23  0355   HGB 9.8* 9.9* 10.3* 10.0*   WBC 7.2 7.2 7.1 6.2   RBC 3.17* 3.18* 3.32* 3.23*   HCT 29.6* 28.7* 30.1* 29.1*   MCV 93 90 91 90   MCH 30.9 31.1 31.0 31.0   MCHC 33.1 34.5 34.2 34.4   RDW 17.9* 18.0* 18.1* 18.0*   PLT 89* 82* 96* 80*     INR  Recent Labs   Lab 11/09/23  0335 11/08/23  0355 11/07/23  0505 11/06/23  1624 11/06/23  0425 11/05/23  2148 11/05/23  1854 11/05/23  1740 11/05/23  0520 11/04/23  1049   INR 2.18* 1.88* 1.31* 1.31*   < > 1.44* 1.60* 1.89*   < > 4.31*   PTT  --   --   --   --   --  36 37 54*  --  84*    < > = values in this interval not displayed.     ABG  Recent Labs   Lab 11/07/23  0701 11/05/23  2147 11/05/23  1851 11/05/23  1751   PH 7.43 7.31* 7.39 7.41   PCO2 34* 49* 37 35   PO2 103 167* 134* 108*   HCO3 23 25 22 22   O2PER 30 40 52.0 53.0      Urine Studies  Recent Labs   Lab Test 11/04/23  2309 10/31/23  2100   COLOR Dark Yellow* Orange*   APPEARANCE Slightly Cloudy* Cloudy*   URINEGLC Negative Negative   URINEBILI Moderate* Large*   URINEKETONE Negative Negative   SG 1.016 1.019   UBLD Trace* Trace*   URINEPH 5.5 5.5   PROTEIN 10* 30*   NITRITE Negative Negative   LEUKEST Small* Small*   RBCU 2 4*   WBCU 5 25*     IMAGING:  All imaging studies reviewed by me.

## 2023-11-09 NOTE — OP NOTE
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 Donation after Brain Death Piggyback liver transplant   2. End-to-end Choledochocholedochostomy       Surgeon  Surgeon(s) and Role:     * Aime Lowe MD - Primary     * Vicente Claudio MD    Co-Surgeon:  Aime Lowe    Fellow/Assistant:  Dr Vicente Claudio served as first assistant as there was no qualified resident available.  Dr Claudio assisted with the hepatectomy and performed the vascular and biliary anastomoses.      Anesthesia:  General    Specimen:  Hepatic artery lymph node.  Donor gallbladder.  Native liver and gallbladder.    Drains:  2    Urine output:  See anesthesia record    Estimated blood loss:  3.5L     Intraoperative Events: none    Complications: None.    Findings: Large but cirrhotic liver, indicative of acute on chronic nature. MELD 40+, Standard anatomy. Traditional piggyback technique. No biliary stent. HA flow 600 ml/min. PV flow 3L/min.      None.     Indication: Lola Milner with a history of End Stage Liver Disease due to etoh who presents for Donation after Brain Death Whole Liver liver transplant. A suitable donor offer has become available. After discussing the risks and benefits of proceeding, the patient agreed to proceed with surgery and provided informed consent.    Final ABO/Crossmatch verification: After the donor organ arrived to the operating room and prior to anastomosis, I participated in the transplant pre-verification upon organ receipt timeout by visually verifying the donor ID, organ and laterality, donor blood type, recipient unique identifier, recipient blood type, and that the donor and recipient are blood type compatible.    Donor Organ Information:   Donor UNOS ID:  [id number]    Donor ABO:  A    Donor arterial clamp on:  11/5/2023  8:37 AM    Preservation fluid:  UW     Vessels with  organ:  Yes    Donor organ arrival to recipient room:  11/5/2023 12:19 PM    Total ischemic time:  581    Cold ischemic time:  443    Warm ischemic time:  138    Ex-vivo:  No    Time placed on ex-vivo perfusion:  no    Total time on ex-vivo perfusion:  N/a     Back Table Preparation:   Procedure:  Bench preparation of the liver allograft for transplantation    Preoperative diagnosis:  End Stage Liver Disease due to etoh    Postoperative diagnosis:  Same,     Surgeon:  Surgeon(s) and Role:     * Aime Lowe MD - Primary     * Vicente Claudio MD    Faculty Co-Surgeon:  Mynor    Fellow/Assistant:  Dr Vicente Claudio serves as first assistant as there was no qualified resident available.  He performed the back table preparation of the allograft under my direct supervision    Anesthesia:  None    Graft biopsy:  No    Macroscopic steatosis:  N/a    Back table reconstruction:  no    Intimal flap repair:  no    # of hepatic arteries:  1    # of portal veins:  1    Accessory arteries:  no    # of hepatic veins:  3    # of bile ducts:  1    Graft weight:       Findings:  Liver laceration: no  Overall quality of liver: good    Back Table Procedure: The liver allograft was received and inspected and the aforementioned findings were noted. It was flushed with UW. The donor liver was placed in fresh ice-cold preservation solution. The inferior vena cava was identified. Two stay sutures were placed on the supra-hepatic portion of the cava. Two stay sutures were placed on the infra-hepatic portion of the cava. The fibro-fatty tissue and adrenal gland was cleared of inferior vena cava. The phrenic vein was ligated. The adrenal vein was ligated. The IVC was tested for leaks by using a bulb syringe. All the leaks identified were suture ligated. The portal vein was identified. All the fibro-fatty area or tissue around the portal vein was removed and the portal vein was dissected up to its bifurcation. An 8-Syrian cannula was placed  in the portal vein and fixed with a stitch. The portal vein was tested for leaks. All the leaks identified were suture ligated. The cannula was left in place to be used for flushing the liver at the time of implantation. The hepatic artery anatomy was identified. The celiac axis  was traced all the way from the aortic patch to the level of the gastro-duodenal artery. Dissection was stopped at the level of the gastro-duodenal artery. All the leaks in the hepatic artery tributaries were suture ligated. The bile duct was inspected and flushed. No reconstruction was required. The liver was placed back in ice-cold preservation solution until ready for transplantation.     Operative Procedure:   Arterial anastomosis start:  11/5/2023  4:00 PM    Recipient arterial unclamp:  11/5/2023  6:33 PM    Extra vessels used:  no    Extra vessels banked:  Yes    Previous upper abd surgery?  No    Previous cholecystectomy?  No    Portal vein:  Thrombus? No   Patent? Yes-      On portal bypass?  No    Arterial flow:  Sufficient? Yes-       Bile duct anastomosis:  To bowel? No   To duct? Yes-     Specify: duct to duct, end to end     Lola Milner was brought to the operating room, placed in a supine position, and a time out was performed. Sequential compression devices were placed on both lower extremities and general endotracheal anesthesia was induced. The patient was given IV antibiotics, and  Cellcept and Solumedrol. A Izaguirre catheter was placed. A central line was placed by Anesthesia service. The abdomen was then shaved, prepped, and draped in the usual sterile fashion. The backtable preparation occurred prior to implantation.    The abdomen was opened through a Vertical Extension (Kimberly) incision. The falciform was taken down. We placed the retractors. The abdomen was examined.  Large volume ascites was encountered.   The left lateral segment was mobilized off the diaphragm and the lesser omentum opened. The right lobe was  mobilized from the inferior peritoneal reflections.     Retractors were set up. Exposure of the mariza hepatis was made. The cystic duct was identified, ligated, and divided. The right lateral side of the mariza hepatis was opened. The cystic artery was identified, ligated, and divided. Proper hepatic artery and right and small left hepatic artery were identified. The remaining portion of the mariza hepatis was opened. No significant arteries were found to the right of the bile duct. The bile duct was then divided near the bifurcation. Patent portal vein was identified and dissected from below the GDA to the bifurcation. Due to ESLD, coagulopathy and bleeding was encountered.    At this juncture, we mobilized the remaining portion of the right lobe to the midline. The small accessory hepatic veins were isolated, ligated and divided as they entered into the inferior vena cava.     It was elected to devascularize the liver to more easily remove it. The right hepatic artery was ligated. The portal vein was clamped just below the duodenum and divided at the bifurcation. The remaining portion of the liver was taken off the anterior surface of the inferior vena cava. Eventually, the liver was only suspended on the right middle and left hepatic veins. The right hepatic vein was ligated and divided. A Kl[place]malm clamp was placed on the confluence of the middle and left hepatic vein and the liver was resected off. The specimen was handed off. Hemostasis was obtained.     The clamp was repositioned to include the right, middle, and left hepatic veins. An orifice was made on the cava incorporating these 3. At this juncture, the liver had been brought back, it had been backtabled and the liver was found to be suitable. Anastomy was standard and no reconstruction was necessary.     The suprahepatic cava of the donor was anastomosed in end-to-end fashion on to the cava out with a running 3-0 Prolene, the liver was then flushed of its  preservation solution with cold 5% Albumin 1000cc. We ligated the donor infrahepatic cava with a vascular stapler.  The portal vein of the donor and recipient was anastomosed in an end-to-end fashion with a running 6-0 Prolene with a growth knot the diameter of the recipient vein. Clamps were released. The patient tolerated the unclamping reasonably well.     During the anastomosis, any coagulopathic bleeding was corrected by anesthesia. Common hepatic artery from the donor was anastomosed to the common hepatic artery of the recipient using 7-0 prolene. When hemostasis was secured, the bile ducts were trimmed. The donor gallbladder was removed. The 2 ducts were anastomosed in an end-to-end fashion using running 7-0 PDS.     We irrigated the abdomen. Hemostasis again verified as being adequate. The liver had been making a good amount of bile.  Two Nikolay drains were placed in RUQ. The abdomen was irrigated again and then the fascia closed with a running looped PDS. The skin approximated with staples. All needle, sponge, and instrument counts were accurate. The patient  tolerated the procedure well without apparent complications and was transferred to the ICU in good condition.    Physician Attestation   I was present for the entire procedure between opening and closing.    Aime Lowe MD  Date of Service (when I saw the patient): 11/5/2023

## 2023-11-09 NOTE — PROGRESS NOTES
SURGICAL ICU Progress NOTE  11/09/2023      PRIMARY TEAM: Liver Transplant  PRIMARY PHYSICIAN: Dr. Lowe  REASON FOR CRITICAL CARE ADMISSION: Hemodynamic, respiratory monitoring   ADMITTING PHYSICIAN: Dr. Villavicencio  Date of Service (when I saw the patient): 11/09/2023    ASSESSMENT:  Lola Milner is a 33 year old female with a PMH of MDD, prior tobacco use, gout, polyneuropathy, decompensated cirrhosis secondary to alcohol use complicated by hepatic encephalopathy, esophageal varices status post banding, recurrent ascites, and hepatorenal syndrome who was admitted on 10/31/2023 for evaluation for liver transplantation. She was admitted to SICU s/p uncomplicated liver transplantation on 11/5.    PLAN:  - Repeat TEG prior to starting heparin  - Two 5-pack units of cryoprecipitate  - NG tube out  - Start IHD as CRRT machine clotted off    Neurological:  # Acute pain   # Sedation for mechanical ventilation  - Monitor neurological status. Delirium preventions and precautions.   - Pain:    Gtt: Oxy 2.5-5 Q4 PRN   PRN: acetaminophen (2gm max daily dose)  - Sedation plan:   - Gtt: Off Propofol   - Extubated    # Hx MDD  # Hx polyneuropathy  - Continue PTA Gabapentin 100 mg TID, melatonin at HS PRN    Pulmonary:   # Post operative ventilatory support  - Extubated 11/8  - Supplemental oxygen to keep saturation above 92 %.  - Incentive spirometer every 15- 30 minutes.     Cardiovascular:    # Hypotension   # Shock hypovolemic from hemorrhage   - Monitor hemodynamic status. Goal MAP >65.   - Off pressors; maintaining map goals  - Evaluate repeat TEG prior to starting heparin per transplant  - Midodrine on hold -- was 15 mg TID prior to transplantation  - EBL: 3500 cc  - Intraoperative resuscitation: 5u pRBCs, 10u FFP, 4u Plt, cellsaver 880cc, 3.75L crystalloid, 400cc albumin, fibriga 3, PCC 1000cc  - Two 5-pack units of cryoprecipitate today for platelets <61    Gastroenterology/Nutrition:  # S/p liver transplantation  11/5/23  # Decompensated alcohol cirrhosis c/b HRS, HE, recurrent ascites, esophageal varices s/p banding (9/23)  # Hyperbilirubinemia  - Liver transplant team primary  - Trend hepatic labs; AST/ALT and T.Bili downtrending  - Immediate post-op liver US - prelim read   - Unremarkable appearance of liver   - Perihepatic 6.6 cm fluid collection in hepatorenal space (drain in place)   - small left pleural effusion  - Pantoprazole for ppx  - Transplant immunosuppressants and prophylaxis as below.     # Severe protein calorie deficit malnutrition    - RD consult. Appreciate cares and recommendations.   - Remove NG tube  - Advance diet as tolerated    Renal/Fluids/Electrolytes:   # Hyponatremia  # Acute kidney injury   # Hepatorenal syndrome  # CKD and EDGAR  - Replace HARLEY output 2:1 with Albumin 5% per transplant  - Transplant nephrology following, appreciate recs (11/8 recs below)   - continue on CRRT with given anuria  - will maintain UF at I=O for now  - will discontinue CRRT in the morning (11/9) or if she clots again tonight.  - Agree with fluid/albumin resuscitation with given increased drain out put  - Holding high dose vitamin D insetting of hypercalcemia  - Baseline Cr ~ 0.8-1.0; Cr downtrending to 1.7 today  - Urine output is ~18ml last 24 hours  - HOLD mIVF   - Mg 2.3, K 3.4, Phos 4.4 on 11/9 AM    Endocrine:   # Stress hyperglycemia   - Start insulin medium sliding scale  - Goal to keep BG< 180 for optimal wound healing.     ID:  # immunosuppression   - induction steroids per transplant, steroid taper, MMF, and tacrolimus per transplant   # immunoprophylaxis  - Bactrim start 11/8, Valcyte, Fluconazole  - Micafungin 14 days for CRRT  # perioperative antibiotics: zosyn completed    Heme:     # Acute blood loss anemia   # Coagulopathy due to cirrhosis and surgical blood loss    # Thrombocytopenia   # Anemia of critical illness   # Chronic normocytic anemia  - Hemoglobin prior to transplant ~7; Hgb 9.8 this AM   - 2u  "cryoprecipitate for fibrinogen of <66 on 11/9 AM  - Platelet baseline 70-80 prior to transplant; Plt 89 on 11/9 AM  - Transfusion parameters;   - Plt >20k if not bleeding   - Hgb > 8   - INR < 2   - Fibrinogen > 200      Musculoskeletal:  # Weakness and deconditioning of critical illness   - Physical and occupational therapy consult   - Utilized a walker at baseline    Skin:  - Diligent skin cares to prevent breakdown    General Cares/Prophylaxis:    DVT Prophylaxis: Defer to primary service. Chemical prophylaxis: None   GI Prophylaxis: PPI  Restraints: Restraints for medical healing needed: NO    Lines/ tubes/ drains:  - RIJ Trialysis catheter  - L axillary A-line - remove today  - PIV  - 2x HARLEY drain    Disposition:  -  SICU to see if tolerating IHD, Floor soon    Patient seen, findings and plan discussed with surgical ICU staff, Dr. Garcia.    Kulwant Avalos, MS4  [facility] [employer]    MARIA ELENA GONZALEZ MD  11/09/23 4:05 PM      Clinically Significant Risk Factors          # Hypocalcemia: Lowest Ca = 7.4 mg/dL in last 2 days, will monitor and replace as appropriate     # Hypoalbuminemia: Lowest albumin = 2.9 g/dL at 11/6/2023  4:25 AM, will monitor as appropriate    # Coagulation Defect: INR = 2.18 (Ref range: 0.85 - 1.15) and/or PTT = 36 Seconds (Ref range: 22 - 38 Seconds), will monitor for bleeding  # Thrombocytopenia: Lowest platelets = 80 in last 2 days, will monitor for bleeding          # Overweight: Estimated body mass index is 27.49 kg/m  as calculated from the following:    Height as of an earlier encounter on 10/31/23: 1.6 m (5' 3\").    Weight as of this encounter: 70.4 kg (155 lb 3.3 oz).   # Severe Malnutrition: based on nutrition assessment      # Financial/Environmental Concerns: none (Pt reports her SO is able to pay rent and pay her bills)              - - - - - - - - - - - - - - - - - - - - - - - - - - - - - - - - - - - - - - - - - - - - - -   INTERVAL HISTORY:  No acute events overnight. Patient " sitting in bed comfortably this AM, in no acute distress. Reports minimal abdominal pain, moves extremities bilaterally. Minimal urine output. CRRT clotted off. Patient is able to discuss her needs and overall feels well. No other concerns at this time.     HISTORY PRESENTING ILLNESS: Lola Milner is a 33 year old female with a PMH of MDD, prior tobacco use (quit 2023), gout, polyneuropathy, decompensated cirrhosis secondary to alcohol use (last drink 23) complicated by hepatic encephalopathy, esophageal varices status post banding, recurrent ascites, and hepatorenal syndrome who was admitted on 10/31/2023 for evaluation for liver transplantation from [facility].  She was admitted to SICU s/p liver transplantation on     REVIEW OF SYSTEMS: 10 point ROS neg other than the symptoms noted above in the HPI.    PAST MEDICAL HISTORY:   Past Medical History:   Diagnosis Date    Alcoholic cirrhosis of liver with ascites (H) 2023    History of alcohol abuse     Formatting of this note might be different from the original. In remission as of late 2023       SURGICAL HISTORY:   Past Surgical History:   Procedure Laterality Date    BENCH LIVER  2023    Procedure: Bench liver;  Surgeon: Aime Lowe MD;  Location: [facility] OR    TRANSPLANT LIVER RECIPIENT  DONOR N/A 2023    Procedure: Transplant liver recipient  donor;  Surgeon: Aime Lowe MD;  Location:  OR       SOCIAL HISTORY:   Social History     Socioeconomic History    Marital status: Single   Tobacco Use    Smoking status: Former     Types: Cigarettes     Quit date: 2023     Years since quittin.4    Smokeless tobacco: Never   Substance and Sexual Activity    Alcohol use: Not Currently     Comment: last ETOH use 2023    Drug use: Never     FAMILY HISTORY: No bleeding/clotting disorders nor problems with anesthesia.     ALLERGIES:   No Known Allergies    MEDICATIONS:  No current facility-administered  medications on file prior to encounter.  gabapentin (NEURONTIN) 100 MG capsule, Take 100 mg by mouth 3 times daily  lactulose (CHRONULAC) 10 GM/15ML solution, Take 30 mLs by mouth 3 times daily  melatonin 3 MG tablet, Take 3 mg by mouth nightly as needed  midodrine (PROAMATINE) 5 MG tablet, Take 15 mg by mouth 3 times daily  Multiple Vitamins-Minerals (THERA-TABS M) TABS, Take 1 tablet by mouth daily  omeprazole (PRILOSEC) 20 MG DR capsule, Take 20 mg by mouth daily  sodium bicarbonate 650 MG tablet, Take 1,300 mg by mouth 2 times daily For 10 days        PHYSICAL EXAMINATION:  Temp:  [97.3  F (36.3  C)-97.6  F (36.4  C)] 97.3  F (36.3  C)  Pulse:  [55-75] 64  Resp:  [8-21] 13  MAP:  [35 mmHg-94 mmHg] 75 mmHg  Arterial Line BP: ()/(45-71) 100/56  SpO2:  [95 %-100 %] 97 %  General: Sitting in bed comfortably, no acute distress  HEENT: Trialysis cath in [facility]  Neuro: Mentating normally, following commands briskly in all four extremities  Pulm/Resp: Regular rate and work of breathing on room air  CV: RRR, maintaining MAPs off pressors  Abdomen: Soft, non-tender, 2x HARLEY drain with serosanguinous drainage  :  Izaguirre catheter removed, minimal urine  Incisions/Skin: C/D/I with minimal strikethrough  MSK/Extremities: no peripheral edema, extremities well perfused, DP pulses present bilaterally    LABS: Reviewed.   Arterial Blood Gases   Recent Labs   Lab 11/07/23  0701 11/05/23  2147 11/05/23  1851 11/05/23  1751   PH 7.43 7.31* 7.39 7.41   PCO2 34* 49* 37 35   PO2 103 167* 134* 108*   HCO3 23 25 22 22     Complete Blood Count   Recent Labs   Lab 11/09/23  0335 11/08/23  1959 11/08/23  1146 11/08/23  0355   WBC 7.2 7.2 7.1 6.2   HGB 9.8* 9.9* 10.3* 10.0*   PLT 89* 82* 96* 80*     Basic Metabolic Panel  Recent Labs   Lab 11/09/23  0806 11/09/23  0335 11/08/23  2144 11/08/23  1959 11/08/23  1203 11/08/23  1146 11/08/23  0533 11/08/23  0355   NA  --  136  136  --  135  --  135  --  135  135   POTASSIUM  --  3.4   3.4  --  3.6  --  3.7  --  4.2  4.2   CHLORIDE  --  104  104  --  102  --  101  --  99  99   CO2  --  24  24  --  24  --  22  --  23 23   BUN  --  21.9*  21.9*  --  26.3*  --  33.1*  --  37.2*  37.2*   CR  --  1.71*  1.71*  --  1.86*  --  2.31*  --  2.47*  2.47*   GLC 97 100*  100* 149* 176*   < > 139*   < > 112*  114*  114*    < > = values in this interval not displayed.     Liver Function Tests  Recent Labs   Lab 11/09/23 0335 11/08/23 1959 11/08/23  1146 11/08/23  0355 11/07/23 1958 11/07/23  0505 11/06/23  1624 11/06/23 0425   AST 22  --   --  26  --  61*  --  182*   ALT 14  --   --  19  --  46  --  113*   ALKPHOS 26*  --   --  28*  --  48  --  75   BILITOTAL 1.9*  --   --  2.3*  --  3.6*  --  6.6*   ALBUMIN 3.6  3.6 3.8 3.7 3.7  3.7   < > 3.0*  --  2.9*   INR 2.18*  --   --  1.88*  --  1.31* 1.31* 1.21*    < > = values in this interval not displayed.     Pancreatic Enzymes  Recent Labs   Lab 11/06/23 0425 11/04/23  1049   LIPASE 11*  --    AMYLASE 28 46     Coagulation Profile  Recent Labs   Lab 11/09/23  0335 11/08/23  0355 11/07/23  0505 11/06/23  1624 11/06/23  0425 11/05/23  2148 11/05/23  1854 11/05/23  1740 11/05/23  0520 11/04/23  1049   INR 2.18* 1.88* 1.31* 1.31*   < > 1.44* 1.60* 1.89*   < > 4.31*   PTT  --   --   --   --   --  36 37 54*  --  84*    < > = values in this interval not displayed.       IMAGING:  No results found for this or any previous visit (from the past 24 hour(s)).

## 2023-11-09 NOTE — PROGRESS NOTES
Transplant Surgery  Inpatient Daily Progress Note  11/09/2023    Assessment & Plan: Lola Milner is a 33 year old female admitted on 10/31/2023 for liver transplant evaluation. MELD Na 45. Past medical history significant for MDD, prior tobacco use, gout, polyneuropathy and decompensated cirrhosis secondary to alcohol use c/b hepatic encephalopathy, esophageal varices s/p banding, recurrent ascities and hepatorenal syndrome. She is now status post liver transplant 11/5/23 by Dr. Aime Lowe.     Graft function:   Liver transplant: POD: 4. TB/alk phos/ALT/AST trending down. POD 0 US showed patent doppler.   -HARLEY x 2 with serosanguinous output, drain to suction.     Immunosuppression management:   Induction:  Simulect POD 1 and 4, due to renal function and lower tac goal  Steroid taper per protocol.     Maintenance:  Mycophenolate 750 mg BID  Tacrolimus 2mg BID. Tac goal 5-10.     Neuro:   Acute surgical pain: Oxycodone 2.5-5 Q4 PRN, minimal use  Neuropathic pain: PTA gabapentin    Hematology:   Acute blood loss anemia: Hgb ~10. Last transfused 11/5. Transfuse for Hgb < 7.   Thrombocytopenia: PLT 80-90. Trending down. Monitor.  No transfusion needed.   Coagulapathy: INR 2.2.      Cardiorespiratory:   Circulatory failure requiring pressor support: Pressors discontinued 11/6.  Hx hypotension: PTA on Midodrine 15 TID. Not restarted. SBP 100s  Mechanical ventilation perioperative: Unable to extubate due to apneic episodes during PS trial on POD 1 and 2. Extubated POD 3. Stable on RA. Encourage CDB/IS.    GI/Nutrition:   Diet: Regular diet, Continue calorie counts. Diet recommendations post-transplant: High protein diet x 8 weeks. RD consulted.      Endocrine:   Steroid induced hyperglycemia: HngZ7h=9.3, weaned off insulin gtt. Continue to monitor steroid taper.    Fluid/Electrolytes:   EDGAR: EDGAR secondary to HRS, possible bile cast nephropathy. Cr increased to 3.2. Anuric. Started on CRRT POD 2. I=O. Now stopped this am,  will transition to iHD. Plan for 2hr run today.   -Continue to replace HARLEY output with albumin, change to 25% TID today. Pull Drain #1 today. .      : Izaguirre removed, monitor UOP. PVR ~40s.     Infectious disease: Afebrile. WBC normal.     Prophylaxis: DVT (mechanical), viral (Valcyte x 6 months), PJP (Bactrim), zosyn x 48 hours, switched from diflucan to micafungin x 14 day d/t RRT, nystatin.    Disposition: SICU, will transfer to Floor tomorrow if tolerating iHD.    JENNIFER/Fellow/Resident Provider: Gladys Carmen, NP [phone]       Faculty: Jagjit Santiago M.D.    __________________________________________________________________  Transplant History: Admitted 10/31/2023 for liver transplant.   The patient has a history of liver failure due to decompensated cirrhosis secondary to alcohol use.    11/5/2023 (Liver), Postoperative day: 4     Interval History:   Overnight events: Up to chair, ambulating. Pain controlled. Poor appetite.     ROS:   A 10-point review of systems was negative except as noted above.    Curent Meds:   acetaminophen  325 mg Oral or Feeding Tube Q4H    albumin human  0-50 g Intravenous Q6H    basiliximab (SIMULECT) 20 mg in sodium chloride 0.9 % 50 mL infusion  20 mg Intravenous Once    artificial tears  1 drop Both Eyes 4x Daily    gabapentin  100 mg Oral or Feeding Tube TID    insulin aspart  1-7 Units Subcutaneous TID AC    insulin aspart  1-5 Units Subcutaneous At Bedtime    micafungin  100 mg Intravenous Q24H    multivitamin, therapeutic  1 tablet Oral Daily    mycophenolate  750 mg Oral BID IS    pantoprazole  40 mg Oral QAM AC    polyethylene glycol  17 g Oral or Feeding Tube Daily    [START ON 11/10/2023] predniSONE  10 mg Oral Once    sodium chloride (PF)  3 mL Intravenous Q8H    sulfamethoxazole-trimethoprim  1 tablet Oral or Feeding Tube Daily    tacrolimus  2 mg Oral BID IS    valGANciclovir  450 mg Oral Daily       Physical Exam:     Admit Weight: 68.6 kg (151 lb 4.8 oz)    Current  Vitals:   /56   Pulse 64   Temp 97.3  F (36.3  C) (Oral)   Resp 13   Wt 70.4 kg (155 lb 3.3 oz)   SpO2 97%   BMI 27.49 kg/m        Vital sign ranges:    Temp:  [97.3  F (36.3  C)-97.6  F (36.4  C)] 97.3  F (36.3  C)  Pulse:  [55-75] 64  Resp:  [8-21] 13  MAP:  [35 mmHg-94 mmHg] 75 mmHg  Arterial Line BP: ()/(45-71) 100/56  SpO2:  [95 %-100 %] 97 %  Patient Vitals for the past 24 hrs:   Temp Temp src Pulse Resp SpO2 Weight   11/09/23 0800 97.3  F (36.3  C) Oral 64 13 97 % --   11/09/23 0700 -- -- 66 15 97 % --   11/09/23 0600 -- -- 71 15 97 % --   11/09/23 0500 -- -- 70 16 99 % --   11/09/23 0400 97.6  F (36.4  C) Axillary 64 15 99 % --   11/09/23 0352 -- -- -- -- -- 70.4 kg (155 lb 3.3 oz)   11/09/23 0300 -- -- 66 15 96 % --   11/09/23 0200 -- -- 75 (!) 9 95 % --   11/09/23 0100 -- -- 70 (!) 8 98 % --   11/09/23 0000 97.6  F (36.4  C) Axillary 59 10 96 % --   11/08/23 2300 -- -- 60 14 95 % --   11/08/23 2200 -- -- 66 10 98 % --   11/08/23 2100 -- -- 66 16 100 % --   11/08/23 2000 97.6  F (36.4  C) Oral 72 16 100 % --   11/08/23 1900 -- -- 67 16 99 % --   11/08/23 1800 -- -- 72 13 100 % --   11/08/23 1700 -- -- 66 15 100 % --   11/08/23 1600 97.3  F (36.3  C) Oral 60 16 99 % --   11/08/23 1500 -- -- 68 17 97 % --   11/08/23 1400 -- -- 55 10 100 % --   11/08/23 1300 -- -- 59 21 100 % --   11/08/23 1200 -- -- 59 13 96 % --   11/08/23 1100 -- -- 63 21 100 % --   11/08/23 1041 -- -- 69 13 100 % --   11/08/23 1000 -- -- 66 20 100 % --     Examined by fellow:  General Appearance: in no apparent distress. sitting up in chair  Skin: normal, warm, jaundice  Heart: RRR  Lungs: NLB on RA  Abdomen:  Soft, non distended, non tender.  The wound is CDI with staples. n HARLEY x 2 serosanguinous -remove #1  :   anuric  Extremities: b/l pedal edema +1  Neurologic: alert, awake    Frailty Scores           No data to display                Data:   CMP  Recent Labs   Lab 11/09/23  0806 11/09/23  0335 11/08/23  0236  11/08/23 1959 11/08/23  0533 11/08/23  0355 11/06/23  0502 11/06/23  0425 11/05/23  0520 11/04/23  1049   NA  --  136  136  --  135   < > 135  135   < > 130*  130*   < > 125*   POTASSIUM  --  3.4  3.4  --  3.6   < > 4.2  4.2   < > 3.1*  3.1*   < > 3.6   CHLORIDE  --  104  104  --  102   < > 99  99   < > 96*  96*   < > 89*   CO2  --  24  24  --  24   < > 23  23   < > 24  24   < > 18*   GLC 97 100*  100*   < > 176*   < > 112*  114*  114*   < > 103*  103*   < > 85   BUN  --  21.9*  21.9*  --  26.3*   < > 37.2*  37.2*   < > 42.6*  42.6*   < > 44.2*   CR  --  1.71*  1.71*  --  1.86*   < > 2.47*  2.47*   < > 2.50*  2.50*   < > 2.72*   GFRESTIMATED  --  40*  40*  --  37*   < > 26*  26*   < > 26*  26*   < > 23*   GEE  --  7.6*  7.6*  --  7.4*   < > 8.0*  8.0*   < > 10.5*  10.5*   < > 9.7   ICAW  --  4.5  --  4.3*   < > 4.4   < > 5.8*   < >  --    MAG  --  2.3  --  2.3   < > 2.5*   < > 1.6*  --  1.8   PHOS  --  4.4  --  4.7*   < > 6.0*   < > 3.3  --  4.0   AMYLASE  --   --   --   --   --   --   --  28  --  46   LIPASE  --   --   --   --   --   --   --  11*  --   --    ALBUMIN  --  3.6  3.6  --  3.8   < > 3.7  3.7   < > 2.9*   < > 4.3   BILITOTAL  --  1.9*  --   --   --  2.3*   < > 6.6*   < > 35.9*   ALKPHOS  --  26*  --   --   --  28*   < > 75   < > 83   AST  --  22  --   --   --  26   < > 182*   < > 32   ALT  --  14  --   --   --  19   < > 113*   < > 11    < > = values in this interval not displayed.     CBC  Recent Labs   Lab 11/09/23  0335 11/08/23  1959 11/08/23  1146   HGB 9.8* 9.9* 10.3*   WBC 7.2 7.2 7.1   PLT 89* 82* 96*   A1C  --   --  5.3     COAGS  Recent Labs   Lab 11/09/23  0335 11/08/23  0355 11/06/23  0425 11/05/23  2148 11/05/23  1854   INR 2.18* 1.88*   < > 1.44* 1.60*   PTT  --   --   --  36 37    < > = values in this interval not displayed.      Urinalysis  Recent Labs   Lab Test 11/04/23  2309 10/31/23  2100   COLOR Dark Yellow* Orange*   APPEARANCE Slightly Cloudy* Cloudy*    URINEGLC Negative Negative   URINEBILI Moderate* Large*   URINEKETONE Negative Negative   SG 1.016 1.019   UBLD Trace* Trace*   URINEPH 5.5 5.5   PROTEIN 10* 30*   NITRITE Negative Negative   LEUKEST Small* Small*   RBCU 2 4*   WBCU 5 25*     Virology:  Hepatitis C Antibody   Date Value Ref Range Status   11/04/2023 Nonreactive Nonreactive Final

## 2023-11-10 ENCOUNTER — APPOINTMENT (OUTPATIENT)
Dept: OCCUPATIONAL THERAPY | Facility: CLINIC | Age: 33
End: 2023-11-10
Payer: COMMERCIAL

## 2023-11-10 LAB
ALBUMIN SERPL BCG-MCNC: 3.3 G/DL (ref 3.5–5.2)
ALP SERPL-CCNC: 35 U/L (ref 35–104)
ALT SERPL W P-5'-P-CCNC: 13 U/L (ref 0–50)
ANION GAP SERPL CALCULATED.3IONS-SCNC: 8 MMOL/L (ref 7–15)
AST SERPL W P-5'-P-CCNC: 14 U/L (ref 0–45)
BASOPHILS # BLD AUTO: 0 10E3/UL (ref 0–0.2)
BASOPHILS NFR BLD AUTO: 0 %
BILIRUB DIRECT SERPL-MCNC: 1.16 MG/DL (ref 0–0.3)
BILIRUB SERPL-MCNC: 1.9 MG/DL
BLD PROD TYP BPU: NORMAL
BLOOD COMPONENT TYPE: NORMAL
BUN SERPL-MCNC: 17.7 MG/DL (ref 6–20)
CA-I BLD-MCNC: 4.8 MG/DL (ref 4.4–5.2)
CALCIUM SERPL-MCNC: 8.3 MG/DL (ref 8.6–10)
CHLORIDE SERPL-SCNC: 104 MMOL/L (ref 98–107)
CLOT INIT KAOL IND TO POST HEP NEUT TRTO: 0.9 {RATIO}
CLOT INIT KAOL IND TO POST HEP NEUT TRTO: 1 {RATIO}
CLOT INIT KAOLIN IND BLD US: 111 SEC (ref 113–166)
CLOT INIT KAOLIN IND BLD US: 130 SEC (ref 113–166)
CLOT INIT KAOLIN IND BLD US: 146 SEC (ref 113–166)
CLOT INIT KAOLIN IND BLD US: 147 SEC (ref 113–166)
CLOT INIT KAOLIN IND BLD US: 211 SEC (ref 113–166)
CLOT INIT KAOLIN IND P HEP NEUT BLD US: 120 SEC (ref 103–153)
CLOT INIT KAOLIN IND P HEP NEUT BLD US: 131 SEC (ref 103–153)
CLOT INIT KAOLIN IND P HEP NEUT BLD US: 143 SEC (ref 103–153)
CLOT INIT KAOLIN IND P HEP NEUT BLD US: 144 SEC (ref 103–153)
CLOT INIT KAOLIN IND P HEP NEUT BLD US: 216 SEC (ref 103–153)
CLOT STIFF PLT CONT BLD CALC: 12 HPA (ref 11.9–29.8)
CLOT STIFF PLT CONT BLD CALC: 14.1 HPA (ref 11.9–29.8)
CLOT STIFF PLT CONT BLD CALC: 8.8 HPA (ref 11.9–29.8)
CLOT STIFF PLT CONT BLD CALC: 9.8 HPA (ref 11.9–29.8)
CLOT STIFF PLT CONT BLD CALC: ABNORMAL HPA
CLOT STIFF TF IND P HEP NEUT BLD US: 10.6 HPA (ref 13–33.2)
CLOT STIFF TF IND P HEP NEUT BLD US: 12.7 HPA (ref 13–33.2)
CLOT STIFF TF IND P HEP NEUT BLD US: 15.4 HPA (ref 13–33.2)
CLOT STIFF TF IND P HEP NEUT BLD US: 9.7 HPA (ref 13–33.2)
CLOT STIFF TF IND P HEP NEUT BLD US: ABNORMAL HPA
CLOT STIFF TF IND+IIB-IIIA INH P HEP NEU: 0.5 HPA (ref 1–3.7)
CLOT STIFF TF IND+IIB-IIIA INH P HEP NEU: 0.7 HPA (ref 1–3.7)
CLOT STIFF TF IND+IIB-IIIA INH P HEP NEU: 0.8 HPA (ref 1–3.7)
CLOT STIFF TF IND+IIB-IIIA INH P HEP NEU: 0.9 HPA (ref 1–3.7)
CLOT STIFF TF IND+IIB-IIIA INH P HEP NEU: 1.3 HPA (ref 1–3.7)
CODING SYSTEM: NORMAL
CREAT SERPL-MCNC: 1.82 MG/DL (ref 0.51–0.95)
DEPRECATED HCO3 PLAS-SCNC: 26 MMOL/L (ref 22–29)
EGFRCR SERPLBLD CKD-EPI 2021: 37 ML/MIN/1.73M2
EOSINOPHIL # BLD AUTO: 0.1 10E3/UL (ref 0–0.7)
EOSINOPHIL NFR BLD AUTO: 2 %
ERYTHROCYTE [DISTWIDTH] IN BLOOD BY AUTOMATED COUNT: 17.7 % (ref 10–15)
FIBRINOGEN PPP-MCNC: 112 MG/DL (ref 170–490)
GLUCOSE BLDC GLUCOMTR-MCNC: 148 MG/DL (ref 70–99)
GLUCOSE BLDC GLUCOMTR-MCNC: 168 MG/DL (ref 70–99)
GLUCOSE BLDC GLUCOMTR-MCNC: 77 MG/DL (ref 70–99)
GLUCOSE SERPL-MCNC: 101 MG/DL (ref 70–99)
HCT VFR BLD AUTO: 25.8 % (ref 35–47)
HGB BLD-MCNC: 8.5 G/DL (ref 11.7–15.7)
HGB BLD-MCNC: 8.9 G/DL (ref 11.7–15.7)
IMM GRANULOCYTES # BLD: 0.1 10E3/UL
IMM GRANULOCYTES NFR BLD: 1 %
INR PPP: 1.46 (ref 0.85–1.15)
ISSUE DATE AND TIME: NORMAL
LYMPHOCYTES # BLD AUTO: 1.3 10E3/UL (ref 0.8–5.3)
LYMPHOCYTES NFR BLD AUTO: 21 %
MAGNESIUM SERPL-MCNC: 1.8 MG/DL (ref 1.7–2.3)
MCH RBC QN AUTO: 30.8 PG (ref 26.5–33)
MCHC RBC AUTO-ENTMCNC: 32.9 G/DL (ref 31.5–36.5)
MCV RBC AUTO: 94 FL (ref 78–100)
MONOCYTES # BLD AUTO: 0.4 10E3/UL (ref 0–1.3)
MONOCYTES NFR BLD AUTO: 7 %
NEUTROPHILS # BLD AUTO: 4.4 10E3/UL (ref 1.6–8.3)
NEUTROPHILS NFR BLD AUTO: 69 %
NRBC # BLD AUTO: 0 10E3/UL
NRBC BLD AUTO-RTO: 0 /100
PHOSPHATE SERPL-MCNC: 3.6 MG/DL (ref 2.5–4.5)
PLATELET # BLD AUTO: 80 10E3/UL (ref 150–450)
POTASSIUM SERPL-SCNC: 3 MMOL/L (ref 3.4–5.3)
POTASSIUM SERPL-SCNC: 3.5 MMOL/L (ref 3.4–5.3)
PROT SERPL-MCNC: 3.9 G/DL (ref 6.4–8.3)
RBC # BLD AUTO: 2.76 10E6/UL (ref 3.8–5.2)
SODIUM SERPL-SCNC: 138 MMOL/L (ref 135–145)
TACROLIMUS BLD-MCNC: 3.5 UG/L (ref 5–15)
TME LAST DOSE: ABNORMAL H
TME LAST DOSE: ABNORMAL H
UNIT ABO/RH: NORMAL
UNIT NUMBER: NORMAL
UNIT STATUS: NORMAL
UNIT TYPE ISBT: 6200
WBC # BLD AUTO: 6.3 10E3/UL (ref 4–11)

## 2023-11-10 PROCEDURE — 250N000013 HC RX MED GY IP 250 OP 250 PS 637

## 2023-11-10 PROCEDURE — 250N000012 HC RX MED GY IP 250 OP 636 PS 637: Performed by: STUDENT IN AN ORGANIZED HEALTH CARE EDUCATION/TRAINING PROGRAM

## 2023-11-10 PROCEDURE — 85018 HEMOGLOBIN: CPT | Performed by: PHYSICIAN ASSISTANT

## 2023-11-10 PROCEDURE — 999N000248 HC STATISTIC IV INSERT WITH US BY RN

## 2023-11-10 PROCEDURE — 82330 ASSAY OF CALCIUM: CPT | Performed by: STUDENT IN AN ORGANIZED HEALTH CARE EDUCATION/TRAINING PROGRAM

## 2023-11-10 PROCEDURE — 250N000012 HC RX MED GY IP 250 OP 636 PS 637: Performed by: PHYSICIAN ASSISTANT

## 2023-11-10 PROCEDURE — 99232 SBSQ HOSP IP/OBS MODERATE 35: CPT | Mod: 25 | Performed by: SURGERY

## 2023-11-10 PROCEDURE — 80053 COMPREHEN METABOLIC PANEL: CPT | Performed by: PHYSICIAN ASSISTANT

## 2023-11-10 PROCEDURE — 82248 BILIRUBIN DIRECT: CPT | Performed by: STUDENT IN AN ORGANIZED HEALTH CARE EDUCATION/TRAINING PROGRAM

## 2023-11-10 PROCEDURE — 83735 ASSAY OF MAGNESIUM: CPT | Performed by: STUDENT IN AN ORGANIZED HEALTH CARE EDUCATION/TRAINING PROGRAM

## 2023-11-10 PROCEDURE — 250N000009 HC RX 250: Performed by: PHYSICIAN ASSISTANT

## 2023-11-10 PROCEDURE — 97535 SELF CARE MNGMENT TRAINING: CPT | Mod: GO

## 2023-11-10 PROCEDURE — 85610 PROTHROMBIN TIME: CPT | Performed by: PHYSICIAN ASSISTANT

## 2023-11-10 PROCEDURE — 84100 ASSAY OF PHOSPHORUS: CPT | Performed by: STUDENT IN AN ORGANIZED HEALTH CARE EDUCATION/TRAINING PROGRAM

## 2023-11-10 PROCEDURE — 84132 ASSAY OF SERUM POTASSIUM: CPT | Performed by: PHYSICIAN ASSISTANT

## 2023-11-10 PROCEDURE — P9012 CRYOPRECIPITATE EACH UNIT: HCPCS | Performed by: PHYSICIAN ASSISTANT

## 2023-11-10 PROCEDURE — 250N000011 HC RX IP 250 OP 636: Performed by: NURSE PRACTITIONER

## 2023-11-10 PROCEDURE — 258N000003 HC RX IP 258 OP 636: Performed by: PHYSICIAN ASSISTANT

## 2023-11-10 PROCEDURE — 80197 ASSAY OF TACROLIMUS: CPT | Performed by: PHYSICIAN ASSISTANT

## 2023-11-10 PROCEDURE — P9047 ALBUMIN (HUMAN), 25%, 50ML: HCPCS | Performed by: NURSE PRACTITIONER

## 2023-11-10 PROCEDURE — 99233 SBSQ HOSP IP/OBS HIGH 50: CPT | Mod: 24 | Performed by: INTERNAL MEDICINE

## 2023-11-10 PROCEDURE — 250N000013 HC RX MED GY IP 250 OP 250 PS 637: Performed by: TRANSPLANT SURGERY

## 2023-11-10 PROCEDURE — 120N000011 HC R&B TRANSPLANT UMMC

## 2023-11-10 PROCEDURE — 250N000013 HC RX MED GY IP 250 OP 250 PS 637: Performed by: NURSE PRACTITIONER

## 2023-11-10 PROCEDURE — 250N000013 HC RX MED GY IP 250 OP 250 PS 637: Performed by: INTERNAL MEDICINE

## 2023-11-10 PROCEDURE — 250N000011 HC RX IP 250 OP 636: Mod: JZ | Performed by: PHYSICIAN ASSISTANT

## 2023-11-10 PROCEDURE — 85384 FIBRINOGEN ACTIVITY: CPT

## 2023-11-10 PROCEDURE — 85025 COMPLETE CBC W/AUTO DIFF WBC: CPT | Performed by: STUDENT IN AN ORGANIZED HEALTH CARE EDUCATION/TRAINING PROGRAM

## 2023-11-10 PROCEDURE — 250N000013 HC RX MED GY IP 250 OP 250 PS 637: Performed by: PHYSICIAN ASSISTANT

## 2023-11-10 RX ORDER — TACROLIMUS 0.5 MG/1
0.5 CAPSULE ORAL ONCE
Status: DISCONTINUED | OUTPATIENT
Start: 2023-11-10 | End: 2023-11-10

## 2023-11-10 RX ORDER — PREDNISONE 5 MG/1
5 TABLET ORAL DAILY
Status: DISCONTINUED | OUTPATIENT
Start: 2023-11-11 | End: 2023-11-15 | Stop reason: HOSPADM

## 2023-11-10 RX ORDER — LIDOCAINE HYDROCHLORIDE 10 MG/ML
10 INJECTION, SOLUTION EPIDURAL; INFILTRATION; INTRACAUDAL; PERINEURAL ONCE
Status: COMPLETED | OUTPATIENT
Start: 2023-11-10 | End: 2023-11-10

## 2023-11-10 RX ORDER — GABAPENTIN 100 MG/1
200 CAPSULE ORAL AT BEDTIME
Status: DISCONTINUED | OUTPATIENT
Start: 2023-11-10 | End: 2023-11-15 | Stop reason: HOSPADM

## 2023-11-10 RX ORDER — POTASSIUM CHLORIDE 750 MG/1
20 TABLET, EXTENDED RELEASE ORAL ONCE
Status: COMPLETED | OUTPATIENT
Start: 2023-11-10 | End: 2023-11-10

## 2023-11-10 RX ADMIN — ALBUMIN HUMAN 25 G: 0.25 SOLUTION INTRAVENOUS at 10:28

## 2023-11-10 RX ADMIN — GABAPENTIN 200 MG: 100 CAPSULE ORAL at 21:36

## 2023-11-10 RX ADMIN — PANTOPRAZOLE SODIUM 40 MG: 40 TABLET, DELAYED RELEASE ORAL at 08:38

## 2023-11-10 RX ADMIN — POTASSIUM CHLORIDE 20 MEQ: 750 TABLET, EXTENDED RELEASE ORAL at 11:37

## 2023-11-10 RX ADMIN — Medication 1 DROP: at 21:32

## 2023-11-10 RX ADMIN — VALGANCICLOVIR HYDROCHLORIDE 450 MG: 450 TABLET ORAL at 21:32

## 2023-11-10 RX ADMIN — MYCOPHENOLATE MOFETIL 750 MG: 250 CAPSULE ORAL at 08:38

## 2023-11-10 RX ADMIN — ACETAMINOPHEN 325 MG: 325 TABLET, FILM COATED ORAL at 14:32

## 2023-11-10 RX ADMIN — Medication 1 DROP: at 11:37

## 2023-11-10 RX ADMIN — ACETAMINOPHEN 325 MG: 325 TABLET, FILM COATED ORAL at 06:14

## 2023-11-10 RX ADMIN — Medication 1 DROP: at 15:46

## 2023-11-10 RX ADMIN — MYCOPHENOLATE MOFETIL 750 MG: 250 CAPSULE ORAL at 17:43

## 2023-11-10 RX ADMIN — SODIUM CHLORIDE 100 MG: 9 INJECTION, SOLUTION INTRAVENOUS at 12:12

## 2023-11-10 RX ADMIN — ACETAMINOPHEN 325 MG: 325 TABLET, FILM COATED ORAL at 17:46

## 2023-11-10 RX ADMIN — TACROLIMUS 2.5 MG: 1 CAPSULE ORAL at 08:37

## 2023-11-10 RX ADMIN — OXYCODONE HYDROCHLORIDE 5 MG: 5 TABLET ORAL at 11:45

## 2023-11-10 RX ADMIN — PREDNISONE 10 MG: 10 TABLET ORAL at 08:37

## 2023-11-10 RX ADMIN — LIDOCAINE HYDROCHLORIDE 10 ML: 10 INJECTION, SOLUTION EPIDURAL; INFILTRATION; INTRACAUDAL; PERINEURAL at 12:06

## 2023-11-10 RX ADMIN — OXYCODONE HYDROCHLORIDE 5 MG: 5 TABLET ORAL at 21:32

## 2023-11-10 RX ADMIN — ACETAMINOPHEN 325 MG: 325 TABLET, FILM COATED ORAL at 02:10

## 2023-11-10 RX ADMIN — GABAPENTIN 100 MG: 100 CAPSULE ORAL at 08:38

## 2023-11-10 RX ADMIN — THERA TABS 1 TABLET: TAB at 11:45

## 2023-11-10 RX ADMIN — OXYCODONE HYDROCHLORIDE 5 MG: 5 TABLET ORAL at 15:46

## 2023-11-10 RX ADMIN — ACETAMINOPHEN 325 MG: 325 TABLET, FILM COATED ORAL at 21:36

## 2023-11-10 RX ADMIN — Medication 1 DROP: at 08:37

## 2023-11-10 RX ADMIN — TACROLIMUS 2.5 MG: 1 CAPSULE ORAL at 17:43

## 2023-11-10 RX ADMIN — INSULIN ASPART 1 UNITS: 100 INJECTION, SOLUTION INTRAVENOUS; SUBCUTANEOUS at 17:19

## 2023-11-10 RX ADMIN — SULFAMETHOXAZOLE AND TRIMETHOPRIM 1 TABLET: 400; 80 TABLET ORAL at 21:32

## 2023-11-10 RX ADMIN — ACETAMINOPHEN 325 MG: 325 TABLET, FILM COATED ORAL at 10:29

## 2023-11-10 ASSESSMENT — ACTIVITIES OF DAILY LIVING (ADL)
ADLS_ACUITY_SCORE: 33
ADLS_ACUITY_SCORE: 34
ADLS_ACUITY_SCORE: 33
ADLS_ACUITY_SCORE: 34
ADLS_ACUITY_SCORE: 33
ADLS_ACUITY_SCORE: 34

## 2023-11-10 NOTE — PROGRESS NOTES
SURGICAL ICU Progress NOTE  11/10/2023      PRIMARY TEAM: Liver Transplant  PRIMARY PHYSICIAN: Dr. Lowe  REASON FOR CRITICAL CARE ADMISSION: Hemodynamic, respiratory monitoring   ADMITTING PHYSICIAN: Dr. Villavicencio  Date of Service (when I saw the patient): 11/10/2023    ASSESSMENT:  Lola Milner is a 33 year old female with a PMH of MDD, prior tobacco use, gout, polyneuropathy, decompensated cirrhosis secondary to alcohol use complicated by hepatic encephalopathy, esophageal varices status post banding, recurrent ascites, and hepatorenal syndrome who was admitted on 10/31/2023 for evaluation for liver transplantation. She was admitted to SICU s/p uncomplicated liver transplantation on 11/5.        PLAN:    Neurological:  # Acute pain   # Sedation for mechanical ventilation  - Monitor neurological status. Delirium preventions and precautions.   - Pain:    Scheduled acetaminophen (2gm max daily dose)  Oxy 2.5-5 Q4 PRN  Gabapentin 200  - Sedation plan:   - Gtt: Off Propofol   - Extubated    # Hx MDD  # Hx polyneuropathy  - Continue PTA Gabapentin 100 mg TID -> 200mg nightly , melatonin at HS PRN      Pulmonary:   # Post operative ventilatory support  - Extubated 11/8  - Supplemental oxygen to keep saturation above 92 %.  - Incentive spirometer every 15- 30 minutes.       Cardiovascular:   # Hypotension   # Shock hypovolemic from hemorrhage   - Monitor hemodynamic status. Goal MAP >65.   - Off pressors; maintaining map goals  - Evaluate repeat TEG prior to starting heparin per transplant  - Midodrine on hold -- was 15 mg TID prior to transplantation  - EBL: 3500 cc  - Intraoperative resuscitation: 5u pRBCs, 10u FFP, 4u Plt, cellsaver 880cc, 3.75L crystalloid, 400cc albumin, fibriga 3, PCC 1000cc  - Two 5-pack units of cryoprecipitate today for platelets <61      Gastroenterology/Nutrition:  # S/p liver transplantation 11/5/23  # Decompensated alcohol cirrhosis c/b HRS, HE, recurrent ascites, esophageal varices s/p  banding (9/23)  # Hyperbilirubinemia  - Liver transplant team primary  - Trend hepatic labs; AST/ALT and T.Bili downtrending  - Immediate post-op liver US - prelim read   - Unremarkable appearance of liver   - Perihepatic 6.6 cm fluid collection in hepatorenal space (drain in place)   - small left pleural effusion  - Pantoprazole for ppx  - Transplant immunosuppressants and prophylaxis as below.     # Severe protein calorie deficit malnutrition    - RD consult. Appreciate cares and recommendations.   - Remove NG tube  - Advance diet as tolerated      Renal/Fluids/Electrolytes:   # Hyponatremia  # Acute kidney injury   # Hepatorenal syndrome  # CKD and EDGAR  - Replace HARLEY output 2:1 with Albumin 5% per transplant  - Transplant nephrology following, appreciate recs (11/8 recs below)   - continue on CRRT with given anuria  - will maintain UF at I=O for now  - Agree with fluid/albumin resuscitation with given increased drain out put  - Holding high dose vitamin D insetting of hypercalcemia  - Baseline Cr ~ 0.8-1.0; Cr stable at 1.8 today  - Urine output is ~18ml last 24 hours  - HOLD mIVF   - Mg 1.8, K 3.0->3.5, Phos 3.6 on 11/9 AM      Endocrine:   # Stress hyperglycemia   - Start insulin medium sliding scale  - Goal to keep BG< 180 for optimal wound healing.       ID:  # immunosuppression   - induction steroids per transplant, steroid taper, MMF, and tacrolimus per transplant   # immunoprophylaxis  - Bactrim start 11/8, Valcyte, Fluconazole  - Micafungin 14 days for CRRT  # perioperative antibiotics: zosyn completed      Heme:     # Acute blood loss anemia   # Coagulopathy due to cirrhosis and surgical blood loss    # Thrombocytopenia   # Anemia of critical illness   # Chronic normocytic anemia  - Hemoglobin prior to transplant ~7; Hgb 9.8 this AM   - 2u cryoprecipitate for fibrinogen of <66 on 11/9 AM  - Platelet baseline 70-80 prior to transplant; Plt 89 on 11/9 AM  - Transfusion parameters;   - Plt >20k if not  "bleeding   - Hgb > 8   - INR < 2   - Fibrinogen > 200    Musculoskeletal:  # Weakness and deconditioning of critical illness   - Physical and occupational therapy consult   - Utilized a walker at baseline      Skin:  - Diligent skin cares to prevent breakdown      General Cares/Prophylaxis:    DVT Prophylaxis: Defer to primary service. Chemical prophylaxis: None   GI Prophylaxis: PPI  Restraints: Restraints for medical healing needed: NO      Lines/ tubes/ drains:  - RIJ Trialysis catheter  - PIV  - 1x HARLEY drain    Disposition:  -  Transfer to Floor    Patient seen, findings and plan discussed with surgical ICU staff, Dr. Garcia.      MARIA ELENA GONZALEZ MD  11/10/23 10:38 AM    Clinically Significant Risk Factors        # Hypokalemia: Lowest K = 3 mmol/L in last 2 days, will replace as needed   # Hypocalcemia: Lowest Ca = 7.4 mg/dL in last 2 days, will monitor and replace as appropriate     # Hypoalbuminemia: Lowest albumin = 2.9 g/dL at 11/6/2023  4:25 AM, will monitor as appropriate    # Coagulation Defect: INR = 1.46 (Ref range: 0.85 - 1.15) and/or PTT = 36 Seconds (Ref range: 22 - 38 Seconds), will monitor for bleeding  # Thrombocytopenia: Lowest platelets = 80 in last 2 days, will monitor for bleeding          # Overweight: Estimated body mass index is 27.49 kg/m  as calculated from the following:    Height as of an earlier encounter on 10/31/23: 1.6 m (5' 3\").    Weight as of this encounter: 70.4 kg (155 lb 3.3 oz).   # Severe Malnutrition: based on nutrition assessment      # Financial/Environmental Concerns: none (Pt reports her SO is able to pay rent and pay her bills)              - - - - - - - - - - - - - - - - - - - - - - - - - - - - - - - - - - - - - - - - - - - - - -   INTERVAL HISTORY:  No acute events overnight. Patient sitting in chair comfortably this AM, ordering food. Reports minimal abdominal pain, moves extremities bilaterally. Minimal urine output. Tolerated IHD yesterday. Patient is able to discuss " her needs and overall feels well. No other concerns at this time.     VSS - Afebrile; HR 60s, BP [99/59 - 127/74]; room air  I/o - regular diet, low appetite; remains anuric, +BM; 2 bd drains, 1 removed y-day; 1 remains, 1.8L/24, 400/mn  BMP - hypokalemic at 3 (repleted w/ 20), Cr stable 1.8-2, Tbili 1.9 (stable)  CBC - Hgb dropped 8.5 <- 10.2  INR 1.46 (after 2 packs cryo and 1 unit FFP y-day)  Plan - TTF defer DVT ppx to transplant      HISTORY PRESENTING ILLNESS: Lola Milner is a 33 year old female with a PMH of MDD, prior tobacco use (quit 2023), gout, polyneuropathy, decompensated cirrhosis secondary to alcohol use (last drink 23) complicated by hepatic encephalopathy, esophageal varices status post banding, recurrent ascites, and hepatorenal syndrome who was admitted on 10/31/2023 for evaluation for liver transplantation from .  She was admitted to SICU s/p liver transplantation on     REVIEW OF SYSTEMS: 10 point ROS neg other than the symptoms noted above in the HPI.    PAST MEDICAL HISTORY:   Past Medical History:   Diagnosis Date    Alcoholic cirrhosis of liver with ascites (H) 2023    History of alcohol abuse     Formatting of this note might be different from the original. In remission as of late 2023       SURGICAL HISTORY:   Past Surgical History:   Procedure Laterality Date    BENCH LIVER  2023    Procedure: Bench liver;  Surgeon: Aime Lowe MD;  Location: [facility] OR    TRANSPLANT LIVER RECIPIENT  DONOR N/A 2023    Procedure: Transplant liver recipient  donor;  Surgeon: Aime Lowe MD;  Location:  OR       SOCIAL HISTORY:   Social History     Socioeconomic History    Marital status: Single   Tobacco Use    Smoking status: Former     Types: Cigarettes     Quit date: 2023     Years since quittin.4    Smokeless tobacco: Never   Substance and Sexual Activity    Alcohol use: Not Currently     Comment: last ETOH use 2023     Drug use: Never     FAMILY HISTORY: No bleeding/clotting disorders nor problems with anesthesia.     ALLERGIES:   No Known Allergies    MEDICATIONS:  No current facility-administered medications on file prior to encounter.  gabapentin (NEURONTIN) 100 MG capsule, Take 100 mg by mouth 3 times daily  lactulose (CHRONULAC) 10 GM/15ML solution, Take 30 mLs by mouth 3 times daily  melatonin 3 MG tablet, Take 3 mg by mouth nightly as needed  midodrine (PROAMATINE) 5 MG tablet, Take 15 mg by mouth 3 times daily  Multiple Vitamins-Minerals (THERA-TABS M) TABS, Take 1 tablet by mouth daily  omeprazole (PRILOSEC) 20 MG DR capsule, Take 20 mg by mouth daily  [] sodium bicarbonate 650 MG tablet, Take 1,300 mg by mouth 2 times daily For 10 days        PHYSICAL EXAMINATION:  Temp:  [97.3  F (36.3  C)-98.2  F (36.8  C)] 98.2  F (36.8  C)  Pulse:  [53-78] 55  Resp:  [10-23] 20  BP: ()/(59-79) 127/74  MAP:  [74 mmHg-77 mmHg] 77 mmHg  Arterial Line BP: (100-103)/(55-62) 103/62  SpO2:  [96 %-100 %] 100 %  General: Sitting in bed comfortably, no acute distress, ordering breakfast  HEENT: Trialysis cath in [facility], [place]  Neuro: Mentating normally, following commands briskly in all four extremities  Pulm/Resp: Regular rate and work of breathing on room air  CV: RRR, maintaining MAPs off pressors  Abdomen: Soft, non-tender, 1x HARLEY drain with serosanguinous drainage  :  Izaguirre catheter removed, minimal urine  Incisions/Skin: C/D/I  MSK/Extremities: no peripheral edema, extremities well perfused, DP pulses present bilaterally    LABS: Reviewed.   Arterial Blood Gases   Recent Labs   Lab 23  0701 23  2147 23  1851 23  1751   PH 7.43 7.31* 7.39 7.41   PCO2 34* 49* 37 35   PO2 103 167* 134* 108*   HCO3 23 25 22 22     Complete Blood Count   Recent Labs   Lab 11/10/23  0622 23  1422 23  0335 23   WBC 6.3 7.6 7.2 7.2   HGB 8.5* 10.2* 9.8* 9.9*   PLT 80* 90* 89* 82*     Basic Metabolic  Panel  Recent Labs   Lab 11/10/23  0622 11/09/23  2145 11/09/23  1804 11/09/23  1422 11/09/23  0806 11/09/23 0335 11/08/23 2144 11/08/23 1959     --   --  136  --  136  136  --  135   POTASSIUM 3.0*  --   --  3.5  --  3.4  3.4  --  3.6   CHLORIDE 104  --   --  103  --  104  104  --  102   CO2 26  --   --  22  --  24  24  --  24   BUN 17.7  --   --  25.0*  --  21.9*  21.9*  --  26.3*   CR 1.82*  --   --  2.00*  --  1.71*  1.71*  --  1.86*   * 193* 193* 156*   < > 100*  100*   < > 176*    < > = values in this interval not displayed.     Liver Function Tests  Recent Labs   Lab 11/10/23  0622 11/09/23  1422 11/09/23  0335 11/08/23  1959 11/08/23  1146 11/08/23  0355 11/07/23 1958 11/07/23  0505   AST 14  --  22  --   --  26  --  61*   ALT 13  --  14  --   --  19  --  46   ALKPHOS 35  --  26*  --   --  28*  --  48   BILITOTAL 1.9*  --  1.9*  --   --  2.3*  --  3.6*   ALBUMIN 3.3* 3.7 3.6  3.6 3.8   < > 3.7  3.7   < > 3.0*   INR 1.46*  --  2.18*  --   --  1.88*  --  1.31*    < > = values in this interval not displayed.     Pancreatic Enzymes  Recent Labs   Lab 11/06/23 0425 11/04/23  1049   LIPASE 11*  --    AMYLASE 28 46     Coagulation Profile  Recent Labs   Lab 11/10/23  0622 11/09/23  0335 11/08/23  0355 11/07/23  0505 11/06/23  0425 11/05/23 2148 11/05/23 1854 11/05/23  1740 11/05/23  0520 11/04/23  1049   INR 1.46* 2.18* 1.88* 1.31*   < > 1.44* 1.60* 1.89*   < > 4.31*   PTT  --   --   --   --   --  36 37 54*  --  84*    < > = values in this interval not displayed.       IMAGING:  No results found for this or any previous visit (from the past 24 hour(s)).

## 2023-11-10 NOTE — PLAN OF CARE
Major Shift Events:    Neuro intact. Stand with assist of 1 and walker, AxO X4. room air satting in high 90s. MAPs 65-70. Bladder scan<100  1 HARLEY removed, arterial line removed, NG removed  Multiple small loose BMs. Minimal appetite  Plan: If tolerating HD run transfer to floor  For vital signs and complete assessments, please see documentation flowsheets.

## 2023-11-10 NOTE — PROGRESS NOTES
HEMODIALYSIS TREATMENT NOTE    Date: 11/9/2023  Time: 6:39 PM    Data:  Pre Wt:   70.4 kg   Desired Wt:  70.4 kg   Post Wt:  70.4 kg  Weight change: 0  kg  Ultrafiltration - Post Run Net Total Removed (mL):  0mL  Vascular Access Status: patent  Dialyzer Rinse:  Clear  Total Blood Volume Processed:   Liters  Total Dialysis (Treatment) Time:   2 Hours    Lab:   No    Interventions:     Run with K3 Ca 3bath for 2 hrs UF was off Line functioning well. Completed HD tx. Post rinse back done. Lumens locked with saline.     Handoff report given to SHADI Cruz left in stable condition at room.                 Assessment:  -VSS  -Calm & coomparative     Plan:    Renal team

## 2023-11-10 NOTE — PLAN OF CARE
Major Shift Events:  Alert and oriented x4. Uses call light appropriately, JAYCEE, Strength equal throughout. Sinus rhythm/ sinus van.  MAP goal greater than 65, meets without intervention . RA, LS clear. No void bladder scan volume 90. Regular diet, fair intake. Rt HARLEY with 100-150 out every 2 hours.   Plan: Possible transfer to 6th floor. Continue with plan of care and notify team of any changes.  For vital signs and complete assessments, please see documentation flowsheets.

## 2023-11-10 NOTE — PROGRESS NOTES
SPIRITUAL HEALTH SERVICES Progress Note  [facility] ([place]) 4E    I visited Lola  per length of stay.  Lola's fiance joined us assisted through the visit.  Lola shared with me that she just received a liver transplant and it all occurred very quickly so she's hardly had time to process.  She told me that her loved ones have been taking turns being with her in the hospital.  Lola told me that she has been sober for 151 days.  Lola told me that she is spiritual, but not Muslim. She told me she had some Muslim trauma in her childhood that she was able to distance herself from when her parents got . I will follow up with Lola next week.    Viridiana Hanna  Chaplain Resident  Pager [phone]    * [facility] remains available 24/7 for emergent requests/referrals, either by having the switchboard page the on-call  or by entering an ASAP/STAT consult in Epic (this will also page the on-call ). Routine Epic consults receive an initial response within 24 hours.*

## 2023-11-10 NOTE — PROGRESS NOTES
Transplant Surgery  Inpatient Daily Progress Note  11/10/2023    Assessment & Plan: Lola Milner is a 33 year old female admitted on 10/31/2023 for liver transplant evaluation. MELD Na 45. Past medical history significant for MDD, prior tobacco use, gout, polyneuropathy and decompensated cirrhosis secondary to alcohol use c/b hepatic encephalopathy, esophageal varices s/p banding, recurrent ascities and hepatorenal syndrome. She is now status post liver transplant 11/5/23 by Dr. Aime Lowe.     Graft function:   Liver transplant: POD: 5. TB/alk phos/ALT/AST trending down. POD 0 US showed patent doppler.   -HARLEY x 1 with serosanguinous output, drain to suction.   -Stitch placed at previous HARLEY site that is leaking.    Immunosuppression management:   Induction:  Simulect POD 1 and 4, due to renal function and lower tac goal  Steroid taper per protocol.     Maintenance:  Mycophenolate 750 mg BID  Tacrolimus 2.5 mg BID. Tac goal 5-10.   Prednisone 5 mg daily after taper d/t lower tac goal    Neuro:   Acute surgical pain: Oxycodone 2.5-5 Q4 PRN, minimal use  Neuropathic pain: PTA gabapentin    Hematology:   Acute blood loss anemia: Last transfused 11/5. Transfuse for Hgb < 7. Hgb 8.5 (10). HARLEY output serosanguinous. Recheck Hgb.   Thrombocytopenia: PLT 80-90. Trending down. Monitor.  No transfusion needed.   Coagulapathy: INR 1.46, Fibrinogen 112.   Transfuse 1 5pk Cryo.    Cardiorespiratory:   Circulatory failure requiring pressor support: Pressors discontinued 11/6.  Hx hypotension: PTA on Midodrine 15 TID. Not restarted. SBP 100s  Mechanical ventilation perioperative: Unable to extubate due to apneic episodes during PS trial on POD 1 and 2. Extubated POD 3. Stable on RA. Encourage CDB/IS.    GI/Nutrition:   Diet: Regular diet, Continue calorie counts. Diet recommendations post-transplant: High protein diet x 8 weeks. RD consulted.      Endocrine:   Steroid induced hyperglycemia: QueW4r=9.3. sliding scale insulin  ACHS.    Fluid/Electrolytes:   EDGAR, anuric: EDGAR secondary to HRS, possible bile cast nephropathy. Started on CRRT post op until 11/9. iHD on 11/9.   Hypokalemia: K 3. KCL 20 mEq. Recheck K.     : Izaguirre removed, monitor UOP. PVR ~40s. Bladder scan daily.    Infectious disease: Afebrile. WBC normal.     Prophylaxis: DVT (mechanical), viral (Valcyte x 6 months), PJP (Bactrim), zosyn x 48 hours, switched from diflucan to micafungin x 14 day d/t RRT, nystatin.    Disposition: SICU, will transfer to Floor tomorrow if tolerating iHD.    JENNIFER/Fellow/Resident Provider: SAUNDRA Coreas [id number]       Faculty: Jagjit Santiago M.D.    __________________________________________________________________  Transplant History: Admitted 10/31/2023 for liver transplant.   The patient has a history of liver failure due to decompensated cirrhosis secondary to alcohol use.    11/5/2023 (Liver), Postoperative day: 5     Interval History:   Overnight events: Poor appetite. Denies nausea. Pain controlled.  +BM and flatus. OOB to chair and ambulated yesterday.    ROS:   A 10-point review of systems was negative except as noted above.    Curent Meds:   acetaminophen  325 mg Oral or Feeding Tube Q4H    artificial tears  1 drop Both Eyes 4x Daily    gabapentin  200 mg Oral or Feeding Tube At Bedtime    insulin aspart  1-7 Units Subcutaneous TID AC    insulin aspart  1-5 Units Subcutaneous At Bedtime    lidocaine (PF)  10 mL Subcutaneous Once    micafungin  100 mg Intravenous Q24H    multivitamin, therapeutic  1 tablet Oral Daily    mycophenolate  750 mg Oral BID IS    pantoprazole  40 mg Oral QAM AC    polyethylene glycol  17 g Oral or Feeding Tube Daily    potassium chloride  20 mEq Oral Once    sodium chloride (PF)  3 mL Intravenous Q8H    sulfamethoxazole-trimethoprim  1 tablet Oral or Feeding Tube Once per day on Monday Wednesday Friday    tacrolimus  2.5 mg Oral BID IS    valGANciclovir  450 mg Oral Once per day on Tuesday Friday        Physical Exam:     Admit Weight: 68.6 kg (151 lb 4.8 oz)    Current Vitals:   BP 96/66   Pulse 61   Temp 97.5  F (36.4  C) (Oral)   Resp 14   Wt 70.4 kg (155 lb 3.3 oz)   SpO2 100%   BMI 27.49 kg/m        Vital sign ranges:    Temp:  [97.3  F (36.3  C)-98.2  F (36.8  C)] 97.5  F (36.4  C)  Pulse:  [53-76] 61  Resp:  [8-23] 14  BP: ()/(59-79) 96/66  SpO2:  [96 %-100 %] 100 %  Patient Vitals for the past 24 hrs:   BP Temp Temp src Pulse Resp SpO2   11/10/23 1000 96/66 -- -- 61 14 100 %   11/10/23 0900 110/79 -- -- 62 16 100 %   11/10/23 0800 96/69 97.5  F (36.4  C) Oral 56 (!) 8 100 %   11/10/23 0700 115/70 -- -- 62 19 100 %   11/10/23 0600 127/74 -- -- 55 20 100 %   11/10/23 0500 112/70 -- -- 54 10 100 %   11/10/23 0400 107/69 98.2  F (36.8  C) Oral 53 11 100 %   11/10/23 0300 111/77 -- -- 71 23 100 %   11/10/23 0200 108/72 -- -- 69 18 100 %   11/10/23 0100 98/64 -- -- 63 11 98 %   11/10/23 0000 111/68 97.8  F (36.6  C) Oral 76 23 96 %   11/09/23 2300 107/67 -- -- 70 17 100 %   11/09/23 2200 105/65 -- -- 69 17 99 %   11/09/23 2100 100/60 -- -- 74 15 100 %   11/09/23 2000 109/62 97.9  F (36.6  C) Oral 73 17 98 %   11/09/23 1950 113/70 97.7  F (36.5  C) Axillary 75 18 100 %   11/09/23 1945 106/68 97.7  F (36.5  C) Axillary 71 16 100 %   11/09/23 1930 115/79 -- -- 72 17 100 %   11/09/23 1915 112/67 -- -- 65 14 100 %   11/09/23 1900 107/70 -- -- 70 16 100 %   11/09/23 1845 106/67 -- -- 60 11 100 %   11/09/23 1830 104/64 -- -- 60 11 100 %   11/09/23 1815 98/61 -- -- 61 12 100 %   11/09/23 1800 98/61 -- -- 60 10 100 %   11/09/23 1745 99/59 97.3  F (36.3  C) Axillary 60 10 100 %   11/09/23 1730 99/65 -- -- 66 11 100 %   11/09/23 1700 102/63 -- -- 66 15 98 %   11/09/23 1600 100/65 97.7  F (36.5  C) Oral 70 14 100 %   11/09/23 1510 108/65 97.7  F (36.5  C) Oral 73 12 100 %   11/09/23 1500 103/60 -- -- 66 14 100 %   11/09/23 1457 -- 97.4  F (36.3  C) Oral -- -- --   11/09/23 1430 -- -- -- 66 17 100 %    11/09/23 1400 103/67 -- -- 66 17 100 %   11/09/23 1300 103/61 -- -- 65 16 100 %   11/09/23 1216 -- 97.4  F (36.3  C) Oral 59 14 --   11/09/23 1206 105/59 97.4  F (36.3  C) Oral 64 16 --   11/09/23 1200 -- 97.4  F (36.3  C) Oral 62 17 100 %     Examined by fellow:  General Appearance: in no apparent distress  Skin: normal, warm, jaundice  Heart: RRR  Lungs: NLB on RA  Abdomen:  Soft, non distended, non tender.  The wound is CDI with staples. n HARLEY x 1 serosanguinous   :   anuric  Extremities: b/l pedal edema +1  Neurologic: alert, awake    Frailty Scores           No data to display                Data:   CMP  Recent Labs   Lab 11/10/23  0844 11/10/23  0622 11/09/23  1804 11/09/23  1422 11/09/23  0806 11/09/23  0335 11/06/23  0502 11/06/23  0425 11/05/23  0520 11/04/23  1049   NA  --  138  --  136  --  136  136   < > 130*  130*   < > 125*   POTASSIUM  --  3.0*  --  3.5  --  3.4  3.4   < > 3.1*  3.1*   < > 3.6   CHLORIDE  --  104  --  103  --  104  104   < > 96*  96*   < > 89*   CO2  --  26  --  22  --  24  24   < > 24  24   < > 18*   GLC 77 101*   < > 156*   < > 100*  100*   < > 103*  103*   < > 85   BUN  --  17.7  --  25.0*  --  21.9*  21.9*   < > 42.6*  42.6*   < > 44.2*   CR  --  1.82*  --  2.00*  --  1.71*  1.71*   < > 2.50*  2.50*   < > 2.72*   GFRESTIMATED  --  37*  --  33*  --  40*  40*   < > 26*  26*   < > 23*   GEE  --  8.3*  --  7.9*  --  7.6*  7.6*   < > 10.5*  10.5*   < > 9.7   ICAW  --  4.8  --  4.5  --  4.5   < > 5.8*   < >  --    MAG  --  1.8  --  2.2  --  2.3   < > 1.6*  --  1.8   PHOS  --  3.6  --  4.9*  --  4.4   < > 3.3  --  4.0   AMYLASE  --   --   --   --   --   --   --  28  --  46   LIPASE  --   --   --   --   --   --   --  11*  --   --    ALBUMIN  --  3.3*  --  3.7  --  3.6  3.6   < > 2.9*   < > 4.3   BILITOTAL  --  1.9*  --   --   --  1.9*   < > 6.6*   < > 35.9*   ALKPHOS  --  35  --   --   --  26*   < > 75   < > 83   AST  --  14  --   --   --  22   < > 182*   < > 32    ALT  --  13  --   --   --  14   < > 113*   < > 11    < > = values in this interval not displayed.     CBC  Recent Labs   Lab 11/10/23  0622 11/09/23  1422 11/08/23  1959 11/08/23  1146   HGB 8.5* 10.2*   < > 10.3*   WBC 6.3 7.6   < > 7.1   PLT 80* 90*   < > 96*   A1C  --   --   --  5.3    < > = values in this interval not displayed.     COAGS  Recent Labs   Lab 11/10/23  0622 11/09/23  0335 11/06/23  0425 11/05/23  2148 11/05/23  1854   INR 1.46* 2.18*   < > 1.44* 1.60*   PTT  --   --   --  36 37    < > = values in this interval not displayed.      Urinalysis  Recent Labs   Lab Test 11/04/23  2309 10/31/23  2100   COLOR Dark Yellow* Orange*   APPEARANCE Slightly Cloudy* Cloudy*   URINEGLC Negative Negative   URINEBILI Moderate* Large*   URINEKETONE Negative Negative   SG 1.016 1.019   UBLD Trace* Trace*   URINEPH 5.5 5.5   PROTEIN 10* 30*   NITRITE Negative Negative   LEUKEST Small* Small*   RBCU 2 4*   WBCU 5 25*     Virology:  Hepatitis C Antibody   Date Value Ref Range Status   11/04/2023 Nonreactive Nonreactive Final

## 2023-11-11 ENCOUNTER — APPOINTMENT (OUTPATIENT)
Dept: OCCUPATIONAL THERAPY | Facility: CLINIC | Age: 33
End: 2023-11-11
Payer: COMMERCIAL

## 2023-11-11 LAB
ALBUMIN SERPL BCG-MCNC: 3.5 G/DL (ref 3.5–5.2)
ALP SERPL-CCNC: 59 U/L (ref 35–104)
ALT SERPL W P-5'-P-CCNC: 15 U/L (ref 0–50)
ANION GAP SERPL CALCULATED.3IONS-SCNC: 16 MMOL/L (ref 7–15)
AST SERPL W P-5'-P-CCNC: 25 U/L (ref 0–45)
BILIRUB DIRECT SERPL-MCNC: NORMAL MG/DL
BILIRUB SERPL-MCNC: 2.3 MG/DL
BUN SERPL-MCNC: 22.2 MG/DL (ref 6–20)
CA-I BLD-MCNC: 4.3 MG/DL (ref 4.4–5.2)
CALCIUM SERPL-MCNC: 8.6 MG/DL (ref 8.6–10)
CHLORIDE SERPL-SCNC: 104 MMOL/L (ref 98–107)
CREAT SERPL-MCNC: 2.18 MG/DL (ref 0.51–0.95)
DEPRECATED HCO3 PLAS-SCNC: 16 MMOL/L (ref 22–29)
EGFRCR SERPLBLD CKD-EPI 2021: 30 ML/MIN/1.73M2
GLUCOSE BLDC GLUCOMTR-MCNC: 102 MG/DL (ref 70–99)
GLUCOSE BLDC GLUCOMTR-MCNC: 134 MG/DL (ref 70–99)
GLUCOSE BLDC GLUCOMTR-MCNC: 150 MG/DL (ref 70–99)
GLUCOSE BLDC GLUCOMTR-MCNC: 159 MG/DL (ref 70–99)
GLUCOSE SERPL-MCNC: 84 MG/DL (ref 70–99)
INR PPP: 1.11 (ref 0.85–1.15)
MAGNESIUM SERPL-MCNC: 1.8 MG/DL (ref 1.7–2.3)
PHOSPHATE SERPL-MCNC: 4.5 MG/DL (ref 2.5–4.5)
POTASSIUM SERPL-SCNC: 3.7 MMOL/L (ref 3.4–5.3)
PROT SERPL-MCNC: 4.6 G/DL (ref 6.4–8.3)
SODIUM SERPL-SCNC: 136 MMOL/L (ref 135–145)
TACROLIMUS BLD-MCNC: 5.2 UG/L (ref 5–15)
TME LAST DOSE: NORMAL H
TME LAST DOSE: NORMAL H

## 2023-11-11 PROCEDURE — 80053 COMPREHEN METABOLIC PANEL: CPT | Performed by: PHYSICIAN ASSISTANT

## 2023-11-11 PROCEDURE — 250N000013 HC RX MED GY IP 250 OP 250 PS 637: Performed by: INTERNAL MEDICINE

## 2023-11-11 PROCEDURE — 250N000013 HC RX MED GY IP 250 OP 250 PS 637: Performed by: NURSE PRACTITIONER

## 2023-11-11 PROCEDURE — 120N000011 HC R&B TRANSPLANT UMMC

## 2023-11-11 PROCEDURE — 82248 BILIRUBIN DIRECT: CPT | Performed by: STUDENT IN AN ORGANIZED HEALTH CARE EDUCATION/TRAINING PROGRAM

## 2023-11-11 PROCEDURE — 250N000013 HC RX MED GY IP 250 OP 250 PS 637: Performed by: TRANSPLANT SURGERY

## 2023-11-11 PROCEDURE — 5A1D70Z PERFORMANCE OF URINARY FILTRATION, INTERMITTENT, LESS THAN 6 HOURS PER DAY: ICD-10-PCS | Performed by: INTERNAL MEDICINE

## 2023-11-11 PROCEDURE — 258N000003 HC RX IP 258 OP 636: Performed by: INTERNAL MEDICINE

## 2023-11-11 PROCEDURE — 97535 SELF CARE MNGMENT TRAINING: CPT | Mod: GO

## 2023-11-11 PROCEDURE — 250N000012 HC RX MED GY IP 250 OP 636 PS 637: Performed by: STUDENT IN AN ORGANIZED HEALTH CARE EDUCATION/TRAINING PROGRAM

## 2023-11-11 PROCEDURE — 90935 HEMODIALYSIS ONE EVALUATION: CPT

## 2023-11-11 PROCEDURE — 250N000013 HC RX MED GY IP 250 OP 250 PS 637

## 2023-11-11 PROCEDURE — 80197 ASSAY OF TACROLIMUS: CPT | Performed by: PHYSICIAN ASSISTANT

## 2023-11-11 PROCEDURE — 82330 ASSAY OF CALCIUM: CPT | Performed by: STUDENT IN AN ORGANIZED HEALTH CARE EDUCATION/TRAINING PROGRAM

## 2023-11-11 PROCEDURE — 85610 PROTHROMBIN TIME: CPT | Performed by: PHYSICIAN ASSISTANT

## 2023-11-11 PROCEDURE — 83735 ASSAY OF MAGNESIUM: CPT | Performed by: STUDENT IN AN ORGANIZED HEALTH CARE EDUCATION/TRAINING PROGRAM

## 2023-11-11 PROCEDURE — 258N000003 HC RX IP 258 OP 636: Performed by: PHYSICIAN ASSISTANT

## 2023-11-11 PROCEDURE — 36415 COLL VENOUS BLD VENIPUNCTURE: CPT | Performed by: PHYSICIAN ASSISTANT

## 2023-11-11 PROCEDURE — 250N000011 HC RX IP 250 OP 636: Mod: JZ | Performed by: INTERNAL MEDICINE

## 2023-11-11 PROCEDURE — 84100 ASSAY OF PHOSPHORUS: CPT | Performed by: STUDENT IN AN ORGANIZED HEALTH CARE EDUCATION/TRAINING PROGRAM

## 2023-11-11 PROCEDURE — P9045 ALBUMIN (HUMAN), 5%, 250 ML: HCPCS | Mod: JZ | Performed by: INTERNAL MEDICINE

## 2023-11-11 PROCEDURE — 250N000011 HC RX IP 250 OP 636: Mod: JZ | Performed by: PHYSICIAN ASSISTANT

## 2023-11-11 PROCEDURE — 99233 SBSQ HOSP IP/OBS HIGH 50: CPT | Mod: 24 | Performed by: INTERNAL MEDICINE

## 2023-11-11 PROCEDURE — 250N000012 HC RX MED GY IP 250 OP 636 PS 637: Performed by: PHYSICIAN ASSISTANT

## 2023-11-11 PROCEDURE — 250N000013 HC RX MED GY IP 250 OP 250 PS 637: Performed by: PHYSICIAN ASSISTANT

## 2023-11-11 RX ORDER — ALBUMIN (HUMAN) 12.5 G/50ML
50 SOLUTION INTRAVENOUS
Status: DISCONTINUED | OUTPATIENT
Start: 2023-11-11 | End: 2023-11-11

## 2023-11-11 RX ADMIN — ALBUMIN HUMAN 250 ML: 0.05 INJECTION, SOLUTION INTRAVENOUS at 08:29

## 2023-11-11 RX ADMIN — ACETAMINOPHEN 325 MG: 325 TABLET, FILM COATED ORAL at 12:14

## 2023-11-11 RX ADMIN — TACROLIMUS 2.5 MG: 1 CAPSULE ORAL at 07:58

## 2023-11-11 RX ADMIN — Medication 1 DROP: at 12:14

## 2023-11-11 RX ADMIN — OXYCODONE HYDROCHLORIDE 5 MG: 5 TABLET ORAL at 06:29

## 2023-11-11 RX ADMIN — PREDNISONE 5 MG: 5 TABLET ORAL at 08:41

## 2023-11-11 RX ADMIN — GABAPENTIN 200 MG: 100 CAPSULE ORAL at 22:40

## 2023-11-11 RX ADMIN — MYCOPHENOLATE MOFETIL 750 MG: 250 CAPSULE ORAL at 07:57

## 2023-11-11 RX ADMIN — ACETAMINOPHEN 325 MG: 325 TABLET, FILM COATED ORAL at 06:26

## 2023-11-11 RX ADMIN — MYCOPHENOLATE MOFETIL 750 MG: 250 CAPSULE ORAL at 18:26

## 2023-11-11 RX ADMIN — ACETAMINOPHEN 325 MG: 325 TABLET, FILM COATED ORAL at 17:04

## 2023-11-11 RX ADMIN — TACROLIMUS 2.5 MG: 1 CAPSULE ORAL at 18:26

## 2023-11-11 RX ADMIN — SODIUM CHLORIDE 300 ML: 9 INJECTION, SOLUTION INTRAVENOUS at 08:29

## 2023-11-11 RX ADMIN — SODIUM CHLORIDE 100 MG: 9 INJECTION, SOLUTION INTRAVENOUS at 13:02

## 2023-11-11 RX ADMIN — INSULIN ASPART 1 UNITS: 100 INJECTION, SOLUTION INTRAVENOUS; SUBCUTANEOUS at 12:14

## 2023-11-11 RX ADMIN — THERA TABS 1 TABLET: TAB at 12:14

## 2023-11-11 RX ADMIN — OXYCODONE HYDROCHLORIDE 2.5 MG: 5 TABLET ORAL at 13:01

## 2023-11-11 RX ADMIN — Medication: at 08:29

## 2023-11-11 RX ADMIN — POLYETHYLENE GLYCOL 3350 17 G: 17 POWDER, FOR SOLUTION ORAL at 12:15

## 2023-11-11 RX ADMIN — PANTOPRAZOLE SODIUM 40 MG: 40 TABLET, DELAYED RELEASE ORAL at 08:41

## 2023-11-11 RX ADMIN — Medication 1 DROP: at 20:11

## 2023-11-11 RX ADMIN — Medication 1 DROP: at 17:03

## 2023-11-11 RX ADMIN — SODIUM CHLORIDE 250 ML: 9 INJECTION, SOLUTION INTRAVENOUS at 08:36

## 2023-11-11 RX ADMIN — ACETAMINOPHEN 325 MG: 325 TABLET, FILM COATED ORAL at 20:10

## 2023-11-11 ASSESSMENT — ACTIVITIES OF DAILY LIVING (ADL)
ADLS_ACUITY_SCORE: 34

## 2023-11-11 NOTE — PROGRESS NOTES
Calorie Count  Intake recorded for: 11/10  Total Kcals: 347 Total Protein: 7g  Kcals from Hospital Food: 347   Protein: 7g  Kcals from Outside Food (average): 0  Protein: 0g  # Meals Ordered from Kitchen: 3 meals  # Meals Recorded: 1 meal (First - 100% saltine crackers, Caesar dressing, 75% side Caesar salad, 25% chili, 2 almendarez slice)  # Supplements Recorded: No intake recorded

## 2023-11-11 NOTE — PROGRESS NOTES
Date: 11/11/2023  Time: 11:38 AM     Data:  Pre Wt:   70.9 kg  Desired Wt:   To be established  Post Wt:  70.4 kg (estimated)  Weight change: - 0.5 kg  Ultrafiltration - Post Run Net Total Removed (mL):  500 ml  Vascular Access Status: CVC patent  Dialyzer Rinse:  Light  Total Blood Volume Processed: 57.06 L   Total Dialysis (Treatment) Time:   3.0 Hrs  Dialysate Bath: K 3, Ca 3  Heparin: Heparin: None     Lab:   No  HbsAg - 11/4/2023 (Non reactive)  HbsAb - 11/4/2023 2.89 (Susceptible)     Interventions:  Dialysis done through right CVC  UF set to 0.8 Liters of fluid removal, accommodating priming and rinse back volumes  ,   medication administered: see MAR  CVC dressing changed aseptically  Pt was hemodynamically stable on HD. Pt tolerated Net UF pull of 0.5L.  Glucose checks done: Intra-dialysis: 102 mg/dl  Treatment has ended safely and  blood is rinsed back completely  Catheter lumens flushed with saline and locked with Saline, catheter caps (ClearGuard ) changed post HD  Report given to PCN, sent back to [facility] room in stable condition     Assessment:  A/O x 4, calm & cooperative, complained of abdominal pain 5/10.  Lung sounds  anterior and lateral BUL, BLL: clear ; diminished  CVC intact, previous dressing clean and dry                Plan:    Per Renal team      CHRIS ParkerN, RN  Acute Dialysis RN  [facility]

## 2023-11-11 NOTE — PROGRESS NOTES
[facility]  Transplant Nephrology Progress notes  Date of Admission:  10/31/2023  Today's Date: 11/11/2023  Requesting physician: Dilcia att. providers found    Recommendations:  - Will plan HD today and reassess daily.    Assessment & Plan   # CKD and EDGAR: Stable on dialysis.  Minimal urine output and remains anuric.  Baseline prior to liver transplant was at ~1 but prior to transplant have had some EDGAR with creatinine of 2.6-2.7 and peaked at ~3, and become anuric since midnight.  EDGAR related to significant volume loss.    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     # Liver Tx: Normal transaminases.    # Immunosuppression: Tacrolimus immediate release (goal 8-10) and Mycophenolate mofetil (dose 750 mg every 12 hours)   - Patient is in an immunosuppressed state and will continue to monitor for efficacy and toxicity of immunosuppression medications.   - Changes: Not at this time, per liver team    # Infection Prophylaxis:   - PJP: Sulfa/TMP (Bactrim)  - CMV: Valganciclovir (Valcyte); Patient is CMV IgG Ab discordant (D+/R-) and will continue on Valcyte x 6 months, then check CMV PCR monthly until 12 months post transplant.   - Thrush: Micafungin (Mycamine)  - Fungal: Micafungin (Mycamine)     # Blood Pressure: Controlled  Goal BP: > 100, but < 130 systolic   - Volume status: Mildly hypervolemic     - Changes: No    # Elevated Blood Glucose: Glucose generally running ~ 100-160s   - Receiving insulin at times.   - Management as per primary team.    # Anemia in Chronic Renal Disease: Hgb: Stable      NIRMAL: No   - Iron studies: Not checked recently    # Mineral Bone Disorder:    - Secondary renal hyperparathyroidism; PTH level: Not checked recently        On treatment: None   - Vitamin D; level: Low        On supplement: No, received  high dose once, but holding further doses with given hypercalcemia.   - Calcium; level: Stable low        On supplement: No   - Phosphorus; level: Normal        On supplement: No    # Electrolytes:   - Potassium; level: Normal        On supplement: No  - Magnesium; level: Normal        On supplement: No  - Bicarbonate; level: Low        On supplement: No    # Depression: well managed. Defer to primary team     # Transplant History:  Etiology of Kidney Failure: Hepatorenal syndrome (HRS)  Tx: CKD and liver transplant  Transplant: 11/5/2023 (Liver)  Crossmatch at time of Tx: pending  Significant changes in immunosuppression: None  Significant transplant-related complications: None    Recommendations were communicated to the primary team via this note.    Lv Paz MD  Transplant Nephrology  Contact information available via [facility] Paging/Directory    History of Present Illness   Ms. Milner's creatinine is 2.18 (11/11 0618); As expected with dialysis.  Minimal urine output and remains anuric.  Stable, normal transaminases.  Other significant labs/tests/vitals: Decreased bicarbonate level, otherwise, stable electrolytes.  No new events overnight.  Patient dialyzing today.  Some chest pain and shortness of breath from liver incision.  Stable leg swelling.  Patient was a bit frustrated and didn't answer other questions.    Review of Systems    4 point ROS was obtained and negative except as noted in the Interval History.    Allergies   No Known Allergies  Prior to Admission Medications    acetaminophen  325 mg Oral or Feeding Tube Q4H    artificial tears  1 drop Both Eyes 4x Daily    gabapentin  200 mg Oral or Feeding Tube At Bedtime    insulin aspart  1-7 Units Subcutaneous TID AC    insulin aspart  1-5 Units Subcutaneous At Bedtime    micafungin  100 mg Intravenous Q24H    multivitamin, therapeutic  1 tablet Oral Daily    mycophenolate  750 mg Oral BID IS    pantoprazole  40 mg Oral QAM AC    polyethylene  glycol  17 g Oral or Feeding Tube Daily    predniSONE  5 mg Oral Daily    sodium chloride (PF)  3 mL Intravenous Q8H    sulfamethoxazole-trimethoprim  1 tablet Oral or Feeding Tube Once per day on     tacrolimus  2.5 mg Oral BID IS    valGANciclovir  450 mg Oral Once per day on       - MEDICATION INSTRUCTIONS -      BETA BLOCKER NOT PRESCRIBED         Physical Exam   Temp  Av.9  F (36.6  C)  Min: 97.3  F (36.3  C)  Max: 98.3  F (36.8  C)      Pulse  Av.5  Min: 66  Max: 86 Resp  Av.7  Min: 13  Max: 27  SpO2  Av.9 %  Min: 87 %  Max: 100 %     /70 (BP Location: Right arm)   Pulse 77   Temp 97.7  F (36.5  C) (Oral)   Resp 16   Wt 70.9 kg (156 lb 6.4 oz)   SpO2 97%   BMI 27.71 kg/m      Admit Weight: 68.6 kg (151 lb 4.8 oz)     GENERAL APPEARANCE: awake and alert  HENT: no gross abnormalities noted  RESP: lungs clear to auscultation - no rales, rhonchi or wheezes  CV: regular rhythm, normal rate, no rub, no murmur  EDEMA: 1+ LE edema bilaterally  ABDOMEN: soft, nondistended, non tender  MS: extremities normal - no gross deformities noted, no evidence of inflammation in joints, no muscle tenderness  SKIN: no rash  Neuro : mentation and speech intact    Data   CMP  Recent Labs   Lab 23  1200 23  0836 23  0618 11/10/23  2123 11/10/23  1717 11/10/23  1439 11/10/23  0844 11/10/23  0622 23  1804 23  1422 23  0806 23  0335 23  0533 23  0355   NA  --   --  136  --   --   --   --  138  --  136  --  136  136   < > 135  135   POTASSIUM  --   --  3.7  --   --  3.5  --  3.0*  --  3.5  --  3.4  3.4   < > 4.2  4.2   CHLORIDE  --   --  104  --   --   --   --  104  --  103  --  104  104   < > 99  99   CO2  --   --  16*  --   --   --   --  26  --  22  --  24  24   < > 23  23   ANIONGAP  --   --  16*  --   --   --   --  8  --  11  --  8  8   < > 13  13   * 102* 84 168*   < >  --    < > 101*   < > 156*    < > 100*  100*   < > 112*  114*  114*   BUN  --   --  22.2*  --   --   --   --  17.7  --  25.0*  --  21.9*  21.9*   < > 37.2*  37.2*   CR  --   --  2.18*  --   --   --   --  1.82*  --  2.00*  --  1.71*  1.71*   < > 2.47*  2.47*   GFRESTIMATED  --   --  30*  --   --   --   --  37*  --  33*  --  40*  40*   < > 26*  26*   GEE  --   --  8.6  --   --   --   --  8.3*  --  7.9*  --  7.6*  7.6*   < > 8.0*  8.0*   MAG  --   --  1.8  --   --   --   --  1.8  --  2.2  --  2.3   < > 2.5*   PHOS  --   --  4.5  --   --   --   --  3.6  --  4.9*  --  4.4   < > 6.0*   PROTTOTAL  --   --  4.6*  --   --   --   --  3.9*  --   --   --  4.0*  --  4.2*   ALBUMIN  --   --  3.5  --   --   --   --  3.3*  --  3.7  --  3.6  3.6   < > 3.7  3.7   BILITOTAL  --   --  2.3*  --   --   --   --  1.9*  --   --   --  1.9*  --  2.3*   ALKPHOS  --   --  59  --   --   --   --  35  --   --   --  26*  --  28*   AST  --   --  25  --   --   --   --  14  --   --   --  22  --  26   ALT  --   --  15  --   --   --   --  13  --   --   --  14  --  19    < > = values in this interval not displayed.     CBC  Recent Labs   Lab 11/10/23  1439 11/10/23  0622 11/09/23  1422 11/09/23  0335 11/08/23 1959   HGB 8.9* 8.5* 10.2* 9.8* 9.9*   WBC  --  6.3 7.6 7.2 7.2   RBC  --  2.76* 3.22* 3.17* 3.18*   HCT  --  25.8* 29.5* 29.6* 28.7*   MCV  --  94 92 93 90   MCH  --  30.8 31.7 30.9 31.1   MCHC  --  32.9 34.6 33.1 34.5   RDW  --  17.7* 17.9* 17.9* 18.0*   PLT  --  80* 90* 89* 82*     INR  Recent Labs   Lab 11/11/23  0618 11/10/23  0622 11/09/23  0335 11/08/23  0355 11/06/23  0425 11/05/23  2148 11/05/23  1854 11/05/23  1740   INR 1.11 1.46* 2.18* 1.88*   < > 1.44* 1.60* 1.89*   PTT  --   --   --   --   --  36 37 54*    < > = values in this interval not displayed.     ABG  Recent Labs   Lab 11/07/23  0701 11/05/23  2147 11/05/23  1851 11/05/23  1751   PH 7.43 7.31* 7.39 7.41   PCO2 34* 49* 37 35   PO2 103 167* 134* 108*   HCO3 23 25 22 22   O2PER 30 40 52.0 53.0       Urine Studies  Recent Labs   Lab Test 11/04/23  2309 10/31/23  2100   COLOR Dark Yellow* Orange*   APPEARANCE Slightly Cloudy* Cloudy*   URINEGLC Negative Negative   URINEBILI Moderate* Large*   URINEKETONE Negative Negative   SG 1.016 1.019   UBLD Trace* Trace*   URINEPH 5.5 5.5   PROTEIN 10* 30*   NITRITE Negative Negative   LEUKEST Small* Small*   RBCU 2 4*   WBCU 5 25*     IMAGING:  All imaging studies reviewed by me.

## 2023-11-11 NOTE — CARE PLAN
Vitals: BP 96/72 (BP Location: Right arm)   Pulse 86   Temp 97.6  F (36.4  C) (Oral)   Resp 18   Wt 70.9 kg (156 lb 6.4 oz)   SpO2 99%   BMI 27.71 kg/m    Time: 6752-7151  Neuro: A/O x 4, calls appropriately and makes needs known.  Activity: Up with SBA and a walker.   Pain and N/V: abdominal and back pain managed with oxycodone and scheduled tylenol.  GI/: No BM this shift, Voiding spontaneously. AUOP.   Cardiac: Denies cardiac chest pain.   Diet: Regular.   Respiratory: Denies SOB, on RA  Lines: CVC TL R IJ  Incisions/Drains/Skin: Abdominal incision CLIFFORD CDI. Abdominal drain in place. HARLEY-drain site cleaned and redressed x1.   Lab: Reviewed.  New changes this shift: No significant changes this shift, Continue POC.

## 2023-11-11 NOTE — PROGRESS NOTES
[facility]  Transplant Nephrology Progress notes  Date of Admission:  10/31/2023  Today's Date: 11/10/2023  Requesting physician: No att. providers found    Recommendations:  - no acute indications for RRT today.  - will plan for HD AM with albumin prime   - Holding high dose vitamin D insetting of hypercalcemia  - Obtained consent for RRT and placed in pt's chart    Assessment & Plan   # CKD and EDGAR: Trend up in SCr.  Baseline prior to liver transplant was at ~1 but prior to transplant have had some EDGAR with creatinine of 2.6-2.7 and peaked at ~3, and become anuric since midnight.  EDGAR related to significant volume loss.    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     # Liver Tx: LFT's were improving.     # Immunosuppression: Tacrolimus immediate release (goal 8-10) and Mycophenolate mofetil (dose 750 mg every 12 hours)   - Patient is in an immunosuppressed state and will continue to monitor for efficacy and toxicity of immunosuppression medications.   - Changes: Not at this time, per liver team    # Infection Prophylaxis:    - PJP: Sulfa/TMP (Bactrim)- will be started post-txp   - CMV: Valganciclovir (Valcyte)-will be started post-txp    # Hypotension: Hypotensive at times  Goal BP: > 100, but < 130 systolic   - Volume status: Mildly hypervolemic     - Changes: No.    # Anemia in Chronic Renal Disease: Hgb: Stable      NIRMAL: No   - Iron studies: Not checked recently    # Mineral Bone Disorder:    - Secondary renal hyperparathyroidism; PTH level: Not checked recently        On treatment: None   - Vitamin D; level: Low        On supplement: No, received high dose once, but holding further doses with given hypercalcemia.   - Calcium; level: High        On supplement: No   - Phosphorus; level: Normal        On supplement: No    #  Electrolytes:   - Potassium; level: Low        On supplement: No replacing as needed  - Magnesium; level: Normal        On supplement: Yes  - Bicarbonate; level: Normal        On supplement: No  - Sodium; level: Normal.    #Depression: well managed. Defer to primary team     # Transplant History:  Etiology of Kidney Failure: Hepatorenal syndrome (HRS)  Tx: CKD and liver transplant  Transplant: 2023 (Liver)  Crossmatch at time of Tx: pending  Significant changes in immunosuppression: None  Significant transplant-related complications: None    Recommendations were communicated to the primary team verbally.    Ely Trujillo MD  Pager: [phone]    REASON FOR CONSULT   Liver transplant    History of Present Illness   Pt s/p liver transplant. Stable hemodynamics. No significant symptoms noted today. Family was visting. Briefly was on O2, now with improved oxygenation.    Review of Systems    ROS were negative, denied chest pain, SOB, significant abdominal pain. Appetite is slowly coming back and able to eat with out vomiting.    Past Medical History    I have reviewed this patient's medical history and updated it with pertinent information if needed.   Past Medical History:   Diagnosis Date     Alcoholic cirrhosis of liver with ascites (H) 2023     History of alcohol abuse     Formatting of this note might be different from the original. In remission as of late 2023       Past Surgical History   I have reviewed this patient's surgical history and updated it with pertinent information if needed.  Past Surgical History:   Procedure Laterality Date     BENCH LIVER  2023    Procedure: Bench liver;  Surgeon: Aime Lowe MD;  Location: [facility] OR     TRANSPLANT LIVER RECIPIENT  DONOR N/A 2023    Procedure: Transplant liver recipient  donor;  Surgeon: Aime Lowe MD;  Location: [facility] OR       Family History   I have reviewed this patient's family history and updated it with pertinent  information if needed.   No family history on file.    Social History   I have reviewed this patient's social history and updated it with pertinent information if needed. Lola Milner  reports that she quit smoking about 5 months ago. Her smoking use included cigarettes. She has never used smokeless tobacco. She reports that she does not currently use alcohol. She reports that she does not use drugs.    Allergies   No Known Allergies  Prior to Admission Medications     acetaminophen  325 mg Oral or Feeding Tube Q4H     artificial tears  1 drop Both Eyes 4x Daily     gabapentin  200 mg Oral or Feeding Tube At Bedtime     insulin aspart  1-7 Units Subcutaneous TID AC     insulin aspart  1-5 Units Subcutaneous At Bedtime     micafungin  100 mg Intravenous Q24H     multivitamin, therapeutic  1 tablet Oral Daily     mycophenolate  750 mg Oral BID IS     pantoprazole  40 mg Oral QAM AC     polyethylene glycol  17 g Oral or Feeding Tube Daily     [START ON 2023] predniSONE  5 mg Oral Daily     sodium chloride (PF)  3 mL Intravenous Q8H     sulfamethoxazole-trimethoprim  1 tablet Oral or Feeding Tube Once per day on      tacrolimus  2.5 mg Oral BID IS     valGANciclovir  450 mg Oral Once per day on        - MEDICATION INSTRUCTIONS -       BETA BLOCKER NOT PRESCRIBED         Physical Exam   Temp  Av.9  F (36.6  C)  Min: 97.3  F (36.3  C)  Max: 98.3  F (36.8  C)      Pulse  Av.5  Min: 66  Max: 86 Resp  Av.7  Min: 13  Max: 27  SpO2  Av.9 %  Min: 87 %  Max: 100 %     BP 99/66   Pulse 64   Temp 97.9  F (36.6  C) (Oral)   Resp 15   Wt 70.4 kg (155 lb 3.3 oz)   SpO2 100%   BMI 27.49 kg/m      Admit Weight: 68.6 kg (151 lb 4.8 oz)     GENERAL APPEARANCE: awake and alert  HENT: no gross abnormalities noted  RESP: lungs clear to auscultation - no rales, rhonchi or wheezes  CV: regular rhythm, normal rate, no rub, no murmur  EDEMA: 1+ LE edema bilaterally  ABDOMEN:  soft, nondistended, non tender  MS: extremities normal - no gross deformities noted, no evidence of inflammation in joints, no muscle tenderness  SKIN: no rash  Neuro : mentation and speech intact    Data   CMP  Recent Labs   Lab 11/10/23  1717 11/10/23  1439 11/10/23  0844 11/10/23  0622 11/09/23  2145 11/09/23  1804 11/09/23  1422 11/09/23  0806 11/09/23  0335 11/08/23  2144 11/08/23  1959 11/08/23  0533 11/08/23  0355 11/07/23  0608 11/07/23  0505   NA  --   --   --  138  --   --  136  --  136  136  --  135   < > 135  135   < > 128*  128*   POTASSIUM  --  3.5  --  3.0*  --   --  3.5  --  3.4  3.4  --  3.6   < > 4.2  4.2   < > 4.3  4.3   CHLORIDE  --   --   --  104  --   --  103  --  104  104  --  102   < > 99  99   < > 94*  94*   CO2  --   --   --  26  --   --  22  --  24  24  --  24   < > 23  23   < > 21*  21*   ANIONGAP  --   --   --  8  --   --  11  --  8  8  --  9   < > 13  13   < > 13  13   *  --  77 101* 193*   < > 156*   < > 100*  100*   < > 176*   < > 112*  114*  114*   < > 150*  150*   BUN  --   --   --  17.7  --   --  25.0*  --  21.9*  21.9*  --  26.3*   < > 37.2*  37.2*   < > 50.0*  50.0*   CR  --   --   --  1.82*  --   --  2.00*  --  1.71*  1.71*  --  1.86*   < > 2.47*  2.47*   < > 3.18*  3.18*   GFRESTIMATED  --   --   --  37*  --   --  33*  --  40*  40*  --  37*   < > 26*  26*   < > 19*  19*   GEE  --   --   --  8.3*  --   --  7.9*  --  7.6*  7.6*  --  7.4*   < > 8.0*  8.0*   < > 9.2  9.2   MAG  --   --   --  1.8  --   --  2.2  --  2.3  --  2.3   < > 2.5*   < > 2.7*   PHOS  --   --   --  3.6  --   --  4.9*  --  4.4  --  4.7*   < > 6.0*   < > 7.3*   PROTTOTAL  --   --   --  3.9*  --   --   --   --  4.0*  --   --   --  4.2*  --  4.1*   ALBUMIN  --   --   --  3.3*  --   --  3.7  --  3.6  3.6  --  3.8   < > 3.7  3.7   < > 3.0*   BILITOTAL  --   --   --  1.9*  --   --   --   --  1.9*  --   --   --  2.3*  --  3.6*   ALKPHOS  --   --   --  35  --   --   --   --  26*   --   --   --  28*  --  48   AST  --   --   --  14  --   --   --   --  22  --   --   --  26  --  61*   ALT  --   --   --  13  --   --   --   --  14  --   --   --  19  --  46    < > = values in this interval not displayed.     CBC  Recent Labs   Lab 11/10/23  1439 11/10/23  0622 11/09/23  1422 11/09/23  0335 11/08/23 1959   HGB 8.9* 8.5* 10.2* 9.8* 9.9*   WBC  --  6.3 7.6 7.2 7.2   RBC  --  2.76* 3.22* 3.17* 3.18*   HCT  --  25.8* 29.5* 29.6* 28.7*   MCV  --  94 92 93 90   MCH  --  30.8 31.7 30.9 31.1   MCHC  --  32.9 34.6 33.1 34.5   RDW  --  17.7* 17.9* 17.9* 18.0*   PLT  --  80* 90* 89* 82*     INR  Recent Labs   Lab 11/10/23  0622 11/09/23  0335 11/08/23  0355 11/07/23  0505 11/06/23  0425 11/05/23 2148 11/05/23  1854 11/05/23  1740 11/05/23  0520 11/04/23  1049   INR 1.46* 2.18* 1.88* 1.31*   < > 1.44* 1.60* 1.89*   < > 4.31*   PTT  --   --   --   --   --  36 37 54*  --  84*    < > = values in this interval not displayed.     ABG  Recent Labs   Lab 11/07/23  0701 11/05/23  2147 11/05/23  1851 11/05/23  1751   PH 7.43 7.31* 7.39 7.41   PCO2 34* 49* 37 35   PO2 103 167* 134* 108*   HCO3 23 25 22 22   O2PER 30 40 52.0 53.0      Urine Studies  Recent Labs   Lab Test 11/04/23  2309 10/31/23  2100   COLOR Dark Yellow* Orange*   APPEARANCE Slightly Cloudy* Cloudy*   URINEGLC Negative Negative   URINEBILI Moderate* Large*   URINEKETONE Negative Negative   SG 1.016 1.019   UBLD Trace* Trace*   URINEPH 5.5 5.5   PROTEIN 10* 30*   NITRITE Negative Negative   LEUKEST Small* Small*   RBCU 2 4*   WBCU 5 25*     IMAGING:  All imaging studies reviewed by me.

## 2023-11-11 NOTE — PLAN OF CARE
Major Shift Events:  A/O x4. SR. Old HARLEY drain leaking- stitch applied per team. Dressing CDI. 1 HARLEY drain RLQ with large output. Up with 1 to the chair and commode. Taking scheduled tylenol and prn oxy for pain. 1 Cryo given.   Plan: Transferred to . Plan for HD tomorrow.   For vital signs and complete assessments, please see documentation flowsheets.       Goal Outcome Evaluation:      Plan of Care Reviewed With: patient, significant other    Overall Patient Progress: improvingOverall Patient Progress: improving

## 2023-11-11 NOTE — PLAN OF CARE
Vitals: stable room air.   Blood glucose: achs: 102. 150. 134.   Pain/nausea: oxy x 1. Scheduled tylenol given.   Diet: regular.   Lines: PIVL SL, HD line CDI.   : oliguric, dialyzed today. 500 ml off.   GI: no bm. Last bm 11/09.   Drains: HARLEY dressing cdi.  ml serosang. Leaky @ site.   Skin: Incision abdomen staples lebron.   Mobility: stand by assist.   Labs : creatinine 2.18 from 1.82.   Education : med/lab book updated. MTP started.

## 2023-11-11 NOTE — PLAN OF CARE
Goal Outcome Evaluation:    Patient up from 4E this shift. Soft BPs (100s/60s), other vital signs WNL. PRN oxycodone given x1. Poor appetite on regular diet. Blood sugars 148, 161. Up with stand by assist in room. Showered this shift. Plan for dialysis tomorrow. Family at bedside supportive of patient.

## 2023-11-11 NOTE — PROGRESS NOTES
Transplant Surgery  Inpatient Daily Progress Note  11/11/2023    Assessment & Plan: Lola Milner is a 33 year old female admitted on 10/31/2023 for liver transplant evaluation. MELD Na 45. Past medical history significant for MDD, prior tobacco use, gout, polyneuropathy and decompensated cirrhosis secondary to alcohol use c/b hepatic encephalopathy, esophageal varices s/p banding, recurrent ascites and hepatorenal syndrome. She is now status post liver transplant 11/5/23 by Dr. Aime Lowe.     Graft function:   Liver transplant: POD: 6. Liver tests minimally increased today, will monitor. POD 0 US showed patent doppler.   -HARLEY x 1 with serosanguinous output, drain to suction.   -Stitch placed at previous HARLEY site that is leaking.    Immunosuppression management:   Induction:  Simulect POD 1 and 4, due to renal function and lower tac goal  Steroid taper per protocol.     Maintenance:  Mycophenolate 750 mg BID  Tacrolimus 2.5 mg BID. Tac goal 5-10.   Prednisone 5 mg daily after taper d/t lower tac goal    Neuro:   Acute surgical pain: Oxycodone 2.5-5 Q4 PRN, minimal use  Neuropathic pain: PTA gabapentin    Hematology:   Acute blood loss anemia: Last transfused 11/5. Transfuse for Hgb < 7. Hgb 8.5 (10). HARLEY output serosanguinous. Recheck Hgb.   Thrombocytopenia: PLT 80-90. Trending down. Monitor.  No transfusion needed.   Coagulapathy: INR 1.11.    Cardiorespiratory:   Circulatory failure requiring pressor support: Pressors discontinued 11/6.  Hx hypotension: PTA on Midodrine 15 TID. Not restarted. SBP 100s  Mechanical ventilation perioperative: Unable to extubate due to apneic episodes during PS trial on POD 1 and 2. Extubated POD 3. Stable on RA. Encourage CDB/IS.    GI/Nutrition:   Diet: Regular diet, Continue calorie counts. Diet recommendations post-transplant: High protein diet x 8 weeks. RD consulted.      Endocrine:   Steroid induced hyperglycemia: MefO5u=1.3. sliding scale insulin ACHS.    Fluid/Electrolytes:    EDGAR, anuric: EDGAR secondary to HRS, possible bile cast nephropathy. Started on CRRT post op until 11/9. iHD on 11/9.   Hypokalemia: K 3. KCL 20 mEq. Recheck K.     : Izaguirre removed, monitor UOP. PVR ~40s. Bladder scan daily.    Infectious disease: Afebrile. WBC normal.     Prophylaxis: DVT (mechanical), viral (Valcyte x 6 months), PJP (Bactrim), zosyn x 48 hours, switched from diflucan to micafungin x 14 day d/t RRT, nystatin.    Disposition: 7A status, will look into outpatient dialysis options. Therapy recommending home with assist    JENNIFER/Fellow/Resident Provider: SAUNDRA Desir [id number]     Faculty: Jagjit Santiago M.D.    __________________________________________________________________  Transplant History: Admitted 10/31/2023 for liver transplant.   The patient has a history of liver failure due to decompensated cirrhosis secondary to alcohol use.    11/5/2023 (Liver), Postoperative day: 6     Interval History:   Overnight events: Eating. Denies nausea. Pain controlled.  +BM and flatus. Feels like her UOP is increasing    ROS:   A 10-point review of systems was negative except as noted above.    Curent Meds:   acetaminophen  325 mg Oral or Feeding Tube Q4H    artificial tears  1 drop Both Eyes 4x Daily    gabapentin  200 mg Oral or Feeding Tube At Bedtime    insulin aspart  1-7 Units Subcutaneous TID AC    insulin aspart  1-5 Units Subcutaneous At Bedtime    micafungin  100 mg Intravenous Q24H    multivitamin, therapeutic  1 tablet Oral Daily    mycophenolate  750 mg Oral BID IS    pantoprazole  40 mg Oral QAM AC    polyethylene glycol  17 g Oral or Feeding Tube Daily    predniSONE  5 mg Oral Daily    sodium chloride (PF)  3 mL Intravenous Q8H    sulfamethoxazole-trimethoprim  1 tablet Oral or Feeding Tube Once per day on Monday Wednesday Friday    tacrolimus  2.5 mg Oral BID IS    valGANciclovir  450 mg Oral Once per day on Tuesday Friday       Physical Exam:     Admit Weight: 68.6 kg (151 lb 4.8  oz)    Current Vitals:   /77   Pulse 66   Temp 97.7  F (36.5  C) (Oral)   Resp 10   Wt 70.9 kg (156 lb 6.4 oz)   SpO2 100%   BMI 27.71 kg/m        Vital sign ranges:    Temp:  [97  F (36.1  C)-98.1  F (36.7  C)] 97.7  F (36.5  C)  Pulse:  [59-86] 66  Resp:  [10-18] 10  BP: ()/() 121/77  SpO2:  [97 %-100 %] 100 %  Patient Vitals for the past 24 hrs:   BP Temp Temp src Pulse Resp SpO2 Weight   11/11/23 1132 121/77 -- -- 66 10 100 % --   11/11/23 1130 119/79 97.7  F (36.5  C) Oral 69 15 100 % --   11/11/23 1115 (!) 115/90 -- -- 69 16 100 % --   11/11/23 1100 (!) 130/102 -- -- 71 15 100 % --   11/11/23 1045 (!) 123/95 -- -- 79 10 100 % --   11/11/23 1030 (!) 115/93 -- -- 67 14 100 % --   11/11/23 1015 101/84 -- -- 68 12 100 % --   11/11/23 1000 121/81 -- -- 80 14 100 % --   11/11/23 0945 112/73 -- -- 70 10 100 % --   11/11/23 0930 118/82 -- -- 67 10 100 % --   11/11/23 0915 118/86 -- -- 83 17 100 % --   11/11/23 0900 (!) 129/94 -- -- 69 11 100 % --   11/11/23 0845 110/78 -- -- 71 16 100 % --   11/11/23 0830 95/74 -- -- 68 14 100 % --   11/11/23 0815 112/83 97.7  F (36.5  C) Oral 70 10 100 % --   11/11/23 0812 110/76 -- -- 75 18 100 % --   11/11/23 0800 110/76 -- -- 75 18 -- --   11/11/23 0559 115/70 97.7  F (36.5  C) Oral 59 18 100 % --   11/11/23 0150 96/72 97.6  F (36.4  C) Oral 86 18 99 % --   11/11/23 0030 -- -- -- -- -- -- 70.9 kg (156 lb 6.4 oz)   11/10/23 2114 106/67 98.1  F (36.7  C) Oral 70 18 97 % --   11/10/23 1720 99/66 97.9  F (36.6  C) Oral 64 15 100 % --   11/10/23 1600 100/70 97.2  F (36.2  C) Oral 65 12 100 % --   11/10/23 1545 104/71 97.6  F (36.4  C) -- 63 13 100 % --   11/10/23 1300 100/65 -- -- 64 15 100 % --   11/10/23 1200 -- 97  F (36.1  C) Oral 68 17 100 % --     Examined by fellow:  General Appearance: in no apparent distress  Skin: normal, warm, jaundice  Heart: RRR  Lungs: NLB on RA  Abdomen:  Soft, non distended, non tender.  The wound is CDI with staples. n HARLEY x 1  serosanguinous   :   anuric  Extremities: b/l pedal edema +1  Neurologic: alert, awake    Frailty Scores           No data to display                Data:   CMP  Recent Labs   Lab 11/11/23  0836 11/11/23  0618 11/10/23  1717 11/10/23  1439 11/10/23  0844 11/10/23  0622 11/06/23  0502 11/06/23  0425   NA  --  136  --   --   --  138   < > 130*  130*   POTASSIUM  --  3.7  --  3.5  --  3.0*   < > 3.1*  3.1*   CHLORIDE  --  104  --   --   --  104   < > 96*  96*   CO2  --  16*  --   --   --  26   < > 24  24   * 84   < >  --    < > 101*   < > 103*  103*   BUN  --  22.2*  --   --   --  17.7   < > 42.6*  42.6*   CR  --  2.18*  --   --   --  1.82*   < > 2.50*  2.50*   GFRESTIMATED  --  30*  --   --   --  37*   < > 26*  26*   GEE  --  8.6  --   --   --  8.3*   < > 10.5*  10.5*   ICAW  --  4.3*  --   --   --  4.8   < > 5.8*   MAG  --  1.8  --   --   --  1.8   < > 1.6*   PHOS  --  4.5  --   --   --  3.6   < > 3.3   AMYLASE  --   --   --   --   --   --   --  28   LIPASE  --   --   --   --   --   --   --  11*   ALBUMIN  --  3.5  --   --   --  3.3*   < > 2.9*   BILITOTAL  --  2.3*  --   --   --  1.9*   < > 6.6*   ALKPHOS  --  59  --   --   --  35   < > 75   AST  --  25  --   --   --  14   < > 182*   ALT  --  15  --   --   --  13   < > 113*    < > = values in this interval not displayed.     CBC  Recent Labs   Lab 11/10/23  1439 11/10/23  0622 11/09/23  1422 11/08/23  1959 11/08/23  1146   HGB 8.9* 8.5* 10.2*   < > 10.3*   WBC  --  6.3 7.6   < > 7.1   PLT  --  80* 90*   < > 96*   A1C  --   --   --   --  5.3    < > = values in this interval not displayed.     COAGS  Recent Labs   Lab 11/11/23  0618 11/10/23  0622 11/06/23  0425 11/05/23  2148 11/05/23  1854   INR 1.11 1.46*   < > 1.44* 1.60*   PTT  --   --   --  36 37    < > = values in this interval not displayed.      Urinalysis  Recent Labs   Lab Test 11/04/23  2309 10/31/23  2100   COLOR Dark Yellow* Orange*   APPEARANCE Slightly Cloudy* Cloudy*   URINEGLC  Negative Negative   URINEBILI Moderate* Large*   URINEKETONE Negative Negative   SG 1.016 1.019   UBLD Trace* Trace*   URINEPH 5.5 5.5   PROTEIN 10* 30*   NITRITE Negative Negative   LEUKEST Small* Small*   RBCU 2 4*   WBCU 5 25*     Virology:  Hepatitis C Antibody   Date Value Ref Range Status   11/04/2023 Nonreactive Nonreactive Final

## 2023-11-11 NOTE — PROGRESS NOTES
Transferred to: [address]  at 1740  Belongings: Cellphone given to significant other.  and glasses at bedside. All belongings sent with.   Izaguirre removed? Yes  Central line removed? No, HD line  Chart and medications sent with patient Yes  Family notified: Yes

## 2023-11-12 LAB
ALBUMIN SERPL BCG-MCNC: 3.6 G/DL (ref 3.5–5.2)
ALP SERPL-CCNC: 76 U/L (ref 35–104)
ALT SERPL W P-5'-P-CCNC: 15 U/L (ref 0–50)
ANION GAP SERPL CALCULATED.3IONS-SCNC: 10 MMOL/L (ref 7–15)
AST SERPL W P-5'-P-CCNC: 19 U/L (ref 0–45)
BASOPHILS # BLD AUTO: ABNORMAL 10*3/UL
BASOPHILS # BLD MANUAL: 0 10E3/UL (ref 0–0.2)
BASOPHILS NFR BLD AUTO: ABNORMAL %
BASOPHILS NFR BLD MANUAL: 0 %
BILIRUB DIRECT SERPL-MCNC: 1.39 MG/DL (ref 0–0.3)
BILIRUB SERPL-MCNC: 2.6 MG/DL
BUN SERPL-MCNC: 12.2 MG/DL (ref 6–20)
CA-I BLD-MCNC: 4.7 MG/DL (ref 4.4–5.2)
CALCIUM SERPL-MCNC: 8.8 MG/DL (ref 8.6–10)
CHLORIDE SERPL-SCNC: 102 MMOL/L (ref 98–107)
CREAT SERPL-MCNC: 1.73 MG/DL (ref 0.51–0.95)
DEPRECATED HCO3 PLAS-SCNC: 23 MMOL/L (ref 22–29)
EGFRCR SERPLBLD CKD-EPI 2021: 40 ML/MIN/1.73M2
EOSINOPHIL # BLD AUTO: ABNORMAL 10*3/UL
EOSINOPHIL # BLD MANUAL: 0.1 10E3/UL (ref 0–0.7)
EOSINOPHIL NFR BLD AUTO: ABNORMAL %
EOSINOPHIL NFR BLD MANUAL: 1 %
ERYTHROCYTE [DISTWIDTH] IN BLOOD BY AUTOMATED COUNT: 17.6 % (ref 10–15)
GLUCOSE BLDC GLUCOMTR-MCNC: 101 MG/DL (ref 70–99)
GLUCOSE BLDC GLUCOMTR-MCNC: 114 MG/DL (ref 70–99)
GLUCOSE BLDC GLUCOMTR-MCNC: 132 MG/DL (ref 70–99)
GLUCOSE BLDC GLUCOMTR-MCNC: 133 MG/DL (ref 70–99)
GLUCOSE BLDC GLUCOMTR-MCNC: 88 MG/DL (ref 70–99)
GLUCOSE SERPL-MCNC: 78 MG/DL (ref 70–99)
HCT VFR BLD AUTO: 32.5 % (ref 35–47)
HGB BLD-MCNC: 10.8 G/DL (ref 11.7–15.7)
IMM GRANULOCYTES # BLD: ABNORMAL 10*3/UL
IMM GRANULOCYTES NFR BLD: ABNORMAL %
INR PPP: 1.04 (ref 0.85–1.15)
LYMPHOCYTES # BLD AUTO: ABNORMAL 10*3/UL
LYMPHOCYTES # BLD MANUAL: 1.8 10E3/UL (ref 0.8–5.3)
LYMPHOCYTES NFR BLD AUTO: ABNORMAL %
LYMPHOCYTES NFR BLD MANUAL: 20 %
MAGNESIUM SERPL-MCNC: 1.5 MG/DL (ref 1.7–2.3)
MCH RBC QN AUTO: 31.5 PG (ref 26.5–33)
MCHC RBC AUTO-ENTMCNC: 33.2 G/DL (ref 31.5–36.5)
MCV RBC AUTO: 95 FL (ref 78–100)
MONOCYTES # BLD AUTO: ABNORMAL 10*3/UL
MONOCYTES # BLD MANUAL: 0.2 10E3/UL (ref 0–1.3)
MONOCYTES NFR BLD AUTO: ABNORMAL %
MONOCYTES NFR BLD MANUAL: 2 %
NEUTROPHILS # BLD AUTO: ABNORMAL 10*3/UL
NEUTROPHILS # BLD MANUAL: 6.9 10E3/UL (ref 1.6–8.3)
NEUTROPHILS NFR BLD AUTO: ABNORMAL %
NEUTROPHILS NFR BLD MANUAL: 77 %
NRBC # BLD AUTO: 0 10E3/UL
NRBC BLD AUTO-RTO: 0 /100
PHOSPHATE SERPL-MCNC: 2.8 MG/DL (ref 2.5–4.5)
PLAT MORPH BLD: NORMAL
PLATELET # BLD AUTO: 118 10E3/UL (ref 150–450)
POTASSIUM SERPL-SCNC: 3.5 MMOL/L (ref 3.4–5.3)
PROT SERPL-MCNC: 4.6 G/DL (ref 6.4–8.3)
RBC # BLD AUTO: 3.43 10E6/UL (ref 3.8–5.2)
RBC MORPH BLD: NORMAL
SODIUM SERPL-SCNC: 135 MMOL/L (ref 135–145)
TACROLIMUS BLD-MCNC: 4.2 UG/L (ref 5–15)
TME LAST DOSE: ABNORMAL H
TME LAST DOSE: ABNORMAL H
WBC # BLD AUTO: 9 10E3/UL (ref 4–11)

## 2023-11-12 PROCEDURE — 250N000012 HC RX MED GY IP 250 OP 636 PS 637: Performed by: PHYSICIAN ASSISTANT

## 2023-11-12 PROCEDURE — 82248 BILIRUBIN DIRECT: CPT | Performed by: STUDENT IN AN ORGANIZED HEALTH CARE EDUCATION/TRAINING PROGRAM

## 2023-11-12 PROCEDURE — 258N000003 HC RX IP 258 OP 636: Performed by: PHYSICIAN ASSISTANT

## 2023-11-12 PROCEDURE — 250N000013 HC RX MED GY IP 250 OP 250 PS 637: Performed by: INTERNAL MEDICINE

## 2023-11-12 PROCEDURE — 250N000013 HC RX MED GY IP 250 OP 250 PS 637: Performed by: TRANSPLANT SURGERY

## 2023-11-12 PROCEDURE — 250N000013 HC RX MED GY IP 250 OP 250 PS 637: Performed by: PHYSICIAN ASSISTANT

## 2023-11-12 PROCEDURE — 83735 ASSAY OF MAGNESIUM: CPT | Performed by: STUDENT IN AN ORGANIZED HEALTH CARE EDUCATION/TRAINING PROGRAM

## 2023-11-12 PROCEDURE — 250N000013 HC RX MED GY IP 250 OP 250 PS 637: Performed by: NURSE PRACTITIONER

## 2023-11-12 PROCEDURE — 250N000013 HC RX MED GY IP 250 OP 250 PS 637

## 2023-11-12 PROCEDURE — 84100 ASSAY OF PHOSPHORUS: CPT | Performed by: STUDENT IN AN ORGANIZED HEALTH CARE EDUCATION/TRAINING PROGRAM

## 2023-11-12 PROCEDURE — 80053 COMPREHEN METABOLIC PANEL: CPT | Performed by: PHYSICIAN ASSISTANT

## 2023-11-12 PROCEDURE — 999N000248 HC STATISTIC IV INSERT WITH US BY RN

## 2023-11-12 PROCEDURE — 120N000011 HC R&B TRANSPLANT UMMC

## 2023-11-12 PROCEDURE — 36415 COLL VENOUS BLD VENIPUNCTURE: CPT | Performed by: STUDENT IN AN ORGANIZED HEALTH CARE EDUCATION/TRAINING PROGRAM

## 2023-11-12 PROCEDURE — 250N000012 HC RX MED GY IP 250 OP 636 PS 637: Performed by: STUDENT IN AN ORGANIZED HEALTH CARE EDUCATION/TRAINING PROGRAM

## 2023-11-12 PROCEDURE — 99233 SBSQ HOSP IP/OBS HIGH 50: CPT | Mod: 24 | Performed by: INTERNAL MEDICINE

## 2023-11-12 PROCEDURE — 85610 PROTHROMBIN TIME: CPT | Performed by: PHYSICIAN ASSISTANT

## 2023-11-12 PROCEDURE — 82330 ASSAY OF CALCIUM: CPT | Performed by: STUDENT IN AN ORGANIZED HEALTH CARE EDUCATION/TRAINING PROGRAM

## 2023-11-12 PROCEDURE — 80197 ASSAY OF TACROLIMUS: CPT | Performed by: PHYSICIAN ASSISTANT

## 2023-11-12 PROCEDURE — 85007 BL SMEAR W/DIFF WBC COUNT: CPT | Performed by: STUDENT IN AN ORGANIZED HEALTH CARE EDUCATION/TRAINING PROGRAM

## 2023-11-12 PROCEDURE — 250N000011 HC RX IP 250 OP 636: Mod: JZ | Performed by: PHYSICIAN ASSISTANT

## 2023-11-12 PROCEDURE — 85027 COMPLETE CBC AUTOMATED: CPT | Performed by: STUDENT IN AN ORGANIZED HEALTH CARE EDUCATION/TRAINING PROGRAM

## 2023-11-12 RX ORDER — TACROLIMUS 1 MG/1
3 CAPSULE ORAL
Status: DISCONTINUED | OUTPATIENT
Start: 2023-11-12 | End: 2023-11-14

## 2023-11-12 RX ORDER — VITAMIN B COMPLEX
50 TABLET ORAL DAILY
Status: DISCONTINUED | OUTPATIENT
Start: 2023-11-12 | End: 2023-11-15 | Stop reason: HOSPADM

## 2023-11-12 RX ADMIN — MYCOPHENOLATE MOFETIL 750 MG: 250 CAPSULE ORAL at 07:55

## 2023-11-12 RX ADMIN — PANTOPRAZOLE SODIUM 40 MG: 40 TABLET, DELAYED RELEASE ORAL at 07:56

## 2023-11-12 RX ADMIN — Medication 1 DROP: at 20:03

## 2023-11-12 RX ADMIN — Medication 1 DROP: at 07:56

## 2023-11-12 RX ADMIN — TACROLIMUS 2.5 MG: 1 CAPSULE ORAL at 07:55

## 2023-11-12 RX ADMIN — ACETAMINOPHEN 325 MG: 325 TABLET, FILM COATED ORAL at 11:21

## 2023-11-12 RX ADMIN — ACETAMINOPHEN 325 MG: 325 TABLET, FILM COATED ORAL at 16:15

## 2023-11-12 RX ADMIN — Medication 1 DROP: at 11:21

## 2023-11-12 RX ADMIN — Medication 1 DROP: at 16:15

## 2023-11-12 RX ADMIN — SODIUM CHLORIDE 100 MG: 9 INJECTION, SOLUTION INTRAVENOUS at 11:21

## 2023-11-12 RX ADMIN — THERA TABS 1 TABLET: TAB at 11:21

## 2023-11-12 RX ADMIN — Medication 50 MCG: at 16:15

## 2023-11-12 RX ADMIN — PREDNISONE 5 MG: 5 TABLET ORAL at 07:56

## 2023-11-12 RX ADMIN — GABAPENTIN 200 MG: 100 CAPSULE ORAL at 22:33

## 2023-11-12 RX ADMIN — ACETAMINOPHEN 325 MG: 325 TABLET, FILM COATED ORAL at 20:03

## 2023-11-12 RX ADMIN — MYCOPHENOLATE MOFETIL 750 MG: 250 CAPSULE ORAL at 18:10

## 2023-11-12 RX ADMIN — OXYCODONE HYDROCHLORIDE 2.5 MG: 5 TABLET ORAL at 16:24

## 2023-11-12 RX ADMIN — TACROLIMUS 3 MG: 1 CAPSULE ORAL at 18:10

## 2023-11-12 RX ADMIN — POLYETHYLENE GLYCOL 3350 17 G: 17 POWDER, FOR SOLUTION ORAL at 07:55

## 2023-11-12 RX ADMIN — ACETAMINOPHEN 325 MG: 325 TABLET, FILM COATED ORAL at 07:56

## 2023-11-12 ASSESSMENT — ACTIVITIES OF DAILY LIVING (ADL)
ADLS_ACUITY_SCORE: 34
ADLS_ACUITY_SCORE: 31
ADLS_ACUITY_SCORE: 34
ADLS_ACUITY_SCORE: 34
ADLS_ACUITY_SCORE: 31
ADLS_ACUITY_SCORE: 34
ADLS_ACUITY_SCORE: 31
ADLS_ACUITY_SCORE: 34

## 2023-11-12 NOTE — PLAN OF CARE
/83 (BP Location: Right arm)   Pulse 73   Temp 97.6  F (36.4  C) (Oral)   Resp 16   Wt 74.2 kg (163 lb 9.3 oz)   SpO2 100%   BMI 28.98 kg/m      SHIFT: 9369-7838  ISOLATION: NA  VITALS: AVSS on RA  BG: ACHS   Recent Labs   Lab 11/12/23  0240 11/11/23  2214 11/11/23  1824   * 159* 134*   NEURO: A&O x4.  DIET: Regular diet  PAIN: No reported pain or nausea.  RESPIRATORY: WDL  CARDIAC: WDL  : Patient on HD.  GI: LBM: 11/10  TUBES: R IJ, L PIV, HARLEY drain leaking.  MIVF/GTT/ABX: NA  ASSIST: SBA  SAFETY: NA  SKIN: Abdominal incision stapled and CLIFFORD  LABS:   Recent Labs   Lab Test 11/11/23  0618 11/10/23  1439 11/10/23  0622 11/09/23  1422   POTASSIUM 3.7 3.5 3.0* 3.5   PHOS 4.5  --  3.6 4.9*   MAG 1.8  --  1.8 2.2   HGB  --  8.9* 8.5* 10.2*   AST 25  --  14  --    ALT 15  --  13  --    ALKPHOS 59  --  35  --    BILITOTAL 2.3*  --  1.9*  --    PLAN: Annie Marshall PA-C:    7A status, will look into outpatient dialysis options. Therapy recommending home with assist

## 2023-11-12 NOTE — PROGRESS NOTES
[facility]  Transplant Nephrology Progress notes  Date of Admission:  10/31/2023  Today's Date: 11/12/2023  Requesting physician: No att. providers found    Recommendations:  - No acute indications for dialysis, but plan for next HD tomorrow.  - Recommend replacing temporary dialysis catheter with a tunneled dialysis catheter.  - Recommend starting cholecalciferol 50 mcg daily.    Assessment & Plan   # EDGAR: As expected with dialysis.  Minimal urine output and remains anuric.  No acute indications for dialysis today, but will plan HD tomorrow.   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  - HD Info  Access: Temporary Catheter Right IJ , Days: TBD, Length: 4.0 hrs, EDW: ?? kg, Heparin: No, NIRMAL: No, IV Iron: No, Vit D analog: No    - Baseline Creatinine: ~ 0.8-1.0   - Proteinuria: Not checked recently, but urinalysis with no proteinuria.    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  Transaminases Stable, normal, but slight trend up in total bilirubin.  Followed by Transplant Surgery.    # Immunosuppression: Tacrolimus immediate release (goal 6-8), Mycophenolate mofetil (dose 750 mg every 12 hours), and Prednisone (dose 5 mg daily)   - Induction with Recent Transplant:  Per Liver Tx Protocol   - Continue with intensive monitoring of immunosuppression for efficacy and toxicity.   - Goal tacrolimus level lower due to EDGAR.   - Changes: Not at this time; Management per Transplant Surgery.    # Infection Prophylaxis:   - PJP: Sulfa/TMP (Bactrim)  - CMV: Valganciclovir (Valcyte); Patient is CMV IgG Ab discordant (D+/R-) and will continue on Valcyte x 6 months, then check CMV PCR monthly until 12 months post transplant.   - Thrush: Micafungin (Mycamine)  - Fungal: Micafungin (Mycamine)    # Blood  Pressure: Controlled;  Goal BP: < 140/90 (Hospitalization goal)   - Volume status: Mildly hypervolemic  EDW ~ 64.0 kg ?? (Estimate)   - Changes: Not at this time, but will try to pull volume with dialysis, as tolerated.    # Elevated Blood Glucose: Glucose generally running ~ 130-150s   - Management as per primary team.    # Anemia in Chronic Disease/Surgery: Hgb: Increased      NIRMAL: No   - Iron studies: High iron saturation    # Thrombocytopenia: Trend up, mildly low platelet level.  No evidence of bleeding.    # Mineral Bone Disorder:   - Vitamin D; level: Low        On supplement: No; Recommend starting cholecalciferol 50 mcg daily.  - Calcium; level: Normal        On supplement: No  - Phosphorus; level: Normal        On supplement: No    # Electrolytes:   - Potassium; level: Normal        On supplement: No  - Magnesium; level: Stable low        On supplement: No  - Bicarbonate; level: Normal        On supplement: No  - Sodium; level: Normal    # Depression: Mood has been a bit up and down. Managed by primary team.    # Transplant History:  Etiology of Organ Failure: Alcohol-related liver disease  Tx: Liver Tx  Transplant: 11/5/2023 (Liver)  Significant changes in immunosuppression: Slightly lower tacrolimus level goal due to EDGAR.  CMV IgG Ab High Risk Discordance (D+/R-): Yes  EBV IgG Ab High Risk Discordance (D+/R-): No  Significant transplant-related complications: EDGAR    Recommendations were communicated to the primary team via this note.    Lv Paz MD  Contact information available via [facility] Paging/Directory    History of Present Illness   Ms. Scanlon creatinine is 1.73 (11/12 0630); As expected with dialysis.  Minimal urine output and remains anuric.  Stable, normal transaminases, but slight trend up in total bilirubin.  Other significant labs/tests/vitals: Stable electrolytes.  Increased hemoglobin.  No new events overnight.  Patient reports not sleeping the best due to some weird dreams.   Otherwise, in a much better mood today.  No chest pain or shortness of breath.  Decreased incisional pain.  Stable leg swelling.  No nausea and vomiting.  Bowel movements are formed.  No fever, sweats or chills.    Review of Systems    4 point ROS was obtained and negative except as noted in the Interval History.    Allergies   No Known Allergies  Prior to Admission Medications    acetaminophen  325 mg Oral or Feeding Tube Q4H    artificial tears  1 drop Both Eyes 4x Daily    gabapentin  200 mg Oral or Feeding Tube At Bedtime    insulin aspart  1-7 Units Subcutaneous TID AC    insulin aspart  1-5 Units Subcutaneous At Bedtime    micafungin  100 mg Intravenous Q24H    multivitamin, therapeutic  1 tablet Oral Daily    mycophenolate  750 mg Oral BID IS    pantoprazole  40 mg Oral QAM AC    polyethylene glycol  17 g Oral or Feeding Tube Daily    predniSONE  5 mg Oral Daily    sodium chloride (PF)  3 mL Intravenous Q8H    sulfamethoxazole-trimethoprim  1 tablet Oral or Feeding Tube Once per day on     tacrolimus  2.5 mg Oral BID IS    valGANciclovir  450 mg Oral Once per day on       - MEDICATION INSTRUCTIONS -      BETA BLOCKER NOT PRESCRIBED         Physical Exam   Temp  Av.9  F (36.6  C)  Min: 97.3  F (36.3  C)  Max: 98.3  F (36.8  C)      Pulse  Av.5  Min: 66  Max: 86 Resp  Av.7  Min: 13  Max: 27  SpO2  Av.9 %  Min: 87 %  Max: 100 %     BP (!) 111/90 (BP Location: Right arm)   Pulse 87   Temp 97.9  F (36.6  C) (Oral)   Resp 16   Wt 74.2 kg (163 lb 9.3 oz)   SpO2 100%   BMI 28.98 kg/m      Admit Weight: 68.6 kg (151 lb 4.8 oz)     GENERAL APPEARANCE: alert and no distress  HENT: mouth without ulcers or lesions  RESP: lungs clear to auscultation - no rales, rhonchi or wheezes  CV: regular rhythm, normal rate, no rub, no murmur  EDEMA: 1+ LE edema bilaterally  ABDOMEN: soft, nondistended, nontender, bowel sounds normal  MS: extremities normal - no gross  deformities noted, no evidence of inflammation in joints, no muscle tenderness  SKIN: no rash, slightly jaundiced  DIALYSIS ACCESS:  Temporary catheter right internal jugular    Data   CMP  Recent Labs   Lab 11/12/23  0930 11/12/23  0630 11/12/23  0240 11/11/23  2214 11/11/23  0836 11/11/23  0618 11/10/23  1717 11/10/23  1439 11/10/23  0844 11/10/23  0622 11/09/23  1804 11/09/23  1422 11/09/23  0806 11/09/23  0335   NA  --  135  --   --   --  136  --   --   --  138  --  136  --  136  136   POTASSIUM  --  3.5  --   --   --  3.7  --  3.5  --  3.0*  --  3.5  --  3.4  3.4   CHLORIDE  --  102  --   --   --  104  --   --   --  104  --  103  --  104  104   CO2  --  23  --   --   --  16*  --   --   --  26  --  22  --  24  24   ANIONGAP  --  10  --   --   --  16*  --   --   --  8  --  11  --  8  8   GLC 88 78 132* 159*   < > 84   < >  --    < > 101*   < > 156*   < > 100*  100*   BUN  --  12.2  --   --   --  22.2*  --   --   --  17.7  --  25.0*  --  21.9*  21.9*   CR  --  1.73*  --   --   --  2.18*  --   --   --  1.82*  --  2.00*  --  1.71*  1.71*   GFRESTIMATED  --  40*  --   --   --  30*  --   --   --  37*  --  33*  --  40*  40*   GEE  --  8.8  --   --   --  8.6  --   --   --  8.3*  --  7.9*  --  7.6*  7.6*   MAG  --  1.5*  --   --   --  1.8  --   --   --  1.8  --  2.2  --  2.3   PHOS  --  2.8  --   --   --  4.5  --   --   --  3.6  --  4.9*  --  4.4   PROTTOTAL  --  4.6*  --   --   --  4.6*  --   --   --  3.9*  --   --   --  4.0*   ALBUMIN  --  3.6  --   --   --  3.5  --   --   --  3.3*  --  3.7  --  3.6  3.6   BILITOTAL  --  2.6*  --   --   --  2.3*  --   --   --  1.9*  --   --   --  1.9*   ALKPHOS  --  76  --   --   --  59  --   --   --  35  --   --   --  26*   AST  --  19  --   --   --  25  --   --   --  14  --   --   --  22   ALT  --  15  --   --   --  15  --   --   --  13  --   --   --  14    < > = values in this interval not displayed.     CBC  Recent Labs   Lab 11/12/23  0630 11/10/23  1439 11/10/23  0622  11/09/23  1422 11/09/23  0335   HGB 10.8* 8.9* 8.5* 10.2* 9.8*   WBC 9.0  --  6.3 7.6 7.2   RBC 3.43*  --  2.76* 3.22* 3.17*   HCT 32.5*  --  25.8* 29.5* 29.6*   MCV 95  --  94 92 93   MCH 31.5  --  30.8 31.7 30.9   MCHC 33.2  --  32.9 34.6 33.1   RDW 17.6*  --  17.7* 17.9* 17.9*   *  --  80* 90* 89*     INR  Recent Labs   Lab 11/12/23  0630 11/11/23  0618 11/10/23  0622 11/09/23  0335 11/06/23  0425 11/05/23  2148 11/05/23  1854 11/05/23  1740   INR 1.04 1.11 1.46* 2.18*   < > 1.44* 1.60* 1.89*   PTT  --   --   --   --   --  36 37 54*    < > = values in this interval not displayed.     ABG  Recent Labs   Lab 11/07/23  0701 11/05/23  2147 11/05/23  1851 11/05/23  1751   PH 7.43 7.31* 7.39 7.41   PCO2 34* 49* 37 35   PO2 103 167* 134* 108*   HCO3 23 25 22 22   O2PER 30 40 52.0 53.0      Urine Studies  Recent Labs   Lab Test 11/04/23  2309 10/31/23  2100   COLOR Dark Yellow* Orange*   APPEARANCE Slightly Cloudy* Cloudy*   URINEGLC Negative Negative   URINEBILI Moderate* Large*   URINEKETONE Negative Negative   SG 1.016 1.019   UBLD Trace* Trace*   URINEPH 5.5 5.5   PROTEIN 10* 30*   NITRITE Negative Negative   LEUKEST Small* Small*   RBCU 2 4*   WBCU 5 25*     IMAGING:  All imaging studies reviewed by me.

## 2023-11-12 NOTE — PROGRESS NOTES
Transplant Surgery  Inpatient Daily Progress Note  11/12/2023    Assessment & Plan: Lola Milner is a 33 year old female admitted on 10/31/2023 for liver transplant evaluation. MELD Na 45. Past medical history significant for MDD, prior tobacco use, gout, polyneuropathy and decompensated cirrhosis secondary to alcohol use c/b hepatic encephalopathy, esophageal varices s/p banding, recurrent ascites and hepatorenal syndrome. She is now status post liver transplant 11/5/23 by Dr. Aime Lowe.     Graft function:   Liver transplant: POD: 7. Liver tests minimally increased again today, will monitor. Consider MRCP if still increasing. POD 0 US showed patent doppler.   -HARLEY x 1 with serosanguinous output, drain to suction.   -Stitch placed at previous HARLEY site that is leaking.    Immunosuppression management:   Induction:  Simulect POD 1 and 4, due to renal function and lower tac goal  Steroid taper per protocol.     Maintenance:  Mycophenolate 750 mg BID  Tacrolimus 3 mg BID. Tac goal 5-10 due to renal function  Prednisone 5 mg daily after taper d/t lower tac goal    Neuro:   Acute surgical pain: Oxycodone 2.5-5 Q4 PRN, minimal use  Neuropathic pain: PTA gabapentin    Hematology:   Acute blood loss anemia: Last transfused 11/5. Transfuse for Hgb < 7. Hgb 10.8.   Thrombocytopenia: PLT 80-90. Trending down. Monitor.  No transfusion needed.   Coagulopathy: INR 1.04.    Cardiorespiratory:   Circulatory failure requiring pressor support: Pressors discontinued 11/6.  Hx hypotension: PTA on Midodrine 15 TID. Not restarted. SBP 100s  Mechanical ventilation perioperative: Unable to extubate due to apneic episodes during PS trial on POD 1 and 2. Extubated POD 3. Stable on RA. Encourage CDB/IS.    GI/Nutrition:   Diet: Regular diet, Continue calorie counts. Diet recommendations post-transplant: High protein diet x 8 weeks. RD consulted.      Endocrine:   Steroid induced hyperglycemia: JisV1u=9.3. sliding scale insulin  ACHS.    Fluid/Electrolytes:   EDGAR, anuric: EDGAR secondary to HRS, possible bile cast nephropathy. Started on CRRT post op until 11/9. iHD on 11/9. Will plan for outpatient dialysis at this time. IR consult for tunneled line, care coordinator notified to look for dialysis placement.  Hypokalemia: Replete, K 3.5 today.      : Izaguirre removed, monitor UOP. PVR ~40s. Bladder scan daily.    Infectious disease: Afebrile. WBC normal.     Prophylaxis: DVT (mechanical), viral (Valcyte x 6 months), PJP (Bactrim), zosyn x 48 hours, switched from diflucan to micafungin x 14 day d/t RRT, nystatin.    Disposition: 7A status, will look into outpatient dialysis options. Therapy recommending home with assist    JENNIFER/Fellow/Resident Provider: SAUNDRA Desir [id number]     Faculty: Jagjit Santiago M.D.    __________________________________________________________________  Transplant History: Admitted 10/31/2023 for liver transplant.   The patient has a history of liver failure due to decompensated cirrhosis secondary to alcohol use.    11/5/2023 (Liver), Postoperative day: 7     Interval History:   Overnight events: Eating. Denies nausea. Pain controlled.  +BM and flatus. Feels like her UOP is increasing    ROS:   A 10-point review of systems was negative except as noted above.    Curent Meds:   acetaminophen  325 mg Oral or Feeding Tube Q4H    artificial tears  1 drop Both Eyes 4x Daily    gabapentin  200 mg Oral or Feeding Tube At Bedtime    insulin aspart  1-7 Units Subcutaneous TID AC    insulin aspart  1-5 Units Subcutaneous At Bedtime    micafungin  100 mg Intravenous Q24H    multivitamin, therapeutic  1 tablet Oral Daily    mycophenolate  750 mg Oral BID IS    pantoprazole  40 mg Oral QAM AC    polyethylene glycol  17 g Oral or Feeding Tube Daily    predniSONE  5 mg Oral Daily    sodium chloride (PF)  3 mL Intravenous Q8H    sulfamethoxazole-trimethoprim  1 tablet Oral or Feeding Tube Once per day on Monday Wednesday Friday     tacrolimus  3 mg Oral BID IS    valGANciclovir  450 mg Oral Once per day on Tuesday Friday    cholecalciferol  50 mcg Oral Daily       Physical Exam:     Admit Weight: 68.6 kg (151 lb 4.8 oz)    Current Vitals:   /76 (BP Location: Right arm)   Pulse 82   Temp 98.2  F (36.8  C) (Oral)   Resp 16   Wt 74.2 kg (163 lb 9.3 oz)   SpO2 100%   BMI 28.98 kg/m        Vital sign ranges:    Temp:  [97.6  F (36.4  C)-98.2  F (36.8  C)] 98.2  F (36.8  C)  Pulse:  [71-87] 82  Resp:  [16-18] 16  BP: (102-125)/(70-90) 116/76  SpO2:  [97 %-100 %] 100 %  Patient Vitals for the past 24 hrs:   BP Temp Temp src Pulse Resp SpO2 Weight   11/12/23 1311 116/76 98.2  F (36.8  C) Oral 82 16 100 % --   11/12/23 0926 (!) 111/90 97.9  F (36.6  C) Oral 87 16 100 % --   11/12/23 0539 125/83 97.6  F (36.4  C) Oral 73 16 100 % 74.2 kg (163 lb 9.3 oz)   11/12/23 0237 104/73 97.9  F (36.6  C) Oral 75 16 -- --   11/11/23 2208 117/71 97.9  F (36.6  C) Oral 71 18 98 % --   11/11/23 1754 102/70 97.9  F (36.6  C) Oral 72 18 97 % --     Examined by fellow:  General Appearance: in no apparent distress  Skin: normal, warm, jaundice  Heart: RRR  Lungs: NLB on RA  Abdomen:  Soft, non distended, non tender.  The wound is CDI with staples. HARLEY x 1 serosanguinous   : oliguric  Extremities: b/l pedal edema +1  Neurologic: alert, awake    Frailty Scores           No data to display                Data:   CMP  Recent Labs   Lab 11/12/23  1311 11/12/23  0930 11/12/23  0630 11/11/23  0836 11/11/23  0618 11/06/23  0502 11/06/23  0425   NA  --   --  135  --  136   < > 130*  130*   POTASSIUM  --   --  3.5  --  3.7   < > 3.1*  3.1*   CHLORIDE  --   --  102  --  104   < > 96*  96*   CO2  --   --  23  --  16*   < > 24  24   * 88 78   < > 84   < > 103*  103*   BUN  --   --  12.2  --  22.2*   < > 42.6*  42.6*   CR  --   --  1.73*  --  2.18*   < > 2.50*  2.50*   GFRESTIMATED  --   --  40*  --  30*   < > 26*  26*   GEE  --   --  8.8  --  8.6   < >  10.5*  10.5*   ICAW  --   --  4.7  --  4.3*   < > 5.8*   MAG  --   --  1.5*  --  1.8   < > 1.6*   PHOS  --   --  2.8  --  4.5   < > 3.3   AMYLASE  --   --   --   --   --   --  28   LIPASE  --   --   --   --   --   --  11*   ALBUMIN  --   --  3.6  --  3.5   < > 2.9*   BILITOTAL  --   --  2.6*  --  2.3*   < > 6.6*   ALKPHOS  --   --  76  --  59   < > 75   AST  --   --  19  --  25   < > 182*   ALT  --   --  15  --  15   < > 113*    < > = values in this interval not displayed.     CBC  Recent Labs   Lab 11/12/23  0630 11/10/23  1439 11/10/23  0622 11/08/23  1959 11/08/23  1146   HGB 10.8* 8.9* 8.5*   < > 10.3*   WBC 9.0  --  6.3   < > 7.1   *  --  80*   < > 96*   A1C  --   --   --   --  5.3    < > = values in this interval not displayed.     COAGS  Recent Labs   Lab 11/12/23  0630 11/11/23  0618 11/06/23  0425 11/05/23  2148 11/05/23  1854   INR 1.04 1.11   < > 1.44* 1.60*   PTT  --   --   --  36 37    < > = values in this interval not displayed.      Urinalysis  Recent Labs   Lab Test 11/04/23  2309 10/31/23  2100   COLOR Dark Yellow* Orange*   APPEARANCE Slightly Cloudy* Cloudy*   URINEGLC Negative Negative   URINEBILI Moderate* Large*   URINEKETONE Negative Negative   SG 1.016 1.019   UBLD Trace* Trace*   URINEPH 5.5 5.5   PROTEIN 10* 30*   NITRITE Negative Negative   LEUKEST Small* Small*   RBCU 2 4*   WBCU 5 25*     Virology:  Hepatitis C Antibody   Date Value Ref Range Status   11/04/2023 Nonreactive Nonreactive Final

## 2023-11-12 NOTE — PLAN OF CARE
Vitals: stable room air.   Blood glucose: achs: 88. 114.   Pain/nausea: oxy 2.5 mg x 1. a  Diet: regular, calorie counted.poor appetite.  Lines: PIV SL. HD line cdi.   : oliguric, On HD, bladder scan zero.   GI: x 2.   Drains: HARLEY R good OP. Serosang, leaky, dressing changed.    Skin: incision clampshell staples lebron.   Mobility: stand by assist.   Med/lab book updated.

## 2023-11-12 NOTE — PROGRESS NOTES
Calorie Count  Intake recorded for: 11/11  Total Kcals: 912 Total Protein: 15g  Kcals from Hospital Food: 875   Protein: 13g  Kcals from Outside Food (average):37 Protein: 2g  # Meals Ordered from Kitchen: 3  # Meals Recorded: 2 (Meal 1: 100% 2 almendarez strips, 3 sherbet cups; 50% english muffin w/ butter and jelly; 15% cream of potato soup)    (Meal 2: 100% 2 orders of rice, 1 sherbet cup)   (Outside food: 10% of [employer] - est. based on [place])  # Supplements Recorded: No supplement intake recorded

## 2023-11-13 ENCOUNTER — APPOINTMENT (OUTPATIENT)
Dept: INTERVENTIONAL RADIOLOGY/VASCULAR | Facility: CLINIC | Age: 33
End: 2023-11-13
Attending: NURSE PRACTITIONER
Payer: COMMERCIAL

## 2023-11-13 ENCOUNTER — APPOINTMENT (OUTPATIENT)
Dept: PHYSICAL THERAPY | Facility: CLINIC | Age: 33
End: 2023-11-13
Payer: COMMERCIAL

## 2023-11-13 ENCOUNTER — TELEPHONE (OUTPATIENT)
Dept: PHARMACY | Facility: CLINIC | Age: 33
End: 2023-11-13
Payer: COMMERCIAL

## 2023-11-13 DIAGNOSIS — Z94.4 LIVER REPLACED BY TRANSPLANT (H): Primary | ICD-10-CM

## 2023-11-13 DIAGNOSIS — Z13.220 LIPID SCREENING: ICD-10-CM

## 2023-11-13 DIAGNOSIS — K70.31 ALCOHOLIC CIRRHOSIS OF LIVER WITH ASCITES (H): ICD-10-CM

## 2023-11-13 LAB
ALBUMIN SERPL BCG-MCNC: 3.1 G/DL (ref 3.5–5.2)
ALP SERPL-CCNC: 86 U/L (ref 35–104)
ALT SERPL W P-5'-P-CCNC: 13 U/L (ref 0–50)
ANION GAP SERPL CALCULATED.3IONS-SCNC: 12 MMOL/L (ref 7–15)
AST SERPL W P-5'-P-CCNC: 17 U/L (ref 0–45)
BASOPHILS # BLD AUTO: ABNORMAL 10*3/UL
BASOPHILS # BLD MANUAL: 0 10E3/UL (ref 0–0.2)
BASOPHILS NFR BLD AUTO: ABNORMAL %
BASOPHILS NFR BLD MANUAL: 0 %
BILIRUB DIRECT SERPL-MCNC: 1.16 MG/DL (ref 0–0.3)
BILIRUB SERPL-MCNC: 2.4 MG/DL
BUN SERPL-MCNC: 15 MG/DL (ref 6–20)
BURR CELLS BLD QL SMEAR: SLIGHT
CA-I BLD-MCNC: 4.7 MG/DL (ref 4.4–5.2)
CALCIUM SERPL-MCNC: 8.3 MG/DL (ref 8.6–10)
CHLORIDE SERPL-SCNC: 103 MMOL/L (ref 98–107)
CREAT SERPL-MCNC: 1.79 MG/DL (ref 0.51–0.95)
DEPRECATED HCO3 PLAS-SCNC: 19 MMOL/L (ref 22–29)
EGFRCR SERPLBLD CKD-EPI 2021: 38 ML/MIN/1.73M2
EOSINOPHIL # BLD AUTO: ABNORMAL 10*3/UL
EOSINOPHIL # BLD MANUAL: 0 10E3/UL (ref 0–0.7)
EOSINOPHIL NFR BLD AUTO: ABNORMAL %
EOSINOPHIL NFR BLD MANUAL: 0 %
ERYTHROCYTE [DISTWIDTH] IN BLOOD BY AUTOMATED COUNT: 17.1 % (ref 10–15)
FRAGMENTS BLD QL SMEAR: SLIGHT
GGT SERPL-CCNC: 25 U/L (ref 5–36)
GLUCOSE BLDC GLUCOMTR-MCNC: 108 MG/DL (ref 70–99)
GLUCOSE BLDC GLUCOMTR-MCNC: 112 MG/DL (ref 70–99)
GLUCOSE BLDC GLUCOMTR-MCNC: 112 MG/DL (ref 70–99)
GLUCOSE BLDC GLUCOMTR-MCNC: 127 MG/DL (ref 70–99)
GLUCOSE SERPL-MCNC: 86 MG/DL (ref 70–99)
HCT VFR BLD AUTO: 30.6 % (ref 35–47)
HGB BLD-MCNC: 9.8 G/DL (ref 11.7–15.7)
HOLD SPECIMEN: NORMAL
IMM GRANULOCYTES # BLD: ABNORMAL 10*3/UL
IMM GRANULOCYTES NFR BLD: ABNORMAL %
INR PPP: 1.18 (ref 0.85–1.15)
LYMPHOCYTES # BLD AUTO: ABNORMAL 10*3/UL
LYMPHOCYTES # BLD MANUAL: 1.1 10E3/UL (ref 0.8–5.3)
LYMPHOCYTES NFR BLD AUTO: ABNORMAL %
LYMPHOCYTES NFR BLD MANUAL: 11 %
MAGNESIUM SERPL-MCNC: 1.4 MG/DL (ref 1.7–2.3)
MCH RBC QN AUTO: 30.8 PG (ref 26.5–33)
MCHC RBC AUTO-ENTMCNC: 32 G/DL (ref 31.5–36.5)
MCV RBC AUTO: 96 FL (ref 78–100)
MONOCYTES # BLD AUTO: ABNORMAL 10*3/UL
MONOCYTES # BLD MANUAL: 0.1 10E3/UL (ref 0–1.3)
MONOCYTES NFR BLD AUTO: ABNORMAL %
MONOCYTES NFR BLD MANUAL: 1 %
NEUTROPHILS # BLD AUTO: ABNORMAL 10*3/UL
NEUTROPHILS # BLD MANUAL: 8.5 10E3/UL (ref 1.6–8.3)
NEUTROPHILS NFR BLD AUTO: ABNORMAL %
NEUTROPHILS NFR BLD MANUAL: 88 %
NRBC # BLD AUTO: 0 10E3/UL
NRBC BLD AUTO-RTO: 0 /100
PHOSPHATE SERPL-MCNC: 3.4 MG/DL (ref 2.5–4.5)
PLAT MORPH BLD: ABNORMAL
PLATELET # BLD AUTO: 167 10E3/UL (ref 150–450)
POTASSIUM SERPL-SCNC: 3.7 MMOL/L (ref 3.4–5.3)
PROT SERPL-MCNC: 4.1 G/DL (ref 6.4–8.3)
RBC # BLD AUTO: 3.18 10E6/UL (ref 3.8–5.2)
RBC MORPH BLD: ABNORMAL
SODIUM SERPL-SCNC: 134 MMOL/L (ref 135–145)
TACROLIMUS BLD-MCNC: 5.2 UG/L (ref 5–15)
TME LAST DOSE: NORMAL H
TME LAST DOSE: NORMAL H
WBC # BLD AUTO: 9.7 10E3/UL (ref 4–11)

## 2023-11-13 PROCEDURE — 999N000127 HC STATISTIC PERIPHERAL IV START W US GUIDANCE

## 2023-11-13 PROCEDURE — 250N000012 HC RX MED GY IP 250 OP 636 PS 637: Performed by: STUDENT IN AN ORGANIZED HEALTH CARE EDUCATION/TRAINING PROGRAM

## 2023-11-13 PROCEDURE — 80053 COMPREHEN METABOLIC PANEL: CPT | Performed by: PHYSICIAN ASSISTANT

## 2023-11-13 PROCEDURE — 250N000013 HC RX MED GY IP 250 OP 250 PS 637: Performed by: PHYSICIAN ASSISTANT

## 2023-11-13 PROCEDURE — C1750 CATH, HEMODIALYSIS,LONG-TERM: HCPCS

## 2023-11-13 PROCEDURE — 250N000013 HC RX MED GY IP 250 OP 250 PS 637: Performed by: NURSE PRACTITIONER

## 2023-11-13 PROCEDURE — 258N000003 HC RX IP 258 OP 636: Performed by: INTERNAL MEDICINE

## 2023-11-13 PROCEDURE — 83735 ASSAY OF MAGNESIUM: CPT | Performed by: STUDENT IN AN ORGANIZED HEALTH CARE EDUCATION/TRAINING PROGRAM

## 2023-11-13 PROCEDURE — 84100 ASSAY OF PHOSPHORUS: CPT | Performed by: STUDENT IN AN ORGANIZED HEALTH CARE EDUCATION/TRAINING PROGRAM

## 2023-11-13 PROCEDURE — 77001 FLUOROGUIDE FOR VEIN DEVICE: CPT | Mod: 26 | Performed by: RADIOLOGY

## 2023-11-13 PROCEDURE — 97116 GAIT TRAINING THERAPY: CPT | Mod: GP

## 2023-11-13 PROCEDURE — 02H633Z INSERTION OF INFUSION DEVICE INTO RIGHT ATRIUM, PERCUTANEOUS APPROACH: ICD-10-PCS | Performed by: RADIOLOGY

## 2023-11-13 PROCEDURE — 120N000011 HC R&B TRANSPLANT UMMC

## 2023-11-13 PROCEDURE — 250N000011 HC RX IP 250 OP 636: Mod: JZ | Performed by: PHYSICIAN ASSISTANT

## 2023-11-13 PROCEDURE — 82977 ASSAY OF GGT: CPT | Performed by: NURSE PRACTITIONER

## 2023-11-13 PROCEDURE — 272N000192 HC ACCESSORY CR2

## 2023-11-13 PROCEDURE — 99152 MOD SED SAME PHYS/QHP 5/>YRS: CPT

## 2023-11-13 PROCEDURE — 250N000013 HC RX MED GY IP 250 OP 250 PS 637: Performed by: TRANSPLANT SURGERY

## 2023-11-13 PROCEDURE — 250N000012 HC RX MED GY IP 250 OP 636 PS 637: Performed by: PHYSICIAN ASSISTANT

## 2023-11-13 PROCEDURE — 99233 SBSQ HOSP IP/OBS HIGH 50: CPT | Mod: 24 | Performed by: INTERNAL MEDICINE

## 2023-11-13 PROCEDURE — 250N000009 HC RX 250: Performed by: STUDENT IN AN ORGANIZED HEALTH CARE EDUCATION/TRAINING PROGRAM

## 2023-11-13 PROCEDURE — 250N000013 HC RX MED GY IP 250 OP 250 PS 637

## 2023-11-13 PROCEDURE — 90935 HEMODIALYSIS ONE EVALUATION: CPT

## 2023-11-13 PROCEDURE — 250N000011 HC RX IP 250 OP 636: Performed by: RADIOLOGY

## 2023-11-13 PROCEDURE — 97530 THERAPEUTIC ACTIVITIES: CPT | Mod: GP

## 2023-11-13 PROCEDURE — 82330 ASSAY OF CALCIUM: CPT | Performed by: STUDENT IN AN ORGANIZED HEALTH CARE EDUCATION/TRAINING PROGRAM

## 2023-11-13 PROCEDURE — 36415 COLL VENOUS BLD VENIPUNCTURE: CPT | Performed by: STUDENT IN AN ORGANIZED HEALTH CARE EDUCATION/TRAINING PROGRAM

## 2023-11-13 PROCEDURE — 272N000504 HC NEEDLE CR4

## 2023-11-13 PROCEDURE — 250N000011 HC RX IP 250 OP 636: Performed by: PHYSICIAN ASSISTANT

## 2023-11-13 PROCEDURE — 99152 MOD SED SAME PHYS/QHP 5/>YRS: CPT | Mod: GC | Performed by: RADIOLOGY

## 2023-11-13 PROCEDURE — 85027 COMPLETE CBC AUTOMATED: CPT | Performed by: STUDENT IN AN ORGANIZED HEALTH CARE EDUCATION/TRAINING PROGRAM

## 2023-11-13 PROCEDURE — 76937 US GUIDE VASCULAR ACCESS: CPT | Mod: 26 | Performed by: RADIOLOGY

## 2023-11-13 PROCEDURE — P9045 ALBUMIN (HUMAN), 5%, 250 ML: HCPCS | Mod: JZ | Performed by: INTERNAL MEDICINE

## 2023-11-13 PROCEDURE — 258N000003 HC RX IP 258 OP 636: Performed by: PHYSICIAN ASSISTANT

## 2023-11-13 PROCEDURE — 250N000011 HC RX IP 250 OP 636: Performed by: STUDENT IN AN ORGANIZED HEALTH CARE EDUCATION/TRAINING PROGRAM

## 2023-11-13 PROCEDURE — 0JH63XZ INSERTION OF TUNNELED VASCULAR ACCESS DEVICE INTO CHEST SUBCUTANEOUS TISSUE AND FASCIA, PERCUTANEOUS APPROACH: ICD-10-PCS | Performed by: RADIOLOGY

## 2023-11-13 PROCEDURE — 82248 BILIRUBIN DIRECT: CPT | Performed by: STUDENT IN AN ORGANIZED HEALTH CARE EDUCATION/TRAINING PROGRAM

## 2023-11-13 PROCEDURE — 85007 BL SMEAR W/DIFF WBC COUNT: CPT | Performed by: STUDENT IN AN ORGANIZED HEALTH CARE EDUCATION/TRAINING PROGRAM

## 2023-11-13 PROCEDURE — C1769 GUIDE WIRE: HCPCS

## 2023-11-13 PROCEDURE — 85610 PROTHROMBIN TIME: CPT | Performed by: PHYSICIAN ASSISTANT

## 2023-11-13 PROCEDURE — 250N000011 HC RX IP 250 OP 636: Mod: JZ | Performed by: INTERNAL MEDICINE

## 2023-11-13 PROCEDURE — 36558 INSERT TUNNELED CV CATH: CPT | Mod: 78 | Performed by: RADIOLOGY

## 2023-11-13 PROCEDURE — 80197 ASSAY OF TACROLIMUS: CPT | Performed by: PHYSICIAN ASSISTANT

## 2023-11-13 PROCEDURE — 36558 INSERT TUNNELED CV CATH: CPT

## 2023-11-13 PROCEDURE — 250N000013 HC RX MED GY IP 250 OP 250 PS 637: Performed by: INTERNAL MEDICINE

## 2023-11-13 RX ORDER — HEPARIN SODIUM 1000 [USP'U]/ML
3 INJECTION, SOLUTION INTRAVENOUS; SUBCUTANEOUS ONCE
Status: COMPLETED | OUTPATIENT
Start: 2023-11-13 | End: 2023-11-13

## 2023-11-13 RX ORDER — MULTIPLE VITAMINS W/ MINERALS TAB 9MG-400MCG
1 TAB ORAL DAILY
Status: DISCONTINUED | OUTPATIENT
Start: 2023-11-14 | End: 2023-11-15 | Stop reason: HOSPADM

## 2023-11-13 RX ORDER — NALOXONE HYDROCHLORIDE 0.4 MG/ML
0.2 INJECTION, SOLUTION INTRAMUSCULAR; INTRAVENOUS; SUBCUTANEOUS
Status: DISCONTINUED | OUTPATIENT
Start: 2023-11-13 | End: 2023-11-13

## 2023-11-13 RX ORDER — FENTANYL CITRATE 50 UG/ML
25-50 INJECTION, SOLUTION INTRAMUSCULAR; INTRAVENOUS EVERY 5 MIN PRN
Status: DISCONTINUED | OUTPATIENT
Start: 2023-11-13 | End: 2023-11-13

## 2023-11-13 RX ORDER — MAGNESIUM OXIDE 400 MG/1
400 TABLET ORAL DAILY
Status: DISCONTINUED | OUTPATIENT
Start: 2023-11-13 | End: 2023-11-14

## 2023-11-13 RX ORDER — NALOXONE HYDROCHLORIDE 0.4 MG/ML
0.4 INJECTION, SOLUTION INTRAMUSCULAR; INTRAVENOUS; SUBCUTANEOUS
Status: DISCONTINUED | OUTPATIENT
Start: 2023-11-13 | End: 2023-11-13

## 2023-11-13 RX ORDER — CEFAZOLIN SODIUM 2 G/100ML
2 INJECTION, SOLUTION INTRAVENOUS EVERY 8 HOURS
Status: DISCONTINUED | OUTPATIENT
Start: 2023-11-13 | End: 2023-11-13

## 2023-11-13 RX ORDER — ALBUMIN (HUMAN) 12.5 G/50ML
50 SOLUTION INTRAVENOUS
Status: DISCONTINUED | OUTPATIENT
Start: 2023-11-13 | End: 2023-11-13

## 2023-11-13 RX ORDER — CEFAZOLIN SODIUM 2 G/100ML
2 INJECTION, SOLUTION INTRAVENOUS ONCE
Status: COMPLETED | OUTPATIENT
Start: 2023-11-13 | End: 2023-11-13

## 2023-11-13 RX ORDER — FLUMAZENIL 0.1 MG/ML
0.2 INJECTION, SOLUTION INTRAVENOUS
Status: DISCONTINUED | OUTPATIENT
Start: 2023-11-13 | End: 2023-11-13

## 2023-11-13 RX ORDER — METHOCARBAMOL 500 MG/1
500 TABLET, FILM COATED ORAL 3 TIMES DAILY PRN
Status: DISCONTINUED | OUTPATIENT
Start: 2023-11-13 | End: 2023-11-15 | Stop reason: HOSPADM

## 2023-11-13 RX ADMIN — SODIUM CHLORIDE 300 ML: 9 INJECTION, SOLUTION INTRAVENOUS at 07:35

## 2023-11-13 RX ADMIN — GABAPENTIN 200 MG: 100 CAPSULE ORAL at 23:00

## 2023-11-13 RX ADMIN — MYCOPHENOLATE MOFETIL 750 MG: 250 CAPSULE ORAL at 18:11

## 2023-11-13 RX ADMIN — SODIUM CHLORIDE 100 MG: 9 INJECTION, SOLUTION INTRAVENOUS at 12:33

## 2023-11-13 RX ADMIN — MAGNESIUM OXIDE TAB 400 MG (241.3 MG ELEMENTAL MG) 400 MG: 400 (241.3 MG) TAB at 12:32

## 2023-11-13 RX ADMIN — THERA TABS 1 TABLET: TAB at 12:32

## 2023-11-13 RX ADMIN — ACETAMINOPHEN 325 MG: 325 TABLET, FILM COATED ORAL at 20:16

## 2023-11-13 RX ADMIN — ACETAMINOPHEN 325 MG: 325 TABLET, FILM COATED ORAL at 12:32

## 2023-11-13 RX ADMIN — FENTANYL CITRATE 50 MCG: 50 INJECTION, SOLUTION INTRAMUSCULAR; INTRAVENOUS at 16:17

## 2023-11-13 RX ADMIN — Medication 1 DROP: at 12:32

## 2023-11-13 RX ADMIN — HEPARIN SODIUM 2500 UNITS: 1000 INJECTION INTRAVENOUS; SUBCUTANEOUS at 16:20

## 2023-11-13 RX ADMIN — HEPARIN SODIUM 2500 UNITS: 1000 INJECTION INTRAVENOUS; SUBCUTANEOUS at 16:21

## 2023-11-13 RX ADMIN — POLYETHYLENE GLYCOL 3350 17 G: 17 POWDER, FOR SOLUTION ORAL at 12:32

## 2023-11-13 RX ADMIN — Medication 1 DROP: at 17:10

## 2023-11-13 RX ADMIN — MIDAZOLAM HYDROCHLORIDE 1 MG: 1 INJECTION, SOLUTION INTRAMUSCULAR; INTRAVENOUS at 16:11

## 2023-11-13 RX ADMIN — Medication 1 DROP: at 20:16

## 2023-11-13 RX ADMIN — SULFAMETHOXAZOLE AND TRIMETHOPRIM 1 TABLET: 400; 80 TABLET ORAL at 20:16

## 2023-11-13 RX ADMIN — FENTANYL CITRATE 50 MCG: 50 INJECTION, SOLUTION INTRAMUSCULAR; INTRAVENOUS at 15:56

## 2023-11-13 RX ADMIN — Medication 50 MCG: at 09:26

## 2023-11-13 RX ADMIN — MYCOPHENOLATE MOFETIL 750 MG: 250 CAPSULE ORAL at 09:31

## 2023-11-13 RX ADMIN — FENTANYL CITRATE 50 MCG: 50 INJECTION, SOLUTION INTRAMUSCULAR; INTRAVENOUS at 15:48

## 2023-11-13 RX ADMIN — LIDOCAINE HYDROCHLORIDE 30 ML: 10 INJECTION, SOLUTION EPIDURAL; INFILTRATION; INTRACAUDAL; PERINEURAL at 15:48

## 2023-11-13 RX ADMIN — ACETAMINOPHEN 325 MG: 325 TABLET, FILM COATED ORAL at 17:09

## 2023-11-13 RX ADMIN — Medication: at 07:35

## 2023-11-13 RX ADMIN — Medication 2 G: at 15:54

## 2023-11-13 RX ADMIN — METHOCARBAMOL 500 MG: 500 TABLET ORAL at 12:51

## 2023-11-13 RX ADMIN — PANTOPRAZOLE SODIUM 40 MG: 40 TABLET, DELAYED RELEASE ORAL at 09:27

## 2023-11-13 RX ADMIN — TACROLIMUS 3 MG: 1 CAPSULE ORAL at 18:11

## 2023-11-13 RX ADMIN — PREDNISONE 5 MG: 5 TABLET ORAL at 09:27

## 2023-11-13 RX ADMIN — MIDAZOLAM HYDROCHLORIDE 1 MG: 1 INJECTION, SOLUTION INTRAMUSCULAR; INTRAVENOUS at 15:48

## 2023-11-13 RX ADMIN — OXYCODONE HYDROCHLORIDE 5 MG: 5 TABLET ORAL at 10:01

## 2023-11-13 RX ADMIN — TACROLIMUS 3 MG: 1 CAPSULE ORAL at 09:29

## 2023-11-13 RX ADMIN — ALBUMIN HUMAN 250 ML: 0.05 INJECTION, SOLUTION INTRAVENOUS at 07:35

## 2023-11-13 ASSESSMENT — ACTIVITIES OF DAILY LIVING (ADL)
ADLS_ACUITY_SCORE: 31

## 2023-11-13 NOTE — PROGRESS NOTES
Care Management Follow Up    Length of Stay (days): 13    Expected Discharge Date:       Concerns to be Addressed: all concerns addressed in this encounter     Patient plan of care discussed at interdisciplinary rounds: Yes    Anticipated Discharge Disposition: Home (Pending transplant evaluation)     Anticipated Discharge Services: [facility] (PTA)  Anticipated Discharge DME: None    Patient/family educated on Medicare website which has current facility and service quality ratings:    Education Provided on the Discharge Plan:    Patient/Family in Agreement with the Plan: yes    Referrals Placed by CM/SW:    Private pay costs discussed: Not applicable    Additional Information:    Initiated referral to [facility] requesting OP HD placement at [facility].    Shefali Castro, RN BSN, PHN, ACM-RN  7A RN Care Coordinator  Phone: [phone]  Pager [phone]    To contact the weekend RNCC  [place] (0800 - 1630) Saturday and Sunday    Units: 5A,5B,5C [phone]    Units: 6A, [phone]    Units 6B, 6C, 6D [phone]    Units: 7A, 7B, 7C, 7D, [phone]    [facility] [place] ([phone]) Saturday and Sunday  Units: 5 Ortho, 5MS & WB ED Pager: [phone]  Units: 6MS, 8A & 10 ICU  Pager [phone]    11/13/2023 4:03 PM

## 2023-11-13 NOTE — CONSULTS
"    Interventional Radiology  [facility] Consult Service Note  11/13/23   8:33 AM    Consult Requested: \"Needs tunneled line placed for ongoing dialysis needs\"     Recommendations/Plan:    Patient is on IR schedule tentatively 11/13 for a TCVC placement for HD.   Labs WNL for procedure.  Orders entered for procedure, NPO status, and pre procedure IV antibiotics.  Consent will be done prior to procedure.     Please contact the IR charge RN at [phone] for estimated time of procedure. Timing of procedure TBD based on staffing/schedule and triage    Case and imaging discussed with IR attending, Dr. Uribe.  Recommendations were reviewed with Gladys Carmen PA-C.    History of Present Illness:  Lola Milner is a 33 year old female admitted on 10/31/2023 for liver transplant evaluation. MELD Na 45. Past medical history significant for MDD, prior tobacco use, gout, polyneuropathy and decompensated cirrhosis secondary to alcohol use c/b hepatic encephalopathy, esophageal varices s/p banding, recurrent ascites and hepatorenal syndrome. She is now status post liver transplant 11/5/23 by Dr. Aime Lowe requiring HD via RIJ NTCVC. Plan for discharge with outpatient HD. IR is consulted for TCVC placement for OP HD. Ok to remove RIJ NTCVC to place our line on that side.    Pertinent Imaging Reviewed:         Expected date of discharge:  TBD    Vitals:   BP (!) 144/87   Pulse 68   Temp 97.8  F (36.6  C) (Oral)   Resp (!) 9   Wt 70.1 kg (154 lb 9.6 oz)   SpO2 100%   BMI 27.39 kg/m      Pertinent Labs:   Lab Results   Component Value Date    WBC 9.7 11/13/2023    WBC 9.0 11/12/2023    WBC 6.3 11/10/2023     Lab Results   Component Value Date    HGB 9.8 11/13/2023    HGB 10.8 11/12/2023    HGB 8.9 11/10/2023     Lab Results   Component Value Date     11/13/2023     11/12/2023    PLT 80 11/10/2023     Lab Results   Component Value Date    INR 1.18 (H) 11/13/2023    PTT 36 11/05/2023     Lab " Results   Component Value Date    POTASSIUM 3.7 11/13/2023    POTASSIUM 2.9 (L) 11/05/2023        COVID-19 Antibody Results, Testing for Immunity           No data to display              COVID-19 PCR Results          8/30/2023    15:08 11/4/2023    13:03   COVID-19 PCR Results   COVID-19 Virus by PCR (External Result) Not Detected        SARS CoV2 PCR  Negative       Details          This result is from an external source.               ANDREEA Hobbs CNP  Interventional Radiology  Pager: [phone]

## 2023-11-13 NOTE — PROGRESS NOTES
CLINICAL NUTRITION SERVICES - REASSESSMENT NOTE     Nutrition Prescription    RECOMMENDATIONS FOR MDs/PROVIDERS TO ORDER:  Recommend patient to consistently consume at least 1350 kcal and 70 grams protein daily (~65% needs).  Pt had very poor PO intake prior to transplant.  Monitor closely, recommend low threshold for supplemental TF if unable to meet this goal.     Malnutrition Status:    Severe malnutrition in the context of acute illness    Recommendations already ordered by Registered Dietitian (RD):  Trial Ensure Plus High Protein and Special K protein bars    Calorie counts (11/14-11/16)    Change MVI to MVI with minerals    Check zinc status    Future/Additional Recommendations:  Encourage increased protein intake. Has been eating <20% of assessed needs despite eating 45% of calorie needs.  Encourage high calorie/high protein foods and oral supplements.      EVALUATION OF THE PROGRESS TOWARD GOALS   Diet: Regular + Magic Cup + Ensure Clear    Intake: VERY poor PO intake prior to transplant.  Pt feels her appetite is better now post-transplant.  She attempted 3 meals yesterday, although 2 of them were quite small.  She dislikes the oral supplements being sent to her.      Calorie counts (2d avg from 11/11 and 11/12) = 934 kcal and 17 grams protein   (met ~16% protein needs, 45% kcal needs)     NEW FINDINGS   Weight: Weight remains above admission weight of 68.6 kg.      Labs: Na 134 (low), vitamin D <6 (low), getting HD    Meds: MagOx 400 mg daily, Vit D3 2000 international unit(s), Tacrolimus BID, Prednisone 5 mg, Protonix, Miralax, Med sliding scale insulin, Mycophenolate BID, MVI     MALNUTRITION  % Intake: </= 50% for >/= 5 days (severe)  % Weight Loss: > 5% in 1 month (severe)  Subcutaneous Fat Loss: Facial region:  mild, Upper arm: mild, Lower arm: moderate and Thoracic/intercostal: moderate  Muscle Loss: Facial & jaw region:  moderate, Thoracic region (clavicle, acromium bone, deltoid, trapezius,  pectoral):  moderate, Upper arm (bicep, tricep):  moderate, Lower arm  (forearm):  moderate, and Dorsal hand:  moderate  Fluid Accumulation/Edema: Moderate  Malnutrition Diagnosis: Severe malnutrition in the context of acute illness    Previous Goals   Patient to consume % of nutritionally adequate meal trays TID, or the equivalent with supplements/snacks.   Evaluation: Not met    Previous Nutrition Diagnosis  Inadequate oral intake   Evaluation: No change    CURRENT NUTRITION DIAGNOSIS  Inadequate oral intake related to high assessed nutritional needs as evidenced by patient eating 16% protein and 45% of calorie needs on average over the last 2 days.       INTERVENTIONS  Implementation  Medical food supplement therapy - modified supplements per patient's preference    Nutrition education for recommended modifications - Discussed need for increased calorie and protein intake.  Reviewed high protein sources and encouraged her to increased intake with meals.  Stressed the need for oral supplements to help meet the majority of her goals.     Goals  Pt to consistently consume at least 1350 kcal and 70 grams protein daily (~65% needs)    Monitoring/Evaluation  Progress toward goals will be monitored and evaluated per protocol.    Leidy Young, MS, RD, LD, CCTD, CNSC  7A and 7B beds [phone], pager [phone]  Weekend pager [phone]

## 2023-11-13 NOTE — PROGRESS NOTES
"Calorie Count  Intake recorded for: 11/12  Total Kcals: 956 Total Protein: 19g  Kcals from Hospital Food: 446  Protein: 5g  Kcals from Outside Food (average): 510 Protein: 14 g  # Meals Ordered from Kitchen: 2  # Meals Recorded: 1 (Meal 1: 50% 1 peach cup, 1 pear cup, 1 mandarin orange cup, 1 apple)  1 (Meal 2: 100% 2 servings white rice, 1 orange sherbet)  Outside meal: 50% thin crust almendarez + onion 12\" pizza + ranch dip + hot sauce  # Supplements Recorded: No supplement intake recorded     Addendum 0900 after discussion with patient re: PO intake/diet recall  "

## 2023-11-13 NOTE — IR NOTE
Patient Name: Lola Milner  Medical Record Number: [id number]  Today's Date: 11/13/2023    Procedure: CVC tunneled line for HD  Proceduralist: JUAN Loya MD  Pathology present: no    Procedure Start: 1545  Procedure end: 1625  Sedation medications administered: 150 mcg fentanyl and 2 mg versed  2g Ancef     Sedation Time: 3525-7046 = Total Minutes 40    Special Considerations: Line flushed w/ Heparin and ready to use  Report given to:   : no    Other Notes: Pt arrived to IR room 5 from . Consent reviewed. Pt denies any questions or concerns regarding procedure. Pt positioned supine and monitored per protocol. Pt tolerated procedure without any noted complications. Pt transferred back to .

## 2023-11-13 NOTE — PROGRESS NOTES
Date: 11/13/2023  Time: 12:28 PM     Data:  Pre Wt:   70.1 kg  Desired Wt:   To be established  Post Wt:  67.1 kg (estimated)  Weight change: - 3.0 kg  Ultrafiltration - Post Run Net Total Removed (mL):  3000 ml  Vascular Access Status: CVC patent  Dialyzer Rinse:  Light: Streaked  Total Blood Volume Processed: 78.05 L   Total Dialysis (Treatment) Time:   4.0 Hrs  Dialysate Bath: K 3, Ca 3  Heparin: Heparin: None     Lab:   No  HbsAg - 11/4/2023 (Non reactive)  HbsAb - 11/4/2023 2.89 (Susceptible)     Interventions:  Dialysis done through right CVC  UF set to 3.3 Liters of fluid removal, accommodating priming and rinse back volumes  -400,   Medication administered per MAR  CVC dressing changed aseptically  CritLine showed a stable Profile B throughout the run, tolerating UF pull  Pt was hemodynamically stable on HD. Pt tolerated UF pull of 3.0L net.  Pt complained of back pain 7/10 score. PRN PO Oxycodone given.  Glucose checks done.  Inta-dialysis: 127 mg/dl  Treatment has ended safely and  blood is rinsed back completely  Catheter lumens flushed with saline and locked with Saline, catheter caps (ClearGuard ) changed post HD  Report given to CATHI Solis, sent back to [facility] room in stable condition     Assessment:  A/O x 4, calm & cooperative, denies pain  Lung sounds anterior and lateral BUL; BLL: clear ; diminished  CVC intact, previous dressing clean and dry                Plan:    Per Renal team      MARIJA Parker, RN  Acute Dialysis RN  [facility]

## 2023-11-13 NOTE — PROGRESS NOTES
Transplant Surgery  Inpatient Daily Progress Note  11/13/2023    Assessment & Plan: Lola Milner is a 33 year old female admitted on 10/31/2023 for liver transplant evaluation. MELD Na 45. Past medical history significant for MDD, prior tobacco use, gout, polyneuropathy and decompensated cirrhosis secondary to alcohol use c/b hepatic encephalopathy, esophageal varices s/p banding, recurrent ascites and hepatorenal syndrome. She is now status post liver transplant 11/5/23 by Dr. Aime Lowe. Postop course complicated by EDGAR requiring dialysis.    Graft function:   Liver transplant: POD: 8. Transmainases normal. AP remains WNL but slightly uptrending. TB improved to 2.4 today. Send GGT.   -POD 0 US showed patent doppler.   -HARLEY x 1 with serosanguinous output, drain to suction. 600cc output/day  -Stitch placed at previous HARLEY site that is leaking.    Immunosuppression management:   Induction:  Simulect POD 1 and 4, due to renal function and lower tac goal  Steroid taper per protocol.     Maintenance:  Mycophenolate 750 mg BID  Tacrolimus 3 mg BID. Tac goal 5-10 due to renal function  Prednisone 5 mg daily after taper d/t lower tac goal    Neuro:   Acute surgical pain: Oxycodone 2.5-5 Q4 PRN, minimal use, add robaxin PRN  Neuropathic pain: PTA gabapentin    Hematology:   Acute blood loss anemia: Last transfused 11/5. Transfuse for Hgb < 7. Hgb ~10.   Thrombocytopenia: resolved, PLT >150.   Coagulopathy: INR 1.2    Cardiorespiratory:   Circulatory failure requiring pressor support: Pressors discontinued 11/6.  Hx hypotension: PTA on Midodrine 15 TID. Not restarted. -130s  Mechanical ventilation perioperative: Unable to extubate due to apneic episodes during PS trial on POD 1 and 2. Extubated POD 3. Stable on RA. Encourage CDB/IS.    GI/Nutrition:   Diet: Regular diet, Continue calorie counts. Diet recommendations post-transplant: High protein diet x 8 weeks. RD consulted.      Endocrine:   Steroid induced  hyperglycemia: TkrZ0s=0.3. sliding scale insulin ACHS.    Fluid/Electrolytes:   EDGAR, anuric: EDGAR secondary to HRS, possible bile cast nephropathy. Started on CRRT post op until 11/9. iHD on 11/9. Will plan for outpatient dialysis at this time. IR consult for tunneled line, care coordinator notified to look for dialysis placement.  Hypokalemia: Replete, K 3.7 today.      : Izaguirre removed, monitor UOP for signs of renal recovery.    Infectious disease: Afebrile. WBC normal.     Prophylaxis: DVT (mechanical), viral (Valcyte x 6 months), PJP (Bactrim), zosyn x 48 hours, switched from diflucan to micafungin x 14 day d/t RRT, nystatin.    Disposition: 7A status, will look into outpatient dialysis options. Therapy recommending home with assist    JENNIFER/Fellow/Resident Provider: Gladys Carmen, NP [id number]         Faculty: Jagjit Santiago M.D.    __________________________________________________________________  Transplant History: Admitted 10/31/2023 for liver transplant.   The patient has a history of liver failure due to decompensated cirrhosis secondary to alcohol use.    11/5/2023 (Liver), Postoperative day: 8     Interval History:   Overnight events: Eating. Denies nausea. Pain controlled. +BM and flatus. Feels like her UOP is increasing.     ROS:   A 10-point review of systems was negative except as noted above.    Curent Meds:   sodium chloride (PF) 0.9%  10 mL Intracatheter During Dialysis/CRRT (from stock)    sodium chloride (PF) 0.9%  10 mL Intracatheter During Dialysis/CRRT (from stock)    acetaminophen  325 mg Oral or Feeding Tube Q4H    artificial tears  1 drop Both Eyes 4x Daily    gabapentin  200 mg Oral or Feeding Tube At Bedtime    insulin aspart  1-7 Units Subcutaneous TID AC    insulin aspart  1-5 Units Subcutaneous At Bedtime    magnesium oxide  400 mg Oral Daily    micafungin  100 mg Intravenous Q24H    multivitamin, therapeutic  1 tablet Oral Daily    mycophenolate  750 mg Oral BID IS    pantoprazole   40 mg Oral QAM AC    polyethylene glycol  17 g Oral or Feeding Tube Daily    predniSONE  5 mg Oral Daily    sodium chloride (PF)  3 mL Intravenous Q8H    sodium chloride (PF)  9 mL Intracatheter During Dialysis/CRRT (from stock)    sodium chloride (PF)  9 mL Intracatheter During Dialysis/CRRT (from stock)    sulfamethoxazole-trimethoprim  1 tablet Oral or Feeding Tube Once per day on Monday Wednesday Friday    tacrolimus  3 mg Oral BID IS    valGANciclovir  450 mg Oral Once per day on Tuesday Friday    cholecalciferol  50 mcg Oral Daily       Physical Exam:     Admit Weight: 68.6 kg (151 lb 4.8 oz)    Current Vitals:   /88   Pulse 75   Temp 97.8  F (36.6  C) (Oral)   Resp 15   Wt 70.1 kg (154 lb 9.6 oz)   SpO2 100%   BMI 27.39 kg/m        Vital sign ranges:    Temp:  [97.8  F (36.6  C)-98.3  F (36.8  C)] 97.8  F (36.6  C)  Pulse:  [] 75  Resp:  [8-27] 15  BP: ()/(60-96) 122/88  SpO2:  [97 %-100 %] 100 %  Patient Vitals for the past 24 hrs:   BP Temp Temp src Pulse Resp SpO2 Weight   11/13/23 1100 122/88 -- -- 75 15 100 % --   11/13/23 1045 121/89 -- -- 71 10 100 % --   11/13/23 1030 118/60 -- -- 80 15 100 % --   11/13/23 1015 (!) 128/91 -- -- 61 13 97 % --   11/13/23 1001 (!) 116/96 -- -- 73 12 100 % --   11/13/23 1000 (!) 116/96 -- -- 69 12 100 % --   11/13/23 0945 132/89 -- -- 73 12 99 % --   11/13/23 0930 (!) 133/94 -- -- 76 (!) 9 100 % --   11/13/23 0915 (!) 131/93 -- -- 71 12 100 % --   11/13/23 0900 133/80 -- -- 76 15 98 % --   11/13/23 0845 125/86 -- -- 77 16 100 % --   11/13/23 0830 128/83 -- -- 72 (!) 8 100 % --   11/13/23 0815 (!) 144/87 -- -- 68 (!) 9 100 % --   11/13/23 0800 (!) 105/94 -- -- 72 19 100 % --   11/13/23 0745 96/74 -- -- 75 11 100 % --   11/13/23 0738 108/79 -- -- 79 16 99 % --   11/13/23 0734 120/80 97.8  F (36.6  C) Oral 79 27 99 % --   11/13/23 0706 -- -- -- -- -- -- 70.1 kg (154 lb 9.6 oz)   11/13/23 0558 123/80 98.3  F (36.8  C) Oral 84 16 100 % --   11/13/23  0139 116/86 98.2  F (36.8  C) Oral 81 16 97 % --   11/12/23 2232 -- -- -- 88 -- -- --   11/12/23 2143 134/89 98.1  F (36.7  C) Oral 112 16 100 % --   11/12/23 1838 122/85 98.1  F (36.7  C) Oral 82 16 100 % --   11/12/23 1311 116/76 98.2  F (36.8  C) Oral 82 16 100 % --     Examined by fellow:  General Appearance: in no apparent distress, tearful at times  Skin: normal, warm, jaundice  Heart: RRR  Lungs: NLB on RA  Abdomen:  Soft, non distended, non tender.  The wound is CDI with staples. HARLEY x 1 serosanguinous, small amount of serous draiange to left lateral incision.  : oliguric  Extremities: b/l pedal edema +1  Neurologic: alert, awake    Frailty Scores           No data to display                Data:   CMP  Recent Labs   Lab 11/13/23  0832 11/13/23  0548 11/12/23 0930 11/12/23 0630   NA  --  134*  --  135   POTASSIUM  --  3.7  --  3.5   CHLORIDE  --  103  --  102   CO2  --  19*  --  23   * 86   < > 78   BUN  --  15.0  --  12.2   CR  --  1.79*  --  1.73*   GFRESTIMATED  --  38*  --  40*   GEE  --  8.3*  --  8.8   ICAW  --  4.7  --  4.7   MAG  --  1.4*  --  1.5*   PHOS  --  3.4  --  2.8   ALBUMIN  --  3.1*  --  3.6   BILITOTAL  --  2.4*  --  2.6*   ALKPHOS  --  86  --  76   AST  --  17  --  19   ALT  --  13  --  15    < > = values in this interval not displayed.     CBC  Recent Labs   Lab 11/13/23  0548 11/12/23  0630 11/08/23  1959 11/08/23  1146   HGB 9.8* 10.8*   < > 10.3*   WBC 9.7 9.0   < > 7.1    118*   < > 96*   A1C  --   --   --  5.3    < > = values in this interval not displayed.     COAGS  Recent Labs   Lab 11/13/23  0548 11/12/23  0630   INR 1.18* 1.04      Urinalysis  Recent Labs   Lab Test 11/04/23  2309 10/31/23  2100   COLOR Dark Yellow* Orange*   APPEARANCE Slightly Cloudy* Cloudy*   URINEGLC Negative Negative   URINEBILI Moderate* Large*   URINEKETONE Negative Negative   SG 1.016 1.019   UBLD Trace* Trace*   URINEPH 5.5 5.5   PROTEIN 10* 30*   NITRITE Negative Negative   LEUKEST  Small* Small*   RBCU 2 4*   WBCU 5 25*     Virology:  Hepatitis C Antibody   Date Value Ref Range Status   11/04/2023 Nonreactive Nonreactive Final      Attestation:    The patient has been seen with team and evaluated by me.   Vital signs, labs, medications and orders were reviewed.   When obtained, diagnostic images were reviewed by me and interpreted as above.    The care plan was discussed with the multidisciplinary team and I agree with the findings and plan in this note, with any differences recorded in blue.    Immunosuppressive medication management was reviewed and adjusted as reflected in the note and orders.       Papa Coe MD  Professor of Surgery

## 2023-11-13 NOTE — TELEPHONE ENCOUNTER
A pharmacist spent 30 minutes providing medication teaching with Lola Milner and Andi (significant other) for discharge with a focus on new medications/dose changes.  The discharge medication list was reviewed with the patient/family and the following points were discussed, as applicable: Name, description, purpose, dose/strength, duration of medications, common side effects, food/medications to avoid, action to be taken if dose is missed, when to call MD, and how to obtain refills.  The patient and Andi (significant other) will be responsible for managing medications. Additionally, the following transplant related education was covered: Purpose of medication card, Medication videos, Timing of medications and day of lab draw considerations , Prescription Insurance , and Discharge process for receiving meds   Patient will  transplant supplies including 7 day pill organizer, thermometer, and BP monitor at the discharge pharmacy along with medications.  Patient chooses to receive medications from [facility] pharmacy.   Clinical Pharmacy Consult:                                                      Transplant Specific:   Date of Transplant: 11/5/23  Type of Transplant: liver  First Transplant: yes  History of rejection: no    Immunosuppression Regimen   TAC 3mg qAM & 3mg qPM, Prednisone 5mg qAM and MMF 750mg qAM & 750mg qPM  Patient specific goal: 5-10  Most recent level: 5.2, date 11/13  Immunosuppressant Levels:  Therapeutic  Pt adherent to lab draws: yes  Scr:   Lab Results   Component Value Date    CR 1.79 11/13/2023     Side effects: Heartburn    Prophylactic Medications  Antibacterial:  Bactrim SS three times per week  Scheduled Discontinue Date: 6 months    Antifungal: Not needed thus far  Scheduled Discontinue Date: N/A    Antiviral: CrCl 10 to 24 mL/minute: Valcyte 450 mg twice weekly   Scheduled Discontinue Date: 6 months    Acid Reducer: Protonix (pantoprazole)  Scheduled Reviewed Date:   omeprazole PTA med    Thrombosis Prevention:   Scheduled Discontinue Date:       Blood Pressure Management  Frequency of home Blood Pressure checks: once daily  Most recent home BP: 96/74  Patient Blood pressure goal: <140/90  Patient blood pressure at goal:  yes  Hospitalizations/ER visits since last assessment: 0      Med rec/DUR performed: yes  Med Rec Discrepancies: no    Reminders:    Bring to first clinic appt: med box, med card, bp monitor, all medications being taken, and lab book.  2.   MTM pharmacist visit on first clinic appt and if ok, again in 3 to 4 months during follow up appt.  3.   Avoid Grapefruit and Grapefruit juice.   4.   Avoid herbal supplements. If wish to take other medications or supplements, call your coordinator.   5.   Keep lab appts.   6.   Can use apps on phone like [employer] to help manage medication lists and reminders.   7.   Make sure you are protecting your skin by wearing long sleeves and applying sunscreen to exposed skin, for any significant time in the sun.     Transplant Coordinator is Mariola Tilley [facility]

## 2023-11-13 NOTE — PLAN OF CARE
Goal Outcome Evaluation:  /86 (BP Location: Right arm)   Pulse 81   Temp 98.2  F (36.8  C) (Oral)   Resp 16   Wt 74.2 kg (163 lb 9.3 oz)   SpO2 97%   BMI 28.98 kg/m      Shift: 3536-5982  VS: Vitals stable, room air, afebrile  Neuro: Alert and oriented x4   BG: ACHS 133, 112  Labs: Awaiting AM labs   Pain/Nausea: Denies nausea, pain managed by scheduled tylenol   Diet: NPO   IV Access: L PIV, R HD line   Lines/Drains: HARLEY drain x1   GI/: Oliguric, no BM overnight   Skin: Clamshell incision with staples, leaky on L side   Mobility: SBA   Plan: Continue with POC and notify team with any changes. Med card and lab book up to date.

## 2023-11-13 NOTE — PROGRESS NOTES
[facility]  Transplant Nephrology Progress notes  Date of Admission:  10/31/2023  Today's Date: 11/13/2023  Requesting physician: No att. providers found    Recommendations:  - Recommend proceeding with tunneled dialysis catheter placement  -Will dialyze on MWF schedule until dialysis unit placement is confirmed  -Monitor UOP    Assessment & Plan   # EDGAR: As expected with dialysis.  Increasing UOP.     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  - HD Info  Access: Temporary Catheter Right IJ , Days: MWF, Length: 3.5 hrs, EDW: ?? kg, Heparin: No, NIRMAL: No, IV Iron: No, Vit D analog: No    - Baseline Creatinine: ~ 0.8-1.0   - Proteinuria: Not checked recently, but urinalysis with no proteinuria.   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  Transaminases Stable.  Followed by Transplant Surgery.    # Immunosuppression: Tacrolimus immediate release (goal 5-10), Mycophenolate mofetil (dose 750 mg every 12 hours) and Prednisone (dose 5 mg daily)   - Induction with Recent Transplant:  Per Liver Tx Protocol   - Continue with intensive monitoring of immunosuppression for efficacy and toxicity.   - Goal tacrolimus level lower due to EDGAR.   - Changes: Not at this time; Management per Transplant Surgery.    # Infection Prophylaxis:   - PJP: Sulfa/TMP (Bactrim)  - CMV: Valganciclovir (Valcyte); Patient is CMV IgG Ab discordant (D+/R-) and will continue on Valcyte x 6 months, then check CMV PCR monthly until 12 months post transplant.   - Thrush: Micafungin (Mycamine)  - Fungal: Micafungin (Mycamine)    # Blood Pressure: Controlled;  Goal BP: < 140/90 (Hospitalization goal)   - Volume status: Mildly hypervolemic  EDW ~ 64.0 kg ??  (Estimate)   - Changes: Not at this time, but will try to pull volume with dialysis, as tolerated.    # Anemia in Chronic Disease/Surgery: Hgb: Increased      NIRMAL: No   - Iron studies: High iron saturation    # Mineral Bone Disorder:   - Vitamin D; level: Low        On supplement: Yes; cholecalciferol 50 mcg daily.  - Calcium; level: Normal        On supplement: No  - Phosphorus; level: Normal        On supplement: No    # Electrolytes:   - Potassium; level: Normal        On supplement: No  - Magnesium; level: Stable low        On supplement: No  - Bicarbonate; level: Low        On supplement: No, modulate with HD  - Sodium; level: Low normal    # Depression: Mood has been a bit up and down. Managed by primary team.    # Transplant History:  Etiology of Organ Failure: Alcohol-related liver disease  Tx: Liver Tx  Transplant: 11/5/2023 (Liver)  Significant changes in immunosuppression: Slightly lower tacrolimus level goal due to EDGAR.  CMV IgG Ab High Risk Discordance (D+/R-): Yes  EBV IgG Ab High Risk Discordance (D+/R-): No  Significant transplant-related complications: EDGAR    Recommendations were communicated to the primary team verbally.    Gerry Pete MD  Contact information available via [facility] Paging/Directory    History of Present Illness   Ms. Milner's creatinine is 1.79 (11/13 0548); As expected with dialysis.  Increasing urine output.  Other significant labs/tests/vitals: Tolerated 3L UF today  No events overnight.  No chest pain or shortness of breath.  Improving leg swelling.  No nausea and vomiting.  Bowel movements are normal.  No fever, sweats or chills.      Review of Systems    4 point ROS was obtained and negative except as noted in the Interval History.    Allergies   No Known Allergies  Prior to Admission Medications     sodium chloride (PF) 0.9%  10 mL Intracatheter During Dialysis/CRRT (from stock)     sodium chloride (PF) 0.9%  10 mL Intracatheter During Dialysis/CRRT (from stock)      acetaminophen  325 mg Oral or Feeding Tube Q4H     artificial tears  1 drop Both Eyes 4x Daily     gabapentin  200 mg Oral or Feeding Tube At Bedtime     insulin aspart  1-7 Units Subcutaneous TID AC     insulin aspart  1-5 Units Subcutaneous At Bedtime     magnesium oxide  400 mg Oral Daily     micafungin  100 mg Intravenous Q24H     multivitamin, therapeutic  1 tablet Oral Daily     mycophenolate  750 mg Oral BID IS     pantoprazole  40 mg Oral QAM AC     polyethylene glycol  17 g Oral or Feeding Tube Daily     predniSONE  5 mg Oral Daily     sodium chloride (PF)  3 mL Intravenous Q8H     sodium chloride (PF)  9 mL Intracatheter During Dialysis/CRRT (from stock)     sodium chloride (PF)  9 mL Intracatheter During Dialysis/CRRT (from stock)     sulfamethoxazole-trimethoprim  1 tablet Oral or Feeding Tube Once per day on      tacrolimus  3 mg Oral BID IS     valGANciclovir  450 mg Oral Once per day on      cholecalciferol  50 mcg Oral Daily       BETA BLOCKER NOT PRESCRIBED         Physical Exam   Temp  Av.9  F (36.6  C)  Min: 97.3  F (36.3  C)  Max: 98.3  F (36.8  C)      Pulse  Av.5  Min: 66  Max: 86 Resp  Av.7  Min: 13  Max: 27  SpO2  Av.9 %  Min: 87 %  Max: 100 %     /80   Pulse 71   Temp 97.7  F (36.5  C) (Oral)   Resp 14   Wt 70.1 kg (154 lb 9.6 oz)   SpO2 100%   BMI 27.39 kg/m      Admit Weight: 68.6 kg (151 lb 4.8 oz)     GENERAL APPEARANCE: alert and no distress  HENT: mouth without ulcers or lesions  RESP: lungs clear to auscultation - no rales, rhonchi or wheezes  CV: regular rhythm, normal rate, no rub, no murmur  EDEMA: 1+ LE edema bilaterally  ABDOMEN: soft, nondistended, nontender, bowel sounds normal  MS: extremities normal - no gross deformities noted, no evidence of inflammation in joints, no muscle tenderness  SKIN: no rash, slightly jaundiced  DIALYSIS ACCESS:  Temporary catheter right internal jugular    Data   CMP  Recent Labs    Lab 11/13/23  0832 11/13/23  0548 11/13/23  0144 11/12/23  2149 11/12/23  0930 11/12/23  0630 11/11/23  0836 11/11/23  0618 11/10/23  1717 11/10/23  1439 11/10/23  0844 11/10/23  0622   NA  --  134*  --   --   --  135  --  136  --   --   --  138   POTASSIUM  --  3.7  --   --   --  3.5  --  3.7  --  3.5  --  3.0*   CHLORIDE  --  103  --   --   --  102  --  104  --   --   --  104   CO2  --  19*  --   --   --  23  --  16*  --   --   --  26   ANIONGAP  --  12  --   --   --  10  --  16*  --   --   --  8   * 86 112* 133*   < > 78   < > 84   < >  --    < > 101*   BUN  --  15.0  --   --   --  12.2  --  22.2*  --   --   --  17.7   CR  --  1.79*  --   --   --  1.73*  --  2.18*  --   --   --  1.82*   GFRESTIMATED  --  38*  --   --   --  40*  --  30*  --   --   --  37*   GEE  --  8.3*  --   --   --  8.8  --  8.6  --   --   --  8.3*   MAG  --  1.4*  --   --   --  1.5*  --  1.8  --   --   --  1.8   PHOS  --  3.4  --   --   --  2.8  --  4.5  --   --   --  3.6   PROTTOTAL  --  4.1*  --   --   --  4.6*  --  4.6*  --   --   --  3.9*   ALBUMIN  --  3.1*  --   --   --  3.6  --  3.5  --   --   --  3.3*   BILITOTAL  --  2.4*  --   --   --  2.6*  --  2.3*  --   --   --  1.9*   ALKPHOS  --  86  --   --   --  76  --  59  --   --   --  35   AST  --  17  --   --   --  19  --  25  --   --   --  14   ALT  --  13  --   --   --  15  --  15  --   --   --  13    < > = values in this interval not displayed.     CBC  Recent Labs   Lab 11/13/23  0548 11/12/23  0630 11/10/23  1439 11/10/23  0622 11/09/23  1422   HGB 9.8* 10.8* 8.9* 8.5* 10.2*   WBC 9.7 9.0  --  6.3 7.6   RBC 3.18* 3.43*  --  2.76* 3.22*   HCT 30.6* 32.5*  --  25.8* 29.5*   MCV 96 95  --  94 92   MCH 30.8 31.5  --  30.8 31.7   MCHC 32.0 33.2  --  32.9 34.6   RDW 17.1* 17.6*  --  17.7* 17.9*    118*  --  80* 90*     INR  Recent Labs   Lab 11/13/23  0548 11/12/23  0630 11/11/23  0618 11/10/23  0622   INR 1.18* 1.04 1.11 1.46*     ABG  Recent Labs   Lab 11/07/23  0701   PH  7.43   PCO2 34*   PO2 103   HCO3 23   O2PER 30      Urine Studies  Recent Labs   Lab Test 11/04/23  2309 10/31/23  2100   COLOR Dark Yellow* Orange*   APPEARANCE Slightly Cloudy* Cloudy*   URINEGLC Negative Negative   URINEBILI Moderate* Large*   URINEKETONE Negative Negative   SG 1.016 1.019   UBLD Trace* Trace*   URINEPH 5.5 5.5   PROTEIN 10* 30*   NITRITE Negative Negative   LEUKEST Small* Small*   RBCU 2 4*   WBCU 5 25*     IMAGING:  All imaging studies reviewed by me.

## 2023-11-13 NOTE — PRE-PROCEDURE
GENERAL PRE-PROCEDURE:   Procedure:  Central venous catheter tunneled line placement  Date/Time:  11/13/2023 3:09 PM    Risks and benefits: Risks, benefits and alternatives were discussed    Consent given by:  Patient  Patient states understanding of procedure being performed: Yes    Patient's understanding of procedure matches consent: Yes    Procedure consent matches procedure scheduled: Yes    Expected level of sedation:  Moderate  Appropriately NPO:  Yes  ASA Class:  2  Mallampati  :  Grade 2- soft palate, base of uvula, tonsillar pillars, and portion of posterior pharyngeal wall visible  Lungs:  Lungs clear with good breath sounds bilaterally  Heart:  Normal heart sounds and rate  History & Physical reviewed:  History and physical reviewed and no updates needed  Statement of review:  I have reviewed the lab findings, diagnostic data, medications, and the plan for sedation

## 2023-11-13 NOTE — PROGRESS NOTES
Vascular Access Services Notes:    Pt was NOT in the room for PIV catheter insertion. RN to re-order PIV when pt's back in the room.      CHRIS BrandN, RN [facility], [place]

## 2023-11-13 NOTE — PROGRESS NOTES
I was called regarding the need to evaluate Ms Milner for latent tuberculosis infection prior to initiating outpatient dialysis.     Her quantiferon testing has returned as indeterminate, reflecting an inadequate response to the test due to  immunosuppression.     In this context, standard latent tuberculosis testing is unlikely to provide a conclusive result, therefore it is appropriate to base TB assessment on clinical history and other parameters. Per discussion with transplant surgery team, Ms Milner has no clear risk factors or history concerning for TB exposure. She has no symptoms consistent with active TB disease, and her recent chest xray on this admission is not concerning for infection.     It is therefore appropriate to proceed with arrangements for outpatient dialysis as per usual.    Howie Mendez MD, PhD  Transplant Infectious Diseases Attending Physician  [phone]

## 2023-11-13 NOTE — PROGRESS NOTES
"/68   Pulse 73   Temp 97.7  F (36.5  C) (Oral)   Resp 14   Wt 70.1 kg (154 lb 9.6 oz)   SpO2 100%   BMI 27.39 kg/m      Shift: 7478-6001  Isolation Status: Standard  VS: 100% on room air, afebrile  Neuro: Aox4  Behaviors: intermittent crying/tearful, cooperative  BG: AC/HS  Labs: creatinine: 1.79, hgb: 9.8  Respiratory: WDL  Cardiac: WDL  Pain/Nausea: pain 7/10, emesis occurrence at 1830  PRN: robaxin x1, oxy at dialysis, fentanyl in IR   Diet: regular   IV Access: double lumen dialysis access, and 1 PIV   Infusion(s): intermittent micafungin  Lines/Drains: LLQ HARLEY 40mL out this shift   GI/: WDL, BM today, urine output 200mL   Skin: generalized bruising, left elbow bruised, scab on right calf, +3 pitting edema bilaterally in ankles and calves   Mobility: generalized weakness, pain bilaterally in feet due to edema   Events/Education: tunneled dialysis cath placed in IR today, LPIV placed in IR-difficulty with access-keep TKO if able. Dialysis with AM. Education given via transplant videos. Patient has an episode of crying today related to her increased \"brain fog\" and anxiety related to the HARLEY drainage. Education provided around HARLEY drainage.   Plan: Lymphedema team consult ordered. Continuing dialysis.     "

## 2023-11-14 ENCOUNTER — APPOINTMENT (OUTPATIENT)
Dept: OCCUPATIONAL THERAPY | Facility: CLINIC | Age: 33
End: 2023-11-14
Attending: TRANSPLANT SURGERY
Payer: COMMERCIAL

## 2023-11-14 ENCOUNTER — APPOINTMENT (OUTPATIENT)
Dept: OCCUPATIONAL THERAPY | Facility: CLINIC | Age: 33
End: 2023-11-14
Payer: COMMERCIAL

## 2023-11-14 ENCOUNTER — TELEPHONE (OUTPATIENT)
Dept: TRANSPLANT | Facility: CLINIC | Age: 33
End: 2023-11-14
Payer: COMMERCIAL

## 2023-11-14 DIAGNOSIS — Z94.4 LIVER REPLACED BY TRANSPLANT (H): Primary | ICD-10-CM

## 2023-11-14 LAB
ALBUMIN SERPL BCG-MCNC: 2.9 G/DL (ref 3.5–5.2)
ALP SERPL-CCNC: 94 U/L (ref 35–104)
ALT SERPL W P-5'-P-CCNC: 10 U/L (ref 0–50)
ANION GAP SERPL CALCULATED.3IONS-SCNC: 8 MMOL/L (ref 7–15)
AST SERPL W P-5'-P-CCNC: 14 U/L (ref 0–45)
BASOPHILS # BLD AUTO: ABNORMAL 10*3/UL
BASOPHILS # BLD MANUAL: 0.1 10E3/UL (ref 0–0.2)
BASOPHILS NFR BLD AUTO: ABNORMAL %
BASOPHILS NFR BLD MANUAL: 1 %
BILIRUB DIRECT SERPL-MCNC: 1.23 MG/DL (ref 0–0.3)
BILIRUB SERPL-MCNC: 2.1 MG/DL
BUN SERPL-MCNC: 9.9 MG/DL (ref 6–20)
BURR CELLS BLD QL SMEAR: SLIGHT
CA-I BLD-MCNC: 4.6 MG/DL (ref 4.4–5.2)
CALCIUM SERPL-MCNC: 8.6 MG/DL (ref 8.6–10)
CHLORIDE SERPL-SCNC: 103 MMOL/L (ref 98–107)
CREAT SERPL-MCNC: 1.42 MG/DL (ref 0.51–0.95)
DEPRECATED HCO3 PLAS-SCNC: 24 MMOL/L (ref 22–29)
EGFRCR SERPLBLD CKD-EPI 2021: 50 ML/MIN/1.73M2
EOSINOPHIL # BLD AUTO: ABNORMAL 10*3/UL
EOSINOPHIL # BLD MANUAL: 0.1 10E3/UL (ref 0–0.7)
EOSINOPHIL NFR BLD AUTO: ABNORMAL %
EOSINOPHIL NFR BLD MANUAL: 1 %
ERYTHROCYTE [DISTWIDTH] IN BLOOD BY AUTOMATED COUNT: 17.1 % (ref 10–15)
FRAGMENTS BLD QL SMEAR: SLIGHT
GLUCOSE BLDC GLUCOMTR-MCNC: 84 MG/DL (ref 70–99)
GLUCOSE SERPL-MCNC: 84 MG/DL (ref 70–99)
HCT VFR BLD AUTO: 27.1 % (ref 35–47)
HGB BLD-MCNC: 8.9 G/DL (ref 11.7–15.7)
IMM GRANULOCYTES # BLD: ABNORMAL 10*3/UL
IMM GRANULOCYTES NFR BLD: ABNORMAL %
INR PPP: 1.21 (ref 0.85–1.15)
LYMPHOCYTES # BLD AUTO: ABNORMAL 10*3/UL
LYMPHOCYTES # BLD MANUAL: 1.4 10E3/UL (ref 0.8–5.3)
LYMPHOCYTES NFR BLD AUTO: ABNORMAL %
LYMPHOCYTES NFR BLD MANUAL: 19 %
MAGNESIUM SERPL-MCNC: 1.4 MG/DL (ref 1.7–2.3)
MCH RBC QN AUTO: 31.6 PG (ref 26.5–33)
MCHC RBC AUTO-ENTMCNC: 32.8 G/DL (ref 31.5–36.5)
MCV RBC AUTO: 96 FL (ref 78–100)
METAMYELOCYTES # BLD MANUAL: 0.1 10E3/UL
METAMYELOCYTES NFR BLD MANUAL: 1 %
MONOCYTES # BLD AUTO: ABNORMAL 10*3/UL
MONOCYTES # BLD MANUAL: 0 10E3/UL (ref 0–1.3)
MONOCYTES NFR BLD AUTO: ABNORMAL %
MONOCYTES NFR BLD MANUAL: 0 %
NEUTROPHILS # BLD AUTO: ABNORMAL 10*3/UL
NEUTROPHILS # BLD MANUAL: 5.9 10E3/UL (ref 1.6–8.3)
NEUTROPHILS NFR BLD AUTO: ABNORMAL %
NEUTROPHILS NFR BLD MANUAL: 78 %
NEUTS HYPERSEG BLD QL SMEAR: PRESENT
NRBC # BLD AUTO: 0 10E3/UL
NRBC BLD AUTO-RTO: 0 /100
PHOSPHATE SERPL-MCNC: 2.9 MG/DL (ref 2.5–4.5)
PLAT MORPH BLD: ABNORMAL
PLATELET # BLD AUTO: 205 10E3/UL (ref 150–450)
POTASSIUM SERPL-SCNC: 3.7 MMOL/L (ref 3.4–5.3)
PROT SERPL-MCNC: 4.1 G/DL (ref 6.4–8.3)
RBC # BLD AUTO: 2.82 10E6/UL (ref 3.8–5.2)
RBC MORPH BLD: ABNORMAL
SODIUM SERPL-SCNC: 135 MMOL/L (ref 135–145)
TACROLIMUS BLD-MCNC: 3.6 UG/L (ref 5–15)
TME LAST DOSE: ABNORMAL H
TME LAST DOSE: ABNORMAL H
WBC # BLD AUTO: 7.6 10E3/UL (ref 4–11)

## 2023-11-14 PROCEDURE — 250N000013 HC RX MED GY IP 250 OP 250 PS 637

## 2023-11-14 PROCEDURE — 97530 THERAPEUTIC ACTIVITIES: CPT | Mod: GO

## 2023-11-14 PROCEDURE — 250N000012 HC RX MED GY IP 250 OP 636 PS 637: Performed by: PHYSICIAN ASSISTANT

## 2023-11-14 PROCEDURE — 97535 SELF CARE MNGMENT TRAINING: CPT | Mod: GO

## 2023-11-14 PROCEDURE — 120N000011 HC R&B TRANSPLANT UMMC

## 2023-11-14 PROCEDURE — 84100 ASSAY OF PHOSPHORUS: CPT | Performed by: STUDENT IN AN ORGANIZED HEALTH CARE EDUCATION/TRAINING PROGRAM

## 2023-11-14 PROCEDURE — 250N000013 HC RX MED GY IP 250 OP 250 PS 637: Performed by: NURSE PRACTITIONER

## 2023-11-14 PROCEDURE — 84630 ASSAY OF ZINC: CPT | Performed by: STUDENT IN AN ORGANIZED HEALTH CARE EDUCATION/TRAINING PROGRAM

## 2023-11-14 PROCEDURE — 83735 ASSAY OF MAGNESIUM: CPT | Performed by: STUDENT IN AN ORGANIZED HEALTH CARE EDUCATION/TRAINING PROGRAM

## 2023-11-14 PROCEDURE — 82330 ASSAY OF CALCIUM: CPT | Performed by: STUDENT IN AN ORGANIZED HEALTH CARE EDUCATION/TRAINING PROGRAM

## 2023-11-14 PROCEDURE — 85027 COMPLETE CBC AUTOMATED: CPT | Performed by: STUDENT IN AN ORGANIZED HEALTH CARE EDUCATION/TRAINING PROGRAM

## 2023-11-14 PROCEDURE — 250N000013 HC RX MED GY IP 250 OP 250 PS 637: Performed by: STUDENT IN AN ORGANIZED HEALTH CARE EDUCATION/TRAINING PROGRAM

## 2023-11-14 PROCEDURE — 250N000013 HC RX MED GY IP 250 OP 250 PS 637: Performed by: TRANSPLANT SURGERY

## 2023-11-14 PROCEDURE — 85007 BL SMEAR W/DIFF WBC COUNT: CPT | Performed by: STUDENT IN AN ORGANIZED HEALTH CARE EDUCATION/TRAINING PROGRAM

## 2023-11-14 PROCEDURE — 250N000013 HC RX MED GY IP 250 OP 250 PS 637: Performed by: INTERNAL MEDICINE

## 2023-11-14 PROCEDURE — 97140 MANUAL THERAPY 1/> REGIONS: CPT | Mod: GO

## 2023-11-14 PROCEDURE — 258N000003 HC RX IP 258 OP 636: Performed by: PHYSICIAN ASSISTANT

## 2023-11-14 PROCEDURE — 82248 BILIRUBIN DIRECT: CPT | Performed by: STUDENT IN AN ORGANIZED HEALTH CARE EDUCATION/TRAINING PROGRAM

## 2023-11-14 PROCEDURE — 250N000012 HC RX MED GY IP 250 OP 636 PS 637: Performed by: STUDENT IN AN ORGANIZED HEALTH CARE EDUCATION/TRAINING PROGRAM

## 2023-11-14 PROCEDURE — 80053 COMPREHEN METABOLIC PANEL: CPT | Performed by: PHYSICIAN ASSISTANT

## 2023-11-14 PROCEDURE — 80197 ASSAY OF TACROLIMUS: CPT | Performed by: PHYSICIAN ASSISTANT

## 2023-11-14 PROCEDURE — 250N000012 HC RX MED GY IP 250 OP 636 PS 637: Performed by: NURSE PRACTITIONER

## 2023-11-14 PROCEDURE — 250N000013 HC RX MED GY IP 250 OP 250 PS 637: Performed by: PHYSICIAN ASSISTANT

## 2023-11-14 PROCEDURE — 99233 SBSQ HOSP IP/OBS HIGH 50: CPT | Mod: 24 | Performed by: INTERNAL MEDICINE

## 2023-11-14 PROCEDURE — 90832 PSYTX W PT 30 MINUTES: CPT | Performed by: STUDENT IN AN ORGANIZED HEALTH CARE EDUCATION/TRAINING PROGRAM

## 2023-11-14 PROCEDURE — 250N000011 HC RX IP 250 OP 636: Mod: JZ | Performed by: PHYSICIAN ASSISTANT

## 2023-11-14 PROCEDURE — 85610 PROTHROMBIN TIME: CPT | Performed by: PHYSICIAN ASSISTANT

## 2023-11-14 PROCEDURE — 36415 COLL VENOUS BLD VENIPUNCTURE: CPT | Performed by: STUDENT IN AN ORGANIZED HEALTH CARE EDUCATION/TRAINING PROGRAM

## 2023-11-14 RX ORDER — TACROLIMUS 1 MG/1
4 CAPSULE ORAL
Status: DISCONTINUED | OUTPATIENT
Start: 2023-11-14 | End: 2023-11-15 | Stop reason: HOSPADM

## 2023-11-14 RX ORDER — BUMETANIDE 2 MG/1
2 TABLET ORAL
Status: DISCONTINUED | OUTPATIENT
Start: 2023-11-14 | End: 2023-11-15 | Stop reason: HOSPADM

## 2023-11-14 RX ORDER — MAGNESIUM OXIDE 400 MG/1
800 TABLET ORAL DAILY
Status: DISCONTINUED | OUTPATIENT
Start: 2023-11-15 | End: 2023-11-15 | Stop reason: HOSPADM

## 2023-11-14 RX ORDER — MAGNESIUM OXIDE 400 MG/1
400 TABLET ORAL ONCE
Status: COMPLETED | OUTPATIENT
Start: 2023-11-14 | End: 2023-11-14

## 2023-11-14 RX ADMIN — MAGNESIUM OXIDE TAB 400 MG (241.3 MG ELEMENTAL MG) 400 MG: 400 (241.3 MG) TAB at 10:15

## 2023-11-14 RX ADMIN — ACETAMINOPHEN 325 MG: 325 TABLET, FILM COATED ORAL at 16:01

## 2023-11-14 RX ADMIN — BUMETANIDE 2 MG: 2 TABLET ORAL at 16:01

## 2023-11-14 RX ADMIN — MYCOPHENOLATE MOFETIL 750 MG: 250 CAPSULE ORAL at 18:02

## 2023-11-14 RX ADMIN — Medication 1 DROP: at 16:01

## 2023-11-14 RX ADMIN — GABAPENTIN 200 MG: 100 CAPSULE ORAL at 21:36

## 2023-11-14 RX ADMIN — ACETAMINOPHEN 325 MG: 325 TABLET, FILM COATED ORAL at 13:30

## 2023-11-14 RX ADMIN — VALGANCICLOVIR HYDROCHLORIDE 450 MG: 450 TABLET ORAL at 20:55

## 2023-11-14 RX ADMIN — ACETAMINOPHEN 325 MG: 325 TABLET, FILM COATED ORAL at 08:39

## 2023-11-14 RX ADMIN — TACROLIMUS 4 MG: 1 CAPSULE ORAL at 18:02

## 2023-11-14 RX ADMIN — BUMETANIDE 2 MG: 2 TABLET ORAL at 10:16

## 2023-11-14 RX ADMIN — Medication 1 DROP: at 08:40

## 2023-11-14 RX ADMIN — ACETAMINOPHEN 325 MG: 325 TABLET, FILM COATED ORAL at 00:02

## 2023-11-14 RX ADMIN — MYCOPHENOLATE MOFETIL 750 MG: 250 CAPSULE ORAL at 08:39

## 2023-11-14 RX ADMIN — ACETAMINOPHEN 325 MG: 325 TABLET, FILM COATED ORAL at 20:54

## 2023-11-14 RX ADMIN — SODIUM CHLORIDE 100 MG: 9 INJECTION, SOLUTION INTRAVENOUS at 13:30

## 2023-11-14 RX ADMIN — TACROLIMUS 3 MG: 1 CAPSULE ORAL at 08:39

## 2023-11-14 RX ADMIN — Medication 1 DROP: at 20:55

## 2023-11-14 RX ADMIN — ACETAMINOPHEN 325 MG: 325 TABLET, FILM COATED ORAL at 04:18

## 2023-11-14 RX ADMIN — POLYETHYLENE GLYCOL 3350 17 G: 17 POWDER, FOR SOLUTION ORAL at 08:39

## 2023-11-14 RX ADMIN — PANTOPRAZOLE SODIUM 40 MG: 40 TABLET, DELAYED RELEASE ORAL at 08:39

## 2023-11-14 RX ADMIN — Medication 50 MCG: at 08:39

## 2023-11-14 RX ADMIN — Medication 1 TABLET: at 08:39

## 2023-11-14 RX ADMIN — OXYCODONE HYDROCHLORIDE 2.5 MG: 5 TABLET ORAL at 22:12

## 2023-11-14 RX ADMIN — ACETAMINOPHEN 325 MG: 325 TABLET, FILM COATED ORAL at 23:52

## 2023-11-14 RX ADMIN — PREDNISONE 5 MG: 5 TABLET ORAL at 08:39

## 2023-11-14 ASSESSMENT — ACTIVITIES OF DAILY LIVING (ADL)
ADLS_ACUITY_SCORE: 31
ADLS_ACUITY_SCORE: 30
ADLS_ACUITY_SCORE: 31

## 2023-11-14 NOTE — PROGRESS NOTES
Transplant Social Work Services Progress Note    Date of Initial Social Work Evaluation: 10/31/2023  Collaborated with: Patient at bedside, Patient's S/O at bedside, NDMA (Central line and George), accommodations, and liver transplant team.     Data:   I received a call from George indicating that we will need to call the service department ([phone]). He later called later and indicated that her lodging was approved until December 5th, 2023.   I spoke with Zeynep through [facility] expansion. She reported that social workers cannot call, and they need to call with the patient. Zeynep put this writer on hold and later asked for her [facility] expansion number. Zeynep indicated that this will take at least four days to get this started. I asked if we can start the process today for this patient. Zeynep reported that it would be difficult for this to start, and inquired why this patient needs lodging. I provided brief information regarding lodging needs. She will send information over to George to move forward with.   I met with Lola and her s/o at bedside and reviewed discharge planning. I provided education about apartments[facility] and [facility]. I provided information about writing to the donor family, and Lola was very tearful. I provided supportive counseling to her re: this topic. I also reviewed the expectation that she returned back to her treatment program through [place] when able. She is agreeable to this and indicated that her family has been in contact with them.     Intervention: discharge planning, lodging  Assessment: Lola is agreeable to return  back to her substance use program when able. She will remain local for her post transplant cares.   Education provided by [facility]PHONSO: Discharge planning, lodging  Plan:    Discharge Plans in Progress: Local to [facility]     Barriers to d/c plan: None at this time.     Follow up Plan: I will continue to follow Lola outpatient.    Melanie  ABEBA Baer, [facility]  Liver Transplant   [facility]  Phone: [phone]  Pager: [phone]

## 2023-11-14 NOTE — TELEPHONE ENCOUNTER
Post Liver Transplant Coordinator Discharge Visit     Attempted to reach patient, but no answer. Left message to return call to tx office.

## 2023-11-14 NOTE — PLAN OF CARE
/82 (BP Location: Right arm)   Pulse 79   Temp 98  F (36.7  C) (Oral)   Resp 16   Wt 70.1 kg (154 lb 9.6 oz)   SpO2 100%   BMI 27.39 kg/m      Shift: 5572-2317  Isolation Status: N/A  VS: stable on RA, afebrile  Neuro: A&O x4  Behaviors: receptive  BG: ACHS, no 0200 since not needing insulin at this time  Labs: Bilirubin direct 1.16, bilirubin total 2.4, albumin 3.1, protein total 4.1  Respiratory: WNL  Cardiac: WNL  Pain/Nausea: Denies nausea. C/O mild 2/10 back pain and bilateral foot nerve pain  PRN: N/A  Diet: regular, calorie count  IV Access: L PIV TKO, R HD tunneled line  Infusion(s): TKO  Lines/Drains: R HARLEY drain, output ~ 320 mL overnight of serosang fluid  GI/: LBM 11/12, oliguric  Skin: clamshell incision stapled, CLIFFORD, leaky on L side, large primapore on L side for leakage  Mobility: SBA/UAL with walker  Events/Education: incision cleaned and dressing changed. HARLEY site cleaned, dressing changed, and bulb changed  Plan: Lymphedema visit for leg wraps in AM. Dialysis MWF schedule. Notify MD of any significant changes, continue plan of care.

## 2023-11-14 NOTE — PLAN OF CARE
/79 (BP Location: Right arm)   Pulse 80   Temp 98.2  F (36.8  C) (Oral)   Resp 16   Wt 67.9 kg (149 lb 11.1 oz)   SpO2 97%   BMI 26.52 kg/m      Shift: [id number]  Isolation Status: Standard  VS: 100% on room air, afebrile  Neuro: Aox4  Behaviors: calm, cooperative, pleasant  BG: AC/HS  Respiratory: WDL  Cardiac: WDL  Pain/Nausea: mild pain, denies nausea  PRN: none administered (pt declined pain meds)  Diet: regular + Calorie Counts  IV Access: double lumen dialysis access, and 1 PIV   Infusion(s): intermittent micafungin  Lines/Drains: none  GI/: BM today, oliguric  Skin: generalized bruising, left elbow bruised, scab on right calf, +3 pitting edema bilaterally in ankles and calves - Lymphedema wraps in place   Mobility: generalized weakness, pain bilaterally in feet due to edema   Events/Education: tunneled dialysis cath placed in IR today, LPIV difficulty with access-keep TKO if able. HARLEY removed.  Plan: Expected discharge to [facility] tomorrow with dialysis at hospital as it is expected to be a temporary need only.

## 2023-11-14 NOTE — PROGRESS NOTES
[facility]  Transplant Nephrology Progress notes  Date of Admission:  10/31/2023  Today's Date: 11/14/2023  Requesting physician: No att. providers found    Recommendations:  -Will dialyze on MWF schedule while inpatient and will evaluate for dialysis needs based on labs 2x/wk  -Monitor UOP and weight as outpatient  -bumex 2 mg PO BID (ordered)  -HD tomorrow 11/15 if inpatient  -Will do outpatient HD here PRN once discharged to closely monitor her labs and volume status     Assessment & Plan   # EDGAR: As expected with dialysis.  Increasing UOP.     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  - HD Info  Access: Right IJ Tunneled Catheter, Days: TBD, Length: 3.5 hrs, EDW: ?? kg, Heparin: No, NIRMAL: No, IV Iron: No, Vit D analog: No    - Baseline Creatinine: ~ 0.8-1.0   - Proteinuria: Not checked recently, but urinalysis with no proteinuria.    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  Transaminases Stable.  Followed by Transplant Surgery.    # Immunosuppression: Tacrolimus immediate release (goal 5-10), Mycophenolate mofetil (dose 750 mg every 12 hours) and Prednisone (dose 5 mg daily)   - Induction with Recent Transplant:  Per Liver Tx Protocol   - Continue with intensive monitoring of immunosuppression for efficacy and toxicity.   - Goal tacrolimus level lower due to EDGAR.   - Changes: Not at this time; Management per Transplant Surgery.    # Infection Prophylaxis:   - PJP: Sulfa/TMP (Bactrim)  - CMV: Valganciclovir (Valcyte); Patient is CMV IgG Ab discordant (D+/R-) and will continue on Valcyte x 6 months, then check CMV PCR monthly until 12 months post transplant.   - Thrush: Micafungin (Mycamine)  - Fungal: Micafungin (Mycamine)    # Blood Pressure: Controlled;  Goal BP: < 140/90  (Hospitalization goal)   - Volume status: Mildly hypervolemic  EDW ~ 64.0 kg ?? (Estimate)   - Changes: Yes - start bumex 2mg PO BID  # Anemia in Chronic Disease/Surgery: Hgb: Increased      NIRMAL: No   - Iron studies: High iron saturation    # Mineral Bone Disorder:   - Vitamin D; level: Low        On supplement: Yes; cholecalciferol 50 mcg daily.  - Calcium; level: Normal        On supplement: No  - Phosphorus; level: Normal        On supplement: No    # Electrolytes:   - Potassium; level: Normal        On supplement: No  - Magnesium; level: Stable low        On supplement: No  - Bicarbonate; level: Normal        On supplement: No, modulate with HD  - Sodium; level: Low normal    # Depression: Mood has been a bit up and down. Managed by primary team.    # Transplant History:  Etiology of Organ Failure: Alcohol-related liver disease  Tx: Liver Tx  Transplant: 11/5/2023 (Liver)  Significant changes in immunosuppression: Slightly lower tacrolimus level goal due to EDGAR.  CMV IgG Ab High Risk Discordance (D+/R-): Yes  EBV IgG Ab High Risk Discordance (D+/R-): No  Significant transplant-related complications: EDGAR    Recommendations were communicated to the primary team verbally.    -Jaydon Espinosa MD    Physician Attestation   I, Gerry Pete MD, personally examined and evaluated this patient.  I discussed the patient with the resident/fellow and care team, and agree with the assessment and plan of care as documented in the note on 11/14/23 .      I personally reviewed vital signs, medications, labs, and imaging.  Gerry Pete MD  Date of Service (when I saw the patient): 11/14/23        Interval events  placed tunneled dialysis catheter 11/13  Ms. Khourys creatinine is 1.79>1.4  As expected with dialysis.  Increasing urine output.  Other significant labs/tests/vitals: Tolerated 3L UF yesterday  No events overnight.  No chest pain or shortness of breath.  Improving leg swelling.  No nausea and vomiting.  Bowel  movements are normal.  No fever, sweats or chills.       Review of Systems    4 point ROS was obtained and negative except as noted in the Interval History.    Allergies   No Known Allergies  Prior to Admission Medications     acetaminophen  325 mg Oral or Feeding Tube Q4H     bumetanide  2 mg Oral BID     artificial tears  1 drop Both Eyes 4x Daily     gabapentin  200 mg Oral or Feeding Tube At Bedtime     [START ON 11/15/2023] magnesium oxide  800 mg Oral Daily     micafungin  100 mg Intravenous Q24H     multivitamin w/minerals  1 tablet Oral Daily     mycophenolate  750 mg Oral BID IS     pantoprazole  40 mg Oral QAM AC     polyethylene glycol  17 g Oral or Feeding Tube Daily     predniSONE  5 mg Oral Daily     sodium chloride (PF)  3 mL Intravenous Q8H     sulfamethoxazole-trimethoprim  1 tablet Oral or Feeding Tube Once per day on      tacrolimus  4 mg Oral BID IS     valGANciclovir  450 mg Oral Once per day on      cholecalciferol  50 mcg Oral Daily       BETA BLOCKER NOT PRESCRIBED         Physical Exam   Temp  Av.9  F (36.6  C)  Min: 97.3  F (36.3  C)  Max: 98.3  F (36.8  C)      Pulse  Av.5  Min: 66  Max: 86 Resp  Av.7  Min: 13  Max: 27  SpO2  Av.9 %  Min: 87 %  Max: 100 %     /79 (BP Location: Right arm)   Pulse 80   Temp 98.2  F (36.8  C) (Oral)   Resp 16   Wt 67.9 kg (149 lb 11.1 oz)   SpO2 97%   BMI 26.52 kg/m      Admit Weight: 68.6 kg (151 lb 4.8 oz)     GENERAL APPEARANCE: alert and no distress  HENT: mouth without ulcers or lesions  RESP: lungs clear to auscultation - no rales, rhonchi or wheezes  CV: regular rhythm, normal rate, no rub, no murmur  EDEMA: 1+ LE edema bilaterally  ABDOMEN: soft, nondistended, nontender, bowel sounds normal  MS: extremities normal - no gross deformities noted, no evidence of inflammation in joints, no muscle tenderness  SKIN: no rash, slightly jaundiced  DIALYSIS ACCESS:  Temporary catheter right  internal jugular    Data   CMP  Recent Labs   Lab 11/14/23  0838 11/14/23  0720 11/13/23  2145 11/13/23  1758 11/13/23  0832 11/13/23  0548 11/12/23  0930 11/12/23  0630 11/11/23  0836 11/11/23  0618   NA  --  135  --   --   --  134*  --  135  --  136   POTASSIUM  --  3.7  --   --   --  3.7  --  3.5  --  3.7   CHLORIDE  --  103  --   --   --  103  --  102  --  104   CO2  --  24  --   --   --  19*  --  23  --  16*   ANIONGAP  --  8  --   --   --  12  --  10  --  16*   GLC 84 84 112* 108*   < > 86   < > 78   < > 84   BUN  --  9.9  --   --   --  15.0  --  12.2  --  22.2*   CR  --  1.42*  --   --   --  1.79*  --  1.73*  --  2.18*   GFRESTIMATED  --  50*  --   --   --  38*  --  40*  --  30*   GEE  --  8.6  --   --   --  8.3*  --  8.8  --  8.6   MAG  --  1.4*  --   --   --  1.4*  --  1.5*  --  1.8   PHOS  --  2.9  --   --   --  3.4  --  2.8  --  4.5   PROTTOTAL  --  4.1*  --   --   --  4.1*  --  4.6*  --  4.6*   ALBUMIN  --  2.9*  --   --   --  3.1*  --  3.6  --  3.5   BILITOTAL  --  2.1*  --   --   --  2.4*  --  2.6*  --  2.3*   ALKPHOS  --  94  --   --   --  86  --  76  --  59   AST  --  14  --   --   --  17  --  19  --  25   ALT  --  10  --   --   --  13  --  15  --  15    < > = values in this interval not displayed.     CBC  Recent Labs   Lab 11/14/23  0719 11/13/23  0548 11/12/23  0630 11/10/23  1439 11/10/23  0622   HGB 8.9* 9.8* 10.8* 8.9* 8.5*   WBC 7.6 9.7 9.0  --  6.3   RBC 2.82* 3.18* 3.43*  --  2.76*   HCT 27.1* 30.6* 32.5*  --  25.8*   MCV 96 96 95  --  94   MCH 31.6 30.8 31.5  --  30.8   MCHC 32.8 32.0 33.2  --  32.9   RDW 17.1* 17.1* 17.6*  --  17.7*    167 118*  --  80*     INR  Recent Labs   Lab 11/14/23  0720 11/13/23  0548 11/12/23  0630 11/11/23  0618   INR 1.21* 1.18* 1.04 1.11     ABG  No lab results found in last 7 days.     Urine Studies  Recent Labs   Lab Test 11/04/23  2309 10/31/23  2100   COLOR Dark Yellow* Orange*   APPEARANCE Slightly Cloudy* Cloudy*   URINEGLC Negative Negative    URINEBILI Moderate* Large*   URINEKETONE Negative Negative   SG 1.016 1.019   UBLD Trace* Trace*   URINEPH 5.5 5.5   PROTEIN 10* 30*   NITRITE Negative Negative   LEUKEST Small* Small*   RBCU 2 4*   WBCU 5 25*     IMAGING:  All imaging studies reviewed by me.

## 2023-11-14 NOTE — PROGRESS NOTES
Transplant Surgery  Inpatient Daily Progress Note  11/14/2023    Assessment & Plan: Lola Milner is a 33 year old female admitted on 10/31/2023 for liver transplant evaluation. MELD Na 45. Past medical history significant for MDD, prior tobacco use, gout, polyneuropathy and decompensated cirrhosis secondary to alcohol use c/b hepatic encephalopathy, esophageal varices s/p banding, recurrent ascites and hepatorenal syndrome. She is now status post liver transplant 11/5/23 by Dr. Aime Lowe. Postop course complicated by EDGAR requiring dialysis.    Graft function:   Liver transplant: POD: 9. Transmainases normal. AP remains WNL but slightly uptrending. TB remains elevated but  improved to 2.1 today.  GGT 11/13 normal.   -POD 0 US showed patent doppler.   -HARLEY x 1 with serosanguinous output, drain to suction. 400cc output/day-remove today.  -Stitch placed at previous HARLEY site that wasleaking.    Immunosuppression management:   Induction:  Simulect POD 1 and 4, due to renal function and lower tac goal  Steroid taper per protocol.     Maintenance:  Mycophenolate 750 mg BID  Tacrolimus 3 mg BID. Tac goal 5-10 due to renal function, increase dose to 4mg BID today.  Prednisone 5 mg daily after taper d/t lower tac goal    Neuro:   Acute surgical pain: Oxycodone 2.5-5 Q4 PRN, minimal use, robaxin PRN  Neuropathic pain: PTA gabapentin    Hematology:   Acute blood loss anemia: Last transfused 11/5. Transfuse for Hgb < 7. Hgb ~9.   Thrombocytopenia: resolved, PLT >150.   Coagulopathy: INR 1.2    Cardiorespiratory:   Circulatory failure requiring pressor support: Resolved, Pressors discontinued 11/6.  Hx hypotension: PTA on Midodrine 15 TID. Not restarted. Improved, -120s  Mechanical ventilation perioperative: Unable to extubate due to apneic episodes during PS trial on POD 1 and 2. Extubated POD 3. Stable on RA. Encourage CDB/IS.    GI/Nutrition:   Severe malnutrition in the context of acute illness:  Regular diet +Ensure  High protein . Continue with High protein diet x 8 weeks. RD following, continue calorie counts    Endocrine:   Steroid induced hyperglycemia: BoiU3e=1.3. sliding scale insulin ACHS-stop today.    Fluid/Electrolytes:   EDGAR, anuric: EDGAR secondary to HRS, possible bile cast nephropathy. Started on CRRT post op until 11/9. Transitioned to iHD on 11/9. Will plan for outpatient dialysis at this time. IR placed a tunneled line 11/13  Hypokalemia: Replete, K 3.7 today.   Hypervolemia/ ALYSSA: below admission weight but presented with Large vol ascites/Edema. ALYSSA pitting, OT for lymphedema wraps today. Start bumex 2mg BID.  Hypomagnesemia: Mag 1.4, increase mag ox to 800mg daily.    : voiding, monitor Strict I&O for signs of renal recovery.    Infectious disease: Afebrile. WBC normal.     Prophylaxis: DVT (mechanical), viral (Valcyte x 6 months), PJP (Bactrim), zosyn x 48 hours, switched from diflucan to micafungin x 14 day d/t RRT, nystatin.    Disposition: 7A status, will plan to discharge to [facility] when apartment available, will dialyze on campus with DGF protocol.     JENNIFER/Fellow/Resident Provider: Gladys Carmen, NP [phone]      Faculty: Papa Coe M.D.     __________________________________________________________________  Transplant History: Admitted 10/31/2023 for liver transplant.   The patient has a history of liver failure due to decompensated cirrhosis secondary to alcohol use.    11/5/2023 (Liver), Postoperative day: 9     Interval History:   Overnight events: feels well. Continues to report fair appetite. Lower extremity edema. Notes making more urine.    ROS:   A 10-point review of systems was negative except as noted above.    Curent Meds:   acetaminophen  325 mg Oral or Feeding Tube Q4H    artificial tears  1 drop Both Eyes 4x Daily    gabapentin  200 mg Oral or Feeding Tube At Bedtime    insulin aspart  1-7 Units Subcutaneous TID AC    insulin aspart  1-5 Units Subcutaneous At Bedtime     magnesium oxide  400 mg Oral Daily    micafungin  100 mg Intravenous Q24H    multivitamin w/minerals  1 tablet Oral Daily    mycophenolate  750 mg Oral BID IS    pantoprazole  40 mg Oral QAM AC    polyethylene glycol  17 g Oral or Feeding Tube Daily    predniSONE  5 mg Oral Daily    sodium chloride (PF)  3 mL Intravenous Q8H    sulfamethoxazole-trimethoprim  1 tablet Oral or Feeding Tube Once per day on Monday Wednesday Friday    tacrolimus  3 mg Oral BID IS    valGANciclovir  450 mg Oral Once per day on Tuesday Friday    cholecalciferol  50 mcg Oral Daily       Physical Exam:     Admit Weight: 68.6 kg (151 lb 4.8 oz)    Current Vitals:   /79 (BP Location: Right arm, Patient Position: Semi-Oneil's, Cuff Size: Adult Regular)   Pulse 82   Temp 98.5  F (36.9  C) (Oral)   Resp 16   Wt 70.1 kg (154 lb 9.6 oz)   SpO2 100%   BMI 27.39 kg/m        Vital sign ranges:    Temp:  [97.7  F (36.5  C)-98.5  F (36.9  C)] 98.5  F (36.9  C)  Pulse:  [61-85] 82  Resp:  [8-27] 16  BP: ()/(60-96) 117/79  SpO2:  [97 %-100 %] 100 %  Patient Vitals for the past 24 hrs:   BP Temp Temp src Pulse Resp SpO2   11/14/23 0555 117/79 98.5  F (36.9  C) Oral 82 16 100 %   11/14/23 0159 104/80 97.9  F (36.6  C) Oral 80 16 100 %   11/13/23 2151 121/82 98  F (36.7  C) Oral 79 16 100 %   11/13/23 1846 122/86 98.2  F (36.8  C) Oral 83 17 100 %   11/13/23 1620 -- -- -- 73 -- 100 %   11/13/23 1615 113/68 -- -- 70 -- 100 %   11/13/23 1610 109/70 -- -- 74 -- 100 %   11/13/23 1605 107/70 -- -- 71 -- 100 %   11/13/23 1600 115/71 -- -- 73 -- 100 %   11/13/23 1555 115/67 -- -- 73 -- 100 %   11/13/23 1550 110/72 -- -- 76 -- 100 %   11/13/23 1545 110/77 -- -- 78 -- 100 %   11/13/23 1540 110/74 -- -- 85 -- 100 %   11/13/23 1535 127/77 -- -- 74 -- 99 %   11/13/23 1335 117/81 -- -- 71 -- 100 %   11/13/23 1147 134/80 -- -- 71 14 100 %   11/13/23 1145 112/84 97.7  F (36.5  C) Oral 80 11 100 %   11/13/23 1130 127/82 -- -- 64 11 100 %   11/13/23 1115  118/82 -- -- 70 12 100 %   11/13/23 1100 122/88 -- -- 75 15 100 %   11/13/23 1045 121/89 -- -- 71 10 100 %   11/13/23 1030 118/60 -- -- 80 15 100 %   11/13/23 1015 (!) 128/91 -- -- 61 13 97 %   11/13/23 1001 (!) 116/96 -- -- 73 12 100 %   11/13/23 1000 (!) 116/96 -- -- 69 12 100 %   11/13/23 0945 132/89 -- -- 73 12 99 %   11/13/23 0930 (!) 133/94 -- -- 76 (!) 9 100 %   11/13/23 0915 (!) 131/93 -- -- 71 12 100 %   11/13/23 0900 133/80 -- -- 76 15 98 %   11/13/23 0845 125/86 -- -- 77 16 100 %   11/13/23 0830 128/83 -- -- 72 (!) 8 100 %   11/13/23 0815 (!) 144/87 -- -- 68 (!) 9 100 %   11/13/23 0800 (!) 105/94 -- -- 72 19 100 %   11/13/23 0745 96/74 -- -- 75 11 100 %   11/13/23 0738 108/79 -- -- 79 16 99 %   11/13/23 0734 120/80 97.8  F (36.6  C) Oral 79 27 99 %     Examined by fellow:  General Appearance: in no apparent distress   Skin: normal, warm, jaundice-improving  Heart: RRR  Lungs: NLB on RA  Abdomen:  Soft, non distended, non tender.  The wound is CDI with staples. HARLEY x 1 serosanguinous, small amount of serous draiange to left lateral incision.  : oliguric  Extremities: b/l ALYSSA, pitting  +2, apply ACE  Neurologic: AAOx3  Lines: Right tunneled HD line, CDI    Frailty Scores           No data to display                Data:   CMP  Recent Labs   Lab 11/13/23  2145 11/13/23  1758 11/13/23  0832 11/13/23  0548 11/12/23  0930 11/12/23  0630   NA  --   --   --  134*  --  135   POTASSIUM  --   --   --  3.7  --  3.5   CHLORIDE  --   --   --  103  --  102   CO2  --   --   --  19*  --  23   * 108*   < > 86   < > 78   BUN  --   --   --  15.0  --  12.2   CR  --   --   --  1.79*  --  1.73*   GFRESTIMATED  --   --   --  38*  --  40*   GEE  --   --   --  8.3*  --  8.8   ICAW  --   --   --  4.7  --  4.7   MAG  --   --   --  1.4*  --  1.5*   PHOS  --   --   --  3.4  --  2.8   ALBUMIN  --   --   --  3.1*  --  3.6   BILITOTAL  --   --   --  2.4*  --  2.6*   ALKPHOS  --   --   --  86  --  76   AST  --   --   --  17  --   19   ALT  --   --   --  13  --  15    < > = values in this interval not displayed.     CBC  Recent Labs   Lab 11/13/23  0548 11/12/23  0630 11/08/23  1959 11/08/23  1146   HGB 9.8* 10.8*   < > 10.3*   WBC 9.7 9.0   < > 7.1    118*   < > 96*   A1C  --   --   --  5.3    < > = values in this interval not displayed.     COAGS  Recent Labs   Lab 11/13/23  0548 11/12/23  0630   INR 1.18* 1.04      Urinalysis  Recent Labs   Lab Test 11/04/23  2309 10/31/23  2100   COLOR Dark Yellow* Orange*   APPEARANCE Slightly Cloudy* Cloudy*   URINEGLC Negative Negative   URINEBILI Moderate* Large*   URINEKETONE Negative Negative   SG 1.016 1.019   UBLD Trace* Trace*   URINEPH 5.5 5.5   PROTEIN 10* 30*   NITRITE Negative Negative   LEUKEST Small* Small*   RBCU 2 4*   WBCU 5 25*     Virology:  Hepatitis C Antibody   Date Value Ref Range Status   11/04/2023 Nonreactive Nonreactive Final      Attestation:    The patient has been seen with team and evaluated by me.   Vital signs, labs, medications and orders were reviewed.   When obtained, diagnostic images were reviewed by me and interpreted as above.    The care plan was discussed with the multidisciplinary team and I agree with the findings and plan in this note, with any differences recorded in blue.    Immunosuppressive medication management was reviewed and adjusted as reflected in the note and orders.       Papa Coe MD  Professor of Surgery

## 2023-11-14 NOTE — CONSULTS
"  Health Psychology                                                                                                                          Alisa Hebert, Ph.D., L.P [phone]  Alyce Frey, Ph.D., L.P. [phone]  Jud Laurent, Ph.D, L..P. [phone]  Virgie Wilson, Ph.D., L.P. [phone]  Ashu Riojas, Ph.D., A.B.P.P., L.P. [phone]         Samantha Pires, Ph.D., L.P. [phone]       Lola Bradley, Ph.D., A.B.P.P., LP [phone]           [facility], 3rd Floor  [address] [place] [address]      Inpatient Health Psychology Consultation    Date of Service:  11/14/23    BACKGROUND:  Per EMR: \"Lola Milner is a 33 year old female admitted on 10/31/2023 for liver transplant evaluation. MELD Na 45. Past medical history significant for MDD, prior tobacco use, gout, polyneuropathy and decompensated cirrhosis secondary to alcohol use c/b hepatic encephalopathy, esophageal varices s/p banding, recurrent ascites and hepatorenal syndrome. She is now status post liver transplant 11/5/23 by Dr. Aime Lowe. Postop course complicated by EDGAR requiring dialysis.\"    Health psychology consulted by Transplant Surgery to support Lola in coping with multiple medical issues and prolonged hospital admission.    SUBJECTIVE:  Supporive vist with Lola today. She was feeling overwhelmed and tearful, cites pamphelet about connecting with donor family as part of the reason but also notes that she's more emotional overall today.     She shared experiences more broadly with transplant and how quickly surgery occurred after being listed. She said she wasn't necessarily feeling hopeful about the future, feeling nervous, but not necessarily feeling hopeless. She recognizes how much effort she will need to put in to participate in her recovery and maintain progress.    Supported Lola in identifying emotional experiences and discussed strategies to use to reduce " emotional intensity in the moment. Provided psychoeducation about emotional reactions to medications, medical procedures, and stressors.     She was grateful for the visit and agreeable to follow-up.       OBJECTIVE:  Met with Lola individually today. She reported overwhelmed mood; affect labile. Thought processes logical and linear. Insight and judgment fair. Speech WNL. Denied suicidal ideation.        ASSESSMENT:  Lola has a history of symptoms of depression and anxiety, today she reports feeling more overwhelmed emotionally and it has been difficult to manage emotional intensity, very tearful. She was open to behavioral skills to manage this pattern.       DIAGNOSIS:  Adjustment disorder with mixed anxiety and depressed mood      PLAN:  Plan for health psychology to follow this patient approximately once per week for the duration of their hospitalization.     Please feel free to call in urgent concerns arise prior to the next follow-up session.     Jud Laurent, PhD,   Clinical Health Psychologist  Phone: [phone]  Pager: [phone]      Time in: 1:55  Time out: 2:15

## 2023-11-14 NOTE — DISCHARGE INSTRUCTIONS
Diet recommendations post-transplant: High protein diet (at least 104 grams/day) x 8 weeks.  Heart healthy dietary habits long term (low saturated/trans fat, low sodium). Practice food safety precautions. See nutrition handout and food safety booklet for more information.

## 2023-11-14 NOTE — PROCEDURES
[facility]    Procedure: IR Procedure Note    Date/Time: 11/13/2023 6:07 PM    Performed by: Cooper Daniel MD  Authorized by: Amy Uribe MD  IR Fellow Physician: Howard Interiano MD  Radiology Resident Physician: Cooper Daniel MD        UNIVERSAL PROTOCOL   Site Marked: NA  Prior Images Obtained and Reviewed:  Yes  Required items: Required blood products, implants, devices and special equipment available    Patient identity confirmed:  Verbally with patient, arm band, provided demographic data and hospital-assigned identification number  Patient was reevaluated immediately before administering moderate or deep sedation or anesthesia  Confirmation Checklist:  Patient's identity using two indicators, relevant allergies, procedure was appropriate and matched the consent or emergent situation and correct equipment/implants were available  Time out: Immediately prior to the procedure a time out was called    Universal Protocol: the Joint Commission Universal Protocol was followed    Preparation: Patient was prepped and draped in usual sterile fashion    ESBL (mL):  2     ANESTHESIA    Anesthesia: Local infiltration  Local Anesthetic:  Lidocaine 1% without epinephrine      SEDATION  Patient Sedated: Yes    Sedation:  Fentanyl and midazolam  Vital signs: Vital signs monitored during sedation    See dictated procedure note for full details.  Findings: None    Specimens: none    Complications: None    Condition: Stable    Plan: Successful placement of right internal jugular central venous catheter for dialysis.      PROCEDURE    Patient Tolerance:  Patient tolerated the procedure well with no immediate complications  Length of time physician/provider present for 1:1 monitoring during sedation: 30

## 2023-11-14 NOTE — PLAN OF CARE
11/13/2023 1812 by Leidy Young  Outcome: Progressing  Flowsheets (Taken 11/13/2023 1812)  Outcome Evaluation: Pt appetite improving, but needs to increase overall calories/protein intake (see RD note).  Plan of Care Reviewed With: patient  Overall Patient Progress: improving

## 2023-11-14 NOTE — PROGRESS NOTES
Care Management Follow Up    Length of Stay (days): 14    Expected Discharge Date:       Concerns to be Addressed: all concerns addressed in this encounter     Patient plan of care discussed at interdisciplinary rounds: Yes    Anticipated Discharge Disposition: Home (Pending transplant evaluation)     Anticipated Discharge Services: [facility] (PTA)  Anticipated Discharge DME: None    Patient/family educated on Medicare website which has current facility and service quality ratings:    Education Provided on the Discharge Plan:    Patient/Family in Agreement with the Plan: yes    Referrals Placed by CM/SW:    Private pay costs discussed: Not applicable    Additional Information:  Notified by Gladys CLARK, Transplant Team nephrology inquiiring if we could arrange to have patient dialyze at [facility] Inpatient HD unit.  Per care team pt kidney function is improving and the transplant team anticipates pt may only need 2-3 HD runs and the transplant nephrology team would like to follow patient closely.  Writer confirmed with Leah Kirkpatrick, Dialysis Unit PCS that patient is able to dialyze at [facility] inpatient HD unit but only for 2-3 HD runs.  If patient does require on going dialysis OP HD will need to be arranged.     Team anticipates pt will be able to discharge today.  Messaged transplant SW inquiring about housing plan.      RNCC will continue to monitor.    Shefali Castro RN BSN, PHN, ACM-RN  7A RN Care Coordinator  Phone: [phone]  Pager [phone]    To contact the weekend RNCC  [place] ([phone]) Saturday and Sunday    Units: 5A,5B,5C [phone]    Units: 6A, [phone]    Units 6B, 6C, 6D [phone]    Units: 7A, 7B, 7C, 7D, [phone]    [facility] ([phone]) Saturday and Sunday  Units: 5 Ortho, 5MS & WB ED Pager: [phone]  Units: 6MS, 8A & 10 ICU  Pager [phone]    11/14/2023 9:33 AM

## 2023-11-14 NOTE — PROGRESS NOTES
Reason for Assessment:  Nutrition education regarding post transplant diet.   Diet History:  Pt reports having some brain fog today.  Feels appetite is improving, but still eating smaller meals.  Didn't receive some of her supplements yesterday.  Calorie counts 11/14-11/16.   Nutrition Diagnosis:  Food- and nutrition-related knowledge deficit r/t no previous knowledge of post transplant diet guidelines AEB patient verbal report.   Interventions:   Provided instruction on post-transplant diet with discussion regarding protein sources and high protein needs in acute post-tx phase.  Stressed the importance of adequate intake for overall health and recovery.  Reviewed protein needs and sources.    -- Reviewed recommendations to follow low fat/low sodium diet long term and discussed heart healthy diet tips.    -- Reviewed need for food safety precautions to prevent food borne illness.  Provided handouts: Post Transplant Diet Guidelines and USDA food safety booklet  Modified oral supplements per patient's preferences.    Pt verbalized understanding of diet following education and denied further nutritional questions.    Goals:   Patient will verbalize understanding of education provided.  Follow-up:    Patient to ask any further nutrition-related questions before discharge.  In addition, pt may request outpatient RD appointment.    Leidy Young, MS, RD, LD, CCTD, CNSC  7A and 7B beds [phone], pager [phone]  Weekend pager [phone]

## 2023-11-15 ENCOUNTER — APPOINTMENT (OUTPATIENT)
Dept: OCCUPATIONAL THERAPY | Facility: CLINIC | Age: 33
End: 2023-11-15
Attending: TRANSPLANT SURGERY
Payer: COMMERCIAL

## 2023-11-15 ENCOUNTER — APPOINTMENT (OUTPATIENT)
Dept: PHYSICAL THERAPY | Facility: CLINIC | Age: 33
End: 2023-11-15
Attending: TRANSPLANT SURGERY
Payer: COMMERCIAL

## 2023-11-15 VITALS
DIASTOLIC BLOOD PRESSURE: 76 MMHG | HEART RATE: 81 BPM | BODY MASS INDEX: 26.52 KG/M2 | RESPIRATION RATE: 16 BRPM | TEMPERATURE: 97.9 F | WEIGHT: 149.69 LBS | OXYGEN SATURATION: 100 % | SYSTOLIC BLOOD PRESSURE: 111 MMHG

## 2023-11-15 PROBLEM — N28.9 HYPERVOLEMIA ASSOCIATED WITH RENAL INSUFFICIENCY: Status: ACTIVE | Noted: 2023-11-15

## 2023-11-15 PROBLEM — N17.9 AKI (ACUTE KIDNEY INJURY) (H): Status: ACTIVE | Noted: 2023-11-15

## 2023-11-15 PROBLEM — E87.70 HYPERVOLEMIA ASSOCIATED WITH RENAL INSUFFICIENCY: Status: ACTIVE | Noted: 2023-11-15

## 2023-11-15 PROBLEM — D84.9 IMMUNOSUPPRESSED STATUS (H): Status: ACTIVE | Noted: 2023-11-15

## 2023-11-15 PROBLEM — E83.42 HYPOMAGNESEMIA: Status: ACTIVE | Noted: 2023-11-15

## 2023-11-15 LAB
ACANTHOCYTES BLD QL SMEAR: SLIGHT
ALBUMIN SERPL BCG-MCNC: 3.1 G/DL (ref 3.5–5.2)
ALP SERPL-CCNC: 109 U/L (ref 40–150)
ALT SERPL W P-5'-P-CCNC: 9 U/L (ref 0–50)
ANION GAP SERPL CALCULATED.3IONS-SCNC: 9 MMOL/L (ref 7–15)
AST SERPL W P-5'-P-CCNC: 14 U/L (ref 0–45)
BASOPHILS # BLD AUTO: ABNORMAL 10*3/UL
BASOPHILS # BLD MANUAL: 0.1 10E3/UL (ref 0–0.2)
BASOPHILS NFR BLD AUTO: ABNORMAL %
BASOPHILS NFR BLD MANUAL: 1 %
BILIRUB DIRECT SERPL-MCNC: 1.26 MG/DL (ref 0–0.3)
BILIRUB SERPL-MCNC: 2.2 MG/DL
BUN SERPL-MCNC: 13.5 MG/DL (ref 6–20)
CA-I BLD-MCNC: 4.6 MG/DL (ref 4.4–5.2)
CALCIUM SERPL-MCNC: 8.4 MG/DL (ref 8.6–10)
CHLORIDE SERPL-SCNC: 99 MMOL/L (ref 98–107)
CREAT SERPL-MCNC: 1.62 MG/DL (ref 0.51–0.95)
DEPRECATED HCO3 PLAS-SCNC: 24 MMOL/L (ref 22–29)
EGFRCR SERPLBLD CKD-EPI 2021: 43 ML/MIN/1.73M2
EOSINOPHIL # BLD AUTO: ABNORMAL 10*3/UL
EOSINOPHIL # BLD MANUAL: 0.2 10E3/UL (ref 0–0.7)
EOSINOPHIL NFR BLD AUTO: ABNORMAL %
EOSINOPHIL NFR BLD MANUAL: 3 %
ERYTHROCYTE [DISTWIDTH] IN BLOOD BY AUTOMATED COUNT: 17.1 % (ref 10–15)
GLUCOSE SERPL-MCNC: 86 MG/DL (ref 70–99)
HCT VFR BLD AUTO: 27.4 % (ref 35–47)
HGB BLD-MCNC: 9 G/DL (ref 11.7–15.7)
IMM GRANULOCYTES # BLD: ABNORMAL 10*3/UL
IMM GRANULOCYTES NFR BLD: ABNORMAL %
INR PPP: 1.1 (ref 0.85–1.15)
LYMPHOCYTES # BLD AUTO: ABNORMAL 10*3/UL
LYMPHOCYTES # BLD MANUAL: 1 10E3/UL (ref 0.8–5.3)
LYMPHOCYTES NFR BLD AUTO: ABNORMAL %
LYMPHOCYTES NFR BLD MANUAL: 13 %
MAGNESIUM SERPL-MCNC: 1.2 MG/DL (ref 1.7–2.3)
MCH RBC QN AUTO: 31.6 PG (ref 26.5–33)
MCHC RBC AUTO-ENTMCNC: 32.8 G/DL (ref 31.5–36.5)
MCV RBC AUTO: 96 FL (ref 78–100)
MONOCYTES # BLD AUTO: ABNORMAL 10*3/UL
MONOCYTES # BLD MANUAL: 0.2 10E3/UL (ref 0–1.3)
MONOCYTES NFR BLD AUTO: ABNORMAL %
MONOCYTES NFR BLD MANUAL: 2 %
NEUTROPHILS # BLD AUTO: ABNORMAL 10*3/UL
NEUTROPHILS # BLD MANUAL: 6.1 10E3/UL (ref 1.6–8.3)
NEUTROPHILS NFR BLD AUTO: ABNORMAL %
NEUTROPHILS NFR BLD MANUAL: 81 %
NRBC # BLD AUTO: 0 10E3/UL
NRBC BLD AUTO-RTO: 0 /100
PHOSPHATE SERPL-MCNC: 2.7 MG/DL (ref 2.5–4.5)
PLAT MORPH BLD: ABNORMAL
PLATELET # BLD AUTO: 221 10E3/UL (ref 150–450)
POTASSIUM SERPL-SCNC: 3.5 MMOL/L (ref 3.4–5.3)
PROT SERPL-MCNC: 4.4 G/DL (ref 6.4–8.3)
RBC # BLD AUTO: 2.85 10E6/UL (ref 3.8–5.2)
RBC MORPH BLD: ABNORMAL
SODIUM SERPL-SCNC: 132 MMOL/L (ref 135–145)
TACROLIMUS BLD-MCNC: 6.1 UG/L (ref 5–15)
TME LAST DOSE: NORMAL H
TME LAST DOSE: NORMAL H
WBC # BLD AUTO: 7.5 10E3/UL (ref 4–11)
ZINC SERPL-MCNC: 69.2 UG/DL

## 2023-11-15 PROCEDURE — 250N000013 HC RX MED GY IP 250 OP 250 PS 637: Performed by: TRANSPLANT SURGERY

## 2023-11-15 PROCEDURE — 97530 THERAPEUTIC ACTIVITIES: CPT | Mod: GP

## 2023-11-15 PROCEDURE — 85041 AUTOMATED RBC COUNT: CPT | Performed by: STUDENT IN AN ORGANIZED HEALTH CARE EDUCATION/TRAINING PROGRAM

## 2023-11-15 PROCEDURE — 250N000013 HC RX MED GY IP 250 OP 250 PS 637: Performed by: STUDENT IN AN ORGANIZED HEALTH CARE EDUCATION/TRAINING PROGRAM

## 2023-11-15 PROCEDURE — 250N000013 HC RX MED GY IP 250 OP 250 PS 637: Performed by: PHYSICIAN ASSISTANT

## 2023-11-15 PROCEDURE — 250N000011 HC RX IP 250 OP 636: Performed by: NURSE PRACTITIONER

## 2023-11-15 PROCEDURE — 82330 ASSAY OF CALCIUM: CPT | Performed by: STUDENT IN AN ORGANIZED HEALTH CARE EDUCATION/TRAINING PROGRAM

## 2023-11-15 PROCEDURE — 36415 COLL VENOUS BLD VENIPUNCTURE: CPT | Performed by: STUDENT IN AN ORGANIZED HEALTH CARE EDUCATION/TRAINING PROGRAM

## 2023-11-15 PROCEDURE — 82248 BILIRUBIN DIRECT: CPT | Performed by: STUDENT IN AN ORGANIZED HEALTH CARE EDUCATION/TRAINING PROGRAM

## 2023-11-15 PROCEDURE — 84100 ASSAY OF PHOSPHORUS: CPT | Performed by: STUDENT IN AN ORGANIZED HEALTH CARE EDUCATION/TRAINING PROGRAM

## 2023-11-15 PROCEDURE — 99233 SBSQ HOSP IP/OBS HIGH 50: CPT | Mod: 24 | Performed by: INTERNAL MEDICINE

## 2023-11-15 PROCEDURE — 250N000012 HC RX MED GY IP 250 OP 636 PS 637: Performed by: STUDENT IN AN ORGANIZED HEALTH CARE EDUCATION/TRAINING PROGRAM

## 2023-11-15 PROCEDURE — 97116 GAIT TRAINING THERAPY: CPT | Mod: GP

## 2023-11-15 PROCEDURE — 250N000013 HC RX MED GY IP 250 OP 250 PS 637

## 2023-11-15 PROCEDURE — 80197 ASSAY OF TACROLIMUS: CPT | Performed by: PHYSICIAN ASSISTANT

## 2023-11-15 PROCEDURE — 97140 MANUAL THERAPY 1/> REGIONS: CPT | Mod: GO

## 2023-11-15 PROCEDURE — 80053 COMPREHEN METABOLIC PANEL: CPT | Performed by: PHYSICIAN ASSISTANT

## 2023-11-15 PROCEDURE — 250N000012 HC RX MED GY IP 250 OP 636 PS 637: Performed by: PHYSICIAN ASSISTANT

## 2023-11-15 PROCEDURE — 85007 BL SMEAR W/DIFF WBC COUNT: CPT | Performed by: STUDENT IN AN ORGANIZED HEALTH CARE EDUCATION/TRAINING PROGRAM

## 2023-11-15 PROCEDURE — 250N000013 HC RX MED GY IP 250 OP 250 PS 637: Performed by: NURSE PRACTITIONER

## 2023-11-15 PROCEDURE — 250N000013 HC RX MED GY IP 250 OP 250 PS 637: Performed by: INTERNAL MEDICINE

## 2023-11-15 PROCEDURE — 250N000012 HC RX MED GY IP 250 OP 636 PS 637: Performed by: NURSE PRACTITIONER

## 2023-11-15 PROCEDURE — 83735 ASSAY OF MAGNESIUM: CPT | Performed by: STUDENT IN AN ORGANIZED HEALTH CARE EDUCATION/TRAINING PROGRAM

## 2023-11-15 PROCEDURE — 85610 PROTHROMBIN TIME: CPT | Performed by: PHYSICIAN ASSISTANT

## 2023-11-15 RX ORDER — BUMETANIDE 2 MG/1
2 TABLET ORAL
Qty: 10 TABLET | Refills: 0 | Status: SHIPPED | OUTPATIENT
Start: 2023-11-15 | End: 2023-11-16

## 2023-11-15 RX ORDER — TACROLIMUS 1 MG/1
4 CAPSULE ORAL 2 TIMES DAILY
Qty: 240 CAPSULE | Refills: 1 | Status: SHIPPED | OUTPATIENT
Start: 2023-11-15 | End: 2023-11-16

## 2023-11-15 RX ORDER — MAGNESIUM SULFATE HEPTAHYDRATE 40 MG/ML
2 INJECTION, SOLUTION INTRAVENOUS ONCE
Qty: 50 ML | Refills: 0 | Status: COMPLETED | OUTPATIENT
Start: 2023-11-15 | End: 2023-11-15

## 2023-11-15 RX ORDER — GABAPENTIN 100 MG/1
200 CAPSULE ORAL AT BEDTIME
Qty: 60 CAPSULE | Refills: 0 | Status: SHIPPED | OUTPATIENT
Start: 2023-11-15 | End: 2023-11-16

## 2023-11-15 RX ORDER — MAGNESIUM OXIDE 400 MG/1
800 TABLET ORAL DAILY
Qty: 60 TABLET | Refills: 1 | Status: SHIPPED | OUTPATIENT
Start: 2023-11-15 | End: 2023-11-16

## 2023-11-15 RX ORDER — PREDNISONE 5 MG/1
5 TABLET ORAL DAILY
Qty: 30 TABLET | Refills: 0 | Status: SHIPPED | OUTPATIENT
Start: 2023-11-16 | End: 2023-11-16

## 2023-11-15 RX ORDER — VALGANCICLOVIR 450 MG/1
450 TABLET, FILM COATED ORAL
Qty: 30 TABLET | Refills: 2 | Status: SHIPPED | OUTPATIENT
Start: 2023-11-17 | End: 2024-01-02

## 2023-11-15 RX ORDER — MYCOPHENOLATE MOFETIL 250 MG/1
750 CAPSULE ORAL 2 TIMES DAILY
Qty: 180 CAPSULE | Refills: 1 | Status: SHIPPED | OUTPATIENT
Start: 2023-11-15 | End: 2023-11-16

## 2023-11-15 RX ORDER — METHOCARBAMOL 500 MG/1
500 TABLET, FILM COATED ORAL 3 TIMES DAILY PRN
Qty: 12 TABLET | Refills: 0 | Status: SHIPPED | OUTPATIENT
Start: 2023-11-15 | End: 2023-11-20

## 2023-11-15 RX ORDER — LIDOCAINE HYDROCHLORIDE 10 MG/ML
10 INJECTION, SOLUTION EPIDURAL; INFILTRATION; INTRACAUDAL; PERINEURAL ONCE
Status: DISCONTINUED | OUTPATIENT
Start: 2023-11-15 | End: 2023-11-15 | Stop reason: HOSPADM

## 2023-11-15 RX ORDER — SULFAMETHOXAZOLE AND TRIMETHOPRIM 400; 80 MG/1; MG/1
1 TABLET ORAL
Qty: 30 TABLET | Refills: 1 | Status: SHIPPED | OUTPATIENT
Start: 2023-11-15 | End: 2023-11-16

## 2023-11-15 RX ORDER — ACETAMINOPHEN 325 MG/1
325-650 TABLET ORAL EVERY 6 HOURS PRN
Qty: 100 TABLET | Refills: 0 | Status: SHIPPED | OUTPATIENT
Start: 2023-11-15

## 2023-11-15 RX ORDER — TACROLIMUS 0.5 MG/1
0.5 CAPSULE ORAL 2 TIMES DAILY PRN
Qty: 30 CAPSULE | Refills: 0 | Status: SHIPPED | OUTPATIENT
Start: 2023-11-15 | End: 2023-11-17

## 2023-11-15 RX ORDER — VITAMIN B COMPLEX
50 TABLET ORAL DAILY
Qty: 30 TABLET | Refills: 1 | Status: SHIPPED | OUTPATIENT
Start: 2023-11-15 | End: 2023-11-30

## 2023-11-15 RX ORDER — MULTIVIT-MIN/IRON FUM/FOLIC AC 19 MG-400
1 TABLET ORAL DAILY
Qty: 30 TABLET | Refills: 1 | Status: SHIPPED | OUTPATIENT
Start: 2023-11-15 | End: 2023-12-18

## 2023-11-15 RX ORDER — OXYCODONE HYDROCHLORIDE 5 MG/1
2.5-5 TABLET ORAL EVERY 6 HOURS PRN
Qty: 5 TABLET | Refills: 0 | Status: SHIPPED | OUTPATIENT
Start: 2023-11-15 | End: 2023-11-20

## 2023-11-15 RX ADMIN — METHOCARBAMOL 500 MG: 500 TABLET ORAL at 00:36

## 2023-11-15 RX ADMIN — Medication 50 MCG: at 08:59

## 2023-11-15 RX ADMIN — ACETAMINOPHEN 325 MG: 325 TABLET, FILM COATED ORAL at 09:00

## 2023-11-15 RX ADMIN — Medication 1 DROP: at 09:00

## 2023-11-15 RX ADMIN — ACETAMINOPHEN 325 MG: 325 TABLET, FILM COATED ORAL at 04:44

## 2023-11-15 RX ADMIN — BUMETANIDE 2 MG: 2 TABLET ORAL at 08:59

## 2023-11-15 RX ADMIN — TACROLIMUS 4 MG: 1 CAPSULE ORAL at 08:58

## 2023-11-15 RX ADMIN — POLYETHYLENE GLYCOL 3350 17 G: 17 POWDER, FOR SOLUTION ORAL at 09:05

## 2023-11-15 RX ADMIN — PREDNISONE 5 MG: 5 TABLET ORAL at 09:00

## 2023-11-15 RX ADMIN — Medication 1 DROP: at 11:41

## 2023-11-15 RX ADMIN — MAGNESIUM SULFATE IN WATER 2 G: 40 INJECTION, SOLUTION INTRAVENOUS at 11:42

## 2023-11-15 RX ADMIN — MYCOPHENOLATE MOFETIL 750 MG: 250 CAPSULE ORAL at 08:59

## 2023-11-15 RX ADMIN — PANTOPRAZOLE SODIUM 40 MG: 40 TABLET, DELAYED RELEASE ORAL at 09:00

## 2023-11-15 RX ADMIN — ACETAMINOPHEN 325 MG: 325 TABLET, FILM COATED ORAL at 11:41

## 2023-11-15 RX ADMIN — MAGNESIUM OXIDE TAB 400 MG (241.3 MG ELEMENTAL MG) 800 MG: 400 (241.3 MG) TAB at 08:58

## 2023-11-15 RX ADMIN — Medication 1 TABLET: at 08:59

## 2023-11-15 ASSESSMENT — ACTIVITIES OF DAILY LIVING (ADL)
ADLS_ACUITY_SCORE: 30
ADLS_ACUITY_SCORE: 28
ADLS_ACUITY_SCORE: 30
ADLS_ACUITY_SCORE: 28
ADLS_ACUITY_SCORE: 30
ADLS_ACUITY_SCORE: 28
ADLS_ACUITY_SCORE: 30
ADLS_ACUITY_SCORE: 30

## 2023-11-15 NOTE — PROGRESS NOTES
"Care Management Follow Up    Length of Stay (days): 15    Expected Discharge Date: 11/15/2023     Concerns to be Addressed:  Discharge planning     Patient plan of care discussed at interdisciplinary rounds: Yes    Anticipated Discharge Disposition: Home, [place] apartments.      Anticipated Discharge Services: [facility] (PTA).  Anticipated Discharge DME: None    Patient/family educated on Medicare website which has current facility and service quality ratings:    Education Provided on the Discharge Plan:    Patient/Family in Agreement with the Plan: yes    Referrals Placed by CM/SW:    Private pay costs discussed: Not applicable    Additional Information:  Pt discharging today. Will need need dialysis after discharge.   Per progress note on 11-14 by BIMAL Quevedo: \"Per care team pt kidney function is improving and the transplant team anticipates pt may only need 2-3 HD runs and the transplant nephrology team would like to follow patient closely.  Writer confirmed with Leah Kirkpatrick, Dialysis Unit PCS that patient is able to dialyze at [facility] inpatient HD unit but only for 2-3 HD runs.  If patient does require on going dialysis OP HD will need to be arranged.\"    Called Leah at inpt Dialysis and informed her pt is discharging today and will need dialysis. She already  spoke with Nephrology and is aware of plan.       Stacia Kwan, RN Care Coordinator  [place] RNCC  [facility]  On 11- RNCC coverage for Unit 7A. Call [phone].       [facility]  Inpatient Dialysis  Phone: [phone]  "

## 2023-11-15 NOTE — PROGRESS NOTES
CLINICAL NUTRITION SERVICES - DISCHARGE NOTE    Patient s discharge needs assessed and discharge planning has been conducted with the multidisciplinary transplant care team including physicians, pharmacy, social work and transplant coordinator.    Follow up/Monitoring:  Once discharged, recommend outpatient nutrition visit within 1-2 weeks to ensure eating adequately (severe malnutrition + very poor intake prior to admission).     Leidy Young, MS, RD, LD, CCTD, CNSC  7A and 7B beds [phone], pager [phone]  Weekend pager [phone]

## 2023-11-15 NOTE — PHARMACY-TRANSPLANT NOTE
Solid Organ Transplant Recipient Prior to Discharge Note    33 year old female s/p liver transplant on 11/5/2023.    Immunosuppression regimen per liver transplant protocol:  INDUCTION:   11/5/2023 (POD 0): methylprednisolone 1000 mg IV  11/6/2023 (POD 1): basiliximab 20 mg IV, methylprednisolone 200 mg IV   11/7/2023 (POD 2): methylprednisolone 100 mg IV  11/8/2023 (POD 3): methylprednisolone 50 mg IV  11/9/2023 (POD 4): basiliximab 20 mg IV, prednisone 25 mg  11/10/2023 (POD 5): prednisone 10 mg   11/11/2023 (POD 6): prednisone 5 mg daily with lower tacrolimus goal level     MAINTENANCE:   - Mycophenolate 750 mg twice daily   - Tacrolimus with goal trough levels of 5-10 mcg/L   - Most recent tacrolimus level: 6.1 (12.25 hr level) on 11/15/2023, current dose 4 mg twice daily     Opportunistic pathogen prophylaxis includes:  - PJP: trimethoprim/sulfamethoxazole  - CMV D-/R+: Valganciclovir for 3 months duration tentatively    Pharmacy has monitored for medication interactions and immunosuppression levels in conjunction with the multidisciplinary team. In anticipation for discharge, medication therapy needs have been addressed daily throughout the current admission via multidisciplinary rounds and/or discussions, order verification, daily clinical pharmacy review, and communication with prescribers.  José Antonio KevinD., BCPS

## 2023-11-15 NOTE — DISCHARGE SUMMARY
[facility]    Discharge Summary  Solid Organ Transplant    Date of Admission:  10/31/2023  Date of Discharge:  11/15/2023  Discharging Provider: Gladys Carmen NP    Discharge Diagnoses   Principal Problem:    End stage liver disease (H)  Active Problems:    Adjustment disorder with anxiety    Major depressive disorder, single episode, moderate (H)    Hypomagnesemia    Immunosuppressed status (H24)    EDGAR (acute kidney injury) (H24)    Hypervolemia associated with renal insufficiency      Procedure date:  11/05/23    Preoperative diagnosis:  End Stage Liver Disease due to EtOH    Postoperative diagnosis:  Same,     Procedure:  1. Donation after Brain Death Piggyback liver transplant   2. End-to-end Choledochocholedochostomy        Surgeon  Surgeon(s) and Role:     * Aime Lowe MD - Primary     * Vicente Claudio MD    Co-Surgeon:  Aime Lowe    Fellow/Assistant:  Dr Vicente Claudio served as first assistant as there was no qualified resident available.  Dr Claudio assisted with the hepatectomy and performed the vascular and biliary anastomoses.      Anesthesia:  General    Specimen:  Hepatic artery lymph node.  Donor gallbladder.  Native liver and gallbladder.    Drains:  2    Urine output:  See anesthesia record    Estimated blood loss:  3.5L      IR 11/13/23: RIJ Tunneled line placement for dialysis    Pathology:  Final Diagnosis   A. HEPATIC ARTERY LYMH NODE; EXCISION:  Benign/reactive      B. GALLBLADDER, DONOR CHOLECYSTECTOMY:   Ischemic/preservation changes otherwise no significant morphologic abnormality      C. LIVER (1945 gm)/ GALLBLADDER; EXPLANTED AT TRANSPLANTATION:  Advanced cirrhosis (Laennec fibrosis stage 4C):   -Inactive with marked cholestasis, consistent with end stage decompensation   -Compatible with alcohol etiology, now quiescent from abstinence   -Negative for hepatocellular or other malignancy    -Bile ducts are present in normal numbers  and architecture  Gallbladder: no significant morphologic abnormality          Follow-ups Needed After Discharge   Follow-up Appointments     Adult [facility] Follow-up and recommended labs and tests      [facility] FOLLOW UP:     1. Follow up in Transplant Clinic weekly for the next 5 weeks with Dr. Aime Lowe (or any available liver transplant surgeon).     2. Follow up with Transplant Hepatology in 3 months.      3. Follow up with Nephrology for ongoing dialysis needs          Call your Transplant Coordinator ([phone]) with questions about   Transplant Center appointment scheduling.     LABS:     CBC, BMP, magnesium, phosphorus, hepatic panel, tacrolimus level every   Monday  and Thursday by home health care nurse if arranged, or at an   outpatient lab.         Appointments on [place] and/or [facility] (with [facility] or [facility]   provider or service). Call [phone] if you haven't heard regarding   these appointments within 7 days of discharge.            Unresulted Labs Ordered in the Past 30 Days of this Admission       Date and Time Order Name Status Description    11/13/2023 11:30 PM Zinc In process     11/5/2023  6:36 PM Prepare pheresed platelets (unit) Preliminary     11/5/2023  6:36 PM Prepare pheresed platelets (unit) Preliminary     11/5/2023  5:47 PM Prepare red blood cells (unit) Preliminary     11/5/2023  5:47 PM Prepare red blood cells (unit) Preliminary     11/5/2023  5:47 PM Prepare red blood cells (unit) Preliminary     11/5/2023  5:47 PM Prepare red blood cells (unit) Preliminary     11/5/2023  5:47 PM Prepare red blood cells (unit) Preliminary     11/5/2023  5:47 PM Prepare plasma (unit) Preliminary     11/5/2023  5:47 PM Prepare plasma (unit) Preliminary     11/5/2023  5:47 PM Prepare plasma (unit) Preliminary     11/5/2023  5:47 PM Prepare plasma (unit) Preliminary     11/5/2023 12:47 PM Prepare red blood cells (unit) Preliminary     11/5/2023 12:47 PM Prepare red  blood cells (unit) Preliminary     11/5/2023 12:47 PM Prepare red blood cells (unit) Preliminary     11/5/2023 12:47 PM Prepare red blood cells (unit) Preliminary     11/5/2023 12:46 PM Prepare plasma (unit) Preliminary     11/4/2023  1:36 PM Prepare red blood cells (unit) Preliminary     11/4/2023  1:36 PM Prepare red blood cells (unit) Preliminary     11/4/2023  1:36 PM Prepare red blood cells (unit) Preliminary     11/4/2023  1:36 PM Prepare red blood cells (unit) Preliminary     11/4/2023  1:36 PM Prepare red blood cells (unit) Preliminary     11/4/2023  1:36 PM Prepare red blood cells (unit) Preliminary     11/4/2023  1:36 PM Prepare red blood cells (unit) Preliminary     11/4/2023  1:36 PM Prepare red blood cells (unit) Preliminary     11/4/2023  1:36 PM Prepare red blood cells (unit) Preliminary     11/4/2023  1:36 PM Prepare red blood cells (unit) Preliminary             Discharge Disposition   Discharged to home  Condition at discharge: Stable    Hospital Course   Lola Milner is a 33 year old female with PMH significant for r MDD, prior tobacco use, gout, polyneuropathy and decompensated cirrhosis secondary to alcohol use c/b hepatic encephalopathy, esophageal varices s/p banding, recurrent ascities and hepatorenal syndrome. She had been admitted to [facility] 10/23-10/29 and referred for liver transplant eval at [facility]. She was seen in Hepatology and Transplant clinic 10/31/23 and was sent to the ED for admission for worsening renal function and leukocytosis. While hospitalized she was listed and an organ offer became available to her. She underwent a liver transplant with Dr. Aime Lowe on 11/5/23. The following problems were addressed during her hospitalization:    Principal Problem:    End stage liver disease (H)  Active Problems:    Adjustment disorder with anxiety    Major depressive disorder, single episode, moderate (H)    Hypomagnesemia    Immunosuppressed status (H24)    EDGAR (acute kidney injury)  (H24)    Hypervolemia associated with renal insufficiency    Liver transplant:   Transaminases trended down as expected after transplant.  AP remains WNL but uptrending at discharge. TB stable ~2. at discharge.  Postop US liver normal graft, patent vasculature. Perihepatic fluid collection present. All surgical drains removed prior to discharge.    Immunosuppressed due to medications:    -Induction basiliximab x2 doses due to EDGAR,renal spairing  -Induction steroid taper per protocol.  -Tacrolimus, goal level 8-10.  -Mycophenolate 750mg BID.  -Prednisone 5 daily while tacrolimus level <8.  -Opportunistic infectious prophylaxis with Bactrim x6 months and Valcyte [id number].    Transplant coordinator Mariola Castellanos [phone].  Biliary stent:  No   Donor status:  DBD  CMV D + / R -  EBV D + / R +  Anticoagulation plan: Aspirin x3 months     Neuro:   Acute surgical pain: continue Oxycodone 2.5-5 Q6PRN, and robaxin TID PRN  Neuropathic pain: continue gabapentin 200mg QHS     Hematology:   Anemia of chronic disease/ Acute blood loss anemia requiring transfusion: Required transfusion prior to transplant. Postoperatively, last transfused 11/5. Hgb remains stable ~9.   Thrombocytopenia: resolved, PLT >200.   Coagulopathy: due to liver disease, INR 1.1 at discharge.     Cardiorespiratory:   Circulatory failure requiring pressor support: Resolved, Pressors discontinued 11/6.  Hx hypotension: PTA on Midodrine 15 TID, not resumed postoperatively. SBP Improved to 110-120s by  discharge  Mechanical ventilation perioperative: Unable to extubate due to apneic episodes during PS trial on POD 1 and 2. Extubated POD 3. Stable on RA. Encourage CDB/IS.     GI/Nutrition:   Severe malnutrition in the context of acute illness:  Regular diet +Ensure High protein . Continue with High protein diet x 8 weeks. RD following, Calorie counts remain below goal and will continue to work on nutrition as an OP.     Endocrine:   Steroid induced  hyperglycemia: Requiring an insulin drip initially postoperatively with steroid taper and transitioned to a sliding scale insulin. PbdO2a=8.3. Off insulin prior to discharge.     Fluid/Electrolytes:   EDGAR: EDGAR secondary to HRS, possible bile cast nephropathy. Started on CRRT post op until 11/9 then transitioned to iHD. Last HD was 11/13. Will plan for outpatient dialysis at this time on a PRN basis. IR placed a tunneled line 11/13  Hypokalemia: Replete, K 3.5 at discharge.   Hypervolemia/ ALYSSA:below admission weight but presented with Large vol ascites/Edema. Continues with bilateral ALYSSA, OT placed lymphedema wraps. Will continue diuresis with Bumex 2mg BID.   Hypomagnesemia:  continue mag ox 800mg daily.     :   Anuric with DEGAR, improving, Voiding ~1L by discharge with diuretics.  monitor Strict I&O for signs of renal recovery.     Infectious disease: Afebrile. WBC normal.     Consultations This Hospital Stay   NURSING TO CONSULT FOR VASCULAR ACCESS CARE IP CONSULT  INTERNAL MEDICINE PROCEDURE TEAM ADULT IP CONSULT [facility] - [facility]  CARE MANAGEMENT / SOCIAL WORK IP CONSULT  PHYSICAL THERAPY ADULT IP CONSULT  OCCUPATIONAL THERAPY ADULT IP CONSULT  INTERNAL MEDICINE PROCEDURE TEAM ADULT IP CONSULT [facility] - [facility]  LYMPHEDEMA THERAPY IP CONSULT  OPHTHALMOLOGY IP CONSULT  NUTRITION SERVICES ADULT IP CONSULT  GI HEPATOLOGY ADULT IP CONSULT  GYNECOLOGY IP CONSULT  SOCIAL WORK IP CONSULT  NUTRITION SERVICES ADULT IP CONSULT  PHYSICAL THERAPY ADULT IP CONSULT  OCCUPATIONAL THERAPY ADULT IP CONSULT  NUTRITION SERVICES ADULT IP CONSULT  OCCUPATIONAL THERAPY ADULT IP CONSULT  PHYSICAL THERAPY ADULT IP CONSULT  PHARMACY IP CONSULT  SOT MEDICATION HISTORY IP PHARMACY CONSULT  PHARMACY IP CONSULT  PHARMACY CRRT IP CONSULT  NURSING TO CONSULT FOR VASCULAR ACCESS CARE IP CONSULT  PSYCHOLOGY ADULT IP CONSULT  INTERVENTIONAL RADIOLOGY ADULT/PEDS IP CONSULT  NURSING TO CONSULT FOR VASCULAR ACCESS CARE IP  CONSULT  INFECTIOUS DISEASE TRANSPLANT SOT ADULT IP CONSULT  NURSING TO CONSULT FOR VASCULAR ACCESS CARE IP CONSULT  LYMPHEDEMA THERAPY IP CONSULT  NURSING TO CONSULT FOR VASCULAR ACCESS CARE IP CONSULT    Code Status   Full Code    Time Spent on this Encounter   I, Gladys Carmen NP, personally saw the patient today and spent greater than 30 minutes discharging this patient.       Gladys Carmen NP  [facility]  ______________________________________________________________________    Physical Exam   Temp: 97.9  F (36.6  C) Temp src: Oral BP: 111/76 Pulse: 81   Resp: 16 SpO2: 100 % O2 Device: None (Room air)    Vitals:    11/13/23 0706 11/14/23 1002 11/14/23 1100   Weight: 70.1 kg (154 lb 9.6 oz) 67.2 kg (148 lb 1.6 oz) 67.9 kg (149 lb 11.1 oz)     Vital Signs with Ranges  Temp:  [97.7  F (36.5  C)-98.5  F (36.9  C)] 97.9  F (36.6  C)  Pulse:  [74-93] 81  Resp:  [16] 16  BP: (107-126)/(76-83) 111/76  SpO2:  [97 %-100 %] 100 %  I/O last 3 completed shifts:  In: 3055 [P.O.:2950; I.V.:105]  Out: 1270 [Urine:1270]    General Appearance: in no apparent distress   Skin: normal, warm, jaundice-improving. Scattered Ecchymosis, resolving to bilateral UEs  Heart: RRR  Lungs: NLB on RA  Abdomen:  Soft, non distended, non tender.  The wound is CDI with staples.   : voiding without retention  Extremities: b/l ALYSSA, pitting  +2,  ACE wraps  Neurologic: AAOx3  Lines: Right tunneled HD line, CDI    Primary Care Physician   Physician No Ref-Primary    Discharge Orders      Reason for your hospital stay    Liver transplant surgery     Activity    Walk at least four times a day, lift no greater than 10 pounds for 8 weeks from the time of surgery.  No driving while taking narcotics or 3 weeks after surgery.     When to contact your care team    WHEN TO CONTACT YOUR  COORDINATOR:     Transplant Coordinator [phone]     Notify your coordinator if you have pain over your liver,  increased redness or drainage from your incision, fever greater than 100F, or yellowing of skin or eyes.     Notify your coordinator immediately if you are ever unable to take your immunosuppressive medications for any reason.     If you have URGENT concerns after office hours, please call the hospital switchboard at [phone] and ask to have the organ transplant nurse on-call paged. If you have a life-threatening emergency, go to the nearest emergency room.     Wound care and dressings    If you have staples in place, they will be removed in 3 weeks after operation. Wash incision daily with soap and water. Do not soak or scrub.     IV access    **Ordering Provider MUST call/page Care Coordinator/ to discuss arranging this service**    You are going home with the following vascular access device: Hemodialysis.     Adult [facility] Follow-up and recommended labs and tests    [facility] FOLLOW UP:     1. Follow up in Transplant Clinic weekly for the next 5 weeks with Dr. Aime Lowe (or any available liver transplant surgeon).     2. Follow up with Transplant Hepatology in 3 months.      3. Follow up with Nephrology for ongoing dialysis needs          Call your Transplant Coordinator ([phone]) with questions about Transplant Center appointment scheduling.     LABS:     CBC, BMP, magnesium, phosphorus, hepatic panel, tacrolimus level every Monday  and Thursday by home health care nurse if arranged, or at an outpatient lab.         Appointments on [place] and/or [facility] (with [facility] or [facility] provider or service). Call [phone] if you haven't heard regarding these appointments within 7 days of discharge.     Monitor and record    Fluid intake and output daily  Monitor for signs of renal recovery with urine output.  Weight every day. Call your coordinator with daily values.     Diet    Diet recommendations post-transplant: Heart healthy dietary habits long term (low  saturated/trans fat, low sodium). High protein diet x 8 weeks. Practice food safety precautions.       Significant Results and Procedures   Most Recent 3 CBC's:  Recent Labs   Lab Test 11/15/23  0619 11/14/23  0719 11/13/23  0548   WBC 7.5 7.6 9.7   HGB 9.0* 8.9* 9.8*   MCV 96 96 96    205 167     Most Recent 3 BMP's:  Recent Labs   Lab Test 11/15/23  0619 11/14/23  0838 11/14/23  0720 11/13/23  0832 11/13/23  0548   *  --  135  --  134*   POTASSIUM 3.5  --  3.7  --  3.7   CHLORIDE 99  --  103  --  103   CO2 24  --  24  --  19*   BUN 13.5  --  9.9  --  15.0   CR 1.62*  --  1.42*  --  1.79*   ANIONGAP 9  --  8  --  12   GEE 8.4*  --  8.6  --  8.3*   GLC 86 84 84   < > 86    < > = values in this interval not displayed.     Most Recent 2 LFT's:  Recent Labs   Lab Test 11/15/23  0619 11/14/23  0720   AST 14 14   ALT 9 10   ALKPHOS 109 94   BILITOTAL 2.2* 2.1*     Most Recent 3 INR's:  Recent Labs   Lab Test 11/15/23  0619 11/14/23  0720 11/13/23  0548   INR 1.10 1.21* 1.18*     Most Recent Hemoglobin A1c:  Recent Labs   Lab Test 11/08/23  1146   A1C 5.3   ,   Results for orders placed or performed during the hospital encounter of 10/31/23   XR Chest Port 1 View    Narrative    EXAM: XR CHEST PORT 1 VIEW 10/31/2023 12:59 PM      HISTORY: sob.    COMPARISON: None.     TECHNIQUE: Frontal view of the chest.    FINDINGS: Trachea is midline. Mild cardiomegaly.. Pulmonary  vasculature is distinct. No focal airspace opacity, pleural effusion,  pneumothorax. Low lung volumes.    No acute osseous abnormality. Visualized soft tissues and upper  abdomen are unremarkable.      Impression    IMPRESSION:   1. No focal consolidative opacity.  2. Cardiomegaly.  3. Low lung volumes.    I have personally reviewed the examination and initial interpretation  and I agree with the findings.    YAZ IRVIN MD         SYSTEM ID:  [id number]   POC US Guide for Paracentesis    Impression    US Indication: decompensated liver  disease    Limited abdominal ultrasound was performed and demonstrated an adequate fluid collection on the right side of the abdomen.     Doppler of the skin demonstrated an area at this site without significant vasculature.  A paracentesis at this site was subsequently performed.    Janett Marinelli MD     XR Chest 2 Views    Narrative    Exam: XR CHEST 2 VIEWS, 11/4/2023 11:24 AM    Indication: Preop liver transplant    Comparison: Chest 10/31/2023    Findings:   Trachea is midline. Cardiac and mediastinal contours stable with mild  enlargement of the heart. There is some retrocardiac opacity  demonstrated with slight blunting of the costophrenic angles. Upper  abdomen unremarkable.      Impression    Impression: Retrocardiac opacity demonstrated most likely atelectasis  however could be correlated clinically with any infectious symptoms  given preoperative status. Trace pleural effusions.    LAISHA DEMPSEY MD         SYSTEM ID:  [id number]   XR Chest Port 1 View    Narrative    EXAM: XR CHEST PORT 1 VIEW  11/5/2023 8:37 PM      HISTORY: cvc    COMPARISON: 11/4/2023    FINDINGS: Single view of the chest. Right IJ [place]-Octavia catheter tip  terminates in the right main pulmonary artery. Endotracheal tube tip  terminates approximately 2 cm from the farshad. Enteric tube courses  inferiorly below the diaphragm and out of the field of view.  Postsurgical changes of liver transplantation with surgical clips  projecting over the right upper quadrant. 2 partially visualized right  upper quadrant surgical drains are present. Trachea is midline. Stable  enlargement of the cardiac silhouette. Dense retrocardiac opacity. New  patchy opacity in the left upper lobe. Silhouetting of the left  hemidiaphragm. No pleural effusion or pneumothorax.      Impression    IMPRESSION:   1. Right IJ [place]-Octavia catheter tip terminates in the right main  pulmonary artery.  2. Endotracheal tube is approximately 2 cm from the farshad.  3. Post surgical  changes of liver transplantation.  4. Left lower lobe atelectasis atelectasis.   5. Patchy opacity in the left upper lobe likely atelectasis. Early  consolidative pneumonia or aspiration should be considered. Attention  on follow-up.    I have personally reviewed the examination and initial interpretation  and I agree with the findings.    VAHID BECKHAM MD         SYSTEM ID:  [id number]   XR Abdomen 1 View    Narrative    EXAMINATION:  XR ABDOMEN 1 VIEW 11/5/2023     COMPARISON: none..    HISTORY: confirm Ng tube placement.    TECHNIQUE: Frontal supine view of the abdomen.    FINDINGS: Enteric tube tip and sidehole projected over the expected  location of the stomach. Postsurgical changes of liver transplant with  surgical clips projecting over the right upper quadrant. 2 right upper  quadrant drains are present. No abnormally dilated loops of bowel,  free air, or pneumatosis in the visualized abdomen.. No portal venous  gas.  The lung bases are unremarkable.       Impression    IMPRESSION: Enteric tube tip and sidehole project over the expected  location of the stomach.    I have personally reviewed the examination and initial interpretation  and I agree with the findings.    VAHID BECKHAM MD         SYSTEM ID:  [id number]   US Liver Transplant    Narrative    EXAMINATION: US LIVER TRANSPLANT, 11/5/2023 9:31 PM     COMPARISON: None.    HISTORY: Liver transplant.    TECHNIQUE:  Gray-scale, color Doppler and spectral flow analysis.    FINDINGS:   There is no ascites.    Liver:   The liver demonstrates normal homogeneous echotexture. No  evidence of a focal hepatic mass.  There is a perihepatic fluid  collection measuring 6.6 x 2.2 cm x 6.0 with surgical drain in place.    Bile Ducts: Both the intra- and extrahepatic biliary system are of  normal caliber.  The common bile duct measures 5 mm in diameter.    Gallbladder: The gallbladder is surgically absent.    Kidneys:   Right kidney:  The right kidney demonstrates  normal echotexture with  no evidence of a shadowing stone, focal mass or hydronephrosis.   12.6  cm in long axis dimension.  Left kidney:  The left kidney demonstrates normal echotexture with no  evidence of a shadowing stone, focal mass or hydronephrosis.   13.7 cm  in long axis dimension.    Pancreas: Not visualized.    Spleen:  The spleen is enlarged, measuring 15.8 cm.    Visualized portions of the aorta are unremarkable.    LIVER DOPPLER:  Splenic vein:  Patent continuous normal antegrade direction flow  towards the liver, 36 cm/sec.  Extrahepatic portal vein:  Patent continuous antegrade flow, 47  cm/sec.  Portal vein at anastomosis: Patent continuous antegrade flow, 60  cm/sec.  Intrahepatic portal vein:  Patent continuous antegrade flow, 65  cm/sec.  Right portal vein flow is antegrade, measuring 47 cm/sec.  Left portal vein flow is antegrade, measuring 64 cm/sec.    Inferior vena cava: patent with flow toward the heart throughout..  IVC above anastomosis:  76 cm/sec.  IVC at anastomosis:  165 cm/sec.  Intrahepatic IVC:  59 cm/sec.  Extrahepatic IVC:  31 cm/sec.    Right, mid, left hepatic veins: Patent with flow towards the inferior  vena cava.    Extrahepatic hepatic artery: Low resistance waveform with flow towards  the liver. 85 cm/sec with resistive index 0.73.  Right hepatic artery: 45 cm/sec with resistive index 0.60.  Left hepatic artery: 115 cm/sec with resistive index 0.7.    Small left pleural effusion.      Impression    Impression:   1.  Unremarkable appearance of the transplant liver.  2.  Perihepatic fluid collection in the hepatorenal space measuring up  to 6.6 cm with surgical drain in place.  3.  Small left pleural effusion.    I have personally reviewed the examination and initial interpretation  and I agree with the findings.    VAHID BECKHAM MD         SYSTEM ID:  [id number]   XR Chest Port 1 View    Narrative    Exam: XR CHEST PORT 1 VIEW, 11/7/2023 4:53 PM    Comparison: Chest x-ray  11/5/2023    History: Right IJ CVC rewired to Trial[place] catheter, verify placement    Findings:  Single portable AP view of the chest with patient in 90 degrees. Right  IJ CVC tip projects over the low SVC. Enteric tube tip and sidehole  project over the stomach. Right basilar chest tube is stable in  position. Interval removal of right [place]-Octavia catheter. Endotracheal  tube tip projects over the mid thoracic trachea. Trachea is midline.  Cardiomediastinal silhouette is mildly enlarged. Dense retrocardiac  opacity stable. Continued silhouetting of the left hemidiaphragm.  Improved aeration of the left upper lobe.. No pneumothorax or pleural  effusion. Surgical clips project over the right upper quadrant. The  upper abdomen is otherwise unremarkable.. No acute osseous  abnormalities.      Impression    Impression:   1. Right IJ CVC tip projects over the low SVC. Other devices as above.  2. Improved aeration of the left upper lobe. Persistent dense  retrocardiac opacity and silhouetting of left hemidiaphragm, likely  atelectasis.    I have personally reviewed the examination and initial interpretation  and I agree with the findings.    YAZ IRVIN MD         SYSTEM ID:  [id number]   IR CVC Tunnel Placement > 5 Yrs of Age    Narrative    PROCEDURE: Tunneled dialysis catheter placement     Procedural Personnel  Attending physician(s): Kathryn Chin MD   Fellow physician(s): Howard Interiano MD  Resident physician(s): Cooper Daniel MD  Advanced practice provider(s): None    Pre-procedure diagnosis: Acute kidney injury  Post-procedure diagnosis: Same  Indication (QCDR): Performance of hemodialysis  Additional clinical history: MDD, prior tobacco use, gout,  polyneuropathy and decompensated cirrhosis secondary to alcohol use  c/b?hepatic encephalopathy,?esophageal varices s/p banding, recurrent  ascites?and hepatorenal syndrome.?Status post liver transplant  11/5/23.    Complications: No immediate  complications.      Impression    IMPRESSION:    Insertion of right-sided tunneled dialysis catheter, with tip in the  expected location of the right atrium.    Plan:     The catheter may be used immediately.  _______________________________________________________________    PROCEDURE SUMMARY:  - Venous access with ultrasound guidance  - Tunneled dialysis catheter insertion with fluoroscopic guidance  - Additional procedure(s): None    PROCEDURE DETAILS:    Pre-procedure  Consent: Informed consent for the procedure including risks, benefits  and alternatives was obtained and time-out was performed prior to the  procedure.  Preparation (MIPS): The site was prepared and draped using all  elements of maximal sterile barrier technique including sterile  gloves, sterile gown, cap, mask, large sterile sheet, sterile  ultrasound probe cover, hand hygiene and cutaneous antisepsis with 2%  chlorhexidine.   Medical reason for site preparation exception (MIPS): Not applicable    Anesthesia/sedation  Level of anesthesia/sedation: Moderate sedation (conscious sedation)  Anesthesia/sedation administered by: Independent trained observer  under attending supervision with continuous monitoring of the  patient?s level of consciousness and physiologic status  Total intra-service sedation time (minutes): 30    Access  Local anesthesia was administered. The vessel was sonographically  evaluated and determined to be patent. Real time ultrasound was used  to visualize needle entry into the vessel and a permanent image was  stored.  Laterality: Right  Vein accessed (QCDR): Internal jugular vein  Internal jugular vein patency (QCDR): Patent or otherwise accessible  on at least one side  Access technique: Micropuncture set with 21 gauge needle    Catheter placement  An incision was made near the venous access site and the catheter was  tunneled subcutaneously to the venous access site. The catheter was  advanced via a peel-away sheath into  the vein under fluoroscopic  guidance. Catheter tip location was fluoroscopically verified and a  permanent image was stored.  Catheter placed: 14.5 x 23 cm tip to cuff Palidrome DL  Lot number: [id number].  Catheter cuff-to-tip length (cm): 2  Catheter tip: right atrium  Catheter flush: Heparin (100 units/mL)    Closure  A sterile dressing was applied.  Access site closure technique: Skin glue  Catheter securement technique: Non-absorbable suture    Radiation Dose  Fluoroscopy time (minutes): 3.4    Reference air kerma (mGy): 9.1   Kerma area product (uGy-cm2): 103.6     Additional Details  Additional description of procedure: None  Registry event: V/3/g  Device used: 14.5 x 23 cm tip to cuff Palindrome DL  Equipment details: None  Unique Device Identifiers: Not available  Specimens removed: None  Estimated blood loss (mL): Less than 10  Standardized report: [email or web address]_v3.1    Attestation  Signer name: ZURI NOBLE MD  I attest that I was present for the entire procedure. I reviewed the  stored images and agree with the report as written.     Echo Limited     Value    LVEF  65-70%    Narrative    [id number]  [id number]  [id number]  [id number]^SANDY^GENNARO^DELVIN     [facility],[place]  Echocardiography Laboratory  [address] [place], [place] [address]     Name: STEWART BRADFORD  MRN: [id number]  : 1990  Study Date: 2023 09:54 AM  Age: 33 yrs  Gender: Female  Patient Location: [facility]  Reason For Study: Pre-Liver Transplant  Ordering Physician: GENNARO DELGADO  Performed By: Lo Duarte     BSA: 1.7 m2  Height: 63 in  Weight: 150 lb  BP: 121/93 mmHg  ______________________________________________________________________________  Procedure  Limited Portable Echo Adult.  ______________________________________________________________________________  Interpretation Summary  Global and regional left ventricular function is hyperkinetic with an EF of  65-70%.  Global right  ventricular function is normal.  No significant valvular abnormalities present.  Mild pulmonary hypertension is present.  IVC diameter >2.1 cm collapsing <50% with sniff suggests a high RA pressure  estimated at 15 mmHg or greater.  Trivial pericardial effusion is present.  There is no prior study for direct comparison.  ______________________________________________________________________________  Left Ventricle  Global and regional left ventricular function is hyperkinetic with an EF of  65-70%. Left ventricular size is normal. Left ventricular wall thickness is  normal. Left ventricular diastolic function is not assessable.     Right Ventricle  The right ventricle is normal size. Global right ventricular function is  normal.     Atria  Mild biatrial enlargement is present.     Mitral Valve  The mitral valve is normal. Trace mitral insufficiency is present.     Aortic Valve  Aortic valve is normal in structure and function.     Tricuspid Valve  Mild tricuspid insufficiency is present. The right ventricular systolic  pressure is approximated at 35.8 mmHg plus the right atrial pressure. Mild  pulmonary hypertension is present.     Pulmonic Valve  The pulmonic valve is normal.     Vessels  The aorta root is normal. IVC diameter >2.1 cm collapsing <50% with sniff  suggests a high RA pressure estimated at 15 mmHg or greater.     Pericardium  Trivial pericardial effusion is present.     Miscellaneous  A left pleural effusion is present. No significant valvular abnormalities  present.     Compared to Previous Study  There is no prior study for direct comparison.  ______________________________________________________________________________  MMode/2D Measurements & Calculations  IVSd: 0.77 cm  LVIDd: 4.6 cm  LVIDs: 2.9 cm  LVPWd: 0.89 cm  FS: 37.4 %  LV mass(C)d: 126.2 grams  LV mass(C)dI: 73.8 grams/m2  LVOT diam: 1.8 cm  LVOT area: 2.5 cm2  RWT: 0.38  TAPSE: 2.2 cm     Doppler Measurements & Calculations  LV V1 max  P.6 mmHg  LV V1 max: 177.6 cm/sec  LV V1 VTI: 33.2 cm  SV(LVOT): 83.5 ml  SI(LVOT): 48.8 ml/m2  PA acc time: 0.14 sec     TR max radha: 299.1 cm/sec  TR max P.8 mmHg     ______________________________________________________________________________  Report approved by: Javier Segal 2023 03:38 PM         Dobutamine Stress Echocardiogram    Narrative    [id number]  [facility]  [id number]  [id number]^DANISHA^LOBO^BETTY     [facility],[place]  Echocardiography Laboratory  [address] [place] [address]     Name: STEWART BRADFORD  MRN: [id number]  : 1990  Study Date: 2023 03:16 PM  Age: 33 yrs  Gender: Female  [facility]  Reason For Study: Pre-Liver Transplant  Ordering Physician: LOBO RAMAN  Performed By: Virgie Dinh RDCS     BSA: 1.7 m2  Height: 63 in  Weight: 150 lb  ______________________________________________________________________________  Procedure  Stress Echo Dobutamine Adult. Contrast Definity.  ______________________________________________________________________________  Interpretation Summary  Non diagnostic dobutamine echocardiogram given target heart rate was not  achieved (reached 75% of MPHR).  However at this degree of stress there was no inducible ischemia.  Normal biventricular size, thickness, and global systolic function at  baseline, LVEF=60-65%.  With dobutamine, LVEF increased to >70% and LV cavity size decreased  appropriately.  No regional wall motion abnormalities at rest or with dobutamine.  No angina was elicited.  No ECG evidence of ischemia. Normal heart rate and blood pressure response to  dobutamine.  Baseline pictures not taken given complete echo performed the day before.  ______________________________________________________________________________  Stress  The drug infusion was stopped due to maximum amount of medication given.  Target HR not acheived.  The patient did not exhibit any symptoms during  drug infusion.  The maximum dose of dobutamine was 50mcg/kg/min.  The maximum dose of atropine was 2.0mg.  The maximum dose of metoprolol was 3mg.     Stress Results                                       Maximum Predicted HR:   189 bpm             Target HR: 161 bpm        % Maximum Predicted HR: 75 %                                  DurationHeart Rate                        Stage  (mm:ss)   (bpm)    BP  Dose                       Baseline  0:00      76    89/490.00                         Peak   14:55     142    97/3650.00                         Stress Duration:   14:55 mm:ss                      Maximum Stress HR: 142 bpm     Contrast  Optison (NDC #2315-6694-19) given intravenously. Optison Expiration  12/27/2024 . Patient was given 7 ml mixture of 3 ml Optison and 6 ml saline. 2  ml wasted. Optison Lot # [id number] .     ______________________________________________________________________________  Report approved by: Ruthie Lawton 11/02/2023 04:18 PM     ______________________________________________________________________________          Discharge Medications   Current Discharge Medication List        START taking these medications    Details   acetaminophen (TYLENOL) 325 MG tablet Take 1-2 tablets (325-650 mg) by mouth every 6 hours as needed for mild pain  Qty: 100 tablet, Refills: 0    Associated Diagnoses: Liver transplanted (H)      bumetanide (BUMEX) 2 MG tablet Take 1 tablet (2 mg) by mouth 2 times daily  Qty: 10 tablet, Refills: 0    Associated Diagnoses: EDGAR (acute kidney injury) (H24)      magnesium oxide (MAG-OX) 400 MG tablet Take 2 tablets (800 mg) by mouth daily  Qty: 60 tablet, Refills: 1    Associated Diagnoses: Hypomagnesemia      methocarbamol (ROBAXIN) 500 MG tablet Take 1 tablet (500 mg) by mouth 3 times daily as needed for muscle spasms  Qty: 12 tablet, Refills: 0    Associated Diagnoses: Liver transplanted (H)      mycophenolate (GENERIC EQUIVALENT) 250 MG capsule  Take 3 capsules (750 mg) by mouth 2 times daily  Qty: 180 capsule, Refills: 1    Associated Diagnoses: End stage liver disease (H)      oxyCODONE (ROXICODONE) 5 MG tablet Take 0.5-1 tablets (2.5-5 mg) by mouth every 6 hours as needed for severe pain  Qty: 5 tablet, Refills: 0    Associated Diagnoses: Liver transplanted (H)      predniSONE (DELTASONE) 5 MG tablet Take 1 tablet (5 mg) by mouth daily  Qty: 30 tablet, Refills: 0    Associated Diagnoses: Liver transplanted (H)      sulfamethoxazole-trimethoprim (BACTRIM) 400-80 MG tablet Take 1 tablet by mouth three times a week Titrate up to daily when directed by transplant team with improving renal function  Qty: 30 tablet, Refills: 1    Associated Diagnoses: Liver transplanted (H)      !! tacrolimus (GENERIC) 0.5 MG capsule Take 1 capsule (0.5 mg) by mouth 2 times daily as needed (dose titration)  Qty: 30 capsule, Refills: 0    Comments: Take 1 cap by mouth twice daily as directed by Transplant Center for dose changes.  Associated Diagnoses: Liver transplanted (H)      !! tacrolimus (GENERIC) 1 MG capsule Take 4 capsules (4 mg) by mouth 2 times daily  Qty: 240 capsule, Refills: 1    Associated Diagnoses: End stage liver disease (H)      valGANciclovir (VALCYTE) 450 MG tablet Take 1 tablet (450 mg) by mouth twice a week Titrate up to 450mg daily when directed by transplant team with improving renal function  Qty: 30 tablet, Refills: 2    Associated Diagnoses: End stage liver disease (H)      Vitamin D3 (CHOLECALCIFEROL) 25 mcg (1000 units) tablet Take 2 tablets (50 mcg) by mouth daily  Qty: 30 tablet, Refills: 1    Associated Diagnoses: Vitamin D deficiency       !! - Potential duplicate medications found. Please discuss with provider.        CONTINUE these medications which have CHANGED    Details   gabapentin (NEURONTIN) 100 MG capsule Take 2 capsules (200 mg) by mouth at bedtime  Qty: 60 capsule, Refills: 0    Associated Diagnoses: Liver transplanted (H)       Multiple Vitamins-Minerals (THERA-TABS M) TABS Take 1 tablet by mouth daily  Qty: 30 tablet, Refills: 1    Associated Diagnoses: Liver transplanted (H)      omeprazole (PRILOSEC) 20 MG DR capsule Take 1 capsule (20 mg) by mouth daily  Qty: 30 capsule, Refills: 1    Associated Diagnoses: Liver transplanted (H)           CONTINUE these medications which have NOT CHANGED    Details   melatonin 3 MG tablet Take 3 mg by mouth nightly as needed           STOP taking these medications       lactulose (CHRONULAC) 10 GM/15ML solution Comments:   Reason for Stopping:         midodrine (PROAMATINE) 5 MG tablet Comments:   Reason for Stopping:         sodium bicarbonate 650 MG tablet Comments:   Reason for Stopping:             Allergies   No Known Allergies       Attestation:    The patient has been seen with team and evaluated by me.   I spent <30 min with discharge planning  Vital signs, labs, medications and orders were reviewed.   When obtained, diagnostic images were reviewed by me and interpreted as above.    The care plan was discussed with the multidisciplinary team and I agree with the findings and plan in this note, with any differences recorded in blue.    Immunosuppressive medication management was reviewed and adjusted as reflected in the note and orders.       Papa Coe MD  Professor of Surgery

## 2023-11-15 NOTE — PROGRESS NOTES
Care Management Discharge Note    Discharge Date: 11/15/2023       Discharge Disposition: Home (Pending transplant evaluation)    Discharge Services: [facility] (PTA)    Discharge DME: None    Discharge Transportation: car, drives self, family or friend will provide    Private pay costs discussed: Not applicable    Does the patient's insurance plan have a 3 day qualifying hospital stay waiver?  No    PAS Confirmation Code:  N/A  Patient/family educated on Medicare website which has current facility and service quality ratings:  N/A    Education Provided on the Discharge Plan:  Yes  Persons Notified of Discharge Plans: Pt, pt's signifiant other, patient's mother  Patient/Family in Agreement with the Plan: yes    Handoff Referral Completed:  RN[facility] following for discharge needs     Additional Information:  Lola Milner is a 33 year old female who underwent a  donor liver transplant on 2023.    Plan for 24 hour care for immediate post transplant period: Significant other, mother and step-dad    If not local, plans for short term stay:  [place] apartments. Funding approved until December 15  Education and resources provided by  at discharge: role of transplant  in out patient setting and provided contact info for , availability of support groups    Discussed anticipated pharmacy out of pocket costs: YES    Provided [employer] Donor Letter Writing packet : YES    IMM Given: Not needed      Melanie Baer, ARLETTESW

## 2023-11-15 NOTE — PROGRESS NOTES
Patient has been educated on potential risks of choosing to leave the unit and that the responsibility for patient well-being will belong to the patient. Pt has been informed that admission to hospital is due to need for medical treatment. Education given to the patient on some of the potential risks included but are not limited to:      - lack of access to nursing intervention      - possible missed appointments with MD, therapies, tests      - possible missed medications, antibiotics, management of IV's    Patient Response: patient verbalized understanding    Patient notified staff prior to leaving unit: yes  Coban wrap placed over IV prior to pt leaving unit: no, IV infusing

## 2023-11-15 NOTE — TELEPHONE ENCOUNTER
Post Liver Transplant Coordinator Discharge Visit       Patient informed of the need for primary care  appointments within 2 weeks if being discharged to home:yes    Patient informed of lab frequency and post transplant follow up with surgeon/NP: yes    Class attendance and educational needs have been addressed:yes     Patient has transplant handbooks, and other supplies as needed:yes    Patient knows the names of immunosuppressive medications:yes    Transition plan: [place]    Pharmacy to be used after discharge:[place] [facility].    Home Health Care:no    Discussed no grape fruit or grape fruit juice. Discussed reading labels. Reviewed beverages that contain grape fruit/grape fruit juice.    Informed patient of annual derm appt for a transplant skin check and risk for skin cancer.    Reviewed if anyone other than the transplant team orders medications to contact the transplant office to review before taking.     Informed patient if becomes ill, forgets to take medications, or other emergencies on how to contact the transplant office.    Stent: no stent     Organ specific education completed, and discharge planning has been conducted with multidisciplinary transplant care team including physicians, pharmacy, nutrition, and social work.

## 2023-11-15 NOTE — PLAN OF CARE
/82 (BP Location: Right arm)   Pulse 83   Temp 97.7  F (36.5  C) (Oral)   Resp 16   Wt 67.9 kg (149 lb 11.1 oz)   SpO2 97%   BMI 26.52 kg/m      Shift: 2184-1295  Isolation Status: N/A  VS: stable on RA, afebrile  Neuro: A&O x4, patient reporting some brain fog  Behaviors: receptive to cares  BG: N/A  Labs/Imaging: ALT 10, AST 14, Bilirubin direct 1.23, Bilirubin total 2.1  Respiratory: WDL  Cardiac: WDL  Pain/Nausea: Denies nausea. C/O intermittent 8-10/10 pain, given scheduled Tylenol 325 mg PO and PRN Oxycodone and Robaxin for pain.  PRN: Oxycodone 2.5 mg PO x1 and Robaxin 500 mg PO x1  Diet: regular with calorie counts  IV Access: R IJ CVC, L PIV  Infusion(s): N/A  Lines/Drains: N/A  GI/: LBM 11/14. Voiding spontaneously, output ~650 mL yellow urine overnight  Skin: Clamshell incision stapled, CLIFFORD. R HARLEY removal site leaking clear/tan fluid, dressing changed x2 on shift.  Mobility: UAL/SBA with walker  Events/Education:  Had 2 episodes of severe pain overnight, given PRN pain meds, episodes resolved.  Plan: Possible discharge on 11/15. Inpatient dialysis after discharge. Notify MD of any significant changes, continue plan of care.

## 2023-11-15 NOTE — PROGRESS NOTES
[facility]  Transplant Nephrology Progress notes  Date of Admission:  10/31/2023  Today's Date: 11/15/2023  Requesting physician: No att. providers found    Recommendations:  -Will evaluate for dialysis needs as outpatient based on labs and volume status 2x/wk  -Monitor UOP and weight as outpatient  -bumex 2 mg PO BID to continue as outpatient for now  -Start mag ox 800mg dialy     Assessment & Plan   # EDGAR: Trend up in SCr.  Increasing UOP.     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  - HD Info  Access: Right IJ Tunneled Catheter, Days: TBD, Length: 3.5 hrs, EDW: ?? kg, Heparin: No, NIRMAL: No, IV Iron: No, Vit D analog: No    - Baseline Creatinine: ~ 0.8-1.0   - Proteinuria: Not checked recently, but urinalysis with no proteinuria.    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  Transaminases Stable.  Followed by Transplant Surgery.    # Immunosuppression: Tacrolimus immediate release (goal 5-10), Mycophenolate mofetil (dose 750 mg every 12 hours) and Prednisone (dose 5 mg daily)   - Induction with Recent Transplant:  Per Liver Tx Protocol   - Continue with intensive monitoring of immunosuppression for efficacy and toxicity.   - Goal tacrolimus level lower due to EDGAR.   - Changes: Not at this time; Management per Transplant Surgery.    # Infection Prophylaxis:   - PJP: Sulfa/TMP (Bactrim)  - CMV: Valganciclovir (Valcyte); Patient is CMV IgG Ab discordant (D+/R-) and will continue on Valcyte x 6 months, then check CMV PCR monthly until 12 months post transplant.   - Thrush: Micafungin (Mycamine)  - Fungal: Micafungin (Mycamine)    # Blood Pressure: Controlled;  Goal BP: < 140/90 (Hospitalization goal)   - Volume status: Mildly hypervolemic  EDW ~ 64.0 kg ?? (Estimate)   - Changes: Not at this  time. Continue bumex 2mg PO BID     # Anemia in Chronic Disease/Surgery: Hgb: Stable, low      NIRMAL: No   - Iron studies: High iron saturation    # Mineral Bone Disorder:   - Vitamin D; level: Low        On supplement: Yes; cholecalciferol 50 mcg daily.  - Calcium; level: Normal for low albumin       On supplement: No  - Phosphorus; level: Normal        On supplement: No    # Electrolytes:   - Potassium; level: Low normal        On supplement: No  - Magnesium; level: Low        On supplement: No, start mag ox 800mg daily  - Bicarbonate; level: Normal        On supplement: No, modulate with HD  - Sodium; level: Low, monitor. Likely 2/2 low kidney function with free water intake. Should improve with loop diuretics.     # Depression: Mood has been a bit up and down. Managed by primary team.    # Transplant History:  Etiology of Organ Failure: Alcohol-related liver disease  Tx: Liver Tx  Transplant: 11/5/2023 (Liver)  Significant changes in immunosuppression: Slightly lower tacrolimus level goal due to EDGAR.  CMV IgG Ab High Risk Discordance (D+/R-): Yes  EBV IgG Ab High Risk Discordance (D+/R-): No  Significant transplant-related complications: EDGAR    Recommendations were communicated to the primary team verbally.    Gerry Pete MD   Pager: [phone]      Interval events  Ms. Milner's creatinine is 1.62 (11/15 0619); Trend up slightly  Increasing urine output.  Other significant labs/tests/vitals: Mag 1.2  No events overnight.  No chest pain or shortness of breath.  Improving leg swelling.  No nausea and vomiting.  Bowel movements are normal.  No fever, sweats or chills.         Review of Systems    4 point ROS was obtained and negative except as noted in the Interval History.    Allergies   No Known Allergies  Prior to Admission Medications     acetaminophen  325 mg Oral or Feeding Tube Q4H     bumetanide  2 mg Oral BID     artificial tears  1 drop Both Eyes 4x Daily     gabapentin  200 mg Oral or Feeding Tube At  Bedtime     lidocaine (PF)  10 mL Intradermal Once     magnesium oxide  800 mg Oral Daily     magnesium sulfate  2 g Intravenous Once     micafungin  100 mg Intravenous Q24H     multivitamin w/minerals  1 tablet Oral Daily     mycophenolate  750 mg Oral BID IS     pantoprazole  40 mg Oral QAM AC     polyethylene glycol  17 g Oral or Feeding Tube Daily     predniSONE  5 mg Oral Daily     sodium chloride (PF)  3 mL Intravenous Q8H     sulfamethoxazole-trimethoprim  1 tablet Oral or Feeding Tube Once per day on      tacrolimus  4 mg Oral BID IS     valGANciclovir  450 mg Oral Once per day on      cholecalciferol  50 mcg Oral Daily       BETA BLOCKER NOT PRESCRIBED         Physical Exam   Temp  Av.9  F (36.6  C)  Min: 97.3  F (36.3  C)  Max: 98.3  F (36.8  C)      Pulse  Av.5  Min: 66  Max: 86 Resp  Av.7  Min: 13  Max: 27  SpO2  Av.9 %  Min: 87 %  Max: 100 %     /83 (BP Location: Right arm)   Pulse 74   Temp 97.7  F (36.5  C) (Oral)   Resp 16   Wt 67.9 kg (149 lb 11.1 oz)   SpO2 100%   BMI 26.52 kg/m      Admit Weight: 68.6 kg (151 lb 4.8 oz)     GENERAL APPEARANCE: alert and no distress  HENT: mouth without ulcers or lesions  RESP: lungs clear to auscultation - no rales, rhonchi or wheezes  CV: regular rhythm, normal rate, no rub, no murmur  EDEMA: 1+ LE edema bilaterally  ABDOMEN: soft, nondistended, nontender, bowel sounds normal  MS: extremities normal - no gross deformities noted, no evidence of inflammation in joints, no muscle tenderness  SKIN: no rash, slightly jaundiced  DIALYSIS ACCESS:  Right IJ tunneled catheter     Data   CMP  Recent Labs   Lab 11/15/23  0619 23  0838 23  0720 23  2145 23  0832 23  0548 23  0930 23  0630   *  --  135  --   --  134*  --  135   POTASSIUM 3.5  --  3.7  --   --  3.7  --  3.5   CHLORIDE 99  --  103  --   --  103  --  102   CO2 24  --  24  --   --  19*  --  23    ANIONGAP 9  --  8  --   --  12  --  10   GLC 86 84 84 112*   < > 86   < > 78   BUN 13.5  --  9.9  --   --  15.0  --  12.2   CR 1.62*  --  1.42*  --   --  1.79*  --  1.73*   GFRESTIMATED 43*  --  50*  --   --  38*  --  40*   GEE 8.4*  --  8.6  --   --  8.3*  --  8.8   MAG 1.2*  --  1.4*  --   --  1.4*  --  1.5*   PHOS 2.7  --  2.9  --   --  3.4  --  2.8   PROTTOTAL 4.4*  --  4.1*  --   --  4.1*  --  4.6*   ALBUMIN 3.1*  --  2.9*  --   --  3.1*  --  3.6   BILITOTAL 2.2*  --  2.1*  --   --  2.4*  --  2.6*   ALKPHOS 109  --  94  --   --  86  --  76   AST 14  --  14  --   --  17  --  19   ALT 9  --  10  --   --  13  --  15    < > = values in this interval not displayed.     CBC  Recent Labs   Lab 11/15/23  0619 11/14/23  0719 11/13/23  0548 11/12/23  0630   HGB 9.0* 8.9* 9.8* 10.8*   WBC 7.5 7.6 9.7 9.0   RBC 2.85* 2.82* 3.18* 3.43*   HCT 27.4* 27.1* 30.6* 32.5*   MCV 96 96 96 95   MCH 31.6 31.6 30.8 31.5   MCHC 32.8 32.8 32.0 33.2   RDW 17.1* 17.1* 17.1* 17.6*    205 167 118*     INR  Recent Labs   Lab 11/15/23  0619 11/14/23  0720 11/13/23  0548 11/12/23  0630   INR 1.10 1.21* 1.18* 1.04     ABG  No lab results found in last 7 days.     Urine Studies  Recent Labs   Lab Test 11/04/23  2309 10/31/23  2100   COLOR Dark Yellow* Orange*   APPEARANCE Slightly Cloudy* Cloudy*   URINEGLC Negative Negative   URINEBILI Moderate* Large*   URINEKETONE Negative Negative   SG 1.016 1.019   UBLD Trace* Trace*   URINEPH 5.5 5.5   PROTEIN 10* 30*   NITRITE Negative Negative   LEUKEST Small* Small*   RBCU 2 4*   WBCU 5 25*     IMAGING:  All imaging studies reviewed by me.

## 2023-11-15 NOTE — DISCHARGE SUMMARY
/76 (BP Location: Right arm)   Pulse 81   Temp 97.9  F (36.6  C) (Oral)   Resp 16   Wt 67.9 kg (149 lb 11.1 oz)   SpO2 100%   BMI 26.52 kg/m      Shift: 2654-9029  Isolation Status: NA  VSS on RA, afebrile  Neuro: Aox4  Behaviors: pleasant & cooperative  BG: NA  Labs: Mag 1.2  Respiratory: WNL  Cardiac: WNL  Pain/Nausea: denies  PRN: NA  Diet: Regular  IV Access: PIV discontinued  Infusion(s): na  Lines/Drains: na  GI/: voiding  Skin: no new concerns  Mobility: UAL with walker  Events/Education: Discharge education, medications, labs, appointments and restrictions. pt/S.O. verbalizes understanding.  Plan: Discharge to apartment near [facility]

## 2023-11-15 NOTE — PROGRESS NOTES
Calorie Count  Intake recorded for: 11/14  Total Kcals: 846 Total Protein: 25g  Kcals from Hospital Food: 746  Protein: 24g  Kcals from Outside Food (average):100 Protein: 1g  # Meals Ordered from Kitchen: 2 meals + snack from outside the hospital   # Meals Recorded: 2 meals (First - 100% chicken noodle soup, saltine crackers, strawberry kiwi water)       (Second - 100% Pepsi, saltine crackers w/ butter, 1 serving rice)      (Snack - 100% skinny pop popcorn - from outside the hospital)  # Supplements Recorded: 100% 1 Special K Protein Bar

## 2023-11-16 ENCOUNTER — TELEPHONE (OUTPATIENT)
Dept: PHARMACY | Facility: CLINIC | Age: 33
End: 2023-11-16

## 2023-11-16 ENCOUNTER — LAB (OUTPATIENT)
Dept: LAB | Facility: CLINIC | Age: 33
End: 2023-11-16
Attending: TRANSPLANT SURGERY
Payer: COMMERCIAL

## 2023-11-16 ENCOUNTER — TELEPHONE (OUTPATIENT)
Dept: TRANSPLANT | Facility: CLINIC | Age: 33
End: 2023-11-16

## 2023-11-16 ENCOUNTER — OFFICE VISIT (OUTPATIENT)
Dept: TRANSPLANT | Facility: CLINIC | Age: 33
End: 2023-11-16
Attending: TRANSPLANT SURGERY
Payer: COMMERCIAL

## 2023-11-16 VITALS
WEIGHT: 149.8 LBS | BODY MASS INDEX: 26.54 KG/M2 | DIASTOLIC BLOOD PRESSURE: 78 MMHG | OXYGEN SATURATION: 100 % | SYSTOLIC BLOOD PRESSURE: 117 MMHG | HEART RATE: 83 BPM

## 2023-11-16 DIAGNOSIS — K72.90 DECOMPENSATION OF CIRRHOSIS OF LIVER (H): ICD-10-CM

## 2023-11-16 DIAGNOSIS — N17.9 AKI (ACUTE KIDNEY INJURY) (H): ICD-10-CM

## 2023-11-16 DIAGNOSIS — Z94.4 LIVER REPLACED BY TRANSPLANT (H): ICD-10-CM

## 2023-11-16 DIAGNOSIS — K72.10 END STAGE LIVER DISEASE (H): ICD-10-CM

## 2023-11-16 DIAGNOSIS — E83.42 HYPOMAGNESEMIA: ICD-10-CM

## 2023-11-16 DIAGNOSIS — K70.31 ALCOHOLIC CIRRHOSIS OF LIVER WITH ASCITES (H): ICD-10-CM

## 2023-11-16 DIAGNOSIS — Z94.4 LIVER TRANSPLANTED (H): ICD-10-CM

## 2023-11-16 DIAGNOSIS — K74.60 DECOMPENSATION OF CIRRHOSIS OF LIVER (H): ICD-10-CM

## 2023-11-16 DIAGNOSIS — K76.6 PORTAL HYPERTENSION (H): ICD-10-CM

## 2023-11-16 DIAGNOSIS — Z94.4 LIVER TRANSPLANTED (H): Primary | ICD-10-CM

## 2023-11-16 LAB
ALBUMIN MFR UR ELPH: <6 MG/DL
ALBUMIN SERPL BCG-MCNC: 3.5 G/DL (ref 3.5–5.2)
ALBUMIN UR-MCNC: NEGATIVE MG/DL
ALP SERPL-CCNC: 142 U/L (ref 40–150)
ALT SERPL W P-5'-P-CCNC: 6 U/L (ref 0–50)
ANION GAP SERPL CALCULATED.3IONS-SCNC: 10 MMOL/L (ref 7–15)
APPEARANCE UR: CLEAR
AST SERPL W P-5'-P-CCNC: 16 U/L (ref 0–45)
BILIRUB DIRECT SERPL-MCNC: 1.51 MG/DL (ref 0–0.3)
BILIRUB SERPL-MCNC: 2.7 MG/DL
BILIRUB UR QL STRIP: NEGATIVE
BUN SERPL-MCNC: 15.2 MG/DL (ref 6–20)
CALCIUM SERPL-MCNC: 8.6 MG/DL (ref 8.6–10)
CHLORIDE SERPL-SCNC: 95 MMOL/L (ref 98–107)
COLOR UR AUTO: NORMAL
CREAT SERPL-MCNC: 1.65 MG/DL (ref 0.51–0.95)
CREAT UR-MCNC: 26.1 MG/DL
DEPRECATED HCO3 PLAS-SCNC: 25 MMOL/L (ref 22–29)
EGFRCR SERPLBLD CKD-EPI 2021: 42 ML/MIN/1.73M2
ERYTHROCYTE [DISTWIDTH] IN BLOOD BY AUTOMATED COUNT: 16.6 % (ref 10–15)
GLUCOSE SERPL-MCNC: 84 MG/DL (ref 70–99)
GLUCOSE UR STRIP-MCNC: NEGATIVE MG/DL
HCT VFR BLD AUTO: 28.5 % (ref 35–47)
HGB BLD-MCNC: 9.7 G/DL (ref 11.7–15.7)
HGB UR QL STRIP: NEGATIVE
KETONES UR STRIP-MCNC: NEGATIVE MG/DL
LEUKOCYTE ESTERASE UR QL STRIP: NEGATIVE
MAGNESIUM SERPL-MCNC: 1.4 MG/DL (ref 1.7–2.3)
MCH RBC QN AUTO: 30.9 PG (ref 26.5–33)
MCHC RBC AUTO-ENTMCNC: 34 G/DL (ref 31.5–36.5)
MCV RBC AUTO: 91 FL (ref 78–100)
NITRATE UR QL: NEGATIVE
PH UR STRIP: 5.5 [PH] (ref 5–7)
PHOSPHATE SERPL-MCNC: 2.8 MG/DL (ref 2.5–4.5)
PLATELET # BLD AUTO: 355 10E3/UL (ref 150–450)
POTASSIUM SERPL-SCNC: 3.7 MMOL/L (ref 3.4–5.3)
PROT SERPL-MCNC: 5 G/DL (ref 6.4–8.3)
PROT/CREAT 24H UR: NORMAL MG/G{CREAT}
RBC # BLD AUTO: 3.14 10E6/UL (ref 3.8–5.2)
SODIUM SERPL-SCNC: 130 MMOL/L (ref 135–145)
SP GR UR STRIP: 1 (ref 1–1.03)
TACROLIMUS BLD-MCNC: 5.3 UG/L (ref 5–15)
TME LAST DOSE: NORMAL H
TME LAST DOSE: NORMAL H
UROBILINOGEN UR STRIP-MCNC: NORMAL MG/DL
WBC # BLD AUTO: 8.9 10E3/UL (ref 4–11)

## 2023-11-16 PROCEDURE — 99000 SPECIMEN HANDLING OFFICE-LAB: CPT | Performed by: PATHOLOGY

## 2023-11-16 PROCEDURE — 84100 ASSAY OF PHOSPHORUS: CPT | Performed by: PATHOLOGY

## 2023-11-16 PROCEDURE — 36415 COLL VENOUS BLD VENIPUNCTURE: CPT | Performed by: PATHOLOGY

## 2023-11-16 PROCEDURE — 82248 BILIRUBIN DIRECT: CPT | Performed by: PATHOLOGY

## 2023-11-16 PROCEDURE — 80053 COMPREHEN METABOLIC PANEL: CPT | Performed by: PATHOLOGY

## 2023-11-16 PROCEDURE — 80307 DRUG TEST PRSMV CHEM ANLYZR: CPT | Mod: 90 | Performed by: PATHOLOGY

## 2023-11-16 PROCEDURE — 85027 COMPLETE CBC AUTOMATED: CPT | Performed by: PATHOLOGY

## 2023-11-16 PROCEDURE — 99213 OFFICE O/P EST LOW 20 MIN: CPT | Mod: 24 | Performed by: TRANSPLANT SURGERY

## 2023-11-16 PROCEDURE — G0463 HOSPITAL OUTPT CLINIC VISIT: HCPCS | Performed by: TRANSPLANT SURGERY

## 2023-11-16 PROCEDURE — 80197 ASSAY OF TACROLIMUS: CPT | Performed by: TRANSPLANT SURGERY

## 2023-11-16 PROCEDURE — 83735 ASSAY OF MAGNESIUM: CPT | Performed by: PATHOLOGY

## 2023-11-16 RX ORDER — MYCOPHENOLATE MOFETIL 250 MG/1
750 CAPSULE ORAL 2 TIMES DAILY
Qty: 180 CAPSULE | Refills: 1 | Status: SHIPPED | OUTPATIENT
Start: 2023-11-16 | End: 2023-12-05

## 2023-11-16 RX ORDER — SULFAMETHOXAZOLE AND TRIMETHOPRIM 400; 80 MG/1; MG/1
1 TABLET ORAL
Qty: 30 TABLET | Refills: 1 | Status: SHIPPED | OUTPATIENT
Start: 2023-11-17 | End: 2024-01-02

## 2023-11-16 RX ORDER — URSODIOL 300 MG/1
300 CAPSULE ORAL 2 TIMES DAILY
Qty: 60 CAPSULE | Refills: 1 | Status: SHIPPED | OUTPATIENT
Start: 2023-11-16 | End: 2023-12-06

## 2023-11-16 RX ORDER — TACROLIMUS 1 MG/1
4 CAPSULE ORAL 2 TIMES DAILY
Qty: 240 CAPSULE | Refills: 1 | Status: SHIPPED | OUTPATIENT
Start: 2023-11-16 | End: 2023-11-17

## 2023-11-16 RX ORDER — GABAPENTIN 100 MG/1
200 CAPSULE ORAL AT BEDTIME
Qty: 60 CAPSULE | Refills: 0 | Status: SHIPPED | OUTPATIENT
Start: 2023-11-16 | End: 2023-12-05

## 2023-11-16 RX ORDER — BUMETANIDE 2 MG/1
2 TABLET ORAL
Qty: 10 TABLET | Refills: 0 | Status: SHIPPED | OUTPATIENT
Start: 2023-11-16 | End: 2023-11-17

## 2023-11-16 RX ORDER — PREDNISONE 5 MG/1
5 TABLET ORAL DAILY
Qty: 30 TABLET | Refills: 0 | Status: SHIPPED | OUTPATIENT
Start: 2023-11-16 | End: 2023-12-06

## 2023-11-16 RX ORDER — MAGNESIUM OXIDE 400 MG/1
800 TABLET ORAL DAILY
Qty: 60 TABLET | Refills: 1 | Status: SHIPPED | OUTPATIENT
Start: 2023-11-16 | End: 2023-11-20

## 2023-11-16 RX ORDER — URSODIOL 300 MG/1
300 CAPSULE ORAL 2 TIMES DAILY
Qty: 60 CAPSULE | Refills: 1 | Status: SHIPPED | OUTPATIENT
Start: 2023-11-16 | End: 2023-11-16

## 2023-11-16 NOTE — PLAN OF CARE
"IP Lymphedema Discharge Summary    Reason for therapy discharge:    Discharged to home.    Progress towards therapy goal(s). See goals on Care Plan in [facility] electronic health record for goal details.  Goals partially met.  Barriers to achieving goals:   discharge from facility.    Therapy recommendation(s):    Continue with use of gradient compression bandaging (GCB) and transition to size F comperm to bilateral lower extremities as edema dissipates. Pt sent home with additional GCB supplies and comperm stockings. Edu handout in instruction of \"quick wrap\" technique, laundering, and wear schedule. Pt fiance familiar with wraps and technique, will plan to assist pt manage edema at home.       "

## 2023-11-16 NOTE — TELEPHONE ENCOUNTER
Lola Milner is a post-liver transplant patient who discharged on 11/15/23. Patient chooses to receive medications from [place] [facility] pharmacy. The patient and significant other (Andi) will be responsible for managing medications.    HEALTH BENEFIT: (BCBS OUT OF STATE)  ID# [id number] [facility]#[id number] (EFFECTIVE (DATE: 9/1/2023) )      PHARMACY BENEFIT: ([place] MEDICAID)  PROCESSING INFO: ID#[id number] BIN#[id number] (EFFECTIVE (DATE: 9/1/2023) ).   NO DEDUCTIBLE & NO MAX OUT-OF-POCKET   COPAY STRUCTURE:  $ (0) FOR GENERIC  $ (0) FOR BRAND  (PA NEEDED) FOR NON-FORMULARY MEDICATIONS      TEST CLAIM SPECIALTY #28  MYCOPHENOLATE 250MG (#240/30DS)= PLAN LIMITATIONS EXCEEDED  PROGRAF 1MG (#120/30DS)= PA NEEDED  TACROLIMUS 1MG (#120/30DS)=$0  CYCLOSPORINE 100MG (#60/30DS)=$0  VALGANCICLOVIR 450MG (#60/30DS)=$0  VALACYCLOVIR 1GM (#90/30DS) =$0    TEST CLAIM DISCHARGE #27 (18 YEARS AND OLDER):  MYCOPHENOLATE 250MG (#240/30DS)= PLAN LIMITATIONS EXCEEDED  PROGRAF 1MG (#120/30DS)= PA NEEDED  TACROLIMUS 1MG (#120/30DS)=$0  CYCLOSPORINE 100MG (#60/30DS)=$0  VALGANCICLOVIR 450MG (#60/30DS)=$0  VALACYCLOVIR 1GM (#90/30DS) =$0    **CAN PATIENT FILL AT [facility] FOR MEDICATIONS LISTED? YES    Nery Tilley, [facility]

## 2023-11-16 NOTE — PROGRESS NOTES
Transplant Surgery Progress Note    Transplants:  11/5/2023 (Liver); Postoperative day:  11  S: overall doing well, just discharged yesterday from the hospital  Transplant History:    Transplant Type:  Liver Tx  Donor Type:    Transplant Date:  11/5/2023 (Liver)   Biliary Stent:  No      Acute Rejection Hx:  No    Present Maintenance Immunosuppression:  Tacrolimus, Mycophenolate mofetil, and Prednisone    CMV IgG Ab Discordance:  Yes  EBV IgG Ab Discordance:  No      Transplant Coordinator: Mariola Castellanos     Transplant Office Phone Number: [phone]     Immunosuppressant Medications       Immunosuppressive Agents Disp Start End     mycophenolate (GENERIC EQUIVALENT) 250 MG capsule    180 capsule 11/15/2023     Sig - Route: Take 3 capsules (750 mg) by mouth 2 times daily - Oral    Class: E-Prescribe    Renewals       Renewal requests to authorizing provider (Gladys Carmen NP) <b>prohibited</b>             tacrolimus (GENERIC) 0.5 MG capsule    30 capsule 11/15/2023     Sig - Route: Take 1 capsule (0.5 mg) by mouth 2 times daily as needed (dose titration) - Oral    Class: E-Prescribe    Notes to Pharmacy: Take 1 cap by mouth twice daily as directed by Transplant Center for dose changes.    Renewals       Renewal requests to authorizing provider (Gladys Carmen NP) <b>prohibited</b>             tacrolimus (GENERIC) 1 MG capsule    240 capsule 11/15/2023     Sig - Route: Take 4 capsules (4 mg) by mouth 2 times daily - Oral    Class: E-Prescribe    Renewals       Renewal requests to authorizing provider (Gladys Carmen NP) <b>prohibited</b>                    Possible Immunosuppression-related side effects:   []             headache  []             vivid dreams  []             irritability  []             cognitive difficuties  []             fine tremor  []             nausea  []             diarrhea  []             neuropathy      []             edema  []             renal calcineurin  toxicity  []             hyperkalemia  []             post-transplant diabetes  []             decreased appetite  []             increased appetite  []             other:  []             none    Prescription Medications as of 11/16/2023         Rx Number Disp Refills Start End Last Dispensed Date Next Fill Date Owning Pharmacy    acetaminophen (TYLENOL) 325 MG tablet  100 tablet 0 11/15/2023    [address]    Sig: Take 1-2 tablets (325-650 mg) by mouth every 6 hours as needed for mild pain    Class: E-Prescribe    Route: Oral    Renewals       Renewal requests to authorizing provider (Gladys Carmen NP) <b>prohibited</b>            bumetanide (BUMEX) 2 MG tablet  10 tablet 0 11/15/2023    [address]    Sig: Take 1 tablet (2 mg) by mouth 2 times daily    Class: E-Prescribe    Route: Oral    Renewals       Renewal requests to authorizing provider (Gladys Carmen NP) <b>prohibited</b>            gabapentin (NEURONTIN) 100 MG capsule  60 capsule 0 11/15/2023    [address]    Sig: Take 2 capsules (200 mg) by mouth at bedtime    Class: E-Prescribe    Route: Oral    Renewals       Renewal requests to authorizing provider (Gladys Carmen NP) <b>prohibited</b>            magnesium oxide (MAG-OX) 400 MG tablet  60 tablet 1 11/15/2023    [address]    Sig: Take 2 tablets (800 mg) by mouth daily    Class: E-Prescribe    Route: Oral    melatonin 3 MG tablet    10/29/2023        Sig: Take 3 mg by mouth nightly as needed    Class: Historical    Route: Oral    methocarbamol (ROBAXIN) 500 MG tablet  12 tablet 0 11/15/2023    [address]    Sig: Take 1 tablet (500 mg) by mouth 3 times daily as needed for muscle spasms     Class: E-Prescribe    Route: Oral    Multiple Vitamins-Minerals (THERA-TABS M) TABS  30 tablet 1 11/15/2023    [address]    Sig: Take 1 tablet by mouth daily    Class: E-Prescribe    Route: Oral    Renewals       Renewal requests to authorizing provider (Gladys Carmen NP) <b>prohibited</b>            mycophenolate (GENERIC EQUIVALENT) 250 MG capsule  180 capsule 1 11/15/2023    [address]    Sig: Take 3 capsules (750 mg) by mouth 2 times daily    Class: E-Prescribe    Route: Oral    Renewals       Renewal requests to authorizing provider (Gladys Carmen NP) <b>prohibited</b>            omeprazole (PRILOSEC) 20 MG DR capsule  30 capsule 1 11/15/2023    [address]    Sig: Take 1 capsule (20 mg) by mouth daily    Class: E-Prescribe    Route: Oral    oxyCODONE (ROXICODONE) 5 MG tablet  5 tablet 0 11/15/2023    [address]    Sig: Take 0.5-1 tablets (2.5-5 mg) by mouth every 6 hours as needed for severe pain    Class: E-Prescribe    Earliest Fill Date: 11/15/2023    Route: Oral    predniSONE (DELTASONE) 5 MG tablet  30 tablet 0 11/16/2023    [address]    Sig: Take 1 tablet (5 mg) by mouth daily    Class: E-Prescribe    Route: Oral    Renewals       Renewal requests to authorizing provider (Gladys Carmen NP) <b>prohibited</b>            sulfamethoxazole-trimethoprim (BACTRIM) 400-80 MG tablet  30 tablet 1 11/15/2023    [address]    Sig: Take 1 tablet by mouth three times a week Titrate up to daily when directed by transplant team with improving renal function    Class: E-Prescribe    Route: Oral    tacrolimus (GENERIC) 0.5 MG capsule  30 capsule 0  11/15/2023    [address]    Sig: Take 1 capsule (0.5 mg) by mouth 2 times daily as needed (dose titration)    Class: E-Prescribe    Notes to Pharmacy: Take 1 cap by mouth twice daily as directed by Transplant Center for dose changes.    Route: Oral    Renewals       Renewal requests to authorizing provider (Gladys Carmen NP) <b>prohibited</b>            tacrolimus (GENERIC) 1 MG capsule  240 capsule 1 11/15/2023    [address]    Sig: Take 4 capsules (4 mg) by mouth 2 times daily    Class: E-Prescribe    Route: Oral    Renewals       Renewal requests to authorizing provider (Gladys Carmen NP) <b>prohibited</b>            ursodiol (ACTIGALL) 300 MG capsule  60 capsule 1 11/16/2023    [phone]    Sig: Take 1 capsule (300 mg) by mouth 2 times daily    Class: E-Prescribe    Route: Oral    valGANciclovir (VALCYTE) 450 MG tablet  30 tablet 2 11/17/2023    [address]    Sig: Take 1 tablet (450 mg) by mouth twice a week Titrate up to 450mg daily when directed by transplant team with improving renal function    Class: E-Prescribe    Route: Oral    Vitamin D3 (CHOLECALCIFEROL) 25 mcg (1000 units) tablet  30 tablet 1 11/15/2023    [address]    Sig: Take 2 tablets (50 mcg) by mouth daily    Class: E-Prescribe    Route: Oral    Renewals       Renewal requests to authorizing provider (Gladys Carmen NP) <b>prohibited</b>                  Hospital Medications as of 11/16/2023         Dose Frequency Start End    magnesium sulfate 2 g in 50 mL sterile water intermittent infusion (Completed) 2 g ONCE 11/15/2023 11/15/2023    Class: E-Prescribe    Route: Intravenous            O:   [unfilled]         "Latest Ref Rng & Units 11/16/2023     9:00 AM 11/15/2023     6:19 AM 11/14/2023     7:20 AM 11/14/2023     7:19 AM 11/13/2023     5:48 AM   Transplant Immunosuppression Labs   Creat 0.51 - 0.95 mg/dL 1.65  1.62  1.42   1.79    Urea Nitrogen 6.0 - 20.0 mg/dL 15.2  13.5  9.9   15.0    WBC 4.0 - 11.0 10e3/uL 8.9  7.5   7.6  9.7    Neutrophil %  81   78  88    ANEU 1.6 - 8.3 10e3/uL  6.1   5.9  8.5        Chemistries:   Recent Labs   Lab Test 11/16/23  0900   BUN 15.2   CR 1.65*   GFRESTIMATED 42*   GLC 84     Lab Results   Component Value Date    A1C 5.3 11/08/2023     Recent Labs   Lab Test 11/16/23  0900   ALBUMIN 3.5   BILITOTAL 2.7*   ALKPHOS 142   AST 16   ALT 6     Urine Studies:  Recent Labs   Lab Test 11/16/23  0905 11/04/23  2309   COLOR Light Yellow Dark Yellow*   APPEARANCE Clear Slightly Cloudy*   URINEGLC Negative Negative   URINEBILI Negative Moderate*   URINEKETONE Negative Negative   SG 1.005 1.016   UBLD Negative Trace*   URINEPH 5.5 5.5   PROTEIN Negative 10*   NITRITE Negative Negative   LEUKEST Negative Small*   RBCU  --  2   WBCU  --  5     No lab results found.  Hematology:   Recent Labs   Lab Test 11/16/23  0900 11/15/23  0619 11/14/23  0719   HGB 9.7* 9.0* 8.9*    221 205   WBC 8.9 7.5 7.6     Coags:   Recent Labs   Lab Test 11/15/23  0619 11/14/23  0720   INR 1.10 1.21*     HLA antibodies:   No results found for: \"KC5FMAVHP\", \"YW9RRVXLHS\", \"OH3SDDAZG\", \"AC4HBPDSQN\"    Assessment: Lola Milner is doing well s/p Liver Tx:  Issues we addressed during her visit include:    Plan:    1. Graft function: LFTs down trending, TB up a little to 2.7.  will monitor if continues to increase will need biopsy, will start ursodiol  2. Immunosuppression Management: No change tac 8-10 given improving renal function  .  Complexity of management:Low.  Contributing factors:  edema improving    Followup: 1 week          Aime Lowe MD/PhD    "

## 2023-11-16 NOTE — TELEPHONE ENCOUNTER
Post Discharge Liver Transplant Phone Call (within 1-3 business days post discharge)      Reviewed with the patient the Transplant Center contact information:  Best phone contact is [phone] Monday-Friday from 8am-5pm.   *You may have to leave a message and get a call back.    Good alternative communication method is via [employer] message.    Coordinators are available 8am-5pm M-F, otherwise there will be an on-call RN available 24/7  *If calling after hours, please call the same number, [phone] and ask the  to page the on-call Transplant Coordinator    When to contact the Transplant Center:  Fever > 100.5F  Dizziness, lightheadedness  Severe pain  If you are unable to take your immunosuppression medications.  Unable to obtain refill on immunosuppressive medications    Medications:  Reviewed medications with patient.   - confirmed pt has supply of meds  - MAR is up to date   - Verify preferred pharmacy in [employer]  - Reminded patient to always contact the pharmacy first for refills    Confirmed the patient has an up to date medication card at home and is knowledgeable in updating their med card (or has someone to assist in this).   - Reinforced patient always bring med card to appointment      Labs:  Labs are expected to be drawn on Monday and Thursday until you are told differently by your transplant team.   - confirmed patient's preferred lab and updated EPIC   - Take a copy of your lab letter with to each lab draw   - Lab order sent directly to patient via [employer] or mail.    - Confirmed patient has a copy of lab letter  - Review how to review lab results on [employer] or obtaining and recording lab values in handbook    Vitals:  Encouraged the patient to record the following:  BP - daily unless light headed  Temperature - daily  Weight - daily    Follow Up:  Role of the Transplant Coordinator vs LPN was reviewed. Contact information provided for both.   Reviewed current scheduled follow up appointments  with surgeon.    Reminded the patient to follow up with PCP within 1 -2 weeks for general follow-up and management of diabetes, pain, and blood pressure  STENT REMOVAL - reminded patient that if stent was placed at time of transplant (this is indicated on the check list) this will need to come out 2-3 mos post transplant.  Asked patient to notify transplant coordinator if sees stent in stool.  Has the patient been contacted with initial visit from HOME CARE? No  If not, Transplant Coordinator to be notified to follow up with Home Care  Pt will use [facility] pharmacy.  Suggested to Andi to have pt establish a PCP upon returning.  Gave Andi phone number to medical supply [employer] to inquire about items needed.       Denia Amador LPN

## 2023-11-16 NOTE — PLAN OF CARE
Occupational Therapy Discharge Summary    Reason for therapy discharge:    Discharged to home.    Progress towards therapy goal(s). See goals on Care Plan in [facility] electronic health record for goal details.  Goals partially met.  Barriers to achieving goals:   discharge from facility.    Therapy recommendation(s):    No further therapy is recommended.

## 2023-11-16 NOTE — LETTER
11/16/2023         RE: Lola Milner  [address]  [place] [address]        Dear Colleague,    Thank you for referring your patient, Lola Milner, to the [facility] TRANSPLANT [facility]. Please see a copy of my visit note below.    Transplant Surgery Progress Note    Transplants:  11/5/2023 (Liver); Postoperative day:  11  S: overall doing well, just discharged yesterday from the hospital  Transplant History:    Transplant Type:  Liver Tx  Donor Type:    Transplant Date:  11/5/2023 (Liver)   Biliary Stent:  No      Acute Rejection Hx:  No    Present Maintenance Immunosuppression:  Tacrolimus, Mycophenolate mofetil, and Prednisone    CMV IgG Ab Discordance:  Yes  EBV IgG Ab Discordance:  No      Transplant Coordinator: aMriola Castellanos     Transplant Office Phone Number: [phone]     Immunosuppressant Medications       Immunosuppressive Agents Disp Start End     mycophenolate (GENERIC EQUIVALENT) 250 MG capsule    180 capsule 11/15/2023     Sig - Route: Take 3 capsules (750 mg) by mouth 2 times daily - Oral    Class: E-Prescribe    Renewals       Renewal requests to authorizing provider (Gladys Carmen NP) <b>prohibited</b>             tacrolimus (GENERIC) 0.5 MG capsule    30 capsule 11/15/2023     Sig - Route: Take 1 capsule (0.5 mg) by mouth 2 times daily as needed (dose titration) - Oral    Class: E-Prescribe    Notes to Pharmacy: Take 1 cap by mouth twice daily as directed by Transplant Center for dose changes.    Renewals       Renewal requests to authorizing provider (Gladys Carmen NP) <b>prohibited</b>             tacrolimus (GENERIC) 1 MG capsule    240 capsule 11/15/2023     Sig - Route: Take 4 capsules (4 mg) by mouth 2 times daily - Oral    Class: E-Prescribe    Renewals       Renewal requests to authorizing provider (Gladys Carmen NP) <b>prohibited</b>                    Possible Immunosuppression-related side effects:   []             headache  []              vivid dreams  []             irritability  []             cognitive difficuties  []             fine tremor  []             nausea  []             diarrhea  []             neuropathy      []             edema  []             renal calcineurin toxicity  []             hyperkalemia  []             post-transplant diabetes  []             decreased appetite  []             increased appetite  []             other:  []             none    Prescription Medications as of 11/16/2023         Rx Number Disp Refills Start End Last Dispensed Date Next Fill Date Owning Pharmacy    acetaminophen (TYLENOL) 325 MG tablet  100 tablet 0 11/15/2023    [address]    Sig: Take 1-2 tablets (325-650 mg) by mouth every 6 hours as needed for mild pain    Class: E-Prescribe    Route: Oral    Renewals       Renewal requests to authorizing provider (Gladys Carmen NP) <b>prohibited</b>            bumetanide (BUMEX) 2 MG tablet  10 tablet 0 11/15/2023    [address]    Sig: Take 1 tablet (2 mg) by mouth 2 times daily    Class: E-Prescribe    Route: Oral    Renewals       Renewal requests to authorizing provider (Gladys Carmen NP) <b>prohibited</b>            gabapentin (NEURONTIN) 100 MG capsule  60 capsule 0 11/15/2023    [address]    Sig: Take 2 capsules (200 mg) by mouth at bedtime    Class: E-Prescribe    Route: Oral    Renewals       Renewal requests to authorizing provider (Gladys Carmen NP) <b>prohibited</b>            magnesium oxide (MAG-OX) 400 MG tablet  60 tablet 1 11/15/2023    [address]    Sig: Take 2 tablets (800 mg) by mouth daily    Class: E-Prescribe    Route: Oral    melatonin 3 MG tablet    10/29/2023        Sig: Take 3 mg by mouth nightly as needed    Class:  Historical    Route: Oral    methocarbamol (ROBAXIN) 500 MG tablet  12 tablet 0 11/15/2023    [address]    Sig: Take 1 tablet (500 mg) by mouth 3 times daily as needed for muscle spasms    Class: E-Prescribe    Route: Oral    Multiple Vitamins-Minerals (THERA-TABS M) TABS  30 tablet 1 11/15/2023    [address]    Sig: Take 1 tablet by mouth daily    Class: E-Prescribe    Route: Oral    Renewals       Renewal requests to authorizing provider (Gladys Carmen NP) <b>prohibited</b>            mycophenolate (GENERIC EQUIVALENT) 250 MG capsule  180 capsule 1 11/15/2023    [address]    Sig: Take 3 capsules (750 mg) by mouth 2 times daily    Class: E-Prescribe    Route: Oral    Renewals       Renewal requests to authorizing provider (Gladys Carmen NP) <b>prohibited</b>            omeprazole (PRILOSEC) 20 MG DR capsule  30 capsule 1 11/15/2023    [address]    Sig: Take 1 capsule (20 mg) by mouth daily    Class: E-Prescribe    Route: Oral    oxyCODONE (ROXICODONE) 5 MG tablet  5 tablet 0 11/15/2023    [address]    Sig: Take 0.5-1 tablets (2.5-5 mg) by mouth every 6 hours as needed for severe pain    Class: E-Prescribe    Earliest Fill Date: 11/15/2023    Route: Oral    predniSONE (DELTASONE) 5 MG tablet  30 tablet 0 11/16/2023    [address]    Sig: Take 1 tablet (5 mg) by mouth daily    Class: E-Prescribe    Route: Oral    Renewals       Renewal requests to authorizing provider (Gladys Carmen NP) <b>prohibited</b>            sulfamethoxazole-trimethoprim (BACTRIM) 400-80 MG tablet  30 tablet 1 11/15/2023    [facility]  [address]    Sig: Take 1 tablet by mouth three times a week Titrate up to daily when directed by transplant team with improving renal function    Class: E-Prescribe    Route: Oral    tacrolimus (GENERIC) 0.5 MG capsule  30 capsule 0 11/15/2023    [address]    Sig: Take 1 capsule (0.5 mg) by mouth 2 times daily as needed (dose titration)    Class: E-Prescribe    Notes to Pharmacy: Take 1 cap by mouth twice daily as directed by Transplant Center for dose changes.    Route: Oral    Renewals       Renewal requests to authorizing provider (Gladys Carmen NP) <b>prohibited</b>            tacrolimus (GENERIC) 1 MG capsule  240 capsule 1 11/15/2023    [address]    Sig: Take 4 capsules (4 mg) by mouth 2 times daily    Class: E-Prescribe    Route: Oral    Renewals       Renewal requests to authorizing provider (Gladys Carmen NP) <b>prohibited</b>            ursodiol (ACTIGALL) 300 MG capsule  60 capsule 1 11/16/2023    [address]    Sig: Take 1 capsule (300 mg) by mouth 2 times daily    Class: E-Prescribe    Route: Oral    valGANciclovir (VALCYTE) 450 MG tablet  30 tablet 2 11/17/2023    [address]    Sig: Take 1 tablet (450 mg) by mouth twice a week Titrate up to 450mg daily when directed by transplant team with improving renal function    Class: E-Prescribe    Route: Oral    Vitamin D3 (CHOLECALCIFEROL) 25 mcg (1000 units) tablet  30 tablet 1 11/15/2023    [address]    Sig: Take 2 tablets (50 mcg) by mouth daily    Class: E-Prescribe    Route: Oral    Renewals       Renewal requests to authorizing provider (Gladys Carmen NP) <b>prohibited</b>                  Hospital  "Medications as of 11/16/2023         Dose Frequency Start End    magnesium sulfate 2 g in 50 mL sterile water intermittent infusion (Completed) 2 g ONCE 11/15/2023 11/15/2023    Class: E-Prescribe    Route: Intravenous            O:   [unfilled]        Latest Ref Rng & Units 11/16/2023     9:00 AM 11/15/2023     6:19 AM 11/14/2023     7:20 AM 11/14/2023     7:19 AM 11/13/2023     5:48 AM   Transplant Immunosuppression Labs   Creat 0.51 - 0.95 mg/dL 1.65  1.62  1.42   1.79    Urea Nitrogen 6.0 - 20.0 mg/dL 15.2  13.5  9.9   15.0    WBC 4.0 - 11.0 10e3/uL 8.9  7.5   7.6  9.7    Neutrophil %  81   78  88    ANEU 1.6 - 8.3 10e3/uL  6.1   5.9  8.5        Chemistries:   Recent Labs   Lab Test 11/16/23  0900   BUN 15.2   CR 1.65*   GFRESTIMATED 42*   GLC 84     Lab Results   Component Value Date    A1C 5.3 11/08/2023     Recent Labs   Lab Test 11/16/23  0900   ALBUMIN 3.5   BILITOTAL 2.7*   ALKPHOS 142   AST 16   ALT 6     Urine Studies:  Recent Labs   Lab Test 11/16/23  0905 11/04/23  2309   COLOR Light Yellow Dark Yellow*   APPEARANCE Clear Slightly Cloudy*   URINEGLC Negative Negative   URINEBILI Negative Moderate*   URINEKETONE Negative Negative   SG 1.005 1.016   UBLD Negative Trace*   URINEPH 5.5 5.5   PROTEIN Negative 10*   NITRITE Negative Negative   LEUKEST Negative Small*   RBCU  --  2   WBCU  --  5     No lab results found.  Hematology:   Recent Labs   Lab Test 11/16/23  0900 11/15/23  0619 11/14/23  0719   HGB 9.7* 9.0* 8.9*    221 205   WBC 8.9 7.5 7.6     Coags:   Recent Labs   Lab Test 11/15/23  0619 11/14/23  0720   INR 1.10 1.21*     HLA antibodies:   No results found for: \"UL7ZSHBDR\", \"XK0RJDHAZV\", \"XH6JUOAGQ\", \"MT9SONRKDC\"    Assessment: Lola Milner is doing well s/p Liver Tx:  Issues we addressed during her visit include:    Plan:    1. Graft function: LFTs down trending, TB up a little to 2.7.  will monitor if continues to increase will need biopsy, will start ursodiol  2. Immunosuppression " Management: No change tac 8-10 given improving renal function  .  Complexity of management:Low.  Contributing factors:  edema improving    Followup: 1 week          Aime Lowe MD/PhD      Again, thank you for allowing me to participate in the care of your patient.        Sincerely,        Aime Lowe MD

## 2023-11-17 ENCOUNTER — TELEPHONE (OUTPATIENT)
Dept: TRANSPLANT | Facility: CLINIC | Age: 33
End: 2023-11-17
Payer: COMMERCIAL

## 2023-11-17 DIAGNOSIS — Z94.4 LIVER TRANSPLANTED (H): Primary | ICD-10-CM

## 2023-11-17 DIAGNOSIS — K72.10 END STAGE LIVER DISEASE (H): ICD-10-CM

## 2023-11-17 DIAGNOSIS — N17.9 AKI (ACUTE KIDNEY INJURY) (H): ICD-10-CM

## 2023-11-17 LAB — ETHYL GLUCURONIDE UR QL SCN: NEGATIVE NG/ML

## 2023-11-17 RX ORDER — TACROLIMUS 5 MG/1
5 CAPSULE ORAL 2 TIMES DAILY
Qty: 180 CAPSULE | Refills: 3 | Status: SHIPPED | OUTPATIENT
Start: 2023-11-17 | End: 2023-11-21

## 2023-11-17 RX ORDER — TACROLIMUS 1 MG/1
1 CAPSULE ORAL 2 TIMES DAILY PRN
Qty: 240 CAPSULE | Refills: 1 | Status: SHIPPED | OUTPATIENT
Start: 2023-11-17 | End: 2023-11-21

## 2023-11-17 RX ORDER — BUMETANIDE 2 MG/1
2 TABLET ORAL DAILY
Qty: 10 TABLET | Refills: 0 | Status: SHIPPED | OUTPATIENT
Start: 2023-11-17 | End: 2023-11-20

## 2023-11-17 NOTE — TELEPHONE ENCOUNTER
"Attempted to reach Lola again, but no answer. Contacted Andi.    Spoke with Andi.  144.8 weight  UOP for 24 hours 950ml urine. She did have some in toilet that missed the hat in toilet.     She did have 4 loose stools yesterday. It's about the same since transplant.    Denied any nausea. Eating well. Appetite improving.    Edema decreased. Lola stated \"my Feet fit into shoes finally. Can get on and off toilet by herself.\"   Lola stated she is happy with all the positive changes she is seeing and she is grateful.     "

## 2023-11-17 NOTE — TELEPHONE ENCOUNTER
Per dr Lowe and Zenobia JEFFREY ok to fill oxycodone. Zenobia JEFFREY will [facility].

## 2023-11-17 NOTE — TELEPHONE ENCOUNTER
ISSUE:   Tacrolimus IR level 5.3 on 11/17, goal 8, dose 4 mg BID.    PLAN:   Please call patient and confirm this was an accurate 12-hour trough. Verify Tacrolimus IR dose 4 mg BID. Confirm no new medications or illness. Confirm no missed doses. If accurate trough and accurate dose, increase Tacrolimus IR dose to 5 mg BID and repeat labs Monday. Mariola Castellanos RN      OUTCOME:   Spoke with patient, they confirm accurate trough level and current dose 4 mg BID. Patient confirmed dose change to 5 mg BID and to repeat labs on Monday.  Orders sent to preferred pharmacy for dose change and lab for repeat labs. Patient voiced understanding of plan.     Per Dr Pete patient weight decreased patient to decrease bumex 2 mg daily.   Andi repeated plan.

## 2023-11-20 ENCOUNTER — LAB (OUTPATIENT)
Dept: LAB | Facility: CLINIC | Age: 33
End: 2023-11-20
Attending: TRANSPLANT SURGERY
Payer: COMMERCIAL

## 2023-11-20 ENCOUNTER — LAB (OUTPATIENT)
Dept: LAB | Facility: CLINIC | Age: 33
End: 2023-11-20
Payer: COMMERCIAL

## 2023-11-20 ENCOUNTER — TELEPHONE (OUTPATIENT)
Dept: TRANSPLANT | Facility: CLINIC | Age: 33
End: 2023-11-20

## 2023-11-20 ENCOUNTER — INFUSION THERAPY VISIT (OUTPATIENT)
Dept: INFUSION THERAPY | Facility: CLINIC | Age: 33
End: 2023-11-20
Attending: INTERNAL MEDICINE
Payer: COMMERCIAL

## 2023-11-20 ENCOUNTER — OFFICE VISIT (OUTPATIENT)
Dept: TRANSPLANT | Facility: CLINIC | Age: 33
End: 2023-11-20
Attending: TRANSPLANT SURGERY
Payer: COMMERCIAL

## 2023-11-20 VITALS
WEIGHT: 150.8 LBS | SYSTOLIC BLOOD PRESSURE: 121 MMHG | OXYGEN SATURATION: 100 % | TEMPERATURE: 97.9 F | HEART RATE: 91 BPM | BODY MASS INDEX: 26.71 KG/M2 | DIASTOLIC BLOOD PRESSURE: 69 MMHG

## 2023-11-20 DIAGNOSIS — E83.42 HYPOMAGNESEMIA: Primary | ICD-10-CM

## 2023-11-20 DIAGNOSIS — R19.7 DIARRHEA, UNSPECIFIED TYPE: ICD-10-CM

## 2023-11-20 DIAGNOSIS — Z94.4 LIVER REPLACED BY TRANSPLANT (H): ICD-10-CM

## 2023-11-20 DIAGNOSIS — N17.9 AKI (ACUTE KIDNEY INJURY) (H): ICD-10-CM

## 2023-11-20 DIAGNOSIS — Z94.4 LIVER TRANSPLANTED (H): ICD-10-CM

## 2023-11-20 DIAGNOSIS — H04.123 DRY EYES: ICD-10-CM

## 2023-11-20 LAB
ALBUMIN SERPL BCG-MCNC: 3.3 G/DL (ref 3.5–5.2)
ALP SERPL-CCNC: 139 U/L (ref 40–150)
ALT SERPL W P-5'-P-CCNC: 7 U/L (ref 0–50)
ANION GAP SERPL CALCULATED.3IONS-SCNC: 10 MMOL/L (ref 7–15)
AST SERPL W P-5'-P-CCNC: 13 U/L (ref 0–45)
BILIRUB DIRECT SERPL-MCNC: 1.22 MG/DL (ref 0–0.3)
BILIRUB SERPL-MCNC: 2.1 MG/DL
BUN SERPL-MCNC: 21.9 MG/DL (ref 6–20)
CALCIUM SERPL-MCNC: 8.2 MG/DL (ref 8.6–10)
CHLORIDE SERPL-SCNC: 103 MMOL/L (ref 98–107)
CREAT SERPL-MCNC: 1.73 MG/DL (ref 0.51–0.95)
DEPRECATED HCO3 PLAS-SCNC: 26 MMOL/L (ref 22–29)
EGFRCR SERPLBLD CKD-EPI 2021: 40 ML/MIN/1.73M2
ERYTHROCYTE [DISTWIDTH] IN BLOOD BY AUTOMATED COUNT: 17.5 % (ref 10–15)
GLUCOSE SERPL-MCNC: 80 MG/DL (ref 70–99)
HCT VFR BLD AUTO: 25.7 % (ref 35–47)
HGB BLD-MCNC: 8.5 G/DL (ref 11.7–15.7)
MAGNESIUM SERPL-MCNC: 1.1 MG/DL (ref 1.7–2.3)
MCH RBC QN AUTO: 31.4 PG (ref 26.5–33)
MCHC RBC AUTO-ENTMCNC: 33.1 G/DL (ref 31.5–36.5)
MCV RBC AUTO: 95 FL (ref 78–100)
PHOSPHATE SERPL-MCNC: 3.9 MG/DL (ref 2.5–4.5)
PLATELET # BLD AUTO: 436 10E3/UL (ref 150–450)
POTASSIUM SERPL-SCNC: 3.6 MMOL/L (ref 3.4–5.3)
PROT SERPL-MCNC: 5 G/DL (ref 6.4–8.3)
RBC # BLD AUTO: 2.71 10E6/UL (ref 3.8–5.2)
SODIUM SERPL-SCNC: 139 MMOL/L (ref 135–145)
TACROLIMUS BLD-MCNC: 5.5 UG/L (ref 5–15)
TME LAST DOSE: NORMAL H
TME LAST DOSE: NORMAL H
WBC # BLD AUTO: 9 10E3/UL (ref 4–11)

## 2023-11-20 PROCEDURE — 99000 SPECIMEN HANDLING OFFICE-LAB: CPT | Performed by: PATHOLOGY

## 2023-11-20 PROCEDURE — 99213 OFFICE O/P EST LOW 20 MIN: CPT | Mod: 24 | Performed by: NURSE PRACTITIONER

## 2023-11-20 PROCEDURE — 85027 COMPLETE CBC AUTOMATED: CPT | Performed by: PATHOLOGY

## 2023-11-20 PROCEDURE — 36415 COLL VENOUS BLD VENIPUNCTURE: CPT | Performed by: PATHOLOGY

## 2023-11-20 PROCEDURE — G0463 HOSPITAL OUTPT CLINIC VISIT: HCPCS | Performed by: NURSE PRACTITIONER

## 2023-11-20 PROCEDURE — 80053 COMPREHEN METABOLIC PANEL: CPT | Performed by: PATHOLOGY

## 2023-11-20 PROCEDURE — 82248 BILIRUBIN DIRECT: CPT | Performed by: PATHOLOGY

## 2023-11-20 PROCEDURE — 250N000011 HC RX IP 250 OP 636: Performed by: NURSE PRACTITIONER

## 2023-11-20 PROCEDURE — 80197 ASSAY OF TACROLIMUS: CPT | Performed by: TRANSPLANT SURGERY

## 2023-11-20 PROCEDURE — 84100 ASSAY OF PHOSPHORUS: CPT | Performed by: PATHOLOGY

## 2023-11-20 PROCEDURE — 96365 THER/PROPH/DIAG IV INF INIT: CPT

## 2023-11-20 PROCEDURE — 83735 ASSAY OF MAGNESIUM: CPT | Performed by: PATHOLOGY

## 2023-11-20 RX ORDER — HEPARIN SODIUM,PORCINE 10 UNIT/ML
5-20 VIAL (ML) INTRAVENOUS DAILY PRN
OUTPATIENT
Start: 2023-11-20

## 2023-11-20 RX ORDER — METHYLPREDNISOLONE SODIUM SUCCINATE 125 MG/2ML
125 INJECTION, POWDER, LYOPHILIZED, FOR SOLUTION INTRAMUSCULAR; INTRAVENOUS
Start: 2023-11-20

## 2023-11-20 RX ORDER — MAGNESIUM OXIDE 400 MG/1
800 TABLET ORAL 2 TIMES DAILY
Qty: 60 TABLET | Refills: 1 | Status: SHIPPED | OUTPATIENT
Start: 2023-11-20 | End: 2023-12-06

## 2023-11-20 RX ORDER — MAGNESIUM SULFATE HEPTAHYDRATE 40 MG/ML
2 INJECTION, SOLUTION INTRAVENOUS ONCE
Status: CANCELLED
Start: 2023-11-20 | End: 2023-11-20

## 2023-11-20 RX ORDER — HEPARIN SODIUM,PORCINE 10 UNIT/ML
5-20 VIAL (ML) INTRAVENOUS DAILY PRN
Status: CANCELLED | OUTPATIENT
Start: 2023-11-20

## 2023-11-20 RX ORDER — ALBUTEROL SULFATE 0.83 MG/ML
2.5 SOLUTION RESPIRATORY (INHALATION)
Status: CANCELLED | OUTPATIENT
Start: 2023-11-20

## 2023-11-20 RX ORDER — ALBUTEROL SULFATE 90 UG/1
1-2 AEROSOL, METERED RESPIRATORY (INHALATION)
Start: 2023-11-20

## 2023-11-20 RX ORDER — ALBUTEROL SULFATE 0.83 MG/ML
2.5 SOLUTION RESPIRATORY (INHALATION)
OUTPATIENT
Start: 2023-11-20

## 2023-11-20 RX ORDER — MEPERIDINE HYDROCHLORIDE 25 MG/ML
25 INJECTION INTRAMUSCULAR; INTRAVENOUS; SUBCUTANEOUS EVERY 30 MIN PRN
OUTPATIENT
Start: 2023-11-20

## 2023-11-20 RX ORDER — MAGNESIUM SULFATE HEPTAHYDRATE 40 MG/ML
2 INJECTION, SOLUTION INTRAVENOUS ONCE
Status: COMPLETED | OUTPATIENT
Start: 2023-11-20 | End: 2023-11-20

## 2023-11-20 RX ORDER — DIPHENHYDRAMINE HYDROCHLORIDE 50 MG/ML
50 INJECTION INTRAMUSCULAR; INTRAVENOUS
Start: 2023-11-20

## 2023-11-20 RX ORDER — HEPARIN SODIUM 1000 [USP'U]/ML
1000 INJECTION, SOLUTION INTRAVENOUS; SUBCUTANEOUS ONCE
Status: COMPLETED | OUTPATIENT
Start: 2023-11-20 | End: 2023-11-20

## 2023-11-20 RX ORDER — DIPHENHYDRAMINE HYDROCHLORIDE 50 MG/ML
50 INJECTION INTRAMUSCULAR; INTRAVENOUS
Status: CANCELLED
Start: 2023-11-20

## 2023-11-20 RX ORDER — MEPERIDINE HYDROCHLORIDE 25 MG/ML
25 INJECTION INTRAMUSCULAR; INTRAVENOUS; SUBCUTANEOUS EVERY 30 MIN PRN
Status: CANCELLED | OUTPATIENT
Start: 2023-11-20

## 2023-11-20 RX ORDER — OXYCODONE HYDROCHLORIDE 5 MG/1
2.5 TABLET ORAL EVERY 6 HOURS PRN
Qty: 5 TABLET | Refills: 0 | Status: SHIPPED | OUTPATIENT
Start: 2023-11-20 | End: 2023-11-30

## 2023-11-20 RX ORDER — ALBUTEROL SULFATE 90 UG/1
1-2 AEROSOL, METERED RESPIRATORY (INHALATION)
Status: CANCELLED
Start: 2023-11-20

## 2023-11-20 RX ORDER — HEPARIN SODIUM (PORCINE) LOCK FLUSH IV SOLN 100 UNIT/ML 100 UNIT/ML
5 SOLUTION INTRAVENOUS
Status: CANCELLED | OUTPATIENT
Start: 2023-11-20

## 2023-11-20 RX ORDER — CARBOXYMETHYLCELLULOSE SODIUM 10 MG/ML
1 SOLUTION/ DROPS OPHTHALMIC DAILY PRN
Qty: 15 ML | Refills: 0 | Status: SHIPPED | OUTPATIENT
Start: 2023-11-20 | End: 2023-12-06

## 2023-11-20 RX ORDER — EPINEPHRINE 1 MG/ML
0.3 INJECTION, SOLUTION, CONCENTRATE INTRAVENOUS EVERY 5 MIN PRN
Status: CANCELLED | OUTPATIENT
Start: 2023-11-20

## 2023-11-20 RX ORDER — METHOCARBAMOL 500 MG/1
500 TABLET, FILM COATED ORAL 3 TIMES DAILY PRN
Qty: 12 TABLET | Refills: 0 | Status: SHIPPED | OUTPATIENT
Start: 2023-11-20 | End: 2023-11-29

## 2023-11-20 RX ORDER — HEPARIN SODIUM (PORCINE) LOCK FLUSH IV SOLN 100 UNIT/ML 100 UNIT/ML
5 SOLUTION INTRAVENOUS
OUTPATIENT
Start: 2023-11-20

## 2023-11-20 RX ORDER — METHYLPREDNISOLONE SODIUM SUCCINATE 125 MG/2ML
125 INJECTION, POWDER, LYOPHILIZED, FOR SOLUTION INTRAMUSCULAR; INTRAVENOUS
Status: CANCELLED
Start: 2023-11-20

## 2023-11-20 RX ORDER — EPINEPHRINE 1 MG/ML
0.3 INJECTION, SOLUTION INTRAMUSCULAR; SUBCUTANEOUS EVERY 5 MIN PRN
OUTPATIENT
Start: 2023-11-20

## 2023-11-20 RX ORDER — BUMETANIDE 2 MG/1
2 TABLET ORAL DAILY
Qty: 10 TABLET | Refills: 0 | Status: SHIPPED | OUTPATIENT
Start: 2023-11-20 | End: 2023-12-01

## 2023-11-20 RX ADMIN — HEPARIN SODIUM 2000 UNITS: 1000 INJECTION, SOLUTION INTRAVENOUS; SUBCUTANEOUS at 12:05

## 2023-11-20 RX ADMIN — MAGNESIUM SULFATE 2 G: 2 INJECTION INTRAVENOUS at 10:38

## 2023-11-20 ASSESSMENT — PAIN SCALES - GENERAL: PAINLEVEL: NO PAIN (0)

## 2023-11-20 NOTE — PATIENT INSTRUCTIONS
Mag 2 grams today in ATC 2nd floor    800mg of mag BID    6 small meals a day. Walk 3 times a day    Continue bumex 2mg daily       Just let water run over your incision    You are referred to mental health.  It is normal to feel overwhelmed    Okay to use artificial tears     stool sample kit in lab.     Continue the robaxin    NO CLEANING OUT LITTER box

## 2023-11-20 NOTE — NURSING NOTE
"Chief Complaint   Patient presents with    Follow Up     Liver transplant follow up       Vital signs:  Temp: 97.9  F (36.6  C) Temp src: Oral BP: 121/69 Pulse: 91     SpO2: 100 %       Weight: 68.4 kg (150 lb 12.8 oz)  Estimated body mass index is 26.71 kg/m  as calculated from the following:    Height as of 10/31/23: 1.6 m (5' 3\").    Weight as of this encounter: 68.4 kg (150 lb 12.8 oz).      Nixon Fulton RN on 11/20/2023 at 9:36 AM    "

## 2023-11-20 NOTE — LETTER
11/20/2023         RE: Lola Milner  [address]  [place] [address]        Dear Colleague,    Thank you for referring your patient, Lola Milner, to the [facility] TRANSPLANT CL[facility]. Please see a copy of my visit note below.    Transplant Surgery -OUTPATIENT IMMUNOSUPPRESSION PROGRESS NOTE    Date of Visit: 11/20/2023    Transplants:  11/5/2023 (Liver); Postoperative day:  15  ASSESMENT AND PLAN:  1.Graft Function: stable allograft dysfunction.   2.Immunosuppression Management: continue same IS  3.Hypertension: stable  4.Renal Function: cr is stable.  Continue bumex 2mg daily  5.Lab frequency: as directed  6.Other: mental health:  ref to mental health.      Low mag:  mag 800mg BID and 2g IV mag today  Cvc line dressing change    Okay to use artifical tears    Renew robaxin and oxy.  Should wean    Should be walking 3 times a day.  May need PT/OT    6 small meals daily        Date: November 20, 2023    Transplant:  [x]                             Liver []                              Kidney []                             Pancreas []                              Other:             Chief Complaint:Follow Up (Liver transplant follow up/)    History of Present Illness:    Lola Milner is a 33 year old female with PMH significant for r MDD, prior tobacco use, gout, polyneuropathy and decompensated cirrhosis secondary to alcohol use c/b hepatic encephalopathy, esophageal varices s/p banding, recurrent ascities and hepatorenal syndrome. She had been admitted to [place] 10/23-10/29 and referred for liver transplant eval at [facility]. She was seen in Hepatology and Transplant clinic 10/31/23 and was sent to the ED for admission for worsening renal function and leukocytosis. While hospitalized she was listed and an organ offer became available to her. She underwent a liver transplant with Dr. Aime Lowe on 11/5/23       No fevers or chills.  No nausea or vomiting    Is teary eyed today.  Overwhelmed.  Not  movinga lot per family    Still has BLE  Patient Active Problem List   Diagnosis     Adjustment disorder with anxiety     Alcohol-induced polyneuropathy (H24)     Alcoholic cirrhosis of liver with ascites (H)     Major depressive disorder, single episode, moderate (H)     Vitamin D deficiency     Anxiety     End stage liver disease (H)     Hypomagnesemia     Immunosuppressed status (H24)     EDGAR (acute kidney injury) (H24)     Hypervolemia associated with renal insufficiency     SOCIAL /FAMILY HISTORY: [x]                  No recent change    Past Medical History:   Diagnosis Date     Alcoholic cirrhosis of liver with ascites (H) 2023     History of alcohol abuse     Formatting of this note might be different from the original. In remission as of late 2023     Past Surgical History:   Procedure Laterality Date     BENCH LIVER  2023    Procedure: Bench liver;  Surgeon: Aime Lowe MD;  Location: [facility] OR     TRANSPLANT LIVER RECIPIENT  DONOR N/A 2023    Procedure: Transplant liver recipient  donor;  Surgeon: Aime Lowe MD;  Location: [facility] OR     Social History     Socioeconomic History     Marital status: Single     Spouse name: Not on file     Number of children: Not on file     Years of education: Not on file     Highest education level: Not on file   Occupational History     Not on file   Tobacco Use     Smoking status: Former     Types: Cigarettes     Quit date: 2023     Years since quittin.4     Smokeless tobacco: Never   Substance and Sexual Activity     Alcohol use: Not Currently     Comment: last ETOH use 2023     Drug use: Never     Sexual activity: Not on file   Other Topics Concern     Not on file   Social History Narrative     Not on file     Social Determinants of Health     Financial Resource Strain: Not on file   Food Insecurity: Not on file   Transportation Needs: Not on file   Physical Activity: Not on file   Stress: Not on file   Social Connections:  Not on file   Interpersonal Safety: Not on file   Housing Stability: Not on file     Prescription Medications as of 11/20/2023         Rx Number Disp Refills Start End Last Dispensed Date Next Fill Date Owning Pharmacy    acetaminophen (TYLENOL) 325 MG tablet  100 tablet 0 11/15/2023    [place], [place] - [address] [facility]    Sig: Take 1-2 tablets (325-650 mg) by mouth every 6 hours as needed for mild pain    Class: E-Prescribe    Route: Oral    Renewals       Renewal requests to authorizing provider (Gladys Carmen NP) <b>prohibited</b>            bumetanide (BUMEX) 2 MG tablet  10 tablet 0 11/20/2023    [address]    Sig: Take 1 tablet (2 mg) by mouth daily    Class: E-Prescribe    Route: Oral    carboxymethylcellulose (ARTIFICIAL TEARS) 1 % ophthalmic solution  15 mL 0 11/20/2023 12/20/2023   [address]    Sig: Place 1 drop into both eyes daily as needed for dry eyes    Class: E-Prescribe    Route: Both Eyes    gabapentin (NEURONTIN) 100 MG capsule  60 capsule 0 11/16/2023    [address]    Sig: Take 2 capsules (200 mg) by mouth at bedtime    Class: E-Prescribe    Route: Oral    magnesium oxide (MAG-OX) 400 MG tablet  60 tablet 1 11/20/2023    [address]    Sig: Take 2 tablets (800 mg) by mouth 2 times daily    Class: E-Prescribe    Route: Oral    melatonin 3 MG tablet    10/29/2023        Sig: Take 3 mg by mouth nightly as needed    Class: Historical    Route: Oral    methocarbamol (ROBAXIN) 500 MG tablet  12 tablet 0 11/20/2023    [address]    Sig: Take 1 tablet (500 mg) by mouth 3 times daily as needed for muscle  spasms    Class: E-Prescribe    Route: Oral    Multiple Vitamins-Minerals (THERA-TABS M) TABS  30 tablet 1 11/15/2023    [place], [place] - [address] [place] [address]    Sig: Take 1 tablet by mouth daily    Class: E-Prescribe    Route: Oral    Renewals       Renewal requests to authorizing provider (Gladys Carmen NP) <b>prohibited</b>            mycophenolate (GENERIC EQUIVALENT) 250 MG capsule  180 capsule 1 11/16/2023    [address]    Sig: Take 3 capsules (750 mg) by mouth 2 times daily    Class: E-Prescribe    Notes to Pharmacy: TXP DT 11/5/2023 (Liver) TXP Dischg DT 11/15/2023 DX Liver replaced by transplant Z94.4 TX [facility] ([place], [place])    Route: Oral    omeprazole (PRILOSEC) 20 MG DR capsule  30 capsule 1 11/16/2023    [address]    Sig: Take 1 capsule (20 mg) by mouth daily    Class: E-Prescribe    Route: Oral    oxyCODONE (ROXICODONE) 5 MG tablet  5 tablet 0 11/20/2023 11/30/2023   [address]    Sig: Take 0.5 tablets (2.5 mg) by mouth every 6 hours as needed for severe pain    Class: E-Prescribe    Earliest Fill Date: 11/20/2023    Route: Oral    predniSONE (DELTASONE) 5 MG tablet  30 tablet 0 11/16/2023    [address]    Sig: Take 1 tablet (5 mg) by mouth daily    Class: E-Prescribe    Notes to Pharmacy: TXP DT 11/5/2023 (Liver) TXP Dischg DT 11/15/2023 DX Liver replaced by transplant Z94.4 TX [facility] ([place], [place])    Route: Oral    sulfamethoxazole-trimethoprim (BACTRIM) 400-80 MG tablet  30 tablet 1 11/17/2023    [address]  [address]    Sig: Take 1 tablet by mouth three times a week Titrate up to daily when directed by transplant team with improving renal function    Class: E-Prescribe    Route: Oral    tacrolimus (GENERIC) 5 MG capsule  180 capsule 3 11/17/2023    [address]    Sig: Take 1 capsule (5 mg) by mouth 2 times daily    Class: E-Prescribe    Notes to Pharmacy: TXP DT 11/5/2023 (Liver) TXP Dischg DT 11/15/2023 DX Liver replaced by transplant Z94.4 [facility] ([place], [place])    Route: Oral    ursodiol (ACTIGALL) 300 MG capsule  60 capsule 1 11/16/2023    [address]    Sig: Take 1 capsule (300 mg) by mouth 2 times daily    Class: E-Prescribe    Route: Oral    valGANciclovir (VALCYTE) 450 MG tablet  30 tablet 2 11/17/2023    [address]    Sig: Take 1 tablet (450 mg) by mouth twice a week Titrate up to 450mg daily when directed by transplant team with improving renal function    Class: E-Prescribe    Route: Oral    Vitamin D3 (CHOLECALCIFEROL) 25 mcg (1000 units) tablet  30 tablet 1 11/15/2023    [address]    Sig: Take 2 tablets (50 mcg) by mouth daily    Class: E-Prescribe    Route: Oral    Renewals       Renewal requests to authorizing provider (Gladys Carmen NP) <b>prohibited</b>            tacrolimus (GENERIC) 1 MG capsule  240 capsule 1 11/17/2023    [address]    Sig: Take 1 capsule (1 mg) by mouth 2 times daily as needed (dose change)    Class: E-Prescribe    Notes to Pharmacy: TXP DT 11/5/2023 (Liver) TXP Dischg DT 11/15/2023 DX Liver replaced by transplant Z94.4 [facility] ([place], [place])     Route: Oral          Clinic-Administered Medications as of 11/20/2023         Dose Frequency Start End    heparin Lock (1000 units/mL High concentration) 1,000 Units (Completed) 1,000 Units ONCE 11/20/2023 11/20/2023    Class: E-Prescribe    Route: Intracatheter    magnesium sulfate 2 g in 50 mL sterile water intermittent infusion (Completed) 2 g ONCE 11/20/2023 11/20/2023    Class: E-Prescribe    Route: Intravenous          Patient has no known allergies.   REVIEW OF SYSTEMS (check box if normal)  [x]               GENERAL  [x]                 PULMONARY [x]                GENITOURINARY  [x]                CNS                 [x]                 CARDIAC  [x]                 ENDOCRINE  [x]                EARS,NOSE,THROAT [x]                 GASTROINTESTINAL [x]                 NEUROLOGIC    [x]                MUSCLOSKELTAL  [x]                  HEMATOLOGY      PHYSICAL EXAM (check box if normal)/69 (BP Location: Right arm, Patient Position: Chair, Cuff Size: Adult Regular)   Pulse 91   Temp 97.9  F (36.6  C) (Oral)   Wt 68.4 kg (150 lb 12.8 oz)   SpO2 100%   BMI 26.71 kg/m          [x]            GENERAL:    [x]       EYES:  ICTERIC   []        YES  []                    NO  [x]           EXTREMITIES: Clubbing []       Y     [x]           N    [x]           EARS, NOSE, THROAT: Membranes Moist    YES   [x]                   NO []                  [x]           LUNGS:  CLEAR    YES       [x]                  NO    []                                [x]           SKIN: Jaundice           YES       []                  NO    [x]                   Rash: YES       []                  NO    [x]                                     [x]             HEART: Regular Rate          YES       [x]                  NO    []                   Incision Clean:  YES       [x]                  NO    []  Some drainage noted                               [x]                    ABDOMEN: Organomegaly YES       []                   NO    [x]                       [x]                    NEUROLOGICAL:  Nonfocal  YES       [x]                  NO    []                       [x]                    Hernia YES       []                  NO    [x]                   PSYCHIATRIC:  Appropriate  YES       [x]                  NO    []                       OTHER:                                                                                                   PAIN SCALE:: 1       Again, thank you for allowing me to participate in the care of your patient.        Sincerely,        Zenobia Chew NP

## 2023-11-20 NOTE — PROGRESS NOTES
Transplant Surgery -OUTPATIENT IMMUNOSUPPRESSION PROGRESS NOTE    Date of Visit: 11/20/2023    Transplants:  11/5/2023 (Liver); Postoperative day:  15  ASSESMENT AND PLAN:  1.Graft Function: stable allograft dysfunction.   2.Immunosuppression Management: continue same IS  3.Hypertension: stable  4.Renal Function: cr is stable.  Continue bumex 2mg daily  5.Lab frequency: as directed  6.Other: mental health:  ref to mental health.      Low mag:  mag 800mg BID and 2g IV mag today  Cvc line dressing change    Okay to use artifical tears    Renew robaxin and oxy.  Should wean    Should be walking 3 times a day.  May need PT/OT    6 small meals daily        Date: November 20, 2023    Transplant:  [x]                             Liver []                              Kidney []                             Pancreas []                              Other:             Chief Complaint:Follow Up (Liver transplant follow up/)    History of Present Illness:    Lola Milner is a 33 year old female with PMH significant for r MDD, prior tobacco use, gout, polyneuropathy and decompensated cirrhosis secondary to alcohol use c/b hepatic encephalopathy, esophageal varices s/p banding, recurrent ascities and hepatorenal syndrome. She had been admitted to [facility] 10/23-10/29 and referred for liver transplant eval at [facility]. She was seen in Hepatology and Transplant clinic 10/31/23 and was sent to the ED for admission for worsening renal function and leukocytosis. While hospitalized she was listed and an organ offer became available to her. She underwent a liver transplant with Dr. Aime Lowe on 11/5/23       No fevers or chills.  No nausea or vomiting    Is teary eyed today.  Overwhelmed.  Not movinga lot per family    Still has BLE  Patient Active Problem List   Diagnosis    Adjustment disorder with anxiety    Alcohol-induced polyneuropathy (H24)    Alcoholic cirrhosis of liver with ascites (H)    Major depressive disorder, single episode,  moderate (H)    Vitamin D deficiency    Anxiety    End stage liver disease (H)    Hypomagnesemia    Immunosuppressed status (H24)    EDGAR (acute kidney injury) (H24)    Hypervolemia associated with renal insufficiency     SOCIAL /FAMILY HISTORY: [x]                  No recent change    Past Medical History:   Diagnosis Date    Alcoholic cirrhosis of liver with ascites (H) 2023    History of alcohol abuse     Formatting of this note might be different from the original. In remission as of late 2023     Past Surgical History:   Procedure Laterality Date    BENCH LIVER  2023    Procedure: Bench liver;  Surgeon: Aime Lowe MD;  Location: [facility] OR    TRANSPLANT LIVER RECIPIENT  DONOR N/A 2023    Procedure: Transplant liver recipient  donor;  Surgeon: Aime Lowe MD;  Location:  OR     Social History     Socioeconomic History    Marital status: Single     Spouse name: Not on file    Number of children: Not on file    Years of education: Not on file    Highest education level: Not on file   Occupational History    Not on file   Tobacco Use    Smoking status: Former     Types: Cigarettes     Quit date: 2023     Years since quittin.4    Smokeless tobacco: Never   Substance and Sexual Activity    Alcohol use: Not Currently     Comment: last ETOH use 2023    Drug use: Never    Sexual activity: Not on file   Other Topics Concern    Not on file   Social History Narrative    Not on file     Social Determinants of Health     Financial Resource Strain: Not on file   Food Insecurity: Not on file   Transportation Needs: Not on file   Physical Activity: Not on file   Stress: Not on file   Social Connections: Not on file   Interpersonal Safety: Not on file   Housing Stability: Not on file     Prescription Medications as of 2023         Rx Number Disp Refills Start End Last Dispensed Date Next Fill Date Owning Pharmacy    acetaminophen (TYLENOL) 325 MG tablet  100 tablet 0  11/15/2023    [address]    Sig: Take 1-2 tablets (325-650 mg) by mouth every 6 hours as needed for mild pain    Class: E-Prescribe    Route: Oral    Renewals       Renewal requests to authorizing provider (Gladys Carmen NP) <b>prohibited</b>            bumetanide (BUMEX) 2 MG tablet  10 tablet 0 11/20/2023    [address]    Sig: Take 1 tablet (2 mg) by mouth daily    Class: E-Prescribe    Route: Oral    carboxymethylcellulose (ARTIFICIAL TEARS) 1 % ophthalmic solution  15 mL 0 11/20/2023 12/20/2023   [address]    Sig: Place 1 drop into both eyes daily as needed for dry eyes    Class: E-Prescribe    Route: Both Eyes    gabapentin (NEURONTIN) 100 MG capsule  60 capsule 0 11/16/2023    [address]    Sig: Take 2 capsules (200 mg) by mouth at bedtime    Class: E-Prescribe    Route: Oral    magnesium oxide (MAG-OX) 400 MG tablet  60 tablet 1 11/20/2023    [address]    Sig: Take 2 tablets (800 mg) by mouth 2 times daily    Class: E-Prescribe    Route: Oral    melatonin 3 MG tablet    10/29/2023        Sig: Take 3 mg by mouth nightly as needed    Class: Historical    Route: Oral    methocarbamol (ROBAXIN) 500 MG tablet  12 tablet 0 11/20/2023    [address]    Sig: Take 1 tablet (500 mg) by mouth 3 times daily as needed for muscle spasms    Class: E-Prescribe    Route: Oral    Multiple Vitamins-Minerals (THERA-TABS M) TABS  30 tablet 1 11/15/2023    [address]    Sig: Take 1 tablet by mouth daily    Class: E-Prescribe    Route: Oral    Renewals        Renewal requests to authorizing provider (Gladys Carmen NP) <b>prohibited</b>            mycophenolate (GENERIC EQUIVALENT) 250 MG capsule  180 capsule 1 11/16/2023    [address]    Sig: Take 3 capsules (750 mg) by mouth 2 times daily    Class: E-Prescribe    Notes to Pharmacy: TXP DT 11/5/2023 (Liver) TXP Dischg DT 11/15/2023 DX Liver replaced by transplant Z94.4 TX [facility] ([place], [place])    Route: Oral    omeprazole (PRILOSEC) 20 MG DR capsule  30 capsule 1 11/16/2023    [address]    Sig: Take 1 capsule (20 mg) by mouth daily    Class: E-Prescribe    Route: Oral    oxyCODONE (ROXICODONE) 5 MG tablet  5 tablet 0 11/20/2023 11/30/2023   [address]    Sig: Take 0.5 tablets (2.5 mg) by mouth every 6 hours as needed for severe pain    Class: E-Prescribe    Earliest Fill Date: 11/20/2023    Route: Oral    predniSONE (DELTASONE) 5 MG tablet  30 tablet 0 11/16/2023    [address]    Sig: Take 1 tablet (5 mg) by mouth daily    Class: E-Prescribe    Notes to Pharmacy: TXP DT 11/5/2023 (Liver) TXP Dischg DT 11/15/2023 DX Liver replaced by transplant Z94.4 [facility] ([place], [place])    Route: Oral    sulfamethoxazole-trimethoprim (BACTRIM) 400-80 MG tablet  30 tablet 1 11/17/2023    [address]    Sig: Take 1 tablet by mouth three times a week Titrate up to daily when directed by transplant team with improving renal function    Class: E-Prescribe    Route: Oral    tacrolimus (GENERIC) 5 MG capsule  180 capsule 3 11/17/2023    [facility]  [address]    Sig: Take 1 capsule (5 mg) by mouth 2 times daily    Class: E-Prescribe    Notes to Pharmacy: TXP DT 11/5/2023 (Liver) TXP Dischg DT 11/15/2023 DX Liver replaced by transplant Z94.4 TX [facility] ([place], [place])    Route: Oral    ursodiol (ACTIGALL) 300 MG capsule  60 capsule 1 11/16/2023    [address]    Sig: Take 1 capsule (300 mg) by mouth 2 times daily    Class: E-Prescribe    Route: Oral    valGANciclovir (VALCYTE) 450 MG tablet  30 tablet 2 11/17/2023    [address]    Sig: Take 1 tablet (450 mg) by mouth twice a week Titrate up to 450mg daily when directed by transplant team with improving renal function    Class: E-Prescribe    Route: Oral    Vitamin D3 (CHOLECALCIFEROL) 25 mcg (1000 units) tablet  30 tablet 1 11/15/2023    [address]    Sig: Take 2 tablets (50 mcg) by mouth daily    Class: E-Prescribe    Route: Oral    Renewals       Renewal requests to authorizing provider (Gladys Carmen NP) <b>prohibited</b>            tacrolimus (GENERIC) 1 MG capsule  240 capsule 1 11/17/2023    [address]    Sig: Take 1 capsule (1 mg) by mouth 2 times daily as needed (dose change)    Class: E-Prescribe    Notes to Pharmacy: TXP DT 11/5/2023 (Liver) TXP Dischg DT 11/15/2023 DX Liver replaced by transplant Z94.4 TX [facility] ([place], [place])    Route: Oral          Clinic-Administered Medications as of 11/20/2023         Dose Frequency Start End    heparin Lock (1000 units/mL High concentration) 1,000 Units (Completed) 1,000 Units ONCE 11/20/2023 11/20/2023    Class: E-Prescribe    Route: Intracatheter    magnesium  sulfate 2 g in 50 mL sterile water intermittent infusion (Completed) 2 g ONCE 11/20/2023 11/20/2023    Class: E-Prescribe    Route: Intravenous          Patient has no known allergies.   REVIEW OF SYSTEMS (check box if normal)  [x]               GENERAL  [x]                 PULMONARY [x]                GENITOURINARY  [x]                CNS                 [x]                 CARDIAC  [x]                 ENDOCRINE  [x]                EARS,NOSE,THROAT [x]                 GASTROINTESTINAL [x]                 NEUROLOGIC    [x]                MUSCLOSKELTAL  [x]                  HEMATOLOGY      PHYSICAL EXAM (check box if normal)/69 (BP Location: Right arm, Patient Position: Chair, Cuff Size: Adult Regular)   Pulse 91   Temp 97.9  F (36.6  C) (Oral)   Wt 68.4 kg (150 lb 12.8 oz)   SpO2 100%   BMI 26.71 kg/m          [x]            GENERAL:    [x]       EYES:  ICTERIC   []        YES  []                    NO  [x]           EXTREMITIES: Clubbing []       Y     [x]           N    [x]           EARS, NOSE, THROAT: Membranes Moist    YES   [x]                   NO []                  [x]           LUNGS:  CLEAR    YES       [x]                  NO    []                                [x]           SKIN: Jaundice           YES       []                  NO    [x]                   Rash: YES       []                  NO    [x]                                     [x]             HEART: Regular Rate          YES       [x]                  NO    []                   Incision Clean:  YES       [x]                  NO    []  Some drainage noted                               [x]                    ABDOMEN: Organomegaly YES       []                  NO    [x]                       [x]                    NEUROLOGICAL:  Nonfocal  YES       [x]                  NO    []                       [x]                    Hernia YES       []                  NO    [x]                   PSYCHIATRIC:  Appropriate  YES       [x]                   NO    []                       OTHER:                                                                                                   PAIN SCALE:: 1

## 2023-11-20 NOTE — PATIENT INSTRUCTIONS
Dear Lola Milner    Thank you for choosing [facility] ([facility]) for your infusion.  The following information is a summary of our appointment as well as important reminders.      High magnesium foods include dark leafy greens, seeds, beans, fish, whole grains, nuts, dark chocolate, yogurt, avocados, bananas, and more. The current daily value (DV) for magnesium is 420mg.     If you are a transplant patient and require transplant education, please click on this link: [email or web address].    We look forward in seeing you on your next appointment here at [facility] ([facility]).  Please don t hesitate to call us at [phone] to reschedule any of your appointments or to speak with one of the [facility] registered nurses.  It was a pleasure taking care of you today.    Sincerely,    [facility]  [address] [place] [address]  [id number]  Phone:  [phone]

## 2023-11-20 NOTE — PROGRESS NOTES
Infusion Nursing Note:  Lola Milner presents today for Patient presents with:  Infusion: magnesium    .    Patient seen by provider today: Yes: Zenobia Chew    present during visit today: Not Applicable.    Note: Patient identification verified by name and date of birth.  Assessment completed.  Vitals recorded in Doc Flowsheets.  Patient was provided with education regarding medication/procedure and possible side effects.  Patient verbalized understanding.    During today's [facility] appointment, orders from Jeevan Fregoso CNP were completed.    Note:  Frequency: one time  Labs: None  Premedications:none  Infusion Rate/Length: Magnesium  infused at 50 mL/hr. Total infusion time of approximately 60 minutes     Administrations This Visit       heparin Lock (1000 units/mL High concentration) 1,000 Units       Admin Date  11/20/2023 Action  $Given Dose  2,000 Units Route  Intracatheter Documented By  Ree Hernandez RN              magnesium sulfate 2 g in 50 mL sterile water intermittent infusion       Admin Date  11/20/2023 Action  $New Bag Dose  2 g Rate  50 mL/hr Route  Intravenous Documented By  Ree Hernandez RN                  Drug Name: Heparin  Dose: 10,000 unit(s) per 10 ml, given 2 ml per line  Route administered: IV  NDC #:  69991-146-01  Amount of waste(mL): 6 ml  Reason for waste:  multi dose vial        Intravenous Access:  Dialysis catheter    Treatment Conditions:  Not Applicable.      Post Infusion Assessment:  Patient tolerated infusion without incident.  Blood return noted pre and post infusion.  Site patent and intact, free from redness, edema or discomfort.       Discharge Plan:   Discharge instructions reviewed with: Patient.  Patient and/or family verbalized understanding of discharge instructions and all questions answered.  Copy of AVS reviewed with patient and/or family.  Patient will return PRN for next appointment.  Patient discharged in  stable condition accompanied by: self.  Departure Mode: Ambulatory.        Ree Hernandez RN

## 2023-11-21 ENCOUNTER — TELEPHONE (OUTPATIENT)
Dept: TRANSPLANT | Facility: CLINIC | Age: 33
End: 2023-11-21

## 2023-11-21 DIAGNOSIS — K72.10 END STAGE LIVER DISEASE (H): ICD-10-CM

## 2023-11-21 DIAGNOSIS — A49.8 CLOSTRIDIUM DIFFICILE INFECTION: Primary | ICD-10-CM

## 2023-11-21 DIAGNOSIS — Z94.4 LIVER TRANSPLANTED (H): ICD-10-CM

## 2023-11-21 LAB
ADV 40+41 DNA STL QL NAA+NON-PROBE: NEGATIVE
ASTRO TYP 1-8 RNA STL QL NAA+NON-PROBE: NEGATIVE
C CAYETANENSIS DNA STL QL NAA+NON-PROBE: NEGATIVE
C DIFF GDH STL QL IA: POSITIVE
C DIFF TOX A+B STL QL IA: POSITIVE
C DIFF TOX B STL QL: POSITIVE
CAMPYLOBACTER DNA SPEC NAA+PROBE: NEGATIVE
CRYPTOSP DNA STL QL NAA+NON-PROBE: NEGATIVE
E COLI O157 DNA STL QL NAA+NON-PROBE: NORMAL
E HISTOLYT DNA STL QL NAA+NON-PROBE: NEGATIVE
EAEC ASTA GENE ISLT QL NAA+PROBE: NEGATIVE
EC STX1+STX2 GENES STL QL NAA+NON-PROBE: NEGATIVE
EPEC EAE GENE STL QL NAA+NON-PROBE: NEGATIVE
ETEC LTA+ST1A+ST1B TOX ST NAA+NON-PROBE: NEGATIVE
G LAMBLIA DNA STL QL NAA+NON-PROBE: NEGATIVE
NOROVIRUS GI+II RNA STL QL NAA+NON-PROBE: NEGATIVE
P SHIGELLOIDES DNA STL QL NAA+NON-PROBE: NEGATIVE
RVA RNA STL QL NAA+NON-PROBE: NEGATIVE
SALMONELLA SP RPOD STL QL NAA+PROBE: NEGATIVE
SAPO I+II+IV+V RNA STL QL NAA+NON-PROBE: NEGATIVE
SHIGELLA SP+EIEC IPAH ST NAA+NON-PROBE: NEGATIVE
V CHOLERAE DNA SPEC QL NAA+PROBE: NEGATIVE
VIBRIO DNA SPEC NAA+PROBE: NEGATIVE
Y ENTEROCOL DNA STL QL NAA+PROBE: NEGATIVE

## 2023-11-21 PROCEDURE — 99000 SPECIMEN HANDLING OFFICE-LAB: CPT | Performed by: PATHOLOGY

## 2023-11-21 PROCEDURE — 87493 C DIFF AMPLIFIED PROBE: CPT | Performed by: NURSE PRACTITIONER

## 2023-11-21 PROCEDURE — 87324 CLOSTRIDIUM AG IA: CPT | Performed by: NURSE PRACTITIONER

## 2023-11-21 PROCEDURE — 87507 IADNA-DNA/RNA PROBE TQ 12-25: CPT | Performed by: NURSE PRACTITIONER

## 2023-11-21 RX ORDER — VANCOMYCIN HYDROCHLORIDE 125 MG/1
125 CAPSULE ORAL 4 TIMES DAILY
Qty: 40 CAPSULE | Refills: 0 | Status: SHIPPED | OUTPATIENT
Start: 2023-11-21 | End: 2023-12-01

## 2023-11-21 RX ORDER — TACROLIMUS 1 MG/1
1 CAPSULE ORAL EVERY 12 HOURS
Qty: 60 CAPSULE | Refills: 1 | Status: SHIPPED | OUTPATIENT
Start: 2023-11-21 | End: 2023-12-05

## 2023-11-21 RX ORDER — HEPARIN SODIUM (PORCINE) LOCK FLUSH IV SOLN 100 UNIT/ML 100 UNIT/ML
5 SOLUTION INTRAVENOUS
Status: CANCELLED | OUTPATIENT
Start: 2023-11-22

## 2023-11-21 RX ORDER — TACROLIMUS 5 MG/1
5 CAPSULE ORAL 2 TIMES DAILY
Qty: 180 CAPSULE | Refills: 3 | Status: SHIPPED | OUTPATIENT
Start: 2023-11-21 | End: 2023-12-05

## 2023-11-21 RX ORDER — HEPARIN SODIUM,PORCINE 10 UNIT/ML
5-20 VIAL (ML) INTRAVENOUS DAILY PRN
Status: CANCELLED | OUTPATIENT
Start: 2023-11-22

## 2023-11-21 NOTE — TELEPHONE ENCOUNTER
ISSUE:   Tacrolimus IR level 5.5 on 11/20, goal 8, dose 5 mg BID.    PLAN:   Please call patient and confirm this was an accurate 12-hour trough. Verify Tacrolimus IR dose 5 mg BID. Confirm no new medications or illness. Confirm no missed doses. If accurate trough and accurate dose, increase Tacrolimus IR dose to 6 mg BID and repeat labs in 1 week. Mariola Castellanos RN      OUTCOME:   Spoke with patient, they confirm accurate trough level and current dose 5 mg BID. Patient confirmed dose change to 6 mg BID and to repeat labs in 1 weeks. Orders sent to preferred pharmacy for dose change and lab for repeat labs. Patient and John voiced understanding of plan.     Pt bp's have been ranging 118/78. Very stable.     Denia Amador LPN

## 2023-11-22 NOTE — PROGRESS NOTES
Contacted patient to start c diff treatment with vanco 125mg QID for 10 days.     Patient states understanding

## 2023-11-24 ENCOUNTER — VIRTUAL VISIT (OUTPATIENT)
Dept: PHARMACY | Facility: CLINIC | Age: 33
End: 2023-11-24
Payer: COMMERCIAL

## 2023-11-24 DIAGNOSIS — R60.9 EDEMA, UNSPECIFIED TYPE: ICD-10-CM

## 2023-11-24 DIAGNOSIS — Z78.9 TAKES DIETARY SUPPLEMENTS: ICD-10-CM

## 2023-11-24 DIAGNOSIS — Z94.4 LIVER REPLACED BY TRANSPLANT (H): Primary | ICD-10-CM

## 2023-11-24 DIAGNOSIS — G89.18 ACUTE POST-OPERATIVE PAIN: ICD-10-CM

## 2023-11-24 PROCEDURE — 99207 PR NO CHARGE LOS: CPT | Performed by: PHARMACIST

## 2023-11-24 RX ORDER — LIDOCAINE 4 G/G
1-2 PATCH TOPICAL EVERY 24 HOURS
Qty: 20 PATCH | Refills: 0 | Status: SHIPPED | OUTPATIENT
Start: 2023-11-24 | End: 2024-02-08

## 2023-11-24 RX ORDER — DICYCLOMINE HYDROCHLORIDE 10 MG/1
10 CAPSULE ORAL
COMMUNITY
End: 2023-12-05

## 2023-11-24 NOTE — PATIENT INSTRUCTIONS
"Recommendations from today's MTM visit:                                                       Start Lidocaine 4% patches 1-2 daily, 12 hours on and 12 hours off apply near incision.   Recommend Metamucil fiber 1 tsp once daily if frequent stools do not improve.   [facility] mail order will call you three weeks post discharge, but if your dose changes, call the number on the back of the pill box to talk to them earlier, [phone]. If your doctor sends over a new prescription to the mail order pharmacy you will have to call them to set up the order. They have an Qasim called \"[facility]\" as well.     Follow-up: 2/21    It was great speaking with you today.  I value your experience and would be very thankful for your time in providing feedback in our clinic survey. In the next few days, you may receive an email or text message from [employer] with a link to a survey related to your  clinical pharmacist.\"     To schedule another MTM appointment, please call the clinic directly or you may call the MTM scheduling line at [phone] or toll-free at [phone].     My Clinical Pharmacist's contact information:                                                      Please feel free to contact me with any questions or concerns you have.      Osmar Cain, PharmD  MTM Pharmacist    Phone: [phone]     "

## 2023-11-24 NOTE — PROGRESS NOTES
"Medication Therapy Management (MTM) Encounter    ASSESSMENT:                            Medication Adherence/Access: No issues identified    Liver Transplant:    If loose stools continue despite CDIF treatment, can start a fiber supplement. Valcyte should be 450mg daily as her CrCL is >40ml/min.     Supplements:   Magnesium not at goal, but recently increased. No changes today.     Pain:   Pain can become poorly controlled. She is requesting another dose of Gabapentin in the morning, will discuss with txp team. She would also benefit from Iidocaine patches, I will send these over for her.     Edema:   Stable.     PLAN:                            Start Lidocaine 4% patches 1-2 daily, 12 hours on and 12 hours off apply near incision.   Recommend Metamucil fiber 1 tsp once daily if frequent stools do not improve.   [email or web address] mail order will call you three weeks post discharge, but if your dose changes, call the number on the back of the pill box to talk to them earlier, [phone]. If your doctor sends over a new prescription to the mail order pharmacy you will have to call them to set up the order. They have an Qasim called \"My [email or web address]\" as well.     Txp team...  Patient is requesting a dose of gabapentin in the morning as her neuropathy is significant during the day.   Recommend increasing Valcyte to 450mg once daily. CrCl is >40ml/min, patient is high risk D+,R-.     Follow-up: 2/21     SUBJECTIVE/OBJECTIVE:                          Lola Milner is a 33 year old female called for a transitions of care visit. She was discharged from [facility] on 11/15 for liver txp.      Reason for visit: initial post txp.    Allergies/ADRs: Reviewed in chart  Past Medical History: Reviewed in chart  Tobacco: She reports that she quit smoking about 5 months ago. Her smoking use included cigarettes. She has never used smokeless tobacco.  Alcohol: not currently using  THC/CBD: none    Medication Adherence/Access: Patient uses pill box(es). " Family does assist with med set up, but she does it herself.  Patient takes medications 4 time(s) per day. 9, 1:30 PM, 5:30pm, 9pm.  Per patient, misses medication 0 times per week.   The patient fills medications at [facility]: YES.    Liver Transplant:    Tacrolimus 6 mg twice daily  Mycophenolate Mofetil 750 mg twice daily  Prednisone 5 mg every morning   Pt reports on and off mild tremors.   Transplant date: 11/5/23  Other meds: Ursodiol 300mg twice daily  CMV prophylaxis: CrCl 10 to 24 mL/minute: Valcyte 450 mg twice weekly Donor (+), Recipient (-), treat 6 months post tx   PJP prophylaxis: Bactrim SS three times per week for 6 months post txp  PPI use: Omeprazole 20mg daily. GERD sx after eating certain.   Tx Coordinator: Abbe Castellanos MD: Dr Roach, Using Med Card: Yes  Recent Infections:  CDIF Vancomycin started 11/21. 125mg 4 TIMES DAILY x 10 days. Having soft/ loose stools, not watery 4-5  x daily. Does think it is getting better.  Immunizations: annual flu shot unknown; [id number]:  unknown; Prevnar 20: unknown; TDaP:  unknown; Shingrix: unknown, HBV: no immunity, COVID: Primary x 2    Estimated Creatinine Clearance: 43.7 mL/min (A) (based on SCr of 1.73 mg/dL (H)).    Supplements:   MVI daily  Vitamin D3 50mcg daily.   Magnesium 800mg twice daily. Getting some cramps in abdomen.   Lab Results   Component Value Date    MAG 1.1 (L) 11/20/2023    MAG 1.4 (L) 11/16/2023    MAG 1.2 (L) 11/15/2023     Pain:   Acetaminophen 650mg up to 3 TIMES DAILY.  Methocarbamol 250mg twice daily prn.  Gabapentin 200mg at bedtime for neuropathy. Would like a dose in the morning as well.   Avoiding Oxycodone for the most part, prn, as she does not want to get addicted to this.   Neuropathy in feet can get up to 7-8/10, can get 0/10. Can't go for a walk because she can't wear shoes due to the pain.   Incisional pain: 4/10 right now, can get up to 7-8/10.     Edema:   Bumex 2mg daily.  Swelling in her legs.  Weights: 154 lb, then  decreased to 144 lb, now 150 lb.     Today's Vitals: There were no vitals taken for this visit.  ----------------  Post Discharge Medication Reconciliation Status: discharge medications reconciled and changed, per note/orders.    I spent 35 minutes with this patient today. All changes were made via collaborative practice agreement with Dr. Lowe. A copy of the visit note was provided to the patient's provider(s).    A summary of these recommendations was sent via [employer].    Sebastien AndradeD  [facility] Pharmacist    Phone: [phone]     Telemedicine Visit Details  Type of service:  Telephone visit  Start Time:  11:47 AM  End Time: 12:23 PM     Medication Therapy Recommendations  Acute post-operative pain    Current Medication: gabapentin (NEURONTIN) 100 MG capsule   Rationale: Dose too low - Dosage too low - Effectiveness   Recommendation: Increase Frequency   Status: Contact Provider - Awaiting Response          Rationale: Synergistic therapy - Needs additional medication therapy - Indication   Recommendation: Start Medication - Lidocaine 4 % Patch   Status: Accepted per CPA         Liver replaced by transplant (H)    Current Medication: valGANciclovir (VALCYTE) 450 MG tablet   Rationale: Dose too low - Dosage too low - Effectiveness   Recommendation: Increase Frequency   Status: Contact Provider - Awaiting Response

## 2023-11-27 ENCOUNTER — VIRTUAL VISIT (OUTPATIENT)
Dept: TRANSPLANT | Facility: CLINIC | Age: 33
End: 2023-11-27
Attending: TRANSPLANT SURGERY
Payer: COMMERCIAL

## 2023-11-27 ENCOUNTER — TELEPHONE (OUTPATIENT)
Dept: TRANSPLANT | Facility: CLINIC | Age: 33
End: 2023-11-27

## 2023-11-27 DIAGNOSIS — Z94.4 LIVER REPLACED BY TRANSPLANT (H): ICD-10-CM

## 2023-11-27 NOTE — LETTER
2023         RE: Lola Milner  [address]  [place] [address]        Dear Colleague,    Thank you for referring your patient, Lola Milner, to the [facility]. Please see a copy of my visit note below.    Transplant Social Work Services Progress Note       Collaborated with: Liver Transplant Team     Data: Lola is s/p  donor Liver Transplant and this writer contacted Lola via phone for a routine follow up. Lola is 22 days post liver transplant     Intervention/Education: I spoke with Lola and we reviewed the followin. Writing to the Donor Family process. Lola is very emotional speaking about this topic. She has the information to write if she chooses to do so. I encouraged her to take some time before writing as she presented as overwhelmed by the situation.         2. Liver Transplant Support Group and social events        3. Immunosuppression copays-no current concerns.  I reminded Lola to contact me if they have future concerns and we can evaluate for financial assistance programs, grants, etc. They did not report any surprises or concerns when picking up their medications.         4. Adjustment to illness - Lola feels like she is physically doing well with her recovery. She has become pretty mobile and doing much on her own already. She is motivated to make goals for herself, such as attending college either for teaching or LADC, returning to work, and getting back into cooking. In terms of her mental radha, she acknowledged that that area is not doing as well as she had hoped. Lola indicated that it fluctuated day by day, and misses her cats and friends. She indicated that she really gets stuck on thinking about the donor family, and it is hard to distract herself from the thoughts. She is open to psychotherapy, but our counseling center cannot get her in until 2024. [facility] anything  sooner. Lola is OK with this writer contacting Mackenzie as well.           5. Importance of maintaining abstinence from all non-prescribed drugs and alcohol. Lola voices understanding and reported that she feels like it was her old life prior to transplant. She is very motivated to stay sober, and reports that she is proud of her sobriety. She has not had any cravings, but does notice when people are drinking. She also discussed with this writer seeing liquor at [employer] being triggering for her as well. 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 I encouraged her to have a backup plan if she goes, and/or to have someone to go with her that can hold her accountable. She was receptive of this idea.     She inquired about hers staples and HD line coming out. I indicated to ask her surgeon during her visit this week  She also asked about walking on the treadmill to get her 3x walks in per day. Crystal indicated that was OK.     Lola is thinking about moving to [place] due to her frequent appointments. She inquired about switching her insurance to [place] if she were to move. I indicated that would be a possibility and would need to review the guidelines, but believe that if she has a permanent address in [place], she could apply.     Assessment: Lola voiced understanding to the topics reviewed. They decline any concerns and continue to have adequate support.      Plan: I will remain available for the psychosocial needs of this patient.        ABEBA Rosas, [facility]  Liver Transplant   [facility]  Phone: [phone]  Pager: [phone]     Again, thank you for allowing me to participate in the care of your patient.        Sincerely,        Melanie Baer [facility]

## 2023-11-27 NOTE — PROGRESS NOTES
Pt evaluated via billable telephone visit. Time spent: 15 min  Provider location: onsite ([facility] Solid Organ Transplant  Outpatient MNT: Post Liver Transplant    Time Spent: 15 minutes  Visit Type: follow up (saw pt for pre txp eval 10/31/23)  Referring Physician: Mynor  Pt accompanied by: self     Date of txp: 11/5/2023    Nutrition Assessment/Diet Recall  Pt reports she has had a good appetite lately - everything tastes good, wanting to cook more, etc. She is enjoying foods she didn't care much for pre-txp, such as cheese, refried beans, etc. She is staying locally at the [facility] for at least 2 more weeks. She is eating 6 small meals/day. Eating protein bars, apples, poptart, hamburger helper, tacos, chicken/rice bake. Not doing any protein drinks, but 1 protein bar/day. Drinking water, small amt of pop or klaudia sun.     Moving around good- hasn't used walker in a few days  Able to shower independently, getting dressed by self  Ankles still swollen, but wears compression socks     Was on dialysis post txp, but not since hospital discharge     Nutrition Diagnosis  Inadequate protein intake related to increased protein needs s/p transplant AEB diet recall likely not suggesting pt is meeting high post txp needs.    Nutrition Intervention  1. Discussed importance of consuming adequate calories and protein for 2 months post-txp to help heal and reduce muscle/fat wasting. Discussed sources of protein and ways to ensure she is increasing her protein intake.    2. Discussed potential for wt gain post-txp and after 2 months of eating higher calorie/higher protein foods, to return to baseline eating habits and monitor for any weight gains.     3. Reviewed importance of food safety and increased risk for food-borne illness post-txp. Also discussed potential need to monitor K+, CHO, and Na+ intakes d/t medication side effects.     4. Avoid the following post txp d/t risk for rejection, unknown effects  on the organs, and/or potential interactions with immunosuppressants:  - Herbal, Chinese, holistic, chiropractic, natural, alternative medicines and supplements  - Detoxes and cleanses  - Weight loss pills  - Protein powders or other products with extracts or herbs (ie green tea extract)      Patient Understanding: Pt verbalized understanding of education provided.  Expected Engagement: Good  Follow-Up Plans: PRN     Nutrition Goals  Continue high protein diet x 2 months post txp     Janna Morton, RD, LD, CCTD

## 2023-11-27 NOTE — TELEPHONE ENCOUNTER
Spoke with John. He has tested negative for COVID. He has a lot of nasal secretions and post nasal drip that makes him cough otherwise feeling well. He will keep eye on his symptoms and knows that if he is not feeling well he cannot come spend time with Lola.

## 2023-11-27 NOTE — TELEPHONE ENCOUNTER
Patient Call: General  Route to LPN    Reason for call: John patient's dad, calling and has questions for Coordinator. Dad is suppose to come down on 12/3/23 to help care for his daughter, and he has come down with a cold. He has tested negative for Covid. Wondering what precautions he should be taking. Wondering if he should come down, or not.     Call back needed? Yes    Return Call Needed  Same as documented in contacts section  When to return call?: Same day: Route High Priority

## 2023-11-27 NOTE — PROGRESS NOTES
Transplant Social Work Services Progress Note       Collaborated with: Liver Transplant Team     Data: Lola is s/p  donor Liver Transplant and this writer contacted Lola via phone for a routine follow up. Lola is 22 days post liver transplant     Intervention/Education: I spoke with Lola and we reviewed the followin. Writing to the Donor Family process. Lola is very emotional speaking about this topic. She has the information to write if she chooses to do so. I encouraged her to take some time before writing as she presented as overwhelmed by the situation.         2. Liver Transplant Support Group and social events        3. Immunosuppression copays-no current concerns.  I reminded Lola to contact me if they have future concerns and we can evaluate for financial assistance programs, grants, etc. They did not report any surprises or concerns when picking up their medications.         4. Adjustment to illness - Lola feels like she is physically doing well with her recovery. She has become pretty mobile and doing much on her own already. She is motivated to make goals for herself, such as attending college either for teaching or LADC, returning to work, and getting back into cooking. In terms of her mental radha, she acknowledged that that area is not doing as well as she had hoped. Lola indicated that it fluctuated day by day, and misses her cats and friends. She indicated that she really gets stuck on thinking about the donor family, and it is hard to distract herself from the thoughts. She is open to psychotherapy, but our counseling center cannot get her in until 2024. I [facility] see if [facility] have anything sooner. Lola is OK with this writer contacting [facility] as well.           5. Importance of maintaining abstinence from all non-prescribed drugs and alcohol. Lola voices understanding and reported that she feels like it was her old life prior to  transplant. She is very motivated to stay sober, and reports that she is proud of her sobriety. She has not had any cravings, but does notice when people are drinking. She also discussed with this writer seeing liquor at [employer] being triggering for her as well. 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 I encouraged her to have a backup plan if she goes, and/or to have someone to go with her that can hold her accountable. She was receptive of this idea.     She inquired about hers staples and HD line coming out. I indicated to ask her surgeon during her visit this week  She also asked about walking on the treadmill to get her 3x walks in per day. Mariola indicated that was OK.     Lola is thinking about moving to [place] due to her frequent appointments. She inquired about switching her insurance to [place] if she were to move. I indicated that would be a possibility and would need to review the guidelines, but believe that if she has a permanent address in [place], she could apply.     Assessment: Lola voiced understanding to the topics reviewed. They decline any concerns and continue to have adequate support.      Plan: I will remain available for the psychosocial needs of this patient.        ABEBA Rosas, [facility]  Liver Transplant   [facility]  Phone: [phone]  Pager: [phone]

## 2023-11-29 ENCOUNTER — MYC REFILL (OUTPATIENT)
Dept: TRANSPLANT | Facility: CLINIC | Age: 33
End: 2023-11-29
Payer: COMMERCIAL

## 2023-11-29 ENCOUNTER — INFUSION THERAPY VISIT (OUTPATIENT)
Dept: INFUSION THERAPY | Facility: CLINIC | Age: 33
End: 2023-11-29
Attending: INTERNAL MEDICINE
Payer: COMMERCIAL

## 2023-11-29 DIAGNOSIS — Z94.4 LIVER TRANSPLANTED (H): ICD-10-CM

## 2023-11-29 DIAGNOSIS — N17.9 AKI (ACUTE KIDNEY INJURY) (H): Primary | ICD-10-CM

## 2023-11-29 PROCEDURE — 250N000011 HC RX IP 250 OP 636: Performed by: NURSE PRACTITIONER

## 2023-11-29 PROCEDURE — 96523 IRRIG DRUG DELIVERY DEVICE: CPT

## 2023-11-29 RX ORDER — HEPARIN SODIUM 1000 [USP'U]/ML
5000 INJECTION, SOLUTION INTRAVENOUS; SUBCUTANEOUS EVERY 24 HOURS
Status: DISCONTINUED | OUTPATIENT
Start: 2023-11-29 | End: 2023-11-29 | Stop reason: HOSPADM

## 2023-11-29 RX ORDER — METHOCARBAMOL 500 MG/1
500 TABLET, FILM COATED ORAL 2 TIMES DAILY PRN
Qty: 10 TABLET | Refills: 0 | Status: SHIPPED | OUTPATIENT
Start: 2023-11-29 | End: 2023-12-05

## 2023-11-29 RX ORDER — HEPARIN SODIUM 1000 [USP'U]/ML
5000 INJECTION, SOLUTION INTRAVENOUS; SUBCUTANEOUS EVERY 24 HOURS
Status: CANCELLED
Start: 2023-11-29

## 2023-11-29 RX ORDER — HEPARIN SODIUM,PORCINE 10 UNIT/ML
5-20 VIAL (ML) INTRAVENOUS DAILY PRN
Status: CANCELLED | OUTPATIENT
Start: 2023-11-29

## 2023-11-29 RX ORDER — HEPARIN SODIUM (PORCINE) LOCK FLUSH IV SOLN 100 UNIT/ML 100 UNIT/ML
5 SOLUTION INTRAVENOUS
Status: CANCELLED | OUTPATIENT
Start: 2023-11-29

## 2023-11-29 RX ADMIN — HEPARIN SODIUM 3800 UNITS: 1000 INJECTION, SOLUTION INTRAVENOUS; SUBCUTANEOUS at 15:12

## 2023-11-29 NOTE — PROGRESS NOTES
Infusion Nursing Note:  Lola AMMY Annia presents today for line cares.    Patient seen by provider today: No   present during visit today: Not Applicable.    Note: N/A.      Intravenous Access:  Dialysis catheter - line cares completed per orders. Dressing change. Both lumens had blood return.    Treatment Conditions:  Not Applicable.    Discharge Plan:   Patient and/or family verbalized understanding of discharge instructions and all questions answered.  Patient discharged in stable condition accompanied by: self.    Administrations This Visit       heparin Lock (1000 units/mL High concentration) 5,000 Units       Admin Date  11/29/2023 Action  $Given Dose  3,800 Units Route  Intracatheter Documented By  Flor Isaac RN Alyssa Sakhitab-Kerestes, RN

## 2023-11-29 NOTE — PATIENT INSTRUCTIONS
Dear Lola Milner    Thank you for choosing [facility] ([facility]) for your line cares.  The following information is a summary of our appointment as well as important reminders.      If you are a transplant patient and require transplant education, please click on this link: [email or web address].    We look forward in seeing you on your next appointment here at [facility] ([facility]).  Please don t hesitate to call us at [phone] to reschedule any of your appointments or to speak with one of the [facility] registered nurses.  It was a pleasure taking care of you today.    Sincerely,    [facility]  Special[facility]fusion [facility] Center  [address] [place] [address]  [address]  Phone:  [phone]

## 2023-11-30 ENCOUNTER — OFFICE VISIT (OUTPATIENT)
Dept: TRANSPLANT | Facility: CLINIC | Age: 33
End: 2023-11-30
Attending: TRANSPLANT SURGERY
Payer: COMMERCIAL

## 2023-11-30 ENCOUNTER — LAB (OUTPATIENT)
Dept: LAB | Facility: CLINIC | Age: 33
End: 2023-11-30
Attending: TRANSPLANT SURGERY
Payer: COMMERCIAL

## 2023-11-30 DIAGNOSIS — Z94.4 LIVER REPLACED BY TRANSPLANT (H): ICD-10-CM

## 2023-11-30 DIAGNOSIS — E55.9 VITAMIN D DEFICIENCY: ICD-10-CM

## 2023-11-30 DIAGNOSIS — Z94.4 LIVER REPLACED BY TRANSPLANT (H): Primary | ICD-10-CM

## 2023-11-30 LAB
ALBUMIN SERPL BCG-MCNC: 3.4 G/DL (ref 3.5–5.2)
ALP SERPL-CCNC: 141 U/L (ref 40–150)
ALT SERPL W P-5'-P-CCNC: 6 U/L (ref 0–50)
ANION GAP SERPL CALCULATED.3IONS-SCNC: 10 MMOL/L (ref 7–15)
AST SERPL W P-5'-P-CCNC: 16 U/L (ref 0–45)
BILIRUB DIRECT SERPL-MCNC: 0.9 MG/DL (ref 0–0.3)
BILIRUB SERPL-MCNC: 1.5 MG/DL
BUN SERPL-MCNC: 17.6 MG/DL (ref 6–20)
CALCIUM SERPL-MCNC: 9.1 MG/DL (ref 8.6–10)
CHLORIDE SERPL-SCNC: 105 MMOL/L (ref 98–107)
CREAT SERPL-MCNC: 1.46 MG/DL (ref 0.51–0.95)
CYSTATIN C (ROCHE): 3 MG/L (ref 0.6–1)
DEPRECATED HCO3 PLAS-SCNC: 26 MMOL/L (ref 22–29)
EGFRCR SERPLBLD CKD-EPI 2021: 49 ML/MIN/1.73M2
ERYTHROCYTE [DISTWIDTH] IN BLOOD BY AUTOMATED COUNT: 18.2 % (ref 10–15)
GFR SERPL CREATININE-BSD FRML MDRD: 19 ML/MIN/1.73M2
GLUCOSE SERPL-MCNC: 99 MG/DL (ref 70–99)
HCT VFR BLD AUTO: 26.6 % (ref 35–47)
HGB BLD-MCNC: 8.6 G/DL (ref 11.7–15.7)
MAGNESIUM SERPL-MCNC: 1.4 MG/DL (ref 1.7–2.3)
MCH RBC QN AUTO: 31.3 PG (ref 26.5–33)
MCHC RBC AUTO-ENTMCNC: 32.3 G/DL (ref 31.5–36.5)
MCV RBC AUTO: 97 FL (ref 78–100)
PHOSPHATE SERPL-MCNC: 5.9 MG/DL (ref 2.5–4.5)
PLATELET # BLD AUTO: 495 10E3/UL (ref 150–450)
POTASSIUM SERPL-SCNC: 5.3 MMOL/L (ref 3.4–5.3)
PROT SERPL-MCNC: 5.2 G/DL (ref 6.4–8.3)
RBC # BLD AUTO: 2.75 10E6/UL (ref 3.8–5.2)
SODIUM SERPL-SCNC: 141 MMOL/L (ref 135–145)
WBC # BLD AUTO: 6.1 10E3/UL (ref 4–11)

## 2023-11-30 PROCEDURE — G0463 HOSPITAL OUTPT CLINIC VISIT: HCPCS | Performed by: TRANSPLANT SURGERY

## 2023-11-30 PROCEDURE — 99213 OFFICE O/P EST LOW 20 MIN: CPT | Mod: 24 | Performed by: TRANSPLANT SURGERY

## 2023-11-30 PROCEDURE — 84100 ASSAY OF PHOSPHORUS: CPT | Performed by: PATHOLOGY

## 2023-11-30 PROCEDURE — 82248 BILIRUBIN DIRECT: CPT | Performed by: PATHOLOGY

## 2023-11-30 PROCEDURE — 80197 ASSAY OF TACROLIMUS: CPT | Performed by: TRANSPLANT SURGERY

## 2023-11-30 PROCEDURE — 82610 CYSTATIN C: CPT | Performed by: INTERNAL MEDICINE

## 2023-11-30 PROCEDURE — 99000 SPECIMEN HANDLING OFFICE-LAB: CPT | Performed by: PATHOLOGY

## 2023-11-30 PROCEDURE — 80053 COMPREHEN METABOLIC PANEL: CPT | Performed by: PATHOLOGY

## 2023-11-30 PROCEDURE — 85027 COMPLETE CBC AUTOMATED: CPT | Performed by: PATHOLOGY

## 2023-11-30 PROCEDURE — 83735 ASSAY OF MAGNESIUM: CPT | Performed by: PATHOLOGY

## 2023-11-30 PROCEDURE — 36415 COLL VENOUS BLD VENIPUNCTURE: CPT | Performed by: PATHOLOGY

## 2023-11-30 RX ORDER — VITAMIN B COMPLEX
50 TABLET ORAL DAILY
Qty: 30 TABLET | Refills: 1 | Status: SHIPPED | OUTPATIENT
Start: 2023-11-30 | End: 2023-12-18

## 2023-11-30 NOTE — PROGRESS NOTES
Transplant Surgery Progress Note    Transplants:  11/5/2023 (Liver); Postoperative day:  25  S: overall doing well, ambulation and appetite improving, edema decreasing      Transplant History:    Transplant Type:  Liver Tx  Donor Type:    Transplant Date:  11/5/2023 (Liver)   Biliary Stent:  No    Acute Rejection Hx:  No    Present Maintenance Immunosuppression:  Tacrolimus, Mycophenolate mofetil, and Prednisone    CMV IgG Ab Discordance:  Yes  EBV IgG Ab Discordance:  No      Transplant Coordinator: Mariola Castellanos     Transplant Office Phone Number: [phone]     Immunosuppressant Medications       Immunosuppressive Agents Disp Start End     mycophenolate (GENERIC EQUIVALENT) 250 MG capsule    180 capsule 11/16/2023     Sig - Route: Take 3 capsules (750 mg) by mouth 2 times daily - Oral    Class: E-Prescribe    Notes to Pharmacy: TXP DT 11/5/2023 (Liver) TXP Dischg DT 11/15/2023 DX Liver replaced by transplant Z94.4 TX [facility] ([place], [place])     tacrolimus (GENERIC) 1 MG capsule    60 capsule 11/21/2023     Sig - Route: Take 1 capsule (1 mg) by mouth every 12 hours Total dose: 6mg BID - Oral    Class: E-Prescribe    Notes to Pharmacy: TXP DT 11/5/2023 (Liver) TXP Dischg DT 11/15/2023 DX Liver replaced by transplant Z94.4 TX [facility] ([place], [place])     tacrolimus (GENERIC) 5 MG capsule    180 capsule 11/21/2023     Sig - Route: Take 1 capsule (5 mg) by mouth 2 times daily Total dose: 6mg BID - Oral    Class: E-Prescribe    Notes to Pharmacy: TXP DT 11/5/2023 (Liver) TXP Dischg DT 11/15/2023 DX Liver replaced by transplant Z94.4 TX [facility] ([place], [place])            Possible Immunosuppression-related side effects:   []             headache  []             vivid dreams  []             irritability  []             cognitive difficuties  []             fine  tremor  []             nausea  []             diarrhea  []             neuropathy      []             edema  []             renal calcineurin toxicity  []             hyperkalemia  []             post-transplant diabetes  []             decreased appetite  []             increased appetite  []             other:  []             none    Prescription Medications as of 11/30/2023         Rx Number Disp Refills Start End Last Dispensed Date Next Fill Date Owning Pharmacy    acetaminophen (TYLENOL) 325 MG tablet  100 tablet 0 11/15/2023    [place], [place] - [address] [place] [address]    Sig: Take 1-2 tablets (325-650 mg) by mouth every 6 hours as needed for mild pain    Class: E-Prescribe    Route: Oral    Renewals       Renewal requests to authorizing provider (Gladys Carmen NP) <b>prohibited</b>            bumetanide (BUMEX) 2 MG tablet  10 tablet 0 11/20/2023    [address]    Sig: Take 1 tablet (2 mg) by mouth daily    Class: E-Prescribe    Route: Oral    carboxymethylcellulose (ARTIFICIAL TEARS) 1 % ophthalmic solution  15 mL 0 11/20/2023 12/20/2023   [address]    Sig: Place 1 drop into both eyes daily as needed for dry eyes    Class: E-Prescribe    Route: Both Eyes    dicyclomine (BENTYL) 10 MG capsule            Sig: Take 10 mg by mouth 4 times daily (before meals and nightly) For stomach cramping.    Class: Historical    Route: Oral    gabapentin (NEURONTIN) 100 MG capsule  60 capsule 0 11/16/2023    [address]    Sig: Take 2 capsules (200 mg) by mouth at bedtime    Class: E-Prescribe    Route: Oral    Lidocaine (LIDOCARE) 4 % Patch  20 patch 0 11/24/2023    [address]    Sig: Place 1-2 patches  onto the skin every 24 hours To prevent lidocaine toxicity, patient should be patch free for 12 hrs daily. Apply near incision, not on incision.    Class: E-Prescribe    Route: Transdermal    magnesium oxide (MAG-OX) 400 MG tablet  60 tablet 1 11/20/2023    [address]    Sig: Take 2 tablets (800 mg) by mouth 2 times daily    Class: E-Prescribe    Route: Oral    methocarbamol (ROBAXIN) 500 MG tablet  10 tablet 0 11/29/2023    [address]    Sig: Take 1 tablet (500 mg) by mouth 2 times daily as needed for muscle spasms    Class: E-Prescribe    Route: Oral    Multiple Vitamins-Minerals (THERA-TABS M) TABS  30 tablet 1 11/15/2023    [place], [place] - [address] [place] [address]    Sig: Take 1 tablet by mouth daily    Class: E-Prescribe    Route: Oral    Renewals       Renewal requests to authorizing provider (Gladys Carmen NP) <b>prohibited</b>            mycophenolate (GENERIC EQUIVALENT) 250 MG capsule  180 capsule 1 11/16/2023    [address]    Sig: Take 3 capsules (750 mg) by mouth 2 times daily    Class: E-Prescribe    Notes to Pharmacy: TXP DT 11/5/2023 (Liver) TXP Dischg DT 11/15/2023 DX Liver replaced by transplant Z94.4 TX [facility] ([place], [place])    Route: Oral    omeprazole (PRILOSEC) 20 MG DR capsule  30 capsule 1 11/16/2023    [address]    Sig: Take 1 capsule (20 mg) by mouth daily    Class: E-Prescribe    Route: Oral    predniSONE (DELTASONE) 5 MG tablet  30 tablet 0 11/16/2023    [address]    Sig: Take 1 tablet (5 mg) by mouth daily    Class: E-Prescribe    Notes to  Pharmacy: TXP DT 11/5/2023 (Liver) TXP Dischg DT 11/15/2023 DX Liver replaced by transplant Z94.4 TX [facility] ([place], [place])    Route: Oral    sulfamethoxazole-trimethoprim (BACTRIM) 400-80 MG tablet  30 tablet 1 11/17/2023    [place], [place] - [address] [place] [address] [address]    Sig: Take 1 tablet by mouth three times a week Titrate up to daily when directed by transplant team with improving renal function    Class: E-Prescribe    Route: Oral    tacrolimus (GENERIC) 1 MG capsule  60 capsule 1 11/21/2023    [place], [place] - [address] [facility] [address]    Sig: Take 1 capsule (1 mg) by mouth every 12 hours Total dose: 6mg BID    Class: E-Prescribe    Notes to Pharmacy: TXP DT 11/5/2023 (Liver) TXP Dischg DT 11/15/2023 DX Liver replaced by transplant Z94.4 TX [facility] ([place], [place])    Route: Oral    tacrolimus (GENERIC) 5 MG capsule  180 capsule 3 11/21/2023    [place], [place] - [address] [facility] [address]    Sig: Take 1 capsule (5 mg) by mouth 2 times daily Total dose: 6mg BID    Class: E-Prescribe    Notes to Pharmacy: TXP DT 11/5/2023 (Liver) TXP Dischg DT 11/15/2023 DX Liver replaced by transplant Z94.4 TX [facility] ([place], [place])    Route: Oral    ursodiol (ACTIGALL) 300 MG capsule  60 capsule 1 11/16/2023    [place], [place] - [address] [place] [address] [address]    Sig: Take 1 capsule (300 mg) by mouth 2 times daily    Class: E-Prescribe    Route: Oral    valGANciclovir (VALCYTE) 450 MG tablet  30 tablet 2 11/17/2023    [place], [place] - [address] [facility] [address]    Sig: Take 1 tablet (450 mg) by mouth twice a week Titrate up to 450mg daily when directed by transplant team with improving renal function     Class: E-Prescribe    Route: Oral    vancomycin (VANCOCIN) 125 MG capsule  40 capsule 0 11/21/2023 12/1/2023   [address]    Sig: Take 1 capsule (125 mg) by mouth 4 times daily for 10 days    Class: E-Prescribe    Route: Oral    Vitamin D3 (CHOLECALCIFEROL) 25 mcg (1000 units) tablet  30 tablet 1 11/30/2023    [address]    Sig: Take 2 tablets (50 mcg) by mouth daily    Class: E-Prescribe    Route: Oral            O:   [unfilled]        Latest Ref Rng & Units 11/30/2023     9:05 AM 11/20/2023     8:50 AM 11/16/2023     9:00 AM 11/15/2023     6:19 AM 11/14/2023     7:20 AM   Transplant Immunosuppression Labs   Creat 0.51 - 0.95 mg/dL 1.46  1.73  1.65  1.62  1.42    Urea Nitrogen 6.0 - 20.0 mg/dL 17.6  21.9  15.2  13.5  9.9    WBC 4.0 - 11.0 10e3/uL 6.1  9.0  8.9  7.5     Neutrophil %    81     ANEU 1.6 - 8.3 10e3/uL    6.1         Chemistries:   Recent Labs   Lab Test 11/30/23  0905   BUN 17.6   CR 1.46*   GFRESTIMATED 49*   GLC 99     Lab Results   Component Value Date    A1C 5.3 11/08/2023     Recent Labs   Lab Test 11/30/23  0905   ALBUMIN 3.4*   BILITOTAL 1.5*   ALKPHOS 141   AST 16   ALT 6     Urine Studies:  Recent Labs   Lab Test 11/16/23  0905 11/04/23  2309   COLOR Light Yellow Dark Yellow*   APPEARANCE Clear Slightly Cloudy*   URINEGLC Negative Negative   URINEBILI Negative Moderate*   URINEKETONE Negative Negative   SG 1.005 1.016   UBLD Negative Trace*   URINEPH 5.5 5.5   PROTEIN Negative 10*   NITRITE Negative Negative   LEUKEST Negative Small*   RBCU  --  2   WBCU  --  5     No lab results found.  Hematology:   Recent Labs   Lab Test 11/30/23  0905 11/20/23  0850 11/16/23  0900   HGB 8.6* 8.5* 9.7*   * 436 355   WBC 6.1 9.0 8.9     Coags:   Recent Labs   Lab Test 11/15/23  0619 11/14/23  0720   INR 1.10 1.21*     HLA antibodies:   No results found  "for: \"QA9SXYOYC\", \"AT3DEZVUQK\", \"UK7FMAHTR\", \"MQ4ZSCFSVD\"    Assessment: Lola Milner is doing well s/p Liver Tx:  Issues we addressed during her visit include:    Plan:    1. Graft function: LFTs normalizing  2. Immunosuppression Management: No change tac 5-8   .  Complexity of management:Low.  Contributing factors:  renal insufficiency    Need to schedule to remove HD line    Followup: 1-2 weeks            Aime Lowe MD/PhD  "

## 2023-11-30 NOTE — LETTER
11/30/2023         RE: Lola Milner  [address]  [place] [address]        Dear Colleague,    Thank you for referring your patient, Lola Milner, to the [facility] TRANSPLANT CLINIC. Please see a copy of my visit note below.    Transplant Surgery Progress Note    Transplants:  11/5/2023 (Liver); Postoperative day:  25  S: overall doing well, ambulation and appetite improving, edema decreasing      Transplant History:    Transplant Type:  Liver Tx  Donor Type:    Transplant Date:  11/5/2023 (Liver)   Biliary Stent:  No    Acute Rejection Hx:  No    Present Maintenance Immunosuppression:  Tacrolimus, Mycophenolate mofetil, and Prednisone    CMV IgG Ab Discordance:  Yes  EBV IgG Ab Discordance:  No      Transplant Coordinator: Mariola Castellanos     Transplant Office Phone Number: [phone]     Immunosuppressant Medications       Immunosuppressive Agents Disp Start End     mycophenolate (GENERIC EQUIVALENT) 250 MG capsule    180 capsule 11/16/2023     Sig - Route: Take 3 capsules (750 mg) by mouth 2 times daily - Oral    Class: E-Prescribe    Notes to Pharmacy: TXP DT 11/5/2023 (Liver) TXP Dischg DT 11/15/2023 DX Liver replaced by transplant Z94.4 TX [facility] ([place], [place])     tacrolimus (GENERIC) 1 MG capsule    60 capsule 11/21/2023     Sig - Route: Take 1 capsule (1 mg) by mouth every 12 hours Total dose: 6mg BID - Oral    Class: E-Prescribe    Notes to Pharmacy: TXP DT 11/5/2023 (Liver) TXP Dischg DT 11/15/2023 DX Liver replaced by transplant Z94.4 [facility] ([place], [place])     tacrolimus (GENERIC) 5 MG capsule    180 capsule 11/21/2023     Sig - Route: Take 1 capsule (5 mg) by mouth 2 times daily Total dose: 6mg BID - Oral    Class: E-Prescribe    Notes to Pharmacy: TXP DT 11/5/2023 (Liver) TXP Dischg DT 11/15/2023 DX Liver replaced by transplant Z94.4 [facility]  [facility] ([place], [place])            Possible Immunosuppression-related side effects:   []             headache  []             vivid dreams  []             irritability  []             cognitive difficuties  []             fine tremor  []             nausea  []             diarrhea  []             neuropathy      []             edema  []             renal calcineurin toxicity  []             hyperkalemia  []             post-transplant diabetes  []             decreased appetite  []             increased appetite  []             other:  []             none    Prescription Medications as of 11/30/2023         Rx Number Disp Refills Start End Last Dispensed Date Next Fill Date Owning Pharmacy    acetaminophen (TYLENOL) 325 MG tablet  100 tablet 0 11/15/2023    [place], [place] - [address] [facility]    Sig: Take 1-2 tablets (325-650 mg) by mouth every 6 hours as needed for mild pain    Class: E-Prescribe    Route: Oral    Renewals       Renewal requests to authorizing provider (Gladys Carmen, NP) <b>prohibited</b>            bumetanide (BUMEX) 2 MG tablet  10 tablet 0 11/20/2023    [address]    Sig: Take 1 tablet (2 mg) by mouth daily    Class: E-Prescribe    Route: Oral    carboxymethylcellulose (ARTIFICIAL TEARS) 1 % ophthalmic solution  15 mL 0 11/20/2023 12/20/2023   [address]    Sig: Place 1 drop into both eyes daily as needed for dry eyes    Class: E-Prescribe    Route: Both Eyes    dicyclomine (BENTYL) 10 MG capsule            Sig: Take 10 mg by mouth 4 times daily (before meals and nightly) For stomach cramping.    Class: Historical    Route: Oral    gabapentin (NEURONTIN) 100 MG capsule  60 capsule 0 11/16/2023    [place], [place] - [address] [place] [address] [phone]    Sig: Take 2  capsules (200 mg) by mouth at bedtime    Class: E-Prescribe    Route: Oral    Lidocaine (LIDOCARE) 4 % Patch  20 patch 0 11/24/2023    [address]    Sig: Place 1-2 patches onto the skin every 24 hours To prevent lidocaine toxicity, patient should be patch free for 12 hrs daily. Apply near incision, not on incision.    Class: E-Prescribe    Route: Transdermal    magnesium oxide (MAG-OX) 400 MG tablet  60 tablet 1 11/20/2023    [address] 1[address]985    Sig: Take 2 tablets (800 mg) by mouth 2 times daily    Class: E-Prescribe    Route: Oral    methocarbamol (ROBAXIN) 500 MG tablet  10 tablet 0 11/29/2023    [address] 1[address]919    Sig: Take 1 tablet (500 mg) by mouth 2 times daily as needed for muscle spasms    Class: E-Prescribe    Route: Oral    Multiple Vitamins-Minerals (THERA-TABS M) TABS  30 tablet 1 11/15/2023    [place], [place] - [address] [place] [address]    Sig: Take 1 tablet by mouth daily    Class: E-Prescribe    Route: Oral    Renewals       Renewal requests to authorizing provider (Gladys Carmen NP) <b>prohibited</b>            mycophenolate (GENERIC EQUIVALENT) 250 MG capsule  180 capsule 1 11/16/2023    [address]    Sig: Take 3 capsules (750 mg) by mouth 2 times daily    Class: E-Prescribe    Notes to Pharmacy: TXP DT 11/5/2023 (Liver) TXP Dischg DT 11/15/2023 DX Liver replaced by transplant Z94.4 TX [facility] ([place], [place])    Route: Oral    omeprazole (PRILOSEC) 20 MG DR capsule  30 capsule 1 11/16/2023    [address]    Sig: Take 1 capsule (20 mg) by mouth daily    Class: E-Prescribe     Route: Oral    predniSONE (DELTASONE) 5 MG tablet  30 tablet 0 11/16/2023    [address]956    Sig: Take 1 tablet (5 mg) by mouth daily    Class: E-Prescribe    Notes to Pharmacy: TXP DT 11/5/2023 (Liver) TXP Dischg DT 11/15/2023 DX Liver replaced by transplant Z94.4 [facility] ([place], [place])    Route: Oral    sulfamethoxazole-trimethoprim (BACTRIM) 400-80 MG tablet  30 tablet 1 11/17/2023    [address]    Sig: Take 1 tablet by mouth three times a week Titrate up to daily when directed by transplant team with improving renal function    Class: E-Prescribe    Route: Oral    tacrolimus (GENERIC) 1 MG capsule  60 capsule 1 11/21/2023    [address]653    Sig: Take 1 capsule (1 mg) by mouth every 12 hours Total dose: 6mg BID    Class: E-Prescribe    Notes to Pharmacy: TXP DT 11/5/2023 (Liver) TXP Dischg DT 11/15/2023 DX Liver replaced by transplant Z94.4 [facility] ([place], [place])    Route: Oral    tacrolimus (GENERIC) 5 MG capsule  180 capsule 3 11/21/2023    [address]    Sig: Take 1 capsule (5 mg) by mouth 2 times daily Total dose: 6mg BID    Class: E-Prescribe    Notes to Pharmacy: TXP DT 11/5/2023 (Liver) TXP Dischg DT 11/15/2023 DX Liver replaced by transplant Z94.4 [facility] ([place], [place])    Route: Oral    ursodiol (ACTIGALL) 300 MG capsule  60 capsule 1 11/16/2023    [address]    Sig: Take 1 capsule (300 mg) by mouth 2 times daily    Class: E-Prescribe    Route: Oral    valGANciclovir (VALCYTE) 450 MG tablet  30  tablet 2 11/17/2023    [place], [place] - [address] [facility] [address]    Sig: Take 1 tablet (450 mg) by mouth twice a week Titrate up to 450mg daily when directed by transplant team with improving renal function    Class: E-Prescribe    Route: Oral    vancomycin (VANCOCIN) 125 MG capsule  40 capsule 0 11/21/2023 12/1/2023   [place], [place] - [address] [place] [address] 1[address]474    Sig: Take 1 capsule (125 mg) by mouth 4 times daily for 10 days    Class: E-Prescribe    Route: Oral    Vitamin D3 (CHOLECALCIFEROL) 25 mcg (1000 units) tablet  30 tablet 1 11/30/2023    [address]    Sig: Take 2 tablets (50 mcg) by mouth daily    Class: E-Prescribe    Route: Oral            O:   [unfilled]        Latest Ref Rng & Units 11/30/2023     9:05 AM 11/20/2023     8:50 AM 11/16/2023     9:00 AM 11/15/2023     6:19 AM 11/14/2023     7:20 AM   Transplant Immunosuppression Labs   Creat 0.51 - 0.95 mg/dL 1.46  1.73  1.65  1.62  1.42    Urea Nitrogen 6.0 - 20.0 mg/dL 17.6  21.9  15.2  13.5  9.9    WBC 4.0 - 11.0 10e3/uL 6.1  9.0  8.9  7.5     Neutrophil %    81     ANEU 1.6 - 8.3 10e3/uL    6.1         Chemistries:   Recent Labs   Lab Test 11/30/23  0905   BUN 17.6   CR 1.46*   GFRESTIMATED 49*   GLC 99     Lab Results   Component Value Date    A1C 5.3 11/08/2023     Recent Labs   Lab Test 11/30/23  0905   ALBUMIN 3.4*   BILITOTAL 1.5*   ALKPHOS 141   AST 16   ALT 6     Urine Studies:  Recent Labs   Lab Test 11/16/23 0905 11/04/23 2309   COLOR Light Yellow Dark Yellow*   APPEARANCE Clear Slightly Cloudy*   URINEGLC Negative Negative   URINEBILI Negative Moderate*   URINEKETONE Negative Negative   SG 1.005 1.016   UBLD Negative Trace*   URINEPH 5.5 5.5   PROTEIN Negative 10*   NITRITE Negative Negative   LEUKEST Negative Small*   RBCU  --  2   WBCU  --  5     No lab results found.  Hematology:   Recent Labs  "  Lab Test 11/30/23  0905 11/20/23  0850 11/16/23  0900   HGB 8.6* 8.5* 9.7*   * 436 355   WBC 6.1 9.0 8.9     Coags:   Recent Labs   Lab Test 11/15/23  0619 11/14/23  0720   INR 1.10 1.21*     HLA antibodies:   No results found for: \"VN0KGSRSZ\", \"RO6KQDRRHB\", \"IG7GAFBOS\", \"TR7VRMEUZZ\"    Assessment: Lola Milner is doing well s/p Liver Tx:  Issues we addressed during her visit include:    Plan:    1. Graft function: LFTs normalizing  2. Immunosuppression Management: No change tac 5-8   .  Complexity of management:Low.  Contributing factors:  renal insufficiency    Need to schedule to remove HD line    Followup: 1-2 weeks            Aime Lowe MD/PhD    "

## 2023-12-01 ENCOUNTER — TELEPHONE (OUTPATIENT)
Dept: TRANSPLANT | Facility: CLINIC | Age: 33
End: 2023-12-01
Payer: COMMERCIAL

## 2023-12-01 DIAGNOSIS — N17.9 AKI (ACUTE KIDNEY INJURY) (H): ICD-10-CM

## 2023-12-01 LAB
TACROLIMUS BLD-MCNC: 9 UG/L (ref 5–15)
TME LAST DOSE: NORMAL H
TME LAST DOSE: NORMAL H

## 2023-12-01 RX ORDER — BUMETANIDE 2 MG/1
TABLET ORAL
Qty: 10 TABLET | Refills: 0 | Status: SHIPPED | OUTPATIENT
Start: 2023-12-01 | End: 2023-12-05

## 2023-12-05 ENCOUNTER — LAB (OUTPATIENT)
Dept: LAB | Facility: CLINIC | Age: 33
End: 2023-12-05
Attending: TRANSPLANT SURGERY
Payer: COMMERCIAL

## 2023-12-05 ENCOUNTER — OFFICE VISIT (OUTPATIENT)
Dept: TRANSPLANT | Facility: CLINIC | Age: 33
End: 2023-12-05
Attending: TRANSPLANT SURGERY
Payer: COMMERCIAL

## 2023-12-05 VITALS
BODY MASS INDEX: 29.32 KG/M2 | RESPIRATION RATE: 16 BRPM | OXYGEN SATURATION: 100 % | DIASTOLIC BLOOD PRESSURE: 62 MMHG | WEIGHT: 165.5 LBS | HEART RATE: 87 BPM | SYSTOLIC BLOOD PRESSURE: 118 MMHG

## 2023-12-05 DIAGNOSIS — Z94.4 LIVER TRANSPLANTED (H): ICD-10-CM

## 2023-12-05 DIAGNOSIS — Z94.4 LIVER REPLACED BY TRANSPLANT (H): ICD-10-CM

## 2023-12-05 DIAGNOSIS — N17.9 AKI (ACUTE KIDNEY INJURY) (H): ICD-10-CM

## 2023-12-05 DIAGNOSIS — K70.31 ALCOHOLIC CIRRHOSIS OF LIVER WITH ASCITES (H): ICD-10-CM

## 2023-12-05 DIAGNOSIS — K72.10 END STAGE LIVER DISEASE (H): ICD-10-CM

## 2023-12-05 LAB
ALBUMIN SERPL BCG-MCNC: 3.4 G/DL (ref 3.5–5.2)
ALP SERPL-CCNC: 137 U/L (ref 40–150)
ALT SERPL W P-5'-P-CCNC: 9 U/L (ref 0–50)
ANION GAP SERPL CALCULATED.3IONS-SCNC: 10 MMOL/L (ref 7–15)
AST SERPL W P-5'-P-CCNC: 21 U/L (ref 0–45)
BILIRUB DIRECT SERPL-MCNC: 0.78 MG/DL (ref 0–0.3)
BILIRUB SERPL-MCNC: 1.4 MG/DL
BUN SERPL-MCNC: 24.9 MG/DL (ref 6–20)
CALCIUM SERPL-MCNC: 9.1 MG/DL (ref 8.6–10)
CHLORIDE SERPL-SCNC: 100 MMOL/L (ref 98–107)
CREAT SERPL-MCNC: 1.44 MG/DL (ref 0.51–0.95)
DEPRECATED HCO3 PLAS-SCNC: 26 MMOL/L (ref 22–29)
EGFRCR SERPLBLD CKD-EPI 2021: 50 ML/MIN/1.73M2
ERYTHROCYTE [DISTWIDTH] IN BLOOD BY AUTOMATED COUNT: 17.6 % (ref 10–15)
GLUCOSE SERPL-MCNC: 83 MG/DL (ref 70–99)
HCT VFR BLD AUTO: 26.2 % (ref 35–47)
HGB BLD-MCNC: 8.6 G/DL (ref 11.7–15.7)
MAGNESIUM SERPL-MCNC: 1.5 MG/DL (ref 1.7–2.3)
MCH RBC QN AUTO: 30.8 PG (ref 26.5–33)
MCHC RBC AUTO-ENTMCNC: 32.8 G/DL (ref 31.5–36.5)
MCV RBC AUTO: 94 FL (ref 78–100)
PHOSPHATE SERPL-MCNC: 5.7 MG/DL (ref 2.5–4.5)
PLATELET # BLD AUTO: 383 10E3/UL (ref 150–450)
POTASSIUM SERPL-SCNC: 5.5 MMOL/L (ref 3.4–5.3)
PROT SERPL-MCNC: 5 G/DL (ref 6.4–8.3)
RBC # BLD AUTO: 2.79 10E6/UL (ref 3.8–5.2)
SODIUM SERPL-SCNC: 136 MMOL/L (ref 135–145)
TACROLIMUS BLD-MCNC: 6.3 UG/L (ref 5–15)
TME LAST DOSE: NORMAL H
TME LAST DOSE: NORMAL H
WBC # BLD AUTO: 5.2 10E3/UL (ref 4–11)

## 2023-12-05 PROCEDURE — 82248 BILIRUBIN DIRECT: CPT | Performed by: PATHOLOGY

## 2023-12-05 PROCEDURE — G0480 DRUG TEST DEF 1-7 CLASSES: HCPCS | Mod: 90 | Performed by: PATHOLOGY

## 2023-12-05 PROCEDURE — 80197 ASSAY OF TACROLIMUS: CPT | Performed by: TRANSPLANT SURGERY

## 2023-12-05 PROCEDURE — 80053 COMPREHEN METABOLIC PANEL: CPT | Performed by: PATHOLOGY

## 2023-12-05 PROCEDURE — 84100 ASSAY OF PHOSPHORUS: CPT | Performed by: PATHOLOGY

## 2023-12-05 PROCEDURE — 85027 COMPLETE CBC AUTOMATED: CPT | Performed by: PATHOLOGY

## 2023-12-05 PROCEDURE — G0463 HOSPITAL OUTPT CLINIC VISIT: HCPCS | Performed by: NURSE PRACTITIONER

## 2023-12-05 PROCEDURE — 99000 SPECIMEN HANDLING OFFICE-LAB: CPT | Performed by: PATHOLOGY

## 2023-12-05 PROCEDURE — 87516 HEPATITIS B DNA AMP PROBE: CPT | Performed by: TRANSPLANT SURGERY

## 2023-12-05 PROCEDURE — 83735 ASSAY OF MAGNESIUM: CPT | Performed by: PATHOLOGY

## 2023-12-05 PROCEDURE — 36415 COLL VENOUS BLD VENIPUNCTURE: CPT | Performed by: PATHOLOGY

## 2023-12-05 PROCEDURE — 99213 OFFICE O/P EST LOW 20 MIN: CPT | Mod: 24 | Performed by: NURSE PRACTITIONER

## 2023-12-05 RX ORDER — GABAPENTIN 100 MG/1
200 CAPSULE ORAL AT BEDTIME
Qty: 60 CAPSULE | Refills: 0 | Status: SHIPPED | OUTPATIENT
Start: 2023-12-05 | End: 2024-01-19

## 2023-12-05 RX ORDER — BUMETANIDE 2 MG/1
TABLET ORAL
Qty: 10 TABLET | Refills: 0 | Status: SHIPPED | OUTPATIENT
Start: 2023-12-05 | End: 2023-12-05

## 2023-12-05 RX ORDER — TACROLIMUS 1 MG/1
1 CAPSULE ORAL EVERY 12 HOURS
Qty: 60 CAPSULE | Refills: 1 | Status: SHIPPED | OUTPATIENT
Start: 2023-12-05 | End: 2023-12-21

## 2023-12-05 RX ORDER — MYCOPHENOLATE MOFETIL 250 MG/1
750 CAPSULE ORAL 2 TIMES DAILY
Qty: 180 CAPSULE | Refills: 1 | Status: SHIPPED | OUTPATIENT
Start: 2023-12-05 | End: 2024-01-02

## 2023-12-05 RX ORDER — TACROLIMUS 5 MG/1
5 CAPSULE ORAL 2 TIMES DAILY
Qty: 180 CAPSULE | Refills: 3 | Status: SHIPPED | OUTPATIENT
Start: 2023-12-05 | End: 2023-12-06

## 2023-12-05 RX ORDER — DICYCLOMINE HYDROCHLORIDE 10 MG/1
10 CAPSULE ORAL
Qty: 120 CAPSULE | Refills: 0 | Status: SHIPPED | OUTPATIENT
Start: 2023-12-05 | End: 2024-01-04

## 2023-12-05 RX ORDER — METHOCARBAMOL 500 MG/1
500 TABLET, FILM COATED ORAL
Qty: 5 TABLET | Refills: 0 | Status: SHIPPED | OUTPATIENT
Start: 2023-12-05 | End: 2023-12-13

## 2023-12-05 ASSESSMENT — PAIN SCALES - GENERAL: PAINLEVEL: MODERATE PAIN (4)

## 2023-12-05 NOTE — LETTER
12/5/2023         RE: Lola Milner  [address]  [place] [address]        Dear Colleague,    Thank you for referring your patient, Lola Milner, to the [facility] TRANSPLANT CLINIC. Please see a copy of my visit note below.    Transplant Surgery -OUTPATIENT IMMUNOSUPPRESSION PROGRESS NOTE    Date of Visit: 12/05/2023    Transplants:  11/5/2023 (Liver); Postoperative day:  30  ASSESMENT AND PLAN:  1.Graft Function: stable allograft function   2.Immunosuppression Management: continue same IS  3.Hypertension: stable  4.Renal Function: cr is trending down.  5.Lab frequency:twice weekly  6.Other: incision healingwell  Back pain:  robaxin at night.   Increase magnesium 800mg AM and PM  400mg at lunch    Bumex as directed. Will see neph tomorrow.   Mental health: follow-up in [facility] in [place]  Slight hyperkaliemia:  monitor K+ intake     Date: December 5, 2023    Transplant:  [x]                             Liver [x]                              Kidney []                             Pancreas []                              Other:             Chief Complaint:Follow Up (Liver transplant on 11/5/2023)    History of Present Illness:Lola Milner is a 33 year old female with PMH significant for r MDD, prior tobacco use, gout, polyneuropathy and decompensated cirrhosis secondary to alcohol use c/b hepatic encephalopathy, esophageal varices s/p banding, recurrent ascities and hepatorenal syndrome. She had been admitted to [place] 10/23-10/29 and referred for liver transplant eval at [facility]. She was seen in Hepatology and Transplant clinic 10/31/23 and was sent to the ED for admission for worsening renal function and leukocytosis. While hospitalized she was listed and an organ offer became available to her. She underwent a liver transplant with Dr. Aime Lowe on 11/5/23         No fevers or chills.  No nausea or vomiting       Eating well.  Appetite improving.  Moving well.    Patient Active Problem List    Diagnosis    Adjustment disorder with anxiety    Alcohol-induced polyneuropathy (H24)    Alcoholic cirrhosis of liver with ascites (H)    Major depressive disorder, single episode, moderate (H)    Vitamin D deficiency    Anxiety    End stage liver disease (H)    Hypomagnesemia    Immunosuppressed status (H24)    EDGAR (acute kidney injury) (H24)    Hypervolemia associated with renal insufficiency     SOCIAL /FAMILY HISTORY: [x]                  No recent change    Past Medical History:   Diagnosis Date    Alcoholic cirrhosis of liver with ascites (H) 2023    History of alcohol abuse     Formatting of this note might be different from the original. In remission as of late 2023     Past Surgical History:   Procedure Laterality Date    BENCH LIVER  2023    Procedure: Bench liver;  Surgeon: Aime Lowe MD;  Location: [facility] OR    TRANSPLANT LIVER RECIPIENT  DONOR N/A 2023    Procedure: Transplant liver recipient  donor;  Surgeon: Aime Lowe MD;  Location: [facility] OR     Social History     Socioeconomic History    Marital status: Single     Spouse name: Not on file    Number of children: Not on file    Years of education: Not on file    Highest education level: Not on file   Occupational History    Not on file   Tobacco Use    Smoking status: Former     Types: Cigarettes     Quit date: 2023     Years since quittin.5    Smokeless tobacco: Never   Substance and Sexual Activity    Alcohol use: Not Currently     Comment: last ETOH use 2023    Drug use: Never    Sexual activity: Not on file   Other Topics Concern    Not on file   Social History Narrative    Not on file     Social Determinants of Health     Financial Resource Strain: Not on file   Food Insecurity: Not on file   Transportation Needs: Not on file   Physical Activity: Not on file   Stress: Not on file   Social Connections: Not on file   Interpersonal Safety: Not on file   Housing Stability: Not on file      Prescription Medications as of 12/5/2023         Rx Number Disp Refills Start End Last Dispensed Date Next Fill Date Owning Pharmacy    acetaminophen (TYLENOL) 325 MG tablet  100 tablet 0 11/15/2023    [place], [place] - [address] [facility]    Sig: Take 1-2 tablets (325-650 mg) by mouth every 6 hours as needed for mild pain    Class: E-Prescribe    Route: Oral    Renewals       Renewal requests to authorizing provider (Gladys Carmen NP) <b>prohibited</b>            carboxymethylcellulose (ARTIFICIAL TEARS) 1 % ophthalmic solution  15 mL 0 11/20/2023 12/20/2023   [address]    Sig: Place 1 drop into both eyes daily as needed for dry eyes    Class: E-Prescribe    Route: Both Eyes    dicyclomine (BENTYL) 10 MG capsule  120 capsule 0 12/5/2023 1/4/2024   [address]    Sig: Take 1 capsule (10 mg) by mouth 4 times daily (before meals and nightly) for 30 days For stomach cramping.    Class: E-Prescribe    Route: Oral    gabapentin (NEURONTIN) 100 MG capsule  60 capsule 0 12/5/2023    [address]    Sig: Take 2 capsules (200 mg) by mouth at bedtime    Class: E-Prescribe    Route: Oral    Lidocaine (LIDOCARE) 4 % Patch  20 patch 0 11/24/2023    [address]    Sig: Place 1-2 patches onto the skin every 24 hours To prevent lidocaine toxicity, patient should be patch free for 12 hrs daily. Apply near incision, not on incision.    Class: E-Prescribe    Route: Transdermal    magnesium oxide (MAG-OX) 400 MG tablet  60 tablet 1 11/20/2023    [address]    Sig: Take 2 tablets (800 mg) by mouth 2 times daily    Class: E-Prescribe    Route:  Oral    methocarbamol (ROBAXIN) 500 MG tablet  5 tablet 0 12/5/2023 12/10/2023   [address]    Sig: Take 1 tablet (500 mg) by mouth every evening as needed for muscle spasms    Class: E-Prescribe    Route: Oral    Multiple Vitamins-Minerals (THERA-TABS M) TABS  30 tablet 1 11/15/2023    [place], [place] - [address] [place] [address]    Sig: Take 1 tablet by mouth daily    Class: E-Prescribe    Route: Oral    Renewals       Renewal requests to authorizing provider (Gladys Carmen NP) <b>prohibited</b>            mycophenolate (GENERIC EQUIVALENT) 250 MG capsule  180 capsule 1 12/5/2023    [address]    Sig: Take 3 capsules (750 mg) by mouth 2 times daily    Class: E-Prescribe    Notes to Pharmacy: TXP DT 11/5/2023 (Liver) TXP Dischg DT 11/15/2023 DX Liver replaced by transplant Z94.4 TX [facility] ([place], [place])    Route: Oral    omeprazole (PRILOSEC) 20 MG DR capsule  30 capsule 1 11/16/2023    [address]    Sig: Take 1 capsule (20 mg) by mouth daily    Class: E-Prescribe    Route: Oral    predniSONE (DELTASONE) 5 MG tablet  30 tablet 0 11/16/2023    [address] 1[address]583    Sig: Take 1 tablet (5 mg) by mouth daily    Class: E-Prescribe    Notes to Pharmacy: TXP DT 11/5/2023 (Liver) TXP Dischg DT 11/15/2023 DX Liver replaced by transplant Z94.4 [facility] ([place], [place])    Route: Oral    sulfamethoxazole-trimethoprim (BACTRIM) 400-80 MG tablet  30 tablet 1 11/17/2023    [address]    Sig: Take 1 tablet by mouth three times a week Titrate up to  daily when directed by transplant team with improving renal function    Class: E-Prescribe    Route: Oral    tacrolimus (GENERIC) 1 MG capsule  60 capsule 1 12/5/2023    [address] 1[address]848    Sig: Take 1 capsule (1 mg) by mouth every 12 hours Total dose: 6mg BID    Class: E-Prescribe    Notes to Pharmacy: TXP DT 11/5/2023 (Liver) TXP Dischg DT 11/15/2023 DX Liver replaced by transplant Z94.4 TX [facility] ([place], [place])    Route: Oral    tacrolimus (GENERIC) 5 MG capsule  180 capsule 3 12/5/2023    [address]    Sig: Take 1 capsule (5 mg) by mouth 2 times daily Total dose: 6mg BID    Class: E-Prescribe    Notes to Pharmacy: TXP DT 11/5/2023 (Liver) TXP Dischg DT 11/15/2023 DX Liver replaced by transplant Z94.4 TX [facility] ([place], [place])    Route: Oral    ursodiol (ACTIGALL) 300 MG capsule  60 capsule 1 11/16/2023    [address]    Sig: Take 1 capsule (300 mg) by mouth 2 times daily    Class: E-Prescribe    Route: Oral    valGANciclovir (VALCYTE) 450 MG tablet  30 tablet 2 11/17/2023    [place], [place] - [address] [facility] [address]    Sig: Take 1 tablet (450 mg) by mouth twice a week Titrate up to 450mg daily when directed by transplant team with improving renal function    Class: E-Prescribe    Route: Oral    Vitamin D3 (CHOLECALCIFEROL) 25 mcg (1000 units) tablet  30 tablet 1 11/30/2023    [place], [place] - [address] [place] [address] 1-641    Sig: Take 2 tablets (50 mcg) by mouth daily    Class: E-Prescribe    Route: Oral          Patient has no known allergies.   REVIEW OF SYSTEMS (check box if normal)  [x]               GENERAL  [x]                  PULMONARY [x]                GENITOURINARY  [x]                CNS                 [x]                 CARDIAC  [x]                 ENDOCRINE  [x]                EARS,NOSE,THROAT [x]                 GASTROINTESTINAL [x]                 NEUROLOGIC    [x]                MUSCLOSKELTAL  [x]                  HEMATOLOGY      PHYSICAL EXAM (check box if normal)/62 (BP Location: Right arm, Patient Position: Sitting, Cuff Size: Adult Regular)   Pulse 87   Resp 16   Wt 75.1 kg (165 lb 8 oz)   SpO2 100%   BMI 29.32 kg/m          [x]            GENERAL:    [x]       EYES:  ICTERIC   []        YES  []                    NO  [x]           EXTREMITIES: Clubbing []       Y     [x]           N    [x]           EARS, NOSE, THROAT: Membranes Moist    YES   [x]                   NO []                  [x]           LUNGS:  CLEAR    YES       [x]                  NO    []                                [x]           SKIN: Jaundice           YES       []                  NO    [x]                   Rash: YES       []                  NO    [x]                                     [x]             HEART: Regular Rate          YES       [x]                  NO    []                   Incision Clean:  YES       [x]                  NO    []                                [x]                    ABDOMEN: Organomegaly YES       []                  NO    [x]                       [x]                    NEUROLOGICAL:  Nonfocal  YES       [x]                  NO    []                       [x]                    Hernia YES       []                  NO    [x]                   PSYCHIATRIC:  Appropriate  YES       [x]                  NO    []                       OTHER:                                                                                                   PAIN SCALE:: 2       Again, thank you for allowing me to participate in the care of your patient.        Sincerely,        Zenobia Chew NP

## 2023-12-05 NOTE — PROGRESS NOTES
Transplant Surgery -OUTPATIENT IMMUNOSUPPRESSION PROGRESS NOTE    Date of Visit: 12/05/2023    Transplants:  11/5/2023 (Liver); Postoperative day:  30  ASSESMENT AND PLAN:  1.Graft Function: stable allograft function   2.Immunosuppression Management: continue same IS  3.Hypertension: stable  4.Renal Function: cr is trending down.  5.Lab frequency:twice weekly  6.Other: incision healingwell  Back pain:  robaxin at night.   Increase magnesium 800mg AM and PM  400mg at lunch    Bumex as directed. Will see neph tomorrow.   Mental health: follow-up in mental health in [place]  Slight hyperkaliemia:  monitor K+ intake     Date: December 5, 2023    Transplant:  [x]                             Liver [x]                              Kidney []                             Pancreas []                              Other:             Chief Complaint:Follow Up (Liver transplant on 11/5/2023)    History of Present Illness:Lola Milner is a 33 year old female with PMH significant for r MDD, prior tobacco use, gout, polyneuropathy and decompensated cirrhosis secondary to alcohol use c/b hepatic encephalopathy, esophageal varices s/p banding, recurrent ascities and hepatorenal syndrome. She had been admitted to [facility] 10/23-10/29 and referred for liver transplant eval at [facility]. She was seen in Hepatology and Transplant clinic 10/31/23 and was sent to the ED for admission for worsening renal function and leukocytosis. While hospitalized she was listed and an organ offer became available to her. She underwent a liver transplant with Dr. Aime Lowe on 11/5/23         No fevers or chills.  No nausea or vomiting       Eating well.  Appetite improving.  Moving well.    Patient Active Problem List   Diagnosis    Adjustment disorder with anxiety    Alcohol-induced polyneuropathy (H24)    Alcoholic cirrhosis of liver with ascites (H)    Major depressive disorder, single episode, moderate (H)    Vitamin D deficiency    Anxiety    End stage  liver disease (H)    Hypomagnesemia    Immunosuppressed status (H24)    EDGAR (acute kidney injury) (H24)    Hypervolemia associated with renal insufficiency     SOCIAL /FAMILY HISTORY: [x]                  No recent change    Past Medical History:   Diagnosis Date    Alcoholic cirrhosis of liver with ascites (H) 2023    History of alcohol abuse     Formatting of this note might be different from the original. In remission as of late 2023     Past Surgical History:   Procedure Laterality Date    BENCH LIVER  2023    Procedure: Bench liver;  Surgeon: Aime Lowe MD;  Location: [facility] OR    TRANSPLANT LIVER RECIPIENT  DONOR N/A 2023    Procedure: Transplant liver recipient  donor;  Surgeon: Aime Lowe MD;  Location: [facility] OR     Social History     Socioeconomic History    Marital status: Single     Spouse name: Not on file    Number of children: Not on file    Years of education: Not on file    Highest education level: Not on file   Occupational History    Not on file   Tobacco Use    Smoking status: Former     Types: Cigarettes     Quit date: 2023     Years since quittin.5    Smokeless tobacco: Never   Substance and Sexual Activity    Alcohol use: Not Currently     Comment: last ETOH use 2023    Drug use: Never    Sexual activity: Not on file   Other Topics Concern    Not on file   Social History Narrative    Not on file     Social Determinants of Health     Financial Resource Strain: Not on file   Food Insecurity: Not on file   Transportation Needs: Not on file   Physical Activity: Not on file   Stress: Not on file   Social Connections: Not on file   Interpersonal Safety: Not on file   Housing Stability: Not on file     Prescription Medications as of 2023         Rx Number Disp Refills Start End Last Dispensed Date Next Fill Date Owning Pharmacy    acetaminophen (TYLENOL) 325 MG tablet  100 tablet 0 11/15/2023    [facility] - [place], [place] -  [address]    Sig: Take 1-2 tablets (325-650 mg) by mouth every 6 hours as needed for mild pain    Class: E-Prescribe    Route: Oral    Renewals       Renewal requests to authorizing provider (Gladys Carmen NP) <b>prohibited</b>            carboxymethylcellulose (ARTIFICIAL TEARS) 1 % ophthalmic solution  15 mL 0 11/20/2023 12/20/2023   [address]    Sig: Place 1 drop into both eyes daily as needed for dry eyes    Class: E-Prescribe    Route: Both Eyes    dicyclomine (BENTYL) 10 MG capsule  120 capsule 0 12/5/2023 1/4/2024   [address]    Sig: Take 1 capsule (10 mg) by mouth 4 times daily (before meals and nightly) for 30 days For stomach cramping.    Class: E-Prescribe    Route: Oral    gabapentin (NEURONTIN) 100 MG capsule  60 capsule 0 12/5/2023    [address]    Sig: Take 2 capsules (200 mg) by mouth at bedtime    Class: E-Prescribe    Route: Oral    Lidocaine (LIDOCARE) 4 % Patch  20 patch 0 11/24/2023    [address]    Sig: Place 1-2 patches onto the skin every 24 hours To prevent lidocaine toxicity, patient should be patch free for 12 hrs daily. Apply near incision, not on incision.    Class: E-Prescribe    Route: Transdermal    magnesium oxide (MAG-OX) 400 MG tablet  60 tablet 1 11/20/2023    [address]    Sig: Take 2 tablets (800 mg) by mouth 2 times daily    Class: E-Prescribe    Route: Oral    methocarbamol (ROBAXIN) 500 MG tablet  5 tablet 0 12/5/2023 12/10/2023   [address]    Sig: Take 1 tablet (500 mg) by mouth every evening as needed for muscle spasms    Class:  E-Prescribe    Route: Oral    Multiple Vitamins-Minerals (THERA-TABS M) TABS  30 tablet 1 11/15/2023    [place], [place] - [address] [facility]    Sig: Take 1 tablet by mouth daily    Class: E-Prescribe    Route: Oral    Renewals       Renewal requests to authorizing provider (Gladys Carmen NP) <b>prohibited</b>            mycophenolate (GENERIC EQUIVALENT) 250 MG capsule  180 capsule 1 12/5/2023    [address]    Sig: Take 3 capsules (750 mg) by mouth 2 times daily    Class: E-Prescribe    Notes to Pharmacy: TXP DT 11/5/2023 (Liver) TXP Dischg DT 11/15/2023 DX Liver replaced by transplant Z94.4 TX [facility] ([place], [place])    Route: Oral    omeprazole (PRILOSEC) 20 MG DR capsule  30 capsule 1 11/16/2023    [address]    Sig: Take 1 capsule (20 mg) by mouth daily    Class: E-Prescribe    Route: Oral    predniSONE (DELTASONE) 5 MG tablet  30 tablet 0 11/16/2023    [address]    Sig: Take 1 tablet (5 mg) by mouth daily    Class: E-Prescribe    Notes to Pharmacy: TXP DT 11/5/2023 (Liver) TXP Dischg DT 11/15/2023 DX Liver replaced by transplant Z94.4 [facility] ([place], [place])    Route: Oral    sulfamethoxazole-trimethoprim (BACTRIM) 400-80 MG tablet  30 tablet 1 11/17/2023    [address]    Sig: Take 1 tablet by mouth three times a week Titrate up to daily when directed by transplant team with improving renal function    Class: E-Prescribe    Route: Oral    tacrolimus (GENERIC) 1 MG capsule  60 capsule 1 12/5/2023    [address]     Sig: Take 1 capsule (1 mg) by mouth every 12 hours Total dose: 6mg BID    Class: E-Prescribe    Notes to Pharmacy: TXP DT 11/5/2023 (Liver) TXP Dischg DT 11/15/2023 DX Liver replaced by transplant Z94.4 TX [facility] ([place], [place])    Route: Oral    tacrolimus (GENERIC) 5 MG capsule  180 capsule 3 12/5/2023    [address]    Sig: Take 1 capsule (5 mg) by mouth 2 times daily Total dose: 6mg BID    Class: E-Prescribe    Notes to Pharmacy: TXP DT 11/5/2023 (Liver) TXP Dischg DT 11/15/2023 DX Liver replaced by transplant Z94.4 TX [facility] ([place], [place])    Route: Oral    ursodiol (ACTIGALL) 300 MG capsule  60 capsule 1 11/16/2023    [address]    Sig: Take 1 capsule (300 mg) by mouth 2 times daily    Class: E-Prescribe    Route: Oral    valGANciclovir (VALCYTE) 450 MG tablet  30 tablet 2 11/17/2023    [place], [place] - [address] [place] [address]    Sig: Take 1 tablet (450 mg) by mouth twice a week Titrate up to 450mg daily when directed by transplant team with improving renal function    Class: E-Prescribe    Route: Oral    Vitamin D3 (CHOLECALCIFEROL) 25 mcg (1000 units) tablet  30 tablet 1 11/30/2023    [place], [place] - [address] [place] [address] [address]    Sig: Take 2 tablets (50 mcg) by mouth daily    Class: E-Prescribe    Route: Oral          Patient has no known allergies.   REVIEW OF SYSTEMS (check box if normal)  [x]               GENERAL  [x]                 PULMONARY [x]                GENITOURINARY  [x]                CNS                 [x]                 CARDIAC  [x]                 ENDOCRINE  [x]                EARS,NOSE,THROAT [x]                 GASTROINTESTINAL [x]                 NEUROLOGIC    [x]                 DENAAL  [x]                  HEMATOLOGY      PHYSICAL EXAM (check box if normal)/62 (BP Location: Right arm, Patient Position: Sitting, Cuff Size: Adult Regular)   Pulse 87   Resp 16   Wt 75.1 kg (165 lb 8 oz)   SpO2 100%   BMI 29.32 kg/m          [x]            GENERAL:    [x]       EYES:  ICTERIC   []        YES  []                    NO  [x]           EXTREMITIES: Clubbing []       Y     [x]           N    [x]           EARS, NOSE, THROAT: Membranes Moist    YES   [x]                   NO []                  [x]           LUNGS:  CLEAR    YES       [x]                  NO    []                                [x]           SKIN: Jaundice           YES       []                  NO    [x]                   Rash: YES       []                  NO    [x]                                     [x]             HEART: Regular Rate          YES       [x]                  NO    []                   Incision Clean:  YES       [x]                  NO    []                                [x]                    ABDOMEN: Organomegaly YES       []                  NO    [x]                       [x]                    NEUROLOGICAL:  Nonfocal  YES       [x]                  NO    []                       [x]                    Hernia YES       []                  NO    [x]                   PSYCHIATRIC:  Appropriate  YES       [x]                  NO    []                       OTHER:                                                                                                   PAIN SCALE:: 2

## 2023-12-05 NOTE — NURSING NOTE
Chief Complaint   Patient presents with    Follow Up     Liver transplant on 11/5/2023       /62 (BP Location: Right arm, Patient Position: Sitting, Cuff Size: Adult Regular)   Pulse 87   Resp 16   Wt 75.1 kg (165 lb 8 oz)   SpO2 100%   BMI 29.32 kg/m      ZARINA NEWMAN, RN on 12/5/2023 at 9:36 AM

## 2023-12-06 ENCOUNTER — TELEPHONE (OUTPATIENT)
Dept: TRANSPLANT | Facility: CLINIC | Age: 33
End: 2023-12-06
Payer: COMMERCIAL

## 2023-12-06 ENCOUNTER — INFUSION THERAPY VISIT (OUTPATIENT)
Dept: INFUSION THERAPY | Facility: CLINIC | Age: 33
End: 2023-12-06
Attending: INTERNAL MEDICINE
Payer: COMMERCIAL

## 2023-12-06 ENCOUNTER — MYC REFILL (OUTPATIENT)
Dept: TRANSPLANT | Facility: CLINIC | Age: 33
End: 2023-12-06

## 2023-12-06 ENCOUNTER — OFFICE VISIT (OUTPATIENT)
Dept: TRANSPLANT | Facility: CLINIC | Age: 33
End: 2023-12-06
Attending: TRANSPLANT SURGERY
Payer: COMMERCIAL

## 2023-12-06 VITALS
BODY MASS INDEX: 29.42 KG/M2 | HEART RATE: 82 BPM | SYSTOLIC BLOOD PRESSURE: 120 MMHG | WEIGHT: 166.1 LBS | DIASTOLIC BLOOD PRESSURE: 81 MMHG | OXYGEN SATURATION: 100 %

## 2023-12-06 DIAGNOSIS — Z94.4 LIVER REPLACED BY TRANSPLANT (H): Primary | ICD-10-CM

## 2023-12-06 DIAGNOSIS — Z94.4 LIVER TRANSPLANTED (H): ICD-10-CM

## 2023-12-06 DIAGNOSIS — N17.9 AKI (ACUTE KIDNEY INJURY) (H): Primary | ICD-10-CM

## 2023-12-06 DIAGNOSIS — E83.42 HYPOMAGNESEMIA: ICD-10-CM

## 2023-12-06 DIAGNOSIS — E87.79 OTHER HYPERVOLEMIA: ICD-10-CM

## 2023-12-06 DIAGNOSIS — Z94.4 LIVER REPLACED BY TRANSPLANT (H): ICD-10-CM

## 2023-12-06 PROBLEM — K70.31 ALCOHOLIC CIRRHOSIS OF LIVER WITH ASCITES (H): Status: RESOLVED | Noted: 2023-08-30 | Resolved: 2023-12-06

## 2023-12-06 PROBLEM — K72.10 END STAGE LIVER DISEASE (H): Status: RESOLVED | Noted: 2023-10-31 | Resolved: 2023-12-06

## 2023-12-06 PROCEDURE — 250N000011 HC RX IP 250 OP 636: Performed by: NURSE PRACTITIONER

## 2023-12-06 PROCEDURE — 99215 OFFICE O/P EST HI 40 MIN: CPT | Mod: 24 | Performed by: INTERNAL MEDICINE

## 2023-12-06 PROCEDURE — G0463 HOSPITAL OUTPT CLINIC VISIT: HCPCS | Performed by: INTERNAL MEDICINE

## 2023-12-06 RX ORDER — MAGNESIUM OXIDE 400 MG/1
400-800 TABLET ORAL 2 TIMES DAILY
COMMUNITY
Start: 2023-12-06 | End: 2023-12-18

## 2023-12-06 RX ORDER — HEPARIN SODIUM (PORCINE) LOCK FLUSH IV SOLN 100 UNIT/ML 100 UNIT/ML
5 SOLUTION INTRAVENOUS
Status: CANCELLED | OUTPATIENT
Start: 2023-12-06

## 2023-12-06 RX ORDER — HEPARIN SODIUM 1000 [USP'U]/ML
5000 INJECTION, SOLUTION INTRAVENOUS; SUBCUTANEOUS EVERY 24 HOURS
Status: CANCELLED
Start: 2023-12-06

## 2023-12-06 RX ORDER — HEPARIN SODIUM,PORCINE 10 UNIT/ML
5-20 VIAL (ML) INTRAVENOUS DAILY PRN
Status: CANCELLED | OUTPATIENT
Start: 2023-12-06

## 2023-12-06 RX ORDER — HEPARIN SODIUM 1000 [USP'U]/ML
5000 INJECTION, SOLUTION INTRAVENOUS; SUBCUTANEOUS EVERY 24 HOURS
Status: DISCONTINUED | OUTPATIENT
Start: 2023-12-06 | End: 2023-12-06 | Stop reason: HOSPADM

## 2023-12-06 RX ORDER — BUMETANIDE 1 MG/1
TABLET ORAL
Qty: 450 TABLET | Refills: 0 | Status: SHIPPED | OUTPATIENT
Start: 2023-12-06 | End: 2023-12-14

## 2023-12-06 RX ORDER — BUMETANIDE 1 MG/1
2 TABLET ORAL 2 TIMES DAILY
Status: CANCELLED | COMMUNITY
Start: 2023-12-06

## 2023-12-06 RX ADMIN — HEPARIN SODIUM 1900 UNITS: 1000 INJECTION, SOLUTION INTRAVENOUS; SUBCUTANEOUS at 15:50

## 2023-12-06 NOTE — PROGRESS NOTES
Nursing Note  Lola Milner presents today to [facility] for:   Chief Complaint   Patient presents with    line care     Dialysis line care     Old dialysis dressing removed. Area cleansed with chloroprep and dressing changed using sterile technique. Caps changed. Heparin locked with 1000 units/ml heparin.      Discharge Plan:   Follow up plan of care with: ordering provider as scheduled.  Discharge instructions were reviewed with patient.  Patient/representative verbalized understanding of discharge instructions and all questions answered.  Patient discharged from [facility] in stable condition.    Noemi Nance RN    Administrations This Visit       heparin Lock (1000 units/mL High concentration) 5,000 Units       Admin Date  12/06/2023 Action  $Given Dose  1,900 Units Route  Intracatheter Documented By  Noemi Nance, RN

## 2023-12-06 NOTE — LETTER
12/6/2023         RE: Lola Milner  [address]  [place] [address]        Dear Colleague,    Thank you for referring your patient, Lola Milner, to the [facility]. Please see a copy of my visit note below.    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    - Required HD with last session 11/13, now with downtrending Cr. Still has discrepancy with cystatin C   - Would continue to follow Cystatin C monthly   -Weekly labs for now   - Baseline Creatinine  ~ 0.8-1.0 in months prior to transplant   - Proteinuria: Normal (<0.2 grams)   - Recommend tunneled dialysis catheter removal now. Continue to have catheter flushed weekly until removal.     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  Transaminases Stable.  Followed by Transplant Surgery.     # Immunosuppression: Tacrolimus immediate release (goal 5-8), Mycophenolate mofetil (dose 750 mg every 12 hours) and Prednisone (dose 5 mg daily)   - Continue with intensive monitoring of immunosuppression for efficacy and toxicity.   - Changes: Not at this time. Managed by transplant surgery    # Infection Prophylaxis:   - PJP: Sulfa/TMP (Bactrim)  - CMV: Valganciclovir (Valcyte); Patient is CMV IgG Ab discordant (D+/R-) and will continue on Valcyte x 6 months, then check CMV PCR monthly until 12 months post transplant.   - Valcyte dosing based on cystatin C eGFR -at 450mg twice a week    # Blood Pressure: Controlled;  Goal BP: < 140/90   - Changes: Yes - Increase bumex to 3mg in the AM and 2mg in the afternoon.      # Anemia in Chronic Disease: Hgb: Stable, low      NIRMAL: No   - Iron studies: Replete (8/2023)    # Mineral Bone Disorder:    - Secondary renal  hyperparathyroidism; PTH level: Not checked recently        On treatment: None   - Vitamin D; level: Low        On supplement: Yes   - Calcium; level: Normal        On supplement: No   - Phosphorus; level: High        On Binder: No    # Electrolytes:   - Potassium; level: High        On supplement: No, should improve with increase in bumex  - Magnesium; level: Low        On supplement: Yes Mg Ox 800 BID and 400mg at lunch  - Bicarbonate; level: Normal        On supplement: No      # Hx of C diff: Diagnosed 11/21/2023. Treated with vancomycin 125mg PO for 10 days. Now with symptom improvement    # Depression: Mood has been a bit up and down. Referred to mental health.    # Skin Cancer Risk:    - Discussed sun protection and recommend regular follow up with Dermatology.    # Medical Compliance: Yes    # Health Maintenance and Vaccination Review: Not Reviewed    # Transplant History:  Etiology of Kidney Failure: EtOH related liver disease  Tx: Liver Tx  Transplant: 11/5/2023 (Liver)  Significant changes in immunosuppression:  Decreased tac goal due to EDGAR, has now returned to goal 8-10.  CMV IgG Ab High Risk Discordance (D+/R-): Yes  EBV IgG Ab High Risk Discordance (D+/R-): No  Significant transplant-related complications: EDGAR      Transplant Office Phone Number: [phone]    Assessment and plan was discussed with the patient and she voiced her understanding and agreement.    Return visit: Return if symptoms worsen or fail to improve.    Patient seen and discussed with Dr. Yanci Washington MD    Physician Attestation   I, Gerry Pete MD, personally examined and evaluated this patient.  I discussed the patient with the resident/fellow and care team, and agree with the assessment and plan of care as documented in the note on 12/06/23 .      I personally reviewed vital signs, medications, labs, and imaging.    Gerry Pete MD  Date of Service (when I saw the patient): 12/06/23          Reason for Consult    Lola Milner was seen in consultation for elevated creatinine.    Chief Complaint   Ms. Milner is a 33 year old here for elevated creatinine.    History of Present Illness   Lola Milner is a 33 year old with history of depression and EtO-related cirrhosis now s/p OLT on 11/5/2023. Prior to transplant she had developed EDGAR requiring initiation of RRT. Baseline creatinine prior to transplant was around 0.8 - 1.0. Her last HD session was on 11/13 and she has had slow recovery of her kidney function since. She was found to have discrepancy in her creatinine vs cystatin C.     She has had improving urine output (over 2.4L/day) and now is no longer tracking. She continues to try and stay well hydrated. She has noted increase in weight since surgery. Most recently has remained ~161 lbs. Some of this is water weight. Increased bumex to 2mg in AM and 1mg in the afternoon about a week ago and has seen little change in weight. Continues to have significant lower extremity edema. She is motivated to return home to [place].       No urinary symptoms. No pain, burning, or difficulty urinating.  No other N/V/D. Symptoms have improved after treatment of C Diff  No respiratory issues. No chest pain. No recent illness, no fevers.       Home BP:  ~120 systolic  .    Problem List   Patient Active Problem List   Diagnosis    Adjustment disorder with anxiety    Alcohol-induced polyneuropathy (H24)    Alcoholic cirrhosis of liver with ascites (H)    Major depressive disorder, single episode, moderate (H)    Vitamin D deficiency    Anxiety    End stage liver disease (H)    Hypomagnesemia    Immunosuppressed status (H24)    EDGAR (acute kidney injury) (H24)    Hypervolemia associated with renal insufficiency       Past Medical History    Past Medical History:   Diagnosis Date    Alcoholic cirrhosis of liver with ascites (H) 08/30/2023    History of alcohol abuse     Formatting of this note might be different from the original. In remission as  of late 2023       Past Surgical History   Past Surgical History:   Procedure Laterality Date    BENCH LIVER  2023    Procedure: Bench liver;  Surgeon: Aime Lowe MD;  Location: [facility] OR    TRANSPLANT LIVER RECIPIENT  DONOR N/A 2023    Procedure: Transplant liver recipient  donor;  Surgeon: Aime Lowe MD;  Location: [facility] OR       Family History   No family history on file.    Social History   Social History     Tobacco Use    Smoking status: Former     Types: Cigarettes     Quit date: 2023     Years since quittin.5    Smokeless tobacco: Never   Substance Use Topics    Alcohol use: Not Currently     Comment: last ETOH use 2023    Drug use: Never       Allergies   No Known Allergies    Medications   Current Outpatient Medications   Medication Sig    acetaminophen (TYLENOL) 325 MG tablet Take 1-2 tablets (325-650 mg) by mouth every 6 hours as needed for mild pain    bumetanide (BUMEX) 1 MG tablet Take 3 tablets (3 mg) by mouth every morning AND 2 tablets (2 mg) every evening. Do all this for 90 days.    dicyclomine (BENTYL) 10 MG capsule Take 1 capsule (10 mg) by mouth 4 times daily (before meals and nightly) for 30 days For stomach cramping.    gabapentin (NEURONTIN) 100 MG capsule Take 2 capsules (200 mg) by mouth at bedtime    Lidocaine (LIDOCARE) 4 % Patch Place 1-2 patches onto the skin every 24 hours To prevent lidocaine toxicity, patient should be patch free for 12 hrs daily. Apply near incision, not on incision.    magnesium oxide (MAG-OX) 400 MG tablet Take 1-2 tablets (400-800 mg) by mouth 2 times daily    methocarbamol (ROBAXIN) 500 MG tablet Take 1 tablet (500 mg) by mouth every evening as needed for muscle spasms    Multiple Vitamins-Minerals (THERA-TABS M) TABS Take 1 tablet by mouth daily    mycophenolate (GENERIC EQUIVALENT) 250 MG capsule Take 3 capsules (750 mg) by mouth 2 times daily    omeprazole (PRILOSEC) 20 MG DR capsule Take 1 capsule (20 mg) by  mouth daily    predniSONE (DELTASONE) 5 MG tablet Take 1 tablet (5 mg) by mouth daily    sulfamethoxazole-trimethoprim (BACTRIM) 400-80 MG tablet Take 1 tablet by mouth three times a week Titrate up to daily when directed by transplant team with improving renal function    tacrolimus (GENERIC) 1 MG capsule Take 1 capsule (1 mg) by mouth every 12 hours Total dose: 6mg BID    tacrolimus (GENERIC) 5 MG capsule Take 1 capsule (5 mg) by mouth 2 times daily Total dose: 6mg BID    ursodiol (ACTIGALL) 300 MG capsule Take 1 capsule (300 mg) by mouth 2 times daily    valGANciclovir (VALCYTE) 450 MG tablet Take 1 tablet (450 mg) by mouth twice a week Titrate up to 450mg daily when directed by transplant team with improving renal function    Vitamin D3 (CHOLECALCIFEROL) 25 mcg (1000 units) tablet Take 2 tablets (50 mcg) by mouth daily     No current facility-administered medications for this visit.     Medications Discontinued During This Encounter   Medication Reason    carboxymethylcellulose (ARTIFICIAL TEARS) 1 % ophthalmic solution Stopped by Patient (No AVS)    magnesium oxide (MAG-OX) 400 MG tablet Reorder (No AVS)       Physical Exam   Vital Signs: /81   Pulse 82   Wt 75.3 kg (166 lb 1.6 oz)   SpO2 100%   BMI 29.42 kg/m      GENERAL APPEARANCE: alert and no distress  HENT: mouth without ulcers or lesions  RESP: lungs clear to auscultation - no rales, rhonchi or wheezes  CV: regular rhythm, normal rate, no rub, no murmur  EDEMA: no LE edema bilaterally  ABDOMEN: soft, nondistended, nontender, bowel sounds normal  MS: extremities normal - no gross deformities noted, no evidence of inflammation in joints, no muscle tenderness  SKIN: no rash  Dialysis access: RIJ tunneled line      Data         Latest Ref Rng & Units 12/5/2023     8:57 AM 11/30/2023     9:05 AM 11/20/2023     8:50 AM   Renal   Sodium 135 - 145 mmol/L 136  141  139    K 3.4 - 5.3 mmol/L 5.5  5.3  3.6    Cl 98 - 107 mmol/L 100  105  103    Cl  (external) 98 - 107 mmol/L 100  105  103    CO2 22 - 29 mmol/L 26  26  26    Urea Nitrogen 6.0 - 20.0 mg/dL 24.9  17.6  21.9    Creatinine 0.51 - 0.95 mg/dL 1.44  1.46  1.73    Glucose 70 - 99 mg/dL 83  99  80    Calcium 8.6 - 10.0 mg/dL 9.1  9.1  8.2    Magnesium 1.7 - 2.3 mg/dL 1.5  1.4  1.1          Latest Ref Rng & Units 12/5/2023     8:57 AM 11/30/2023     9:05 AM 11/20/2023     8:50 AM   B[facility] Health   Phosphorus 2.5 - 4.5 mg/dL 5.7  5.9  3.9          Latest Ref Rng & Units 12/5/2023     8:57 AM 11/30/2023     9:05 AM 11/20/2023     8:50 AM   Heme   WBC 4.0 - 11.0 10e3/uL 5.2  6.1  9.0    Hgb 11.7 - 15.7 g/dL 8.6  8.6  8.5    Plt 150 - 450 10e3/uL 383  495  436          Latest Ref Rng & Units 12/5/2023     8:57 AM 11/30/2023     9:05 AM 11/20/2023     8:50 AM   Liver   AP 40 - 150 U/L 137  141  139    TBili <=1.2 mg/dL 1.4  1.5  2.1    Bilirubin Direct 0.00 - 0.30 mg/dL 0.78  0.90  1.22    ALT 0 - 50 U/L 9  6  7    AST 0 - 45 U/L 21  16  13    Tot Protein 6.4 - 8.3 g/dL 5.0  5.2  5.0    Albumin 3.5 - 5.2 g/dL 3.4  3.4  3.3          Latest Ref Rng & Units 11/8/2023    11:46 AM 11/6/2023     4:25 AM 11/4/2023    10:49 AM   Pancreas   A1C <5.7 % 5.3      Amylase 28 - 100 U/L  28  46    Lipase (Roche) 13 - 60 U/L  11              Latest Ref Rng & Units 11/4/2023    10:49 AM 10/31/2023     8:14 AM   [facility] Virology   EBV CAPSID ANTIBODY IGG No detectable antibody. Positive  Positive        Recent Labs   Lab Test 11/20/23  0850 11/30/23  0905 12/05/23  0857   DOSTAC 11/19/2023 11/29/2023 12/4/2023   TACROL 5.5 9.0 6.3              Gerry Pete MD

## 2023-12-06 NOTE — TELEPHONE ENCOUNTER
Pt aware that needs dialysis line removed per dr Pete. Would like to attempt to have removed before going to [place].

## 2023-12-06 NOTE — PATIENT INSTRUCTIONS
Patient Recommendations:  - Increase bumex to 3mg in the morning and 2mg in the afternoon  (take the the afternoon dose at 3pm)  -Let us know if your weight is not decreasing  -Continue weekly labs  -We will work to schedule line removal in [facility]. This should be flushed weekly until it is removed.     Transplant Patient Information  Your Post Transplant Coordinator is:  Mariola Castellanos  For non urgent items, we encourage you to contact your coordinator/care team online via [email or web address]  You and your care team can also contact your transplant coordinator Monday - Friday, 8am - 5pm at [phone] (Option 2 to reach the coordinator or Option 4 to schedule an appointment).  After hours for urgent matters, please call [facility] at [phone].

## 2023-12-06 NOTE — PROGRESS NOTES
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    - Required HD with last session 11/13, now with downtrending Cr. Still has discrepancy with cystatin C   - Would continue to follow Cystatin C monthly   -Weekly labs for now   - Baseline Creatinine  ~ 0.8-1.0 in months prior to transplant   - Proteinuria: Normal (<0.2 grams)   - Recommend tunneled dialysis catheter removal now. Continue to have catheter flushed weekly until removal.     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  Transaminases Stable.  Followed by Transplant Surgery.     # Immunosuppression: Tacrolimus immediate release (goal 5-8), Mycophenolate mofetil (dose 750 mg every 12 hours) and Prednisone (dose 5 mg daily)   - Continue with intensive monitoring of immunosuppression for efficacy and toxicity.   - Changes: Not at this time. Managed by transplant surgery    # Infection Prophylaxis:   - PJP: Sulfa/TMP (Bactrim)  - CMV: Valganciclovir (Valcyte); Patient is CMV IgG Ab discordant (D+/R-) and will continue on Valcyte x 6 months, then check CMV PCR monthly until 12 months post transplant.   - Valcyte dosing based on cystatin C eGFR -at 450mg twice a week    # Blood Pressure: Controlled;  Goal BP: < 140/90   - Changes: Yes - Increase bumex to 3mg in the AM and 2mg in the afternoon.      # Anemia in Chronic Disease: Hgb: Stable, low      NIRMAL: No   - Iron studies: Replete (8/2023)    # Mineral Bone Disorder:    - Secondary renal hyperparathyroidism; PTH level: Not checked recently        On treatment: None   - Vitamin D; level: Low        On supplement: Yes   - Calcium; level: Normal        On supplement: No   - Phosphorus; level: High        On Binder: No    #  Electrolytes:   - Potassium; level: High        On supplement: No, should improve with increase in bumex  - Magnesium; level: Low        On supplement: Yes Mg Ox 800 BID and 400mg at lunch  - Bicarbonate; level: Normal        On supplement: No      # Hx of C diff: Diagnosed 11/21/2023. Treated with vancomycin 125mg PO for 10 days. Now with symptom improvement    # Depression: Mood has been a bit up and down. Referred to mental health.    # Skin Cancer Risk:    - Discussed sun protection and recommend regular follow up with Dermatology.    # Medical Compliance: Yes    # Health Maintenance and Vaccination Review: Not Reviewed    # Transplant History:  Etiology of Kidney Failure: EtOH related liver disease  Tx: Liver Tx  Transplant: 11/5/2023 (Liver)  Significant changes in immunosuppression:  Decreased tac goal due to EDGAR, has now returned to goal 8-10.  CMV IgG Ab High Risk Discordance (D+/R-): Yes  EBV IgG Ab High Risk Discordance (D+/R-): No  Significant transplant-related complications: EDGAR      Transplant Office Phone Number: [phone]    Assessment and plan was discussed with the patient and she voiced her understanding and agreement.    Return visit: Return if symptoms worsen or fail to improve.    Patient seen and discussed with Dr. Yanci Washington MD    Physician Attestation   I, Gerry Pete MD, personally examined and evaluated this patient.  I discussed the patient with the resident/fellow and care team, and agree with the assessment and plan of care as documented in the note on 12/06/23 .      I personally reviewed vital signs, medications, labs, and imaging.    Gerry Pete MD  Date of Service (when I saw the patient): 12/06/23          Reason for Consult   Lola Milner was seen in consultation for elevated creatinine.    Chief Complaint   Ms. Milner is a 33 year old here for elevated creatinine.    History of Present Illness   Lola Milner is a 33 year old with history of depression and  EtO-related cirrhosis now s/p OLT on 11/5/2023. Prior to transplant she had developed EDGAR requiring initiation of RRT. Baseline creatinine prior to transplant was around 0.8 - 1.0. Her last HD session was on 11/13 and she has had slow recovery of her kidney function since. She was found to have discrepancy in her creatinine vs cystatin C.     She has had improving urine output (over 2.4L/day) and now is no longer tracking. She continues to try and stay well hydrated. She has noted increase in weight since surgery. Most recently has remained ~161 lbs. Some of this is water weight. Increased bumex to 2mg in AM and 1mg in the afternoon about a week ago and has seen little change in weight. Continues to have significant lower extremity edema. She is motivated to return home to [place].       No urinary symptoms. No pain, burning, or difficulty urinating.  No other N/V/D. Symptoms have improved after treatment of C Diff  No respiratory issues. No chest pain. No recent illness, no fevers.       Home BP:  ~120 systolic  .    Problem List   Patient Active Problem List   Diagnosis     Adjustment disorder with anxiety     Alcohol-induced polyneuropathy (H24)     Alcoholic cirrhosis of liver with ascites (H)     Major depressive disorder, single episode, moderate (H)     Vitamin D deficiency     Anxiety     End stage liver disease (H)     Hypomagnesemia     Immunosuppressed status (H24)     EDGAR (acute kidney injury) (H24)     Hypervolemia associated with renal insufficiency       Past Medical History    Past Medical History:   Diagnosis Date     Alcoholic cirrhosis of liver with ascites (H) 08/30/2023     History of alcohol abuse     Formatting of this note might be different from the original. In remission as of late June 2023       Past Surgical History   Past Surgical History:   Procedure Laterality Date     BENCH LIVER  11/5/2023    Procedure: Bench liver;  Surgeon: Aime Lowe MD;  Location: [facility] OR     TRANSPLANT LIVER  RECIPIENT  DONOR N/A 2023    Procedure: Transplant liver recipient  donor;  Surgeon: Aime Lowe MD;  Location: [facility] OR       Family History   No family history on file.    Social History   Social History     Tobacco Use     Smoking status: Former     Types: Cigarettes     Quit date: 2023     Years since quittin.5     Smokeless tobacco: Never   Substance Use Topics     Alcohol use: Not Currently     Comment: last ETOH use 2023     Drug use: Never       Allergies   No Known Allergies    Medications   Current Outpatient Medications   Medication Sig     acetaminophen (TYLENOL) 325 MG tablet Take 1-2 tablets (325-650 mg) by mouth every 6 hours as needed for mild pain     bumetanide (BUMEX) 1 MG tablet Take 3 tablets (3 mg) by mouth every morning AND 2 tablets (2 mg) every evening. Do all this for 90 days.     dicyclomine (BENTYL) 10 MG capsule Take 1 capsule (10 mg) by mouth 4 times daily (before meals and nightly) for 30 days For stomach cramping.     gabapentin (NEURONTIN) 100 MG capsule Take 2 capsules (200 mg) by mouth at bedtime     Lidocaine (LIDOCARE) 4 % Patch Place 1-2 patches onto the skin every 24 hours To prevent lidocaine toxicity, patient should be patch free for 12 hrs daily. Apply near incision, not on incision.     magnesium oxide (MAG-OX) 400 MG tablet Take 1-2 tablets (400-800 mg) by mouth 2 times daily     methocarbamol (ROBAXIN) 500 MG tablet Take 1 tablet (500 mg) by mouth every evening as needed for muscle spasms     Multiple Vitamins-Minerals (THERA-TABS M) TABS Take 1 tablet by mouth daily     mycophenolate (GENERIC EQUIVALENT) 250 MG capsule Take 3 capsules (750 mg) by mouth 2 times daily     omeprazole (PRILOSEC) 20 MG DR capsule Take 1 capsule (20 mg) by mouth daily     predniSONE (DELTASONE) 5 MG tablet Take 1 tablet (5 mg) by mouth daily     sulfamethoxazole-trimethoprim (BACTRIM) 400-80 MG tablet Take 1 tablet by mouth three times a week Titrate up to  daily when directed by transplant team with improving renal function     tacrolimus (GENERIC) 1 MG capsule Take 1 capsule (1 mg) by mouth every 12 hours Total dose: 6mg BID     tacrolimus (GENERIC) 5 MG capsule Take 1 capsule (5 mg) by mouth 2 times daily Total dose: 6mg BID     ursodiol (ACTIGALL) 300 MG capsule Take 1 capsule (300 mg) by mouth 2 times daily     valGANciclovir (VALCYTE) 450 MG tablet Take 1 tablet (450 mg) by mouth twice a week Titrate up to 450mg daily when directed by transplant team with improving renal function     Vitamin D3 (CHOLECALCIFEROL) 25 mcg (1000 units) tablet Take 2 tablets (50 mcg) by mouth daily     No current facility-administered medications for this visit.     Medications Discontinued During This Encounter   Medication Reason     carboxymethylcellulose (ARTIFICIAL TEARS) 1 % ophthalmic solution Stopped by Patient (No AVS)     magnesium oxide (MAG-OX) 400 MG tablet Reorder (No AVS)       Physical Exam   Vital Signs: /81   Pulse 82   Wt 75.3 kg (166 lb 1.6 oz)   SpO2 100%   BMI 29.42 kg/m      GENERAL APPEARANCE: alert and no distress  HENT: mouth without ulcers or lesions  RESP: lungs clear to auscultation - no rales, rhonchi or wheezes  CV: regular rhythm, normal rate, no rub, no murmur  EDEMA: no LE edema bilaterally  ABDOMEN: soft, nondistended, nontender, bowel sounds normal  MS: extremities normal - no gross deformities noted, no evidence of inflammation in joints, no muscle tenderness  SKIN: no rash  Dialysis access: RIJ tunneled line      Data         Latest Ref Rng & Units 12/5/2023     8:57 AM 11/30/2023     9:05 AM 11/20/2023     8:50 AM   Renal   Sodium 135 - 145 mmol/L 136  141  139    K 3.4 - 5.3 mmol/L 5.5  5.3  3.6    Cl 98 - 107 mmol/L 100  105  103    Cl (external) 98 - 107 mmol/L 100  105  103    CO2 22 - 29 mmol/L 26  26  26    Urea Nitrogen 6.0 - 20.0 mg/dL 24.9  17.6  21.9    Creatinine 0.51 - 0.95 mg/dL 1.44  1.46  1.73    Glucose 70 - 99 mg/dL 83   99  80    Calcium 8.6 - 10.0 mg/dL 9.1  9.1  8.2    Magnesium 1.7 - 2.3 mg/dL 1.5  1.4  1.1          Latest Ref Rng & Units 12/5/2023     8:57 AM 11/30/2023     9:05 AM 11/20/2023     8:50 AM   [facility]   Phosphorus 2.5 - 4.5 mg/dL 5.7  5.9  3.9          Latest Ref Rng & Units 12/5/2023     8:57 AM 11/30/2023     9:05 AM 11/20/2023     8:50 AM   Heme   WBC 4.0 - 11.0 10e3/uL 5.2  6.1  9.0    Hgb 11.7 - 15.7 g/dL 8.6  8.6  8.5    Plt 150 - 450 10e3/uL 383  495  436          Latest Ref Rng & Units 12/5/2023     8:57 AM 11/30/2023     9:05 AM 11/20/2023     8:50 AM   Liver   AP 40 - 150 U/L 137  141  139    TBili <=1.2 mg/dL 1.4  1.5  2.1    Bilirubin Direct 0.00 - 0.30 mg/dL 0.78  0.90  1.22    ALT 0 - 50 U/L 9  6  7    AST 0 - 45 U/L 21  16  13    Tot Protein 6.4 - 8.3 g/dL 5.0  5.2  5.0    Albumin 3.5 - 5.2 g/dL 3.4  3.4  3.3          Latest Ref Rng & Units 11/8/2023    11:46 AM 11/6/2023     4:25 AM 11/4/2023    10:49 AM   Pancreas   A1C <5.7 % 5.3      Amylase 28 - 100 U/L  28  46    Lipase (Roche) 13 - 60 U/L  11              Latest Ref Rng & Units 11/4/2023    10:49 AM 10/31/2023     8:14 AM   [facility]   EBV CAPSID ANTIBODY IGG No detectable antibody. Positive  Positive        Recent Labs   Lab Test 11/20/23  0850 11/30/23  0905 12/05/23  0857   DOSTAC 11/19/2023 11/29/2023 12/4/2023   TACROL 5.5 9.0 6.3

## 2023-12-07 ENCOUNTER — TELEPHONE (OUTPATIENT)
Dept: TRANSPLANT | Facility: CLINIC | Age: 33
End: 2023-12-07

## 2023-12-07 ENCOUNTER — ANCILLARY PROCEDURE (OUTPATIENT)
Dept: INTERVENTIONAL RADIOLOGY/VASCULAR | Facility: CLINIC | Age: 33
End: 2023-12-07
Attending: INTERNAL MEDICINE
Payer: COMMERCIAL

## 2023-12-07 DIAGNOSIS — Z94.4 LIVER TRANSPLANTED (H): Primary | ICD-10-CM

## 2023-12-07 DIAGNOSIS — N17.9 AKI (ACUTE KIDNEY INJURY) (H): ICD-10-CM

## 2023-12-07 LAB
HBV DNA SERPL QL NAA+PROBE: NORMAL
HCV RNA SERPL QL NAA+PROBE: NORMAL
HIV1+2 RNA SERPL QL NAA+PROBE: NORMAL

## 2023-12-07 PROCEDURE — 36589 REMOVAL TUNNELED CV CATH: CPT | Mod: RT | Performed by: PHYSICIAN ASSISTANT

## 2023-12-07 RX ORDER — LIDOCAINE HYDROCHLORIDE 10 MG/ML
5 INJECTION, SOLUTION EPIDURAL; INFILTRATION; INTRACAUDAL; PERINEURAL ONCE
Status: COMPLETED | OUTPATIENT
Start: 2023-12-07 | End: 2023-12-07

## 2023-12-07 RX ORDER — TACROLIMUS 5 MG/1
5 CAPSULE ORAL 2 TIMES DAILY
Qty: 180 CAPSULE | Refills: 3 | Status: SHIPPED | OUTPATIENT
Start: 2023-12-07 | End: 2023-12-21

## 2023-12-07 RX ORDER — URSODIOL 300 MG/1
300 CAPSULE ORAL 2 TIMES DAILY
Qty: 60 CAPSULE | Refills: 1 | Status: SHIPPED | OUTPATIENT
Start: 2023-12-07 | End: 2023-12-15

## 2023-12-07 RX ORDER — PREDNISONE 5 MG/1
5 TABLET ORAL DAILY
Qty: 30 TABLET | Refills: 0 | Status: SHIPPED | OUTPATIENT
Start: 2023-12-07 | End: 2024-01-02

## 2023-12-07 RX ADMIN — LIDOCAINE HYDROCHLORIDE 5 ML: 10 INJECTION, SOLUTION EPIDURAL; INFILTRATION; INTRACAUDAL; PERINEURAL at 13:15

## 2023-12-07 NOTE — TELEPHONE ENCOUNTER
Spoke with Lola. The line was removed today.    Has appt with 12/15 with PCP DR Nancy De La Torre.     Will do labs at [facility]. Orders faxed.    Pt's 162.8 feels legs are bigger  HR 87. 116/78   Using compression socks.     She had started [facility] before transplant it was daily until noon, but then was transplanted. She will contact to see if can restart and did offer a letter to inform that she was present in [facility] during her time away from treatment if they needed.

## 2023-12-07 NOTE — DISCHARGE INSTRUCTIONS
A collaboration between [facility] and [facility]  Experts in minimally invasive, targeted treatments performed using imaging guidance    Tunneled Central Venous Catheter Removal    Today you had your existing tunneled central venous catheter removed because it was no longer needed for treatment.    Your catheter was removed by Alf Quezada PA-C    After you go home:  Avoid lying flat or bending at the waist for 2 hours following removal of the catheter to reduce risk of bleeding from catheter site.  Keep any applied tape/gauze dressings clean and dry. Change tape/gauze dressings if they get wet or soiled.  You may shower following the procedure, however cover and protect from moisture any tape/gauze dressings.   You may remove tape/gauze dressings after 3 days if the site looks like it is in the process of healing or scabbing over.  Avoid strenuous activities (elevating heart rate) or lifting more than 10 pounds for the next 3 days. Walking, elliptical and golf are examples of acceptable activities.  If there is bleeding or oozing from the procedure site, apply firm pressure to the area above your collar bone (where the catheter entered the bloodstream) for 10 minutes.  If the bleeding continues, continue to hold pressure and seek medical attention at the phone numbers below.  Mild procedure site discomfort can be treated with an ice pack and over-the-counter pain relievers.           Call our Interventional Radiology (IR) service if:  If you start bleeding from the procedure site. Our radiology provider can help you decide if you need to return to the hospital.  If you have new or worsening pain related to the procedure.  If you have concerning swelling at the procedure site.  If you develop hives or a rash or any unexplained itching.  If you have a fever of greater than 100.5  F and chills in the first 5 days after procedure.  Any other concerns related to  your procedure.    Contact Number:  [phone]  ([employer] control desk)  Monday - Friday 8:00 am - 4:30 pm    After hours for urgent concerns:  [phone]  After 4:30 pm Monday - Friday, Weekends and Holidays.   Ask for Interventional Radiology on-call.  Someone is available 24 hours a day.  [facility] toll free number:  [phone]

## 2023-12-07 NOTE — LETTER
OUTPATIENT OR TRANSITIONAL CARE  LABORATORY TEST ORDER  [facility] [phone]     Patient Name: Lola Milner  Transplant Date: 11/5/2023   YOB: 1990  Issue Date: 12/07/23   [facility] MR#:[id number]  Expiration Date: 12/07/24       Diagnoses: [x]      Liver Transplant (ICD-10 Z94.4)    [x]      Long term use of medications (ICD-10 Z79.899)     Please fax results to [phone]    Frequency: weekly x 12 weeks, every other week x 4 weeks  then monthly and as needed       [x] CBC with Platelets   [x] Basic Metabolic Panel (Sodium, Potassium, Chloride, CO2, Creatinine, Urea Nitrogen, Glucose, Calcium)  [x] Phosphorous, Magnesium  [x] Hepatic panel (Albumin, Alk Phos, ALT, AST, Direct and Total Bili)  [x] Tacrolimus drug level - 12 hour trough, please document time of last dose     Frequency: Monthly  [x]  Phosphatidylethanol (PEth)    Frequency: one time due in jan 2024  [x]  Cystatin c with GFR    Other Frequency: annually Due Nov or dec 2024  [x]      Fasting lipid panel  [x] Random urine protein  [x] Urinalysis with reflex to micro  [x] Hepatitis B DNA PCR     If you have questions, please call [phone] or [phone].          Aime Lowe MD/PhD  Professor of Surgery  Chief, Division of Transplantation    Executive Medical Director  Solid Organ Transplant Service [facility]

## 2023-12-07 NOTE — PROGRESS NOTES
Interventional Radiology Brief Post Procedure Note    Procedure: IR TCVC Removal    Proceduralist: Alf Quezada PA-C    Assistant: None    Time Out: Prior to the start of the procedure and with procedural staff participation, I verbally confirmed the patient s identity using two indicators, relevant allergies, that the procedure was appropriate and matched the consent or emergent situation, and that the correct equipment/implants were available. Immediately prior to starting the procedure I conducted the Time Out with the procedural staff and re-confirmed the patient s name, procedure, and site/side. (The Joint Commission universal protocol was followed.)  Yes    Medications   Medication Event Details Admin User Admin Time       Sedation: None. Local Anesthestic used    Findings: Completed removal of right chest TCVC in its entirety.    Estimated Blood Loss: Minimal    SPECIMENS: None    Complications: 1. None     Condition: Stable    Plan: Follow-up per primary team.     Comments: See dictated procedure note for full details.    Alf Quezada PA-C

## 2023-12-07 NOTE — TELEPHONE ENCOUNTER
John calls to state that pt has not established a physician in [place]. Apparently Lola will see a Dr. Guadarrama, as noted on the sticky note, but has not established care yet. John and pt is wondering if pt can have the line removed before she leaves.

## 2023-12-07 NOTE — TELEPHONE ENCOUNTER
Spoke to pt about the following:     If her local nephrologist will assist in set up she can have done tiburcio     Pt is willing to have local nephrology remove the line but  Does that mean that pt can go home this week?

## 2023-12-07 NOTE — TELEPHONE ENCOUNTER
Patient Call: General  Route to LPN    Reason for call: Lola, would like to discuss, the removal of her central line with Coordinator. Patient stated Coordinator would like for her to have it removed down here in the [facility], but her Nephrologist, would like it done in [place].     Call back needed? Yes    Return Call Needed  Same as documented in contacts section  When to return call?: Same day: Route High Priority

## 2023-12-09 LAB
LABORATORY REPORT: NORMAL
PETH INTERPRETATION: NORMAL
PLPETH BLD-MCNC: <10 NG/ML
POPETH BLD-MCNC: <10 NG/ML

## 2023-12-11 ENCOUNTER — TELEPHONE (OUTPATIENT)
Dept: TRANSPLANT | Facility: CLINIC | Age: 33
End: 2023-12-11
Payer: COMMERCIAL

## 2023-12-11 DIAGNOSIS — E87.79 OTHER HYPERVOLEMIA: ICD-10-CM

## 2023-12-11 NOTE — TELEPHONE ENCOUNTER
Spoke with Lola.  She is loving being back home. She will make a lab appt at local clinic. She Is struggling with neuropathy pain. She is taking 200 mg nightly and it's not helping.     Pt's LE edema improving, but still has edema. BP this /74 HR 84  Yesterday 115/78   Weight 156.8. update to dr Pete

## 2023-12-13 ENCOUNTER — MYC REFILL (OUTPATIENT)
Dept: TRANSPLANT | Facility: CLINIC | Age: 33
End: 2023-12-13
Payer: COMMERCIAL

## 2023-12-13 DIAGNOSIS — Z94.4 LIVER TRANSPLANTED (H): ICD-10-CM

## 2023-12-14 RX ORDER — METHOCARBAMOL 500 MG/1
500 TABLET, FILM COATED ORAL
Qty: 5 TABLET | Refills: 0 | Status: SHIPPED | OUTPATIENT
Start: 2023-12-14 | End: 2023-12-28

## 2023-12-14 RX ORDER — BUMETANIDE 1 MG/1
TABLET ORAL
Qty: 60 TABLET | Refills: 0 | Status: SHIPPED | OUTPATIENT
Start: 2023-12-14 | End: 2023-12-28

## 2023-12-15 DIAGNOSIS — Z94.4 LIVER TRANSPLANTED (H): ICD-10-CM

## 2023-12-15 RX ORDER — URSODIOL 300 MG/1
300 CAPSULE ORAL 2 TIMES DAILY
Qty: 60 CAPSULE | Refills: 11 | Status: SHIPPED | OUTPATIENT
Start: 2023-12-15 | End: 2024-02-22

## 2023-12-18 ENCOUNTER — MYC REFILL (OUTPATIENT)
Dept: TRANSPLANT | Facility: CLINIC | Age: 33
End: 2023-12-18
Payer: COMMERCIAL

## 2023-12-18 DIAGNOSIS — E83.42 HYPOMAGNESEMIA: ICD-10-CM

## 2023-12-18 DIAGNOSIS — Z94.4 LIVER TRANSPLANTED (H): ICD-10-CM

## 2023-12-18 DIAGNOSIS — E55.9 VITAMIN D DEFICIENCY: ICD-10-CM

## 2023-12-18 DIAGNOSIS — Z94.4 LIVER REPLACED BY TRANSPLANT (H): ICD-10-CM

## 2023-12-18 RX ORDER — GABAPENTIN 100 MG/1
200 CAPSULE ORAL AT BEDTIME
Qty: 60 CAPSULE | Refills: 0 | OUTPATIENT
Start: 2023-12-18

## 2023-12-18 RX ORDER — VITAMIN B COMPLEX
50 TABLET ORAL DAILY
Qty: 90 TABLET | Refills: 3 | Status: SHIPPED | OUTPATIENT
Start: 2023-12-18 | End: 2024-07-29

## 2023-12-18 RX ORDER — MULTIVIT-MIN/IRON FUM/FOLIC AC 19 MG-400
1 TABLET ORAL DAILY
Qty: 90 TABLET | Refills: 3 | Status: SHIPPED | OUTPATIENT
Start: 2023-12-18 | End: 2024-02-21

## 2023-12-18 RX ORDER — MAGNESIUM OXIDE 400 MG/1
400-800 TABLET ORAL 2 TIMES DAILY
Qty: 540 TABLET | Refills: 3 | Status: SHIPPED | OUTPATIENT
Start: 2023-12-18 | End: 2024-06-13

## 2023-12-18 RX ORDER — MULTIVIT-MIN/IRON FUM/FOLIC AC 19 MG-400
1 TABLET ORAL DAILY
Qty: 30 TABLET | Refills: 1 | Status: CANCELLED | OUTPATIENT
Start: 2023-12-18

## 2023-12-18 RX ORDER — MAGNESIUM OXIDE 400 MG/1
400-800 TABLET ORAL 2 TIMES DAILY
Status: CANCELLED | OUTPATIENT
Start: 2023-12-18

## 2023-12-21 ENCOUNTER — TELEPHONE (OUTPATIENT)
Dept: PHARMACY | Facility: CLINIC | Age: 33
End: 2023-12-21
Payer: COMMERCIAL

## 2023-12-21 ENCOUNTER — TELEPHONE (OUTPATIENT)
Dept: TRANSPLANT | Facility: CLINIC | Age: 33
End: 2023-12-21
Payer: COMMERCIAL

## 2023-12-21 DIAGNOSIS — Z94.4 LIVER TRANSPLANTED (H): ICD-10-CM

## 2023-12-21 RX ORDER — TACROLIMUS 5 MG/1
5 CAPSULE ORAL 2 TIMES DAILY
Qty: 180 CAPSULE | Refills: 3 | Status: SHIPPED | OUTPATIENT
Start: 2023-12-21 | End: 2024-01-02

## 2023-12-21 RX ORDER — TACROLIMUS 5 MG/1
5 CAPSULE ORAL 2 TIMES DAILY
Qty: 180 CAPSULE | Refills: 3 | Status: SHIPPED | OUTPATIENT
Start: 2023-12-21 | End: 2023-12-21

## 2023-12-21 NOTE — TELEPHONE ENCOUNTER
ISSUE:   Tacrolimus IR level 14.6 on 12/20, goal 8-10, dose 6 mg BID.    PLAN:   Call Patientand confirm this was an accurate 12-hour trough.   Verify Tacrolimus IR dose 6 mg BID.   Confirm no new medications or illness.   Confirm no missed doses.   If accurate trough and accurate dose, decrease Tacrolimus IR dose to 5 mg BID     Is this more than a 50% increase or decrease in current IS dose: Yes  If YES, justification: above goal    Repeat labs in thursday .  *If > 50% change in immunosuppression dose, repeat labs in 1 week.     OUTCOME:   Spoke with Patient, they confirm accurate trough level and current dose 6 mg BID.   Patientconfirmed dose change to 5 mg BID.  Patientagreed to repeat labs on Thursday.   Orders sent to preferred pharmacy for dose change and lab for repeat labs.   Patientvoiced understanding of plan.     Pt has going to AA with 2 meetings a week. Has not reconnected with group that is doing drugs. Pt's PCP recommended reaching out on Jan 1 to local program. Pt is working on getting home set up. Message dr Guajardo and ALPHONSO Navarro.

## 2023-12-21 NOTE — TELEPHONE ENCOUNTER
Clinical Pharmacy Consult:                                                      Transplant Specific: 1 month post transplant pharmacy review  Date of Transplant: 11/5/23  Type of Transplant: liver  First Transplant: yes  History of rejection: no    Immunosuppression Regimen   TAC 6 mg qAM & 6 mg qPM, Prednisone 5 mg qAM and  mg qAM & 750 mg qPM  Patient specific goal: 8-10  Most recent level: 14.6, date 12/20/13  Immunosuppressant Levels:  supraTherapeutic, dose adjusted  Pt adherent to lab draws: yes  Scr:   Lab Results   Component Value Date    CR 1.79 11/13/2023     Side effects: none reported    Prophylactic Medications  PJP:  Bactrim SS three times per week  Scheduled Discontinue Date: 6 months est. Stop date 5/5/2024    Antifungal: Not needed thus far  Scheduled Discontinue Date: N/A    CMV: CrCl 10 to 24 mL/minute: Valcyte 450 mg twice weekly   Scheduled Discontinue Date: 6 months Stop date 5/5/2024    Acid Reducer: Omeprazole   Scheduled Reviewed Date:  PCP to evaluate    Thrombosis Prevention: n/a  Scheduled Discontinue Date:       Blood Pressure Management  Frequency of home Blood Pressure checks: once daily  Most recent home BP: 132/84  Patient Blood pressure goal: <140/90  Patient blood pressure at goal:  yes  Hospitalizations/ER visits since last assessment: 0    Med rec/DUR performed: yes - changed PM magnesium dosing to 1800 away from bedtime mycophenolate  Med Rec Discrepancies: Patient stopped lidocaine patches, started OTC metamucil 1 packet daily, restarted sertraline 25 mg once daily    Spoke with Lola via phone today, she is overall feeling very good. She notes she never realized how sick she generally felt pre-transplant until lately. She seems to be tolerating her immunosuppression regimen well, denying side effects today. Previously had some tremors but this has resolved. Medication changes as above. Last Tacrolimus level high, dose adjusted    She is using the medication card and  keeping it up to date. She is using a pill organizer and filling it independently. Denies missed doses. She is taking medications at 1000, 1400, 1800 with magnesium move from bedtime, and 2200.     Her blood pressure is borderline of late per patient history. She is not sure why. HR has generally been on the low end, and she denies fever. Advised to keep tracking, can consider antihypertensive if she leaves pre-hypertension range.     No questions for care team today. Next pharmacy check in 1 month.     Time Spent: 10 minutes.     Ta Medrano, PharmD, BCACP  [facility]  [address] [place] [address]  Specialty - [phone]  Transplant - [phone]  Mail - [phone]

## 2023-12-27 ENCOUNTER — TELEPHONE (OUTPATIENT)
Dept: TRANSPLANT | Facility: CLINIC | Age: 33
End: 2023-12-27
Payer: COMMERCIAL

## 2023-12-27 NOTE — TELEPHONE ENCOUNTER
Transplant Social Work Services Phone Call    Data: Lola is s/p  donor liver transplant.   Intervention: I called Lola to review resource for new therapists. I called and she did not answer. VM left indicating reason for the call and my direct call back number.   Assessment: No new assessment at this time.   Education provided by : n/a  Plan: Waiting for a return call.     ABEBA Rosas, [facility]  Liver Transplant   [facility]  Phone: [phone]  Pager: [phone]

## 2023-12-28 ENCOUNTER — OFFICE VISIT (OUTPATIENT)
Dept: TRANSPLANT | Facility: CLINIC | Age: 33
End: 2023-12-28
Attending: TRANSPLANT SURGERY
Payer: COMMERCIAL

## 2023-12-28 ENCOUNTER — LAB (OUTPATIENT)
Dept: LAB | Facility: CLINIC | Age: 33
End: 2023-12-28
Attending: TRANSPLANT SURGERY
Payer: COMMERCIAL

## 2023-12-28 VITALS
SYSTOLIC BLOOD PRESSURE: 130 MMHG | HEART RATE: 70 BPM | BODY MASS INDEX: 28.86 KG/M2 | OXYGEN SATURATION: 100 % | DIASTOLIC BLOOD PRESSURE: 82 MMHG | TEMPERATURE: 97.8 F | WEIGHT: 162.9 LBS

## 2023-12-28 DIAGNOSIS — Z94.4 LIVER REPLACED BY TRANSPLANT (H): ICD-10-CM

## 2023-12-28 DIAGNOSIS — E87.79 OTHER HYPERVOLEMIA: ICD-10-CM

## 2023-12-28 DIAGNOSIS — Z94.4 LIVER TRANSPLANTED (H): ICD-10-CM

## 2023-12-28 LAB
ALBUMIN SERPL BCG-MCNC: 3.9 G/DL (ref 3.5–5.2)
ALP SERPL-CCNC: 113 U/L (ref 40–150)
ALT SERPL W P-5'-P-CCNC: 11 U/L (ref 0–50)
ANION GAP SERPL CALCULATED.3IONS-SCNC: 9 MMOL/L (ref 7–15)
AST SERPL W P-5'-P-CCNC: 18 U/L (ref 0–45)
BILIRUB DIRECT SERPL-MCNC: 0.37 MG/DL (ref 0–0.3)
BILIRUB SERPL-MCNC: 0.7 MG/DL
BUN SERPL-MCNC: 31.9 MG/DL (ref 6–20)
CALCIUM SERPL-MCNC: 9.5 MG/DL (ref 8.6–10)
CHLORIDE SERPL-SCNC: 103 MMOL/L (ref 98–107)
CREAT SERPL-MCNC: 1.75 MG/DL (ref 0.51–0.95)
CYSTATIN C (ROCHE): 3.1 MG/L (ref 0.6–1)
DEPRECATED HCO3 PLAS-SCNC: 26 MMOL/L (ref 22–29)
EGFRCR SERPLBLD CKD-EPI 2021: 39 ML/MIN/1.73M2
ERYTHROCYTE [DISTWIDTH] IN BLOOD BY AUTOMATED COUNT: 14.4 % (ref 10–15)
GFR SERPL CREATININE-BSD FRML MDRD: 18 ML/MIN/1.73M2
GLUCOSE SERPL-MCNC: 98 MG/DL (ref 70–99)
HCT VFR BLD AUTO: 30.1 % (ref 35–47)
HGB BLD-MCNC: 10.1 G/DL (ref 11.7–15.7)
MAGNESIUM SERPL-MCNC: 2 MG/DL (ref 1.7–2.3)
MCH RBC QN AUTO: 31.8 PG (ref 26.5–33)
MCHC RBC AUTO-ENTMCNC: 33.6 G/DL (ref 31.5–36.5)
MCV RBC AUTO: 95 FL (ref 78–100)
PHOSPHATE SERPL-MCNC: 4.9 MG/DL (ref 2.5–4.5)
PLATELET # BLD AUTO: 261 10E3/UL (ref 150–450)
POTASSIUM SERPL-SCNC: 5 MMOL/L (ref 3.4–5.3)
PROT SERPL-MCNC: 5.6 G/DL (ref 6.4–8.3)
RBC # BLD AUTO: 3.18 10E6/UL (ref 3.8–5.2)
SODIUM SERPL-SCNC: 138 MMOL/L (ref 135–145)
TACROLIMUS BLD-MCNC: 8.6 UG/L (ref 5–15)
TME LAST DOSE: NORMAL H
TME LAST DOSE: NORMAL H
WBC # BLD AUTO: 6.1 10E3/UL (ref 4–11)

## 2023-12-28 PROCEDURE — 99000 SPECIMEN HANDLING OFFICE-LAB: CPT | Performed by: PATHOLOGY

## 2023-12-28 PROCEDURE — G0463 HOSPITAL OUTPT CLINIC VISIT: HCPCS | Performed by: TRANSPLANT SURGERY

## 2023-12-28 PROCEDURE — 84100 ASSAY OF PHOSPHORUS: CPT | Performed by: PATHOLOGY

## 2023-12-28 PROCEDURE — 99213 OFFICE O/P EST LOW 20 MIN: CPT | Mod: 24 | Performed by: TRANSPLANT SURGERY

## 2023-12-28 PROCEDURE — 83735 ASSAY OF MAGNESIUM: CPT | Performed by: PATHOLOGY

## 2023-12-28 PROCEDURE — 36415 COLL VENOUS BLD VENIPUNCTURE: CPT | Performed by: PATHOLOGY

## 2023-12-28 PROCEDURE — 85027 COMPLETE CBC AUTOMATED: CPT | Performed by: PATHOLOGY

## 2023-12-28 PROCEDURE — 82610 CYSTATIN C: CPT | Performed by: INTERNAL MEDICINE

## 2023-12-28 PROCEDURE — 80197 ASSAY OF TACROLIMUS: CPT | Performed by: TRANSPLANT SURGERY

## 2023-12-28 PROCEDURE — 82248 BILIRUBIN DIRECT: CPT | Performed by: PATHOLOGY

## 2023-12-28 PROCEDURE — 80053 COMPREHEN METABOLIC PANEL: CPT | Performed by: PATHOLOGY

## 2023-12-28 RX ORDER — SERTRALINE HYDROCHLORIDE 25 MG/1
25 TABLET, FILM COATED ORAL DAILY
COMMUNITY
Start: 2023-12-15 | End: 2024-02-21

## 2023-12-28 RX ORDER — METHOCARBAMOL 500 MG/1
500 TABLET, FILM COATED ORAL
Qty: 10 TABLET | Refills: 0 | Status: SHIPPED | OUTPATIENT
Start: 2023-12-28 | End: 2024-02-22

## 2023-12-28 RX ORDER — METHOCARBAMOL 500 MG/1
500 TABLET, FILM COATED ORAL
Qty: 5 TABLET | Refills: 0 | OUTPATIENT
Start: 2023-12-28

## 2023-12-28 RX ORDER — BUMETANIDE 1 MG/1
1 TABLET ORAL EVERY MORNING
Qty: 30 TABLET | Refills: 0 | Status: SHIPPED | OUTPATIENT
Start: 2023-12-28 | End: 2024-01-02

## 2023-12-28 ASSESSMENT — PAIN SCALES - GENERAL: PAINLEVEL: MODERATE PAIN (4)

## 2023-12-28 NOTE — PROGRESS NOTES
Transplant Surgery Progress Note    Transplants:  11/5/2023 (Liver); Postoperative day:  53  S: overall doing well, appetite and activity good      Transplant History:    Transplant Type:  Liver Tx  Donor Type:    Transplant Date:  11/5/2023 (Liver)  Biliary Stent:  No      Acute Rejection Hx:  No    Present Maintenance Immunosuppression:  Tacrolimus, Mycophenolate mofetil, and Prednisone    CMV IgG Ab Discordance:  Yes  EBV IgG Ab Discordance:  No      Transplant Coordinator: Mariola Castellanos     Transplant Office Phone Number: [phone]     Immunosuppressant Medications       Immunosuppressive Agents Disp Start End     mycophenolate (GENERIC EQUIVALENT) 250 MG capsule 180 capsule 12/5/2023 --    Sig - Route: Take 3 capsules (750 mg) by mouth 2 times daily - Oral    Class: E-Prescribe    Notes to Pharmacy: TXP DT 11/5/2023 (Liver) TXP Dischg DT 11/15/2023 DX Liver replaced by transplant Z94.4 TX [facility] ([place], [place])     tacrolimus (GENERIC) 5 MG capsule 180 capsule 12/21/2023 --    Sig - Route: Take 1 capsule (5 mg) by mouth 2 times daily Pt will  on 12/28 - Oral    Class: E-Prescribe    Notes to Pharmacy: TXP DT 11/5/2023 (Liver) TXP Dischg DT 11/15/2023 DX Liver replaced by transplant Z94.4 TX [facility] ([place], [place])            Possible Immunosuppression-related side effects:   []             headache  []             vivid dreams  []             irritability  []             cognitive difficuties  []             fine tremor  []             nausea  []             diarrhea  []             neuropathy      []             edema  []             renal calcineurin toxicity  []             hyperkalemia  []             post-transplant diabetes  []             decreased appetite  []             increased appetite  []             other:  []             none    Prescription Medications as of 12/28/2023          Rx Number Disp Refills Start End Last Dispensed Date Next Fill Date Owning Pharmacy    acetaminophen (TYLENOL) 325 MG tablet  100 tablet 0 11/15/2023 --   [place], [place] - [address]    Sig: Take 1-2 tablets (325-650 mg) by mouth every 6 hours as needed for mild pain    Class: E-Prescribe    Route: Oral    Renewals       Renewal requests to authorizing provider (Gladys Carmen NP) <b>prohibited</b>            bumetanide (BUMEX) 1 MG tablet  60 tablet 0 12/14/2023 --   [facility] [place], [place] - [address]    Sig: Take 1 tablet (1 mg) by mouth every morning AND 1 tablet (1 mg) every evening.    Class: E-Prescribe    Route: Oral    dicyclomine (BENTYL) 10 MG capsule  120 capsule 0 12/5/2023 1/4/2024   [address]    Sig: Take 1 capsule (10 mg) by mouth 4 times daily (before meals and nightly) for 30 days For stomach cramping.    Class: E-Prescribe    Route: Oral    gabapentin (NEURONTIN) 100 MG capsule  60 capsule 0 12/5/2023 --   [address]    Sig: Take 2 capsules (200 mg) by mouth at bedtime    Class: E-Prescribe    Route: Oral    Lidocaine (LIDOCARE) 4 % Patch  20 patch 0 11/24/2023 --   [address]    Sig: Place 1-2 patches onto the skin every 24 hours To prevent lidocaine toxicity, patient should be patch free for 12 hrs daily. Apply near incision, not on incision.    Class: E-Prescribe    Route: Transdermal    magnesium oxide (MAG-OX) 400 MG tablet  540 tablet 3 12/18/2023 --   [address]    Sig: Take 1-2 tablets (400-800 mg) by mouth 2 times daily    Class: E-Prescribe    Route: Oral    Multiple Vitamins-Minerals (THERA-TABS M) TABS  90 tablet 3 12/18/2023 --    [facility], [place] - [address]    Sig: Take 1 tablet by mouth daily    Class: E-Prescribe    Route: Oral    mycophenolate (GENERIC EQUIVALENT) 250 MG capsule  180 capsule 1 12/5/2023 --   [address]    Sig: Take 3 capsules (750 mg) by mouth 2 times daily    Class: E-Prescribe    Notes to Pharmacy: TXP DT 11/5/2023 (Liver) TXP Dischg DT 11/15/2023 DX Liver replaced by transplant Z94.4 TX Center [facility] ([place], [place])    Route: Oral    omeprazole (PRILOSEC) 20 MG DR capsule  30 capsule 1 12/7/2023 --   [address]    Sig: Take 1 capsule (20 mg) by mouth daily    Class: E-Prescribe    Route: Oral    predniSONE (DELTASONE) 5 MG tablet  30 tablet 0 12/7/2023 --   [address]    Sig: Take 1 tablet (5 mg) by mouth daily    Class: E-Prescribe    Route: Oral    psyllium (METAMUCIL/KONSYL) 0.52 g capsule  -- -- 12/15/2023 --       Sig: Take 1 capsule by mouth daily    Class: Historical    Route: Oral    sertraline (ZOLOFT) 25 MG tablet  -- -- 12/15/2023 12/14/2024       Sig: Take 25 mg by mouth daily    Class: Historical    Route: Oral    sulfamethoxazole-trimethoprim (BACTRIM) 400-80 MG tablet  30 tablet 1 11/17/2023 --   [address]    Sig: Take 1 tablet by mouth three times a week Titrate up to daily when directed by transplant team with improving renal function    Class: E-Prescribe    Route: Oral    tacrolimus (GENERIC) 5 MG capsule  180 capsule 3 12/21/2023 --   [address]    Sig: Take 1 capsule (5 mg) by mouth 2 times daily Pt will  on 12/28    Class: E-Prescribe    Notes to Pharmacy:  TXP DT 11/5/2023 (Liver) TXP Dischg DT 11/15/2023 DX Liver replaced by transplant Z94.4 TX Center [facility] ([place], [place])    Route: Oral    ursodiol (ACTIGALL) 300 MG capsule  60 capsule 11 12/15/2023 --   [facility], [address]    Sig: Take 1 capsule (300 mg) by mouth 2 times daily    Class: E-Prescribe    Route: Oral    valGANciclovir (VALCYTE) 450 MG tablet  30 tablet 2 11/17/2023 --   [facility] - [place], [place] - [address]    Sig: Take 1 tablet (450 mg) by mouth twice a week Titrate up to 450mg daily when directed by transplant team with improving renal function    Class: E-Prescribe    Route: Oral    Vitamin D3 (CHOLECALCIFEROL) 25 mcg (1000 units) tablet  90 tablet 3 12/18/2023 --   [facility], [address]    Sig: Take 2 tablets (50 mcg) by mouth daily    Class: E-Prescribe    Route: Oral            O:   [unfilled]        Latest Ref Rng & Units 12/28/2023    11:27 AM 12/5/2023     8:57 AM 11/30/2023     9:05 AM 11/20/2023     8:50 AM 11/16/2023     9:00 AM   Transplant Immunosuppression Labs   Creat 0.51 - 0.95 mg/dL  1.44  1.46  1.73  1.65    Urea Nitrogen 6.0 - 20.0 mg/dL  24.9  17.6  21.9  15.2    WBC 4.0 - 11.0 10e3/uL 6.1  5.2  6.1  9.0  8.9        Chemistries:   Recent Labs   Lab Test 12/05/23  0857   BUN 24.9*   CR 1.44*   GFRESTIMATED 50*   GLC 83     Lab Results   Component Value Date    A1C 5.3 11/08/2023     Recent Labs   Lab Test 12/05/23  0857   ALBUMIN 3.4*   BILITOTAL 1.4*   ALKPHOS 137   AST 21   ALT 9     Urine Studies:  Recent Labs   Lab Test 11/16/23  0905 11/04/23  2309   COLOR Light Yellow Dark Yellow*   APPEARANCE Clear Slightly Cloudy*   URINEGLC Negative Negative   URINEBILI Negative Moderate*   URINEKETONE Negative Negative   SG 1.005 1.016   UBLD Negative Trace*   URINEPH 5.5 5.5   PROTEIN Negative 10*   NITRITE Negative  "Negative   LEUKEST Negative Small*   RBCU  --  2   WBCU  --  5     No lab results found.  Hematology:   Recent Labs   Lab Test 12/28/23  1127 12/05/23  0857 11/30/23  0905   HGB 10.1* 8.6* 8.6*    383 495*   WBC 6.1 5.2 6.1     Coags:   Recent Labs   Lab Test 11/15/23  0619 11/14/23  0720   INR 1.10 1.21*     HLA antibodies:   No results found for: \"LD8JSUNMZ\", \"UY1OLAWPNH\", \"DP4DUBOUQ\", \"MP4QKJBAGD\"    Assessment: Lola Milner is doing well s/p Liver Tx:  Issues we addressed during her visit include:    Plan:    1. Graft function: LFTs normalized  2. Immunosuppression Management: No change 8-10, Cr still elevated at 1.75, may need to run tac a little lower and keep pred on until Cr improves  .  Complexity of management:Low.  Contributing factors:  renal insufficiency  3. Elevated Cr- will decrease bumex to 1 every day and check tac level  Followup: 2 weeks, needs follow-up with hepatology in 1 mo            Aime Lowe MD/PhD    "

## 2023-12-28 NOTE — LETTER
12/28/2023         RE: Lola Milner  [address]  [place] [address]        Dear Colleague,    Thank you for referring your patient, Lola Milner, to the [facility]. Please see a copy of my visit note below.    Transplant Surgery Progress Note    Transplants:  11/5/2023 (Liver); Postoperative day:  53  S: overall doing well, appetite and activity good      Transplant History:    Transplant Type:  Liver Tx  Donor Type:    Transplant Date:  11/5/2023 (Liver)  Biliary Stent:  No      Acute Rejection Hx:  No    Present Maintenance Immunosuppression:  Tacrolimus, Mycophenolate mofetil, and Prednisone    CMV IgG Ab Discordance:  Yes  EBV IgG Ab Discordance:  No      Transplant Coordinator: Mariola Castellanos     Transplant Office Phone Number: [phone]     Immunosuppressant Medications       Immunosuppressive Agents Disp Start End     mycophenolate (GENERIC EQUIVALENT) 250 MG capsule 180 capsule 12/5/2023 --    Sig - Route: Take 3 capsules (750 mg) by mouth 2 times daily - Oral    Class: E-Prescribe    Notes to Pharmacy: TXP DT 11/5/2023 (Liver) TXP Dischg DT 11/15/2023 DX Liver replaced by transplant Z94.4 TX Center [facility] ([place], [place])     tacrolimus (GENERIC) 5 MG capsule 180 capsule 12/21/2023 --    Sig - Route: Take 1 capsule (5 mg) by mouth 2 times daily Pt will  on 12/28 - Oral    Class: E-Prescribe    Notes to Pharmacy: TXP DT 11/5/2023 (Liver) TXP Dischg DT 11/15/2023 DX Liver replaced by transplant Z94.4 TX [facility] ([place], [place])            Possible Immunosuppression-related side effects:   []             headache  []             vivid dreams  []             irritability  []             cognitive difficuties  []             fine tremor  []             nausea  []             diarrhea  []             neuropathy      []             edema  []             renal calcineurin  toxicity  []             hyperkalemia  []             post-transplant diabetes  []             decreased appetite  []             increased appetite  []             other:  []             none    Prescription Medications as of 12/28/2023         Rx Number Disp Refills Start End Last Dispensed Date Next Fill Date Owning Pharmacy    acetaminophen (TYLENOL) 325 MG tablet  100 tablet 0 11/15/2023 --   [place], [place] - [address]    Sig: Take 1-2 tablets (325-650 mg) by mouth every 6 hours as needed for mild pain    Class: E-Prescribe    Route: Oral    Renewals       Renewal requests to authorizing provider (Gladys Carmen NP) <b>prohibited</b>            bumetanide (BUMEX) 1 MG tablet  60 tablet 0 12/14/2023 --   [facility] [place], [place] - [address] [place] [address]    Sig: Take 1 tablet (1 mg) by mouth every morning AND 1 tablet (1 mg) every evening.    Class: E-Prescribe    Route: Oral    dicyclomine (BENTYL) 10 MG capsule  120 capsule 0 12/5/2023 1/4/2024   [address]    Sig: Take 1 capsule (10 mg) by mouth 4 times daily (before meals and nightly) for 30 days For stomach cramping.    Class: E-Prescribe    Route: Oral    gabapentin (NEURONTIN) 100 MG capsule  60 capsule 0 12/5/2023 --   [place], [place] - [address] [place] [address] [address]    Sig: Take 2 capsules (200 mg) by mouth at bedtime    Class: E-Prescribe    Route: Oral    Lidocaine (LIDOCARE) 4 % Patch  20 patch 0 11/24/2023 --   [address]    Sig: Place 1-2 patches onto the skin every 24 hours To prevent lidocaine toxicity, patient should be patch free for 12 hrs daily. Apply near incision, not on incision.    Class: E-Prescribe    Route: Transdermal    magnesium oxide (MAG-OX) 400 MG tablet  540 tablet 3 12/18/2023 --    [facility], [address]    Sig: Take 1-2 tablets (400-800 mg) by mouth 2 times daily    Class: E-Prescribe    Route: Oral    Multiple Vitamins-Minerals (THERA-TABS M) TABS  90 tablet 3 12/18/2023 --   [facility], [address]    Sig: Take 1 tablet by mouth daily    Class: E-Prescribe    Route: Oral    mycophenolate (GENERIC EQUIVALENT) 250 MG capsule  180 capsule 1 12/5/2023 --   [address]    Sig: Take 3 capsules (750 mg) by mouth 2 times daily    Class: E-Prescribe    Notes to Pharmacy: TXP DT 11/5/2023 (Liver) TXP Dischg DT 11/15/2023 DX Liver replaced by transplant Z94.4 TX Center [facility] ([place], [place])    Route: Oral    omeprazole (PRILOSEC) 20 MG DR capsule  30 capsule 1 12/7/2023 --   [address]    Sig: Take 1 capsule (20 mg) by mouth daily    Class: E-Prescribe    Route: Oral    predniSONE (DELTASONE) 5 MG tablet  30 tablet 0 12/7/2023 --   [address]    Sig: Take 1 tablet (5 mg) by mouth daily    Class: E-Prescribe    Route: Oral    psyllium (METAMUCIL/KONSYL) 0.52 g capsule  -- -- 12/15/2023 --       Sig: Take 1 capsule by mouth daily    Class: Historical    Route: Oral    sertraline (ZOLOFT) 25 MG tablet  -- -- 12/15/2023 12/14/2024       Sig: Take 25 mg by mouth daily    Class: Historical    Route: Oral    sulfamethoxazole-trimethoprim (BACTRIM) 400-80 MG tablet  30 tablet 1 11/17/2023 --   [address]    Sig: Take 1 tablet by mouth three times a week Titrate up to daily when directed by transplant team with improving renal function    Class: E-Prescribe    Route: Oral    tacrolimus (GENERIC) 5 MG  capsule  180 capsule 3 12/21/2023 --   [place], [place] - [address]    Sig: Take 1 capsule (5 mg) by mouth 2 times daily Pt will  on 12/28    Class: E-Prescribe    Notes to Pharmacy: TXP DT 11/5/2023 (Liver) TXP Dischg DT 11/15/2023 DX Liver replaced by transplant Z94.4 TX Center [facility] ([place], [place])    Route: Oral    ursodiol (ACTIGALL) 300 MG capsule  60 capsule 11 12/15/2023 --   [address]    Sig: Take 1 capsule (300 mg) by mouth 2 times daily    Class: E-Prescribe    Route: Oral    valGANciclovir (VALCYTE) 450 MG tablet  30 tablet 2 11/17/2023 --   [facility] [place], [place] - [address]    Sig: Take 1 tablet (450 mg) by mouth twice a week Titrate up to 450mg daily when directed by transplant team with improving renal function    Class: E-Prescribe    Route: Oral    Vitamin D3 (CHOLECALCIFEROL) 25 mcg (1000 units) tablet  90 tablet 3 12/18/2023 --   [address]    Sig: Take 2 tablets (50 mcg) by mouth daily    Class: E-Prescribe    Route: Oral            O:   [unfilled]        Latest Ref Rng & Units 12/28/2023    11:27 AM 12/5/2023     8:57 AM 11/30/2023     9:05 AM 11/20/2023     8:50 AM 11/16/2023     9:00 AM   Transplant Immunosuppression Labs   Creat 0.51 - 0.95 mg/dL  1.44  1.46  1.73  1.65    Urea Nitrogen 6.0 - 20.0 mg/dL  24.9  17.6  21.9  15.2    WBC 4.0 - 11.0 10e3/uL 6.1  5.2  6.1  9.0  8.9        Chemistries:   Recent Labs   Lab Test 12/05/23  0857   BUN 24.9*   CR 1.44*   GFRESTIMATED 50*   GLC 83     Lab Results   Component Value Date    A1C 5.3 11/08/2023     Recent Labs   Lab Test 12/05/23  0857   ALBUMIN 3.4*   BILITOTAL 1.4*   ALKPHOS 137   AST 21   ALT 9     Urine Studies:  Recent Labs   Lab Test 11/16/23  0905 11/04/23  2308   COLOR Light Yellow  "Dark Yellow*   APPEARANCE Clear Slightly Cloudy*   URINEGLC Negative Negative   URINEBILI Negative Moderate*   URINEKETONE Negative Negative   SG 1.005 1.016   UBLD Negative Trace*   URINEPH 5.5 5.5   PROTEIN Negative 10*   NITRITE Negative Negative   LEUKEST Negative Small*   RBCU  --  2   WBCU  --  5     No lab results found.  Hematology:   Recent Labs   Lab Test 12/28/23  1127 12/05/23  0857 11/30/23  0905   HGB 10.1* 8.6* 8.6*    383 495*   WBC 6.1 5.2 6.1     Coags:   Recent Labs   Lab Test 11/15/23  0619 11/14/23  0720   INR 1.10 1.21*     HLA antibodies:   No results found for: \"HE7XEVWEU\", \"XK4DPTAIAK\", \"TC3FIRVPZ\", \"NY3VZKBUAZ\"    Assessment: Lola Milner is doing well s/p Liver Tx:  Issues we addressed during her visit include:    Plan:    1. Graft function: LFTs normalized  2. Immunosuppression Management: No change 8-10, Cr still elevated at 1.75, may need to run tac a little lower and keep pred on until Cr improves  .  Complexity of management:Low.  Contributing factors:  renal insufficiency  3. Elevated Cr- will decrease bumex to 1 every day and check tac level  Followup: 2 weeks, needs follow-up with hepatology in 1 mo            Aime Lowe MD/PhD      Again, thank you for allowing me to participate in the care of your patient.        Sincerely,        Aime Lowe MD  "

## 2023-12-31 ENCOUNTER — HEALTH MAINTENANCE LETTER (OUTPATIENT)
Age: 33
End: 2023-12-31

## 2024-01-02 ENCOUNTER — TELEPHONE (OUTPATIENT)
Dept: TRANSPLANT | Facility: CLINIC | Age: 34
End: 2024-01-02
Payer: COMMERCIAL

## 2024-01-02 DIAGNOSIS — Z94.4 LIVER TRANSPLANTED (H): ICD-10-CM

## 2024-01-02 DIAGNOSIS — E87.79 OTHER HYPERVOLEMIA: ICD-10-CM

## 2024-01-02 DIAGNOSIS — Z94.4 LIVER REPLACED BY TRANSPLANT (H): Primary | ICD-10-CM

## 2024-01-02 DIAGNOSIS — K72.10 END STAGE LIVER DISEASE (H): ICD-10-CM

## 2024-01-02 NOTE — TELEPHONE ENCOUNTER
Spoke to pt who wants to know if a blood pressure 144/93 should be of concern. Suggested that pt reach out to her PCP for management. Writer will send scripts related to transplant to [place].

## 2024-01-03 RX ORDER — MYCOPHENOLATE MOFETIL 250 MG/1
750 CAPSULE ORAL 2 TIMES DAILY
Qty: 180 CAPSULE | Refills: 1 | Status: SHIPPED | OUTPATIENT
Start: 2024-01-03 | End: 2024-01-07

## 2024-01-03 RX ORDER — BUMETANIDE 1 MG/1
1 TABLET ORAL EVERY MORNING
Qty: 30 TABLET | Refills: 3 | Status: SHIPPED | OUTPATIENT
Start: 2024-01-03 | End: 2024-01-19

## 2024-01-03 RX ORDER — TACROLIMUS 5 MG/1
5 CAPSULE ORAL 2 TIMES DAILY
Qty: 180 CAPSULE | Refills: 3 | Status: SHIPPED | OUTPATIENT
Start: 2024-01-03 | End: 2024-01-11 | Stop reason: DRUGHIGH

## 2024-01-03 RX ORDER — PREDNISONE 5 MG/1
5 TABLET ORAL DAILY
Qty: 90 TABLET | Refills: 1 | Status: SHIPPED | OUTPATIENT
Start: 2024-01-03 | End: 2024-01-07

## 2024-01-03 RX ORDER — SULFAMETHOXAZOLE AND TRIMETHOPRIM 400; 80 MG/1; MG/1
1 TABLET ORAL
Qty: 30 TABLET | Refills: 3 | Status: SHIPPED | OUTPATIENT
Start: 2024-01-03 | End: 2024-06-11

## 2024-01-03 RX ORDER — VALGANCICLOVIR 450 MG/1
450 TABLET, FILM COATED ORAL
Qty: 30 TABLET | Refills: 2 | Status: SHIPPED | OUTPATIENT
Start: 2024-01-04 | End: 2024-02-28

## 2024-01-07 ENCOUNTER — MYC REFILL (OUTPATIENT)
Dept: TRANSPLANT | Facility: CLINIC | Age: 34
End: 2024-01-07
Payer: COMMERCIAL

## 2024-01-07 DIAGNOSIS — K72.10 END STAGE LIVER DISEASE (H): ICD-10-CM

## 2024-01-07 DIAGNOSIS — Z94.4 LIVER TRANSPLANTED (H): ICD-10-CM

## 2024-01-08 RX ORDER — MYCOPHENOLATE MOFETIL 250 MG/1
750 CAPSULE ORAL 2 TIMES DAILY
Qty: 180 CAPSULE | Refills: 1 | Status: SHIPPED | OUTPATIENT
Start: 2024-01-08 | End: 2024-02-08

## 2024-01-08 RX ORDER — PREDNISONE 5 MG/1
5 TABLET ORAL DAILY
Qty: 90 TABLET | Refills: 1 | Status: SHIPPED | OUTPATIENT
Start: 2024-01-08 | End: 2024-02-28

## 2024-01-11 DIAGNOSIS — Z94.4 LIVER TRANSPLANTED (H): Primary | ICD-10-CM

## 2024-01-11 RX ORDER — TACROLIMUS 1 MG/1
4 CAPSULE ORAL EVERY 12 HOURS
Qty: 240 CAPSULE | Refills: 11 | Status: SHIPPED | OUTPATIENT
Start: 2024-01-11 | End: 2024-01-18

## 2024-01-11 NOTE — TELEPHONE ENCOUNTER
ISSUE:   Tacrolimus IR level 10.6 on 1/10, goal 8, dose 5 mg BID.    PLAN:   Call Patientand confirm this was an accurate 12-hour trough.   Verify Tacrolimus IR dose 5 mg BID.   Confirm no new medications or illness.   Confirm no missed doses.   If accurate trough and accurate dose, decrease Tacrolimus IR dose to 4 mg BID     Is this more than a 50% increase or decrease in current IS dose: Yes  If YES, justification: above goal    Repeat labs in 1 weeks.  *If > 50% change in immunosuppression dose, repeat labs in 1 week. Mariola Castellanos RN      OUTCOME:   Spoke with Patient, they confirm accurate trough level and current dose 5 mg BID.   Patientconfirmed dose change to 4 mg BID.  Patientagreed to repeat labs in 1 weeks.   Orders sent to preferred pharmacy for dose change and lab for repeat labs.   Patientvoiced understanding of plan.     Denia Amador LPN

## 2024-01-18 ENCOUNTER — TELEPHONE (OUTPATIENT)
Dept: TRANSPLANT | Facility: CLINIC | Age: 34
End: 2024-01-18
Payer: COMMERCIAL

## 2024-01-18 DIAGNOSIS — Z94.4 LIVER TRANSPLANTED (H): ICD-10-CM

## 2024-01-18 RX ORDER — TACROLIMUS 1 MG/1
3 CAPSULE ORAL EVERY 12 HOURS
Qty: 180 CAPSULE | Refills: 11 | Status: SHIPPED | OUTPATIENT
Start: 2024-01-18 | End: 2024-03-05

## 2024-01-18 RX ORDER — LANOLIN ALCOHOL/MO/W.PET/CERES
100 CREAM (GRAM) TOPICAL DAILY
Qty: 7 TABLET | Refills: 1 | Status: SHIPPED | OUTPATIENT
Start: 2024-01-18 | End: 2024-06-25

## 2024-01-18 NOTE — TELEPHONE ENCOUNTER
ISSUE:   Tacrolimus IR level 9.6 on 1/17, goal 8, dose 4 mg BID. Pt having increased neuropathy pain reviewed with dr cho. Pt to start thiamine 100 mg daily x 7 days to see if helps.     PLAN:   Call Patientand confirm this was an accurate 12-hour trough.   Verify Tacrolimus IR dose 4 mg BID.   Confirm no new medications or illness.   Confirm no missed doses.   If accurate trough and accurate dose, decrease Tacrolimus IR dose to 3 mg BID and start thiamine 100 mg daily x 7 days to see if helps with neuropathy.     Is this more than a 50% increase or decrease in current IS dose: Yes  If YES, justification: above goal    Repeat labs in 1 weeks.  *If > 50% change in immunosuppression dose, repeat labs in 1 week.   Mariola Castellanos RN.  OUTCOME:   Spoke with Patient, they confirm accurate trough level and current dose 4 mg BID.   Patientconfirmed dose change to 3 mg BID.  Patientagreed to repeat labs in 1 weeks.   Orders sent to preferred pharmacy for dose change and lab for repeat labs.   Patientvoiced understanding of plan.     Advised to start thiamine 100m daily x 7 days per Dr. MARLEN Amador LPN

## 2024-01-19 DIAGNOSIS — Z94.4 LIVER TRANSPLANTED (H): ICD-10-CM

## 2024-01-19 RX ORDER — GABAPENTIN 100 MG/1
200 CAPSULE ORAL AT BEDTIME
Qty: 14 CAPSULE | Refills: 0 | Status: SHIPPED | OUTPATIENT
Start: 2024-01-19 | End: 2024-02-21

## 2024-01-19 NOTE — TELEPHONE ENCOUNTER
Lola called about BP of 98/70 and she was dizzy. She is drinking plenty of fluid. She said 4 waterbottles that are 40 ounces.     I talked with her and had her recheck and repeat 122/76.     Her weight is 158.8 today   Yesterday 162.     asked her to hold bumex in the am. She has no swelling now. he feels bloated in belly only.    Message to dr ePte.

## 2024-01-19 NOTE — TELEPHONE ENCOUNTER
Spoke with Lola. She was feeling faint. Pt's   Blood pressure 106/62. HR 88. HR 97.4    After she was standing for a few mins. /96 HR 90.   sitting 108/68 HR 97    Dr Pete reviewed blood pressures are ok. Pt stopped bumex.   Message to dr yu and Zenobia MCFARLANE

## 2024-01-21 ENCOUNTER — MYC REFILL (OUTPATIENT)
Dept: TRANSPLANT | Facility: CLINIC | Age: 34
End: 2024-01-21
Payer: COMMERCIAL

## 2024-01-21 DIAGNOSIS — Z94.4 LIVER TRANSPLANTED (H): ICD-10-CM

## 2024-01-22 RX ORDER — METHOCARBAMOL 500 MG/1
500 TABLET, FILM COATED ORAL
Qty: 10 TABLET | Refills: 0 | OUTPATIENT
Start: 2024-01-22

## 2024-01-23 NOTE — TELEPHONE ENCOUNTER
Spoke with Lola. She is currently napping.  138/82 before bed last night.  Weight stable.  She has not had any other concerns.

## 2024-01-25 ENCOUNTER — LAB (OUTPATIENT)
Dept: LAB | Facility: CLINIC | Age: 34
End: 2024-01-25
Payer: COMMERCIAL

## 2024-01-25 ENCOUNTER — OFFICE VISIT (OUTPATIENT)
Dept: TRANSPLANT | Facility: CLINIC | Age: 34
End: 2024-01-25
Attending: TRANSPLANT SURGERY
Payer: COMMERCIAL

## 2024-01-25 VITALS
TEMPERATURE: 97.7 F | HEART RATE: 72 BPM | WEIGHT: 165.2 LBS | OXYGEN SATURATION: 100 % | DIASTOLIC BLOOD PRESSURE: 88 MMHG | SYSTOLIC BLOOD PRESSURE: 128 MMHG | BODY MASS INDEX: 29.26 KG/M2

## 2024-01-25 DIAGNOSIS — Z94.4 LIVER REPLACED BY TRANSPLANT (H): ICD-10-CM

## 2024-01-25 LAB
ALBUMIN SERPL BCG-MCNC: 4.2 G/DL (ref 3.5–5.2)
ALP SERPL-CCNC: 127 U/L (ref 40–150)
ALT SERPL W P-5'-P-CCNC: 16 U/L (ref 0–50)
ANION GAP SERPL CALCULATED.3IONS-SCNC: 11 MMOL/L (ref 7–15)
AST SERPL W P-5'-P-CCNC: 18 U/L (ref 0–45)
BILIRUB DIRECT SERPL-MCNC: <0.2 MG/DL (ref 0–0.3)
BILIRUB SERPL-MCNC: 0.3 MG/DL
BUN SERPL-MCNC: 15.5 MG/DL (ref 6–20)
CALCIUM SERPL-MCNC: 9 MG/DL (ref 8.6–10)
CHLORIDE SERPL-SCNC: 107 MMOL/L (ref 98–107)
CREAT SERPL-MCNC: 1.47 MG/DL (ref 0.51–0.95)
DEPRECATED HCO3 PLAS-SCNC: 19 MMOL/L (ref 22–29)
EGFRCR SERPLBLD CKD-EPI 2021: 48 ML/MIN/1.73M2
ERYTHROCYTE [DISTWIDTH] IN BLOOD BY AUTOMATED COUNT: 12.2 % (ref 10–15)
GLUCOSE SERPL-MCNC: 96 MG/DL (ref 70–99)
HCT VFR BLD AUTO: 32.1 % (ref 35–47)
HGB BLD-MCNC: 11 G/DL (ref 11.7–15.7)
MAGNESIUM SERPL-MCNC: 2 MG/DL (ref 1.7–2.3)
MCH RBC QN AUTO: 30.1 PG (ref 26.5–33)
MCHC RBC AUTO-ENTMCNC: 34.3 G/DL (ref 31.5–36.5)
MCV RBC AUTO: 88 FL (ref 78–100)
PHOSPHATE SERPL-MCNC: 4.6 MG/DL (ref 2.5–4.5)
PLATELET # BLD AUTO: 225 10E3/UL (ref 150–450)
POTASSIUM SERPL-SCNC: 4.4 MMOL/L (ref 3.4–5.3)
PROT SERPL-MCNC: 6.1 G/DL (ref 6.4–8.3)
RBC # BLD AUTO: 3.65 10E6/UL (ref 3.8–5.2)
SODIUM SERPL-SCNC: 137 MMOL/L (ref 135–145)
TACROLIMUS BLD-MCNC: 6.4 UG/L (ref 5–15)
TME LAST DOSE: NORMAL H
TME LAST DOSE: NORMAL H
WBC # BLD AUTO: 3.1 10E3/UL (ref 4–11)

## 2024-01-25 PROCEDURE — 99000 SPECIMEN HANDLING OFFICE-LAB: CPT | Performed by: PATHOLOGY

## 2024-01-25 PROCEDURE — 82248 BILIRUBIN DIRECT: CPT | Performed by: PATHOLOGY

## 2024-01-25 PROCEDURE — 36415 COLL VENOUS BLD VENIPUNCTURE: CPT | Performed by: PATHOLOGY

## 2024-01-25 PROCEDURE — 80053 COMPREHEN METABOLIC PANEL: CPT | Performed by: PATHOLOGY

## 2024-01-25 PROCEDURE — 80197 ASSAY OF TACROLIMUS: CPT | Performed by: TRANSPLANT SURGERY

## 2024-01-25 PROCEDURE — 84100 ASSAY OF PHOSPHORUS: CPT | Performed by: PATHOLOGY

## 2024-01-25 PROCEDURE — 85027 COMPLETE CBC AUTOMATED: CPT | Performed by: PATHOLOGY

## 2024-01-25 PROCEDURE — 99213 OFFICE O/P EST LOW 20 MIN: CPT | Mod: 24 | Performed by: TRANSPLANT SURGERY

## 2024-01-25 PROCEDURE — 83735 ASSAY OF MAGNESIUM: CPT | Performed by: PATHOLOGY

## 2024-01-25 PROCEDURE — 99213 OFFICE O/P EST LOW 20 MIN: CPT | Performed by: TRANSPLANT SURGERY

## 2024-01-25 RX ORDER — DICYCLOMINE HYDROCHLORIDE 10 MG/1
10 CAPSULE ORAL 2 TIMES DAILY PRN
COMMUNITY

## 2024-01-25 NOTE — NURSING NOTE
"Chief Complaint   Patient presents with    Follow Up     Liver transplant follow-up       Vital signs:  Temp: 97.7  F (36.5  C) Temp src: Oral BP: 128/88 Pulse: 72     SpO2: 100 %       Weight: 74.9 kg (165 lb 3.2 oz)  Estimated body mass index is 29.26 kg/m  as calculated from the following:    Height as of 10/31/23: 1.6 m (5' 3\").    Weight as of this encounter: 74.9 kg (165 lb 3.2 oz).            Nixon Fulton RN on 1/25/2024 at 10:14 AM    "

## 2024-01-25 NOTE — LETTER
1/25/2024         RE: Lola Milner  [address]   [address]  [facility] [address]        Dear Colleague,    Thank you for referring your patient, Lola Milner, to the [facility]. Please see a copy of my visit note below.    Transplant Surgery Progress Note    Transplants:  11/5/2023 (Liver); Postoperative day:  82  S: overall doing well, activity and appetite getting back to normal, asking about starting part-time job back home    Transplant History:    Transplant Type:  Liver Tx  Donor Type:    Transplant Date:  11/5/2023 (Liver)   Biliary Stent:  No       Acute Rejection Hx:  No    Present Maintenance Immunosuppression:  Tacrolimus, Mycophenolate mofetil, and Prednisone    CMV IgG Ab Discordance:  Yes  EBV IgG Ab Discordance:  No    Transplant Coordinator: Mariola Castellanos     Transplant Office Phone Number: [phone]     Immunosuppressant Medications       Immunosuppressive Agents Disp Start End     mycophenolate (GENERIC EQUIVALENT) 250 MG capsule 180 capsule 1/8/2024 --    Sig - Route: Take 3 capsules (750 mg) by mouth 2 times daily - Oral    Class: E-Prescribe     tacrolimus (GENERIC) 1 MG capsule 180 capsule 1/18/2024 --    Sig - Route: Take 3 capsules (3 mg) by mouth every 12 hours - Oral    Class: E-Prescribe    Notes to Pharmacy: TXP DT 11/5/2023 (Liver) TXP Dischg DT 11/15/2023 DX Liver replaced by transplant Z94.4 [facility] ([place], [place])            Possible Immunosuppression-related side effects:   []             headache  []             vivid dreams  []             irritability  []             cognitive difficuties  []             fine tremor  []             nausea  []             diarrhea  []             neuropathy      []             edema  []             renal calcineurin toxicity  []             hyperkalemia  []             post-transplant diabetes  []             decreased appetite  []             increased  appetite  []             other:  []             none    Prescription Medications as of 1/26/2024         Rx Number Disp Refills Start End Last Dispensed Date Next Fill Date Owning Pharmacy    acetaminophen (TYLENOL) 325 MG tablet  100 tablet 0 11/15/2023 --   [address]    Sig: Take 1-2 tablets (325-650 mg) by mouth every 6 hours as needed for mild pain    Class: E-Prescribe    Route: Oral    Renewals       Renewal requests to authorizing provider (Gladys Carmen NP) <b>prohibited</b>            dicyclomine (BENTYL) 10 MG capsule  -- --  --       Sig: Take 10 mg by mouth 2 times daily as needed (stomach cramping/period)    Class: Historical    Route: Oral    gabapentin (NEURONTIN) 100 MG capsule  14 capsule 0 1/19/2024 1/26/2024   [address]    Sig: Take 2 capsules (200 mg) by mouth at bedtime for 7 days    Class: E-Prescribe    Route: Oral    Lidocaine (LIDOCARE) 4 % Patch  20 patch 0 11/24/2023 --   [address]    Sig: Place 1-2 patches onto the skin every 24 hours To prevent lidocaine toxicity, patient should be patch free for 12 hrs daily. Apply near incision, not on incision.    Class: E-Prescribe    Route: Transdermal    magnesium oxide (MAG-OX) 400 MG tablet  540 tablet 3 12/18/2023 --   [address]    Sig: Take 1-2 tablets (400-800 mg) by mouth 2 times daily    Class: E-Prescribe    Route: Oral    methocarbamol (ROBAXIN) 500 MG tablet  10 tablet 0 12/28/2023 --   [address]    Sig: Take 1 tablet (500 mg) by mouth every evening as needed for muscle spasms    Class: E-Prescribe    Route: Oral    Multiple Vitamins-Minerals (THERA-TABS M) TABS  90 tablet 3 12/18/2023 --   [facility]  [address]    Sig: Take 1 tablet by mouth daily    Class: E-Prescribe    Route: Oral    mycophenolate (GENERIC EQUIVALENT) 250 MG capsule  180 capsule 1 1/8/2024 --   [address]    Sig: Take 3 capsules (750 mg) by mouth 2 times daily    Class: E-Prescribe    Route: Oral    omeprazole (PRILOSEC) 20 MG DR capsule  30 capsule 1 1/3/2024 --   [address]    Sig: Take 1 capsule (20 mg) by mouth daily    Class: E-Prescribe    Route: Oral    predniSONE (DELTASONE) 5 MG tablet  90 tablet 1 1/8/2024 --   [address]    Sig: Take 1 tablet (5 mg) by mouth daily    Class: E-Prescribe    Route: Oral    psyllium (METAMUCIL/KONSYL) 0.52 g capsule  -- -- 12/15/2023 --       Sig: Take 1 capsule by mouth daily    Class: Historical    Route: Oral    sertraline (ZOLOFT) 25 MG tablet  -- -- 12/15/2023 12/14/2024       Sig: Take 25 mg by mouth daily    Class: Historical    Route: Oral    sulfamethoxazole-trimethoprim (BACTRIM) 400-80 MG tablet  30 tablet 3 1/3/2024 --   [address]    Sig: Take 1 tablet by mouth three times a week Titrate up to daily when directed by transplant team with improving renal function    Class: E-Prescribe    Route: Oral    tacrolimus (GENERIC) 1 MG capsule  180 capsule 11 1/18/2024 --   [address]    Sig: Take 3 capsules (3 mg) by mouth every 12 hours    Class: E-Prescribe    Notes to Pharmacy: TXP DT 11/5/2023 (Liver) TXP Dischg DT 11/15/2023 DX Liver replaced by transplant Z94.4 TX [facility] ([place], [place])    Route: Oral    thiamine (B-1) 100 MG tablet  7 tablet 1 1/18/2024 --   [address]    Sig: Take 1 tablet  (100 mg) by mouth daily    Class: E-Prescribe    Route: Oral    ursodiol (ACTIGALL) 300 MG capsule  60 capsule 11 12/15/2023 --   [facility], [address]    Sig: Take 1 capsule (300 mg) by mouth 2 times daily    Class: E-Prescribe    Route: Oral    valGANciclovir (VALCYTE) 450 MG tablet  30 tablet 2 1/4/2024 --   [facility], [address]    Sig: Take 1 tablet (450 mg) by mouth twice a week Titrate up to 450mg daily when directed by transplant team with improving renal function    Class: E-Prescribe    Route: Oral    Vitamin D3 (CHOLECALCIFEROL) 25 mcg (1000 units) tablet  90 tablet 3 12/18/2023 --   [facility], [address]    Sig: Take 2 tablets (50 mcg) by mouth daily    Class: E-Prescribe    Route: Oral            O:   [unfilled]        Latest Ref Rng & Units 1/25/2024     9:30 AM 12/28/2023    11:27 AM 12/5/2023     8:57 AM 11/30/2023     9:05 AM 11/20/2023     8:50 AM   Transplant Immunosuppression Labs   Creat 0.51 - 0.95 mg/dL 1.47  1.75  1.44  1.46  1.73    Urea Nitrogen 6.0 - 20.0 mg/dL 15.5  31.9  24.9  17.6  21.9    WBC 4.0 - 11.0 10e3/uL 3.1  6.1  5.2  6.1  9.0        Chemistries:   Recent Labs   Lab Test 01/25/24  0930   BUN 15.5   CR 1.47*   GFRESTIMATED 48*   GLC 96     Lab Results   Component Value Date    A1C 5.3 11/08/2023     Recent Labs   Lab Test 01/25/24  0930   ALBUMIN 4.2   BILITOTAL 0.3   ALKPHOS 127   AST 18   ALT 16     Urine Studies:  Recent Labs   Lab Test 11/16/23  0905 11/04/23  2309   COLOR Light Yellow Dark Yellow*   APPEARANCE Clear Slightly Cloudy*   URINEGLC Negative Negative   URINEBILI Negative Moderate*   URINEKETONE Negative Negative   SG 1.005 1.016   UBLD Negative Trace*   URINEPH 5.5 5.5   PROTEIN Negative 10*   NITRITE Negative Negative   LEUKEST Negative Small*   RBCU  --  2   WBCU  --  5     No lab results found.  Hematology:   Recent Labs   Lab  "Test 01/25/24  0930 12/28/23  1127 12/05/23  0857   HGB 11.0* 10.1* 8.6*    261 383   WBC 3.1* 6.1 5.2     Coags:   Recent Labs   Lab Test 11/15/23  0619 11/14/23  0720   INR 1.10 1.21*     HLA antibodies:   No results found for: \"AG0MAPBLJ\", \"ZD6XMWTXFR\", \"KF7WAPVOU\", \"UZ8YPQRJJH\"    Assessment: Lola Milner is doing well s/p Liver Tx:  Issues we addressed during her visit include:    Plan:    1. Graft function: LFTs normal  2. Immunosuppression Management: No change tac 6-8 given renal insufficiency, would keep her on pred 5 until transition to hepatology then they can wean pred   .  Complexity of management: .  Contributing factors:     3. Overall doing well, we spoke about engaging with counselor/continued relapse prevention treatment post-transplant, she agreed to do so, will speak with Melanie for recommendations  Followup: with hepatology 1 mo            Aime Lowe MD/PhD        Again, thank you for allowing me to participate in the care of your patient.        Sincerely,        Aime Lowe MD  "

## 2024-01-26 NOTE — PROGRESS NOTES
Transplant Surgery Progress Note    Transplants:  11/5/2023 (Liver); Postoperative day:  82  S: overall doing well, activity and appetite getting back to normal, asking about starting part-time job back home    Transplant History:    Transplant Type:  Liver Tx  Donor Type:    Transplant Date:  11/5/2023 (Liver)   Biliary Stent:  No       Acute Rejection Hx:  No    Present Maintenance Immunosuppression:  Tacrolimus, Mycophenolate mofetil, and Prednisone    CMV IgG Ab Discordance:  Yes  EBV IgG Ab Discordance:  No    Transplant Coordinator: Mariola Castellanos     Transplant Office Phone Number: [phone]     Immunosuppressant Medications       Immunosuppressive Agents Disp Start End     mycophenolate (GENERIC EQUIVALENT) 250 MG capsule 180 capsule 1/8/2024 --    Sig - Route: Take 3 capsules (750 mg) by mouth 2 times daily - Oral    Class: E-Prescribe     tacrolimus (GENERIC) 1 MG capsule 180 capsule 1/18/2024 --    Sig - Route: Take 3 capsules (3 mg) by mouth every 12 hours - Oral    Class: E-Prescribe    Notes to Pharmacy: TXP DT 11/5/2023 (Liver) TXP Dischg DT 11/15/2023 DX Liver replaced by transplant Z94.4 TX [facility] ([place], [place])            Possible Immunosuppression-related side effects:   []             headache  []             vivid dreams  []             irritability  []             cognitive difficuties  []             fine tremor  []             nausea  []             diarrhea  []             neuropathy      []             edema  []             renal calcineurin toxicity  []             hyperkalemia  []             post-transplant diabetes  []             decreased appetite  []             increased appetite  []             other:  []             none    Prescription Medications as of 1/26/2024         Rx Number Disp Refills Start End Last Dispensed Date Next Fill Date Owning Pharmacy    acetaminophen (TYLENOL) 325 MG tablet  100 tablet 0 11/15/2023  --   [place], [place] - [address] [place] [address]    Sig: Take 1-2 tablets (325-650 mg) by mouth every 6 hours as needed for mild pain    Class: E-Prescribe    Route: Oral    Renewals       Renewal requests to authorizing provider (Gladys Carmen NP) <b>prohibited</b>            dicyclomine (BENTYL) 10 MG capsule  -- --  --       Sig: Take 10 mg by mouth 2 times daily as needed (stomach cramping/period)    Class: Historical    Route: Oral    gabapentin (NEURONTIN) 100 MG capsule  14 capsule 0 1/19/2024 1/26/2024   [address]    Sig: Take 2 capsules (200 mg) by mouth at bedtime for 7 days    Class: E-Prescribe    Route: Oral    Lidocaine (LIDOCARE) 4 % Patch  20 patch 0 11/24/2023 --   [address]    Sig: Place 1-2 patches onto the skin every 24 hours To prevent lidocaine toxicity, patient should be patch free for 12 hrs daily. Apply near incision, not on incision.    Class: E-Prescribe    Route: Transdermal    magnesium oxide (MAG-OX) 400 MG tablet  540 tablet 3 12/18/2023 --   [address]    Sig: Take 1-2 tablets (400-800 mg) by mouth 2 times daily    Class: E-Prescribe    Route: Oral    methocarbamol (ROBAXIN) 500 MG tablet  10 tablet 0 12/28/2023 --   [address]    Sig: Take 1 tablet (500 mg) by mouth every evening as needed for muscle spasms    Class: E-Prescribe    Route: Oral    Multiple Vitamins-Minerals (THERA-TABS M) TABS  90 tablet 3 12/18/2023 --   [address]    Sig: Take 1 tablet by mouth daily    Class: E-Prescribe    Route: Oral    mycophenolate (GENERIC EQUIVALENT) 250 MG capsule  180 capsule 1 1/8/2024 --   [address]     Sig: Take 3 capsules (750 mg) by mouth 2 times daily    Class: E-Prescribe    Route: Oral    omeprazole (PRILOSEC) 20 MG DR capsule  30 capsule 1 1/3/2024 --   [address]    Sig: Take 1 capsule (20 mg) by mouth daily    Class: E-Prescribe    Route: Oral    predniSONE (DELTASONE) 5 MG tablet  90 tablet 1 1/8/2024 --   [address]    Sig: Take 1 tablet (5 mg) by mouth daily    Class: E-Prescribe    Route: Oral    psyllium (METAMUCIL/KONSYL) 0.52 g capsule  -- -- 12/15/2023 --       Sig: Take 1 capsule by mouth daily    Class: Historical    Route: Oral    sertraline (ZOLOFT) 25 MG tablet  -- -- 12/15/2023 12/14/2024       Sig: Take 25 mg by mouth daily    Class: Historical    Route: Oral    sulfamethoxazole-trimethoprim (BACTRIM) 400-80 MG tablet  30 tablet 3 1/3/2024 --   [address]    Sig: Take 1 tablet by mouth three times a week Titrate up to daily when directed by transplant team with improving renal function    Class: E-Prescribe    Route: Oral    tacrolimus (GENERIC) 1 MG capsule  180 capsule 11 1/18/2024 --   [address]    Sig: Take 3 capsules (3 mg) by mouth every 12 hours    Class: E-Prescribe    Notes to Pharmacy: TXP DT 11/5/2023 (Liver) TXP Dischg DT 11/15/2023 DX Liver replaced by transplant Z94.4 [facility] ([place], [place])    Route: Oral    thiamine (B-1) 100 MG tablet  7 tablet 1 1/18/2024 --   [address]    Sig: Take 1 tablet (100 mg) by mouth daily    Class: E-Prescribe    Route: Oral    ursodiol (ACTIGALL) 300 MG capsule  60 capsule 11 12/15/2023 --   [address]    Sig: Take 1 capsule (300 mg) by mouth 2 times daily    Class:  "E-Prescribe    Route: Oral    valGANciclovir (VALCYTE) 450 MG tablet  30 tablet 2 1/4/2024 --   [facility], [address]    Sig: Take 1 tablet (450 mg) by mouth twice a week Titrate up to 450mg daily when directed by transplant team with improving renal function    Class: E-Prescribe    Route: Oral    Vitamin D3 (CHOLECALCIFEROL) 25 mcg (1000 units) tablet  90 tablet 3 12/18/2023 --   [facility], [address]    Sig: Take 2 tablets (50 mcg) by mouth daily    Class: E-Prescribe    Route: Oral            O:   [unfilled]        Latest Ref Rng & Units 1/25/2024     9:30 AM 12/28/2023    11:27 AM 12/5/2023     8:57 AM 11/30/2023     9:05 AM 11/20/2023     8:50 AM   Transplant Immunosuppression Labs   Creat 0.51 - 0.95 mg/dL 1.47  1.75  1.44  1.46  1.73    Urea Nitrogen 6.0 - 20.0 mg/dL 15.5  31.9  24.9  17.6  21.9    WBC 4.0 - 11.0 10e3/uL 3.1  6.1  5.2  6.1  9.0        Chemistries:   Recent Labs   Lab Test 01/25/24  0930   BUN 15.5   CR 1.47*   GFRESTIMATED 48*   GLC 96     Lab Results   Component Value Date    A1C 5.3 11/08/2023     Recent Labs   Lab Test 01/25/24  0930   ALBUMIN 4.2   BILITOTAL 0.3   ALKPHOS 127   AST 18   ALT 16     Urine Studies:  Recent Labs   Lab Test 11/16/23  0905 11/04/23  2309   COLOR Light Yellow Dark Yellow*   APPEARANCE Clear Slightly Cloudy*   URINEGLC Negative Negative   URINEBILI Negative Moderate*   URINEKETONE Negative Negative   SG 1.005 1.016   UBLD Negative Trace*   URINEPH 5.5 5.5   PROTEIN Negative 10*   NITRITE Negative Negative   LEUKEST Negative Small*   RBCU  --  2   WBCU  --  5     No lab results found.  Hematology:   Recent Labs   Lab Test 01/25/24  0930 12/28/23  1127 12/05/23  0857   HGB 11.0* 10.1* 8.6*    261 383   WBC 3.1* 6.1 5.2     Coags:   Recent Labs   Lab Test 11/15/23  0619 11/14/23  0720   INR 1.10 1.21*     HLA antibodies:   No results found for: \"AG9QSHPTA\", " "\"ZT0EXNSWJA\", \"BP3QHSARA\", \"RL7KDRTLYY\"    Assessment: Lola Milner is doing well s/p Liver Tx:  Issues we addressed during her visit include:    Plan:    1. Graft function: LFTs normal  2. Immunosuppression Management: No change tac 6-8 given renal insufficiency, would keep her on pred 5 until transition to hepatology then they can wean pred   .  Complexity of management: .  Contributing factors:     3. Overall doing well, we spoke about engaging with counselor/continued relapse prevention treatment post-transplant, she agreed to do so, will speak with Melanie for recommendations  Followup: with hepatology 1 mo            Aime Lowe MD/PhD      "

## 2024-01-29 ENCOUNTER — MYC REFILL (OUTPATIENT)
Dept: TRANSPLANT | Facility: CLINIC | Age: 34
End: 2024-01-29
Payer: COMMERCIAL

## 2024-01-29 DIAGNOSIS — Z94.4 LIVER TRANSPLANTED (H): ICD-10-CM

## 2024-01-29 RX ORDER — GABAPENTIN 100 MG/1
200 CAPSULE ORAL AT BEDTIME
Qty: 14 CAPSULE | Refills: 0 | OUTPATIENT
Start: 2024-01-29

## 2024-01-29 NOTE — TELEPHONE ENCOUNTER
Spoke to pt and suggested she contact the PCP for the refill for gabapentin. Pt states sge sent a message but has not heard back. Informed pt to call them again today. Pt will.

## 2024-02-02 ENCOUNTER — TELEPHONE (OUTPATIENT)
Dept: TRANSPLANT | Facility: CLINIC | Age: 34
End: 2024-02-02
Payer: COMMERCIAL

## 2024-02-02 NOTE — TELEPHONE ENCOUNTER
Reviewed labs with dr yu. Pt to decrease cellcept due to low wbc. Pt currently taking 750 mg BID. Pt to decrease to 500 mg BID and repeat labs next week.

## 2024-02-02 NOTE — TELEPHONE ENCOUNTER
Left a message to change cellcept to 500mg BID, CHECK LABS NEXT WEEK AND CONFIRM.     Pt confirmed dose change.

## 2024-02-08 ENCOUNTER — TELEPHONE (OUTPATIENT)
Dept: TRANSPLANT | Facility: CLINIC | Age: 34
End: 2024-02-08
Payer: COMMERCIAL

## 2024-02-08 DIAGNOSIS — K72.10 END STAGE LIVER DISEASE (H): ICD-10-CM

## 2024-02-08 DIAGNOSIS — Z94.4 LIVER TRANSPLANTED (H): Primary | ICD-10-CM

## 2024-02-08 RX ORDER — MYCOPHENOLATE MOFETIL 250 MG/1
250 CAPSULE ORAL 2 TIMES DAILY
Qty: 21 CAPSULE | Refills: 0 | Status: SHIPPED | OUTPATIENT
Start: 2024-02-08 | End: 2024-02-22

## 2024-02-08 NOTE — TELEPHONE ENCOUNTER
Spoke with Lola. Wbc decreasing. Reviewed with dr yu and Zenobia. .  Pt to continue cellcept wean. Pt to decrease cellcept to 250 mg BID. CMV blood draw next week. Order faxed. Repeat labs next week

## 2024-02-08 NOTE — LETTER
OUTPATIENT OR TRANSITIONAL CARE  LABORATORY TEST ORDER  [facility] [phone]      Patient Name: Lola Milner  Transplant Date: 11/5/2023   YOB: 1990  Issue Date: 02/08/24   [facility] MR#:[id number]  Expiration Date: 4/8/24      Diagnoses: [x]      Liver Transplant (ICD-10 Z94.4)    [x]      Long term use of medications (ICD-10 Z79.899)     Please fax results to [phone]    Frequency: one time    week of 2/12/24     [x] CMV Quantitative PCR      If you have questions, please call [phone] or [phone].

## 2024-02-16 ENCOUNTER — TELEPHONE (OUTPATIENT)
Dept: TRANSPLANT | Facility: CLINIC | Age: 34
End: 2024-02-16
Payer: COMMERCIAL

## 2024-02-16 ENCOUNTER — MYC MEDICAL ADVICE (OUTPATIENT)
Dept: TRANSPLANT | Facility: CLINIC | Age: 34
End: 2024-02-16
Payer: COMMERCIAL

## 2024-02-16 NOTE — TELEPHONE ENCOUNTER
Pt to continue mycophenolate wean. Pt currently taking mycophenolate 250 mg BID.Pt to decrease to 250 mg daily and repeat labs in 1 week.

## 2024-02-16 NOTE — TELEPHONE ENCOUNTER
[employer] [employer] message sent to Lola instructing her to decrease mycophenolate to 250 mg daily and repeat labs in 1 week.

## 2024-02-16 NOTE — TELEPHONE ENCOUNTER
Patient confirmed via mychart the plan to decrease mycophenolate to 250 mg daily and will repeat labs next week.

## 2024-02-21 ENCOUNTER — VIRTUAL VISIT (OUTPATIENT)
Dept: PHARMACY | Facility: CLINIC | Age: 34
End: 2024-02-21
Payer: COMMERCIAL

## 2024-02-21 ENCOUNTER — LAB (OUTPATIENT)
Dept: LAB | Facility: CLINIC | Age: 34
End: 2024-02-21
Attending: TRANSPLANT SURGERY
Payer: COMMERCIAL

## 2024-02-21 ENCOUNTER — ANCILLARY PROCEDURE (OUTPATIENT)
Dept: GENERAL RADIOLOGY | Facility: CLINIC | Age: 34
End: 2024-02-21
Attending: TRANSPLANT SURGERY
Payer: COMMERCIAL

## 2024-02-21 ENCOUNTER — OFFICE VISIT (OUTPATIENT)
Dept: GASTROENTEROLOGY | Facility: CLINIC | Age: 34
End: 2024-02-21
Attending: STUDENT IN AN ORGANIZED HEALTH CARE EDUCATION/TRAINING PROGRAM
Payer: COMMERCIAL

## 2024-02-21 VITALS
SYSTOLIC BLOOD PRESSURE: 111 MMHG | BODY MASS INDEX: 31.62 KG/M2 | DIASTOLIC BLOOD PRESSURE: 74 MMHG | TEMPERATURE: 97.7 F | HEART RATE: 89 BPM | OXYGEN SATURATION: 96 % | WEIGHT: 178.5 LBS

## 2024-02-21 DIAGNOSIS — Z78.9 TAKES DIETARY SUPPLEMENTS: ICD-10-CM

## 2024-02-21 DIAGNOSIS — Z94.4 LIVER REPLACED BY TRANSPLANT (H): ICD-10-CM

## 2024-02-21 DIAGNOSIS — Z94.4 LIVER REPLACED BY TRANSPLANT (H): Primary | ICD-10-CM

## 2024-02-21 DIAGNOSIS — K21.9 GASTROESOPHAGEAL REFLUX DISEASE, UNSPECIFIED WHETHER ESOPHAGITIS PRESENT: ICD-10-CM

## 2024-02-21 DIAGNOSIS — N18.31 CHRONIC KIDNEY DISEASE, STAGE 3A (H): ICD-10-CM

## 2024-02-21 DIAGNOSIS — F10.21 ALCOHOL USE DISORDER, SEVERE, IN EARLY REMISSION (H): ICD-10-CM

## 2024-02-21 DIAGNOSIS — R19.5 LOOSE STOOLS: ICD-10-CM

## 2024-02-21 DIAGNOSIS — D84.9 IMMUNOSUPPRESSED STATUS (H): Primary | ICD-10-CM

## 2024-02-21 DIAGNOSIS — Z94.4 LIVER TRANSPLANTED (H): ICD-10-CM

## 2024-02-21 LAB
ALBUMIN SERPL BCG-MCNC: 4.4 G/DL (ref 3.5–5.2)
ALP SERPL-CCNC: 115 U/L (ref 40–150)
ALT SERPL W P-5'-P-CCNC: 17 U/L (ref 0–50)
ANION GAP SERPL CALCULATED.3IONS-SCNC: 11 MMOL/L (ref 7–15)
AST SERPL W P-5'-P-CCNC: 16 U/L (ref 0–45)
BASOPHILS # BLD AUTO: ABNORMAL 10*3/UL
BASOPHILS # BLD MANUAL: 0 10E3/UL (ref 0–0.2)
BASOPHILS NFR BLD AUTO: ABNORMAL %
BASOPHILS NFR BLD MANUAL: 1 %
BILIRUB DIRECT SERPL-MCNC: <0.2 MG/DL (ref 0–0.3)
BILIRUB SERPL-MCNC: 0.3 MG/DL
BUN SERPL-MCNC: 33.2 MG/DL (ref 6–20)
CALCIUM SERPL-MCNC: 9 MG/DL (ref 8.6–10)
CHLORIDE SERPL-SCNC: 102 MMOL/L (ref 98–107)
CREAT SERPL-MCNC: 1.6 MG/DL (ref 0.51–0.95)
DEPRECATED HCO3 PLAS-SCNC: 24 MMOL/L (ref 22–29)
EGFRCR SERPLBLD CKD-EPI 2021: 43 ML/MIN/1.73M2
EOSINOPHIL # BLD AUTO: ABNORMAL 10*3/UL
EOSINOPHIL # BLD MANUAL: 0.1 10E3/UL (ref 0–0.7)
EOSINOPHIL NFR BLD AUTO: ABNORMAL %
EOSINOPHIL NFR BLD MANUAL: 2 %
ERYTHROCYTE [DISTWIDTH] IN BLOOD BY AUTOMATED COUNT: 12.7 % (ref 10–15)
GLUCOSE SERPL-MCNC: 104 MG/DL (ref 70–99)
HCT VFR BLD AUTO: 35.3 % (ref 35–47)
HGB BLD-MCNC: 12 G/DL (ref 11.7–15.7)
IMM GRANULOCYTES # BLD: ABNORMAL 10*3/UL
IMM GRANULOCYTES NFR BLD: ABNORMAL %
LYMPHOCYTES # BLD AUTO: ABNORMAL 10*3/UL
LYMPHOCYTES # BLD MANUAL: 1.5 10E3/UL (ref 0.8–5.3)
LYMPHOCYTES NFR BLD AUTO: ABNORMAL %
LYMPHOCYTES NFR BLD MANUAL: 56 %
MAGNESIUM SERPL-MCNC: 2.1 MG/DL (ref 1.7–2.3)
MCH RBC QN AUTO: 29.5 PG (ref 26.5–33)
MCHC RBC AUTO-ENTMCNC: 34 G/DL (ref 31.5–36.5)
MCV RBC AUTO: 87 FL (ref 78–100)
METAMYELOCYTES # BLD MANUAL: 0 10E3/UL
METAMYELOCYTES NFR BLD MANUAL: 1 %
MONOCYTES # BLD AUTO: ABNORMAL 10*3/UL
MONOCYTES # BLD MANUAL: 0.2 10E3/UL (ref 0–1.3)
MONOCYTES NFR BLD AUTO: ABNORMAL %
MONOCYTES NFR BLD MANUAL: 9 %
MYELOCYTES # BLD MANUAL: 0 10E3/UL
MYELOCYTES NFR BLD MANUAL: 1 %
NEUTROPHILS # BLD AUTO: ABNORMAL 10*3/UL
NEUTROPHILS # BLD MANUAL: 0.8 10E3/UL (ref 1.6–8.3)
NEUTROPHILS NFR BLD AUTO: ABNORMAL %
NEUTROPHILS NFR BLD MANUAL: 30 %
NRBC # BLD AUTO: 0 10E3/UL
NRBC BLD AUTO-RTO: 0 /100
PHOSPHATE SERPL-MCNC: 4.7 MG/DL (ref 2.5–4.5)
PLAT MORPH BLD: ABNORMAL
PLATELET # BLD AUTO: 278 10E3/UL (ref 150–450)
POTASSIUM SERPL-SCNC: 4.7 MMOL/L (ref 3.4–5.3)
PROT SERPL-MCNC: 6.3 G/DL (ref 6.4–8.3)
RBC # BLD AUTO: 4.07 10E6/UL (ref 3.8–5.2)
RBC MORPH BLD: ABNORMAL
SODIUM SERPL-SCNC: 137 MMOL/L (ref 135–145)
TACROLIMUS BLD-MCNC: 5.3 UG/L (ref 5–15)
TME LAST DOSE: NORMAL H
TME LAST DOSE: NORMAL H
WBC # BLD AUTO: 2.6 10E3/UL (ref 4–11)
WBC # BLD AUTO: 2.6 10E3/UL (ref 4–11)

## 2024-02-21 PROCEDURE — 99207 PR NO CHARGE LOS: CPT | Mod: 93 | Performed by: PHARMACIST

## 2024-02-21 PROCEDURE — 74019 RADEX ABDOMEN 2 VIEWS: CPT | Mod: GC | Performed by: RADIOLOGY

## 2024-02-21 PROCEDURE — 99000 SPECIMEN HANDLING OFFICE-LAB: CPT | Performed by: PATHOLOGY

## 2024-02-21 PROCEDURE — 83735 ASSAY OF MAGNESIUM: CPT | Performed by: PATHOLOGY

## 2024-02-21 PROCEDURE — 36415 COLL VENOUS BLD VENIPUNCTURE: CPT | Performed by: PATHOLOGY

## 2024-02-21 PROCEDURE — 80053 COMPREHEN METABOLIC PANEL: CPT | Performed by: PATHOLOGY

## 2024-02-21 PROCEDURE — 85027 COMPLETE CBC AUTOMATED: CPT | Performed by: PATHOLOGY

## 2024-02-21 PROCEDURE — 82248 BILIRUBIN DIRECT: CPT | Performed by: PATHOLOGY

## 2024-02-21 PROCEDURE — 80197 ASSAY OF TACROLIMUS: CPT | Performed by: TRANSPLANT SURGERY

## 2024-02-21 PROCEDURE — 85007 BL SMEAR W/DIFF WBC COUNT: CPT | Performed by: PATHOLOGY

## 2024-02-21 PROCEDURE — 99213 OFFICE O/P EST LOW 20 MIN: CPT | Performed by: STUDENT IN AN ORGANIZED HEALTH CARE EDUCATION/TRAINING PROGRAM

## 2024-02-21 PROCEDURE — 84100 ASSAY OF PHOSPHORUS: CPT | Performed by: PATHOLOGY

## 2024-02-21 PROCEDURE — 99215 OFFICE O/P EST HI 40 MIN: CPT | Performed by: STUDENT IN AN ORGANIZED HEALTH CARE EDUCATION/TRAINING PROGRAM

## 2024-02-21 PROCEDURE — 99417 PROLNG OP E/M EACH 15 MIN: CPT | Performed by: STUDENT IN AN ORGANIZED HEALTH CARE EDUCATION/TRAINING PROGRAM

## 2024-02-21 RX ORDER — GABAPENTIN 100 MG/1
200 CAPSULE ORAL AT BEDTIME
COMMUNITY

## 2024-02-21 RX ORDER — HYDROXYZINE HYDROCHLORIDE 25 MG/1
25 TABLET, FILM COATED ORAL 3 TIMES DAILY PRN
COMMUNITY

## 2024-02-21 ASSESSMENT — PAIN SCALES - GENERAL: PAINLEVEL: NO PAIN (1)

## 2024-02-21 NOTE — LETTER
2/21/2024         RE: Lola Milner  [address]   [address]  [facility] [address]        Dear Colleague,    Thank you for referring your patient, Lola Milner, to the [facility] [place]. Please see a copy of my visit note below.    [facility]     Date of Visit: February 21, 2024    Reason for referral: Follow up liver transplant    Subjective:  Ms. Milner is a 34 year old woman with a history of AUD, anxiety, depression, ARLD who is s/p LT 11/5/2023. This is her first visit back to hepatology.     DBD LT 11/5/2023 with End-to-end Choledochocholedochostomy     C. LIVER (1945 gm)/ GALLBLADDER; EXPLANTED AT TRANSPLANTATION:  Advanced cirrhosis (Laennec fibrosis stage 4C):  -Inactive with marked cholestasis, consistent with end stage decompensation  -Compatible with alcohol etiology, now quiescent from abstinence  -Negative for hepatocellular or other malignancy  -Bile ducts are present in normal numbers and architecture  Gallbladder: no significant morphologic abnormality    Transplant coordinator Mariola Castellanos [phone].  Biliary stent:  No            Donor status:  DBD  CMV D + / R -  EBV D + / R +  Anticoagulation plan: Aspirin x3 months     Had EDGAR prior to transplant, received basiliximab for renal sparing. Was on RRT post op - off dialysis since. Creatinine around 1.4    No history of rejection    Having some issues with leukopenia/neutropenia, on renally dosed valcyte and doing MMF wean (taking  mg daily)    Current IS  Tacrolimus 3 mg twice daily  Mycophenolate Mofetil 250 mg daily tapering  Prednisone 5 mg every morning     CMV prophylaxis: CrCl 10 to 24 mL/minute: Valcyte 450 mg twice weekly Donor (+), Recipient (-), treat 6 months post tx   PJP prophylaxis: Bactrim SS three times per week for 6 months post txp    AUD  - Peth testing has been negative  - Feels she is doing well with her sobriety, but has not started treatment  -  Anxiety doing ok    Sexually active, using condoms. Not planning on being pregnant soon. Just got her menstrual period for the first time in awhile.     Other meds: Ursodiol 300mg twice daily    Had c. Dif post transplant    ROS: 14 point ROS negative except for positives noted in HPI.    PMHx:  Past Medical History:   Diagnosis Date    Alcoholic cirrhosis of liver with ascites (H) 2023    History of alcohol abuse     Formatting of this note might be different from the original. In remission as of late 2023       PSHx:  Past Surgical History:   Procedure Laterality Date    BENCH LIVER  2023    Procedure: Bench liver;  Surgeon: Aime Lowe MD;  Location: [facility] OR    IR CVC TUNNEL PLACEMENT > 5 YRS OF AGE  2023    IR CVC TUNNEL REMOVAL RIGHT  2023    IR PARACENTESIS  2023    IR PARACENTESIS  2023    IR PARACENTESIS  2023    IR PARACENTESIS  10/5/2023    IR PARACENTESIS  10/9/2023    IR PARACENTESIS  10/16/2023    IR PARACENTESIS  10/23/2023    TRANSPLANT LIVER RECIPIENT  DONOR N/A 2023    Procedure: Transplant liver recipient  donor;  Surgeon: Aime Lowe MD;  Location: [facility] OR       FamHx:  No family history on file.    SocHx:  Social History     Socioeconomic History    Marital status: Single     Spouse name: Not on file    Number of children: Not on file    Years of education: Not on file    Highest education level: Not on file   Occupational History    Not on file   Tobacco Use    Smoking status: Former     Types: Cigarettes     Quit date: 2023     Years since quittin.7    Smokeless tobacco: Never   Substance and Sexual Activity    Alcohol use: Not Currently     Comment: last ETOH use 2023    Drug use: Never    Sexual activity: Not on file   Other Topics Concern    Not on file   Social History Narrative    Not on file     Social Determinants of Health     Financial Resource Strain: Not on file   Food Insecurity: Not on file    Transportation Needs: Not on file   Physical Activity: Not on file   Stress: Not on file   Social Connections: Not on file   Interpersonal Safety: Not on file   Housing Stability: Not on file       Medications:  Current Outpatient Medications   Medication    acetaminophen (TYLENOL) 325 MG tablet    dicyclomine (BENTYL) 10 MG capsule    gabapentin (NEURONTIN) 100 MG capsule    hydrOXYzine HCl (ATARAX) 25 MG tablet    magnesium oxide (MAG-OX) 400 MG tablet    omeprazole (PRILOSEC) 20 MG DR capsule    predniSONE (DELTASONE) 5 MG tablet    psyllium (METAMUCIL/KONSYL) 0.52 g capsule    sulfamethoxazole-trimethoprim (BACTRIM) 400-80 MG tablet    tacrolimus (GENERIC) 1 MG capsule    thiamine (B-1) 100 MG tablet    valGANciclovir (VALCYTE) 450 MG tablet    Vitamin D3 (CHOLECALCIFEROL) 25 mcg (1000 units) tablet    ursodiol (ACTIGALL) 300 MG capsule     No current facility-administered medications for this visit.       Allergies:   No Known Allergies    Objective:  /74   Pulse 89   Temp 97.7  F (36.5  C) (Oral)   Wt 81 kg (178 lb 8 oz)   SpO2 96%   BMI 31.62 kg/m    Constitutional: pleasant woman in NAD  Eyes: non icteric  Respiratory: Normal respiratory excursion   MSK: normal range of motion of visualized extremities  Ext: no swelling  Psychiatric: normal mood and orientation    Labs:  Last Comprehensive Metabolic Panel:  Sodium   Date Value Ref Range Status   02/21/2024 137 135 - 145 mmol/L Final     Comment:     Reference intervals for this test were updated on 09/26/2023 to more accurately reflect our healthy population. There may be differences in the flagging of prior results with similar values performed with this method. Interpretation of those prior results can be made in the context of the updated reference intervals.      Potassium   Date Value Ref Range Status   02/21/2024 4.7 3.4 - 5.3 mmol/L Final     Potassium POCT   Date Value Ref Range Status   11/05/2023 2.9 (L) 3.5 - 5.0 mmol/L Final      Comment:     CRITICAL VALUES NOTED AND REPORTED TO MD     Chloride   Date Value Ref Range Status   02/21/2024 102 98 - 107 mmol/L Final     Chloride (External)   Date Value Ref Range Status   02/14/2024 111 (H) 98 - 109 meq/L Final     Carbon Dioxide (CO2)   Date Value Ref Range Status   02/21/2024 24 22 - 29 mmol/L Final     Anion Gap   Date Value Ref Range Status   02/21/2024 11 7 - 15 mmol/L Final     Glucose   Date Value Ref Range Status   02/21/2024 104 (H) 70 - 99 mg/dL Final     GLUCOSE BY METER POCT   Date Value Ref Range Status   11/14/2023 84 70 - 99 mg/dL Final     Urea Nitrogen   Date Value Ref Range Status   02/21/2024 33.2 (H) 6.0 - 20.0 mg/dL Final     Creatinine   Date Value Ref Range Status   02/21/2024 1.60 (H) 0.51 - 0.95 mg/dL Final     GFR Estimate   Date Value Ref Range Status   02/21/2024 43 (L) >60 mL/min/1.73m2 Final     Calcium   Date Value Ref Range Status   02/21/2024 9.0 8.6 - 10.0 mg/dL Final     Bilirubin Total   Date Value Ref Range Status   02/21/2024 0.3 <=1.2 mg/dL Final     Alkaline Phosphatase   Date Value Ref Range Status   02/21/2024 115 40 - 150 U/L Final     Comment:     Reference intervals for this test were updated on 11/14/2023 to more accurately reflect our healthy population. There may be differences in the flagging of prior results with similar values performed with this method. Interpretation of those prior results can be made in the context of the updated reference intervals.     ALT   Date Value Ref Range Status   02/21/2024 17 0 - 50 U/L Final     Comment:     Reference intervals for this test were updated on 6/12/2023 to more accurately reflect our healthy population. There may be differences in the flagging of prior results with similar values performed with this method. Interpretation of those prior results can be made in the context of the updated reference intervals.       AST   Date Value Ref Range Status   02/21/2024 16 0 - 45 U/L Final     Comment:      Reference intervals for this test were updated on 6/12/2023 to more accurately reflect our healthy population. There may be differences in the flagging of prior results with similar values performed with this method. Interpretation of those prior results can be made in the context of the updated reference intervals.       Lab Results   Component Value Date    WBC 3.1 01/25/2024     Lab Results   Component Value Date    RBC 3.65 01/25/2024     Lab Results   Component Value Date    HGB 11.0 01/25/2024     Lab Results   Component Value Date    HCT 32.1 01/25/2024     Lab Results   Component Value Date    MCV 88 01/25/2024     Lab Results   Component Value Date    MCH 30.1 01/25/2024     Lab Results   Component Value Date    MCHC 34.3 01/25/2024     Lab Results   Component Value Date    RDW 12.2 01/25/2024     Lab Results   Component Value Date     01/25/2024       INR   Date Value Ref Range Status   11/15/2023 1.10 0.85 - 1.15 Final        Imaging: Reviewed in EHR    Assessment/Plan: Ms. Milner is a 34 year old woman with a history of AUD, anxiety, depression, ARLD who is s/p LT 11/5/2023. This is her first visit back to hepatology.     She has not had issues with rejection or biliary strictures post transplant. She was on RRT post transplant, stopped before discharge. Has elevated creatinine around ~1.5. Doing well with her sobriety, but has not yet engaged in treatment. Asked that she do this. Will also discuss with . Discussed this is important to develop coping skills for long term sobriety.     Discussed that fertility returns after transplant, and she should avoid pregnancy for 1 year due to risks to her and the fetus. Discussed she can have a planned pregnancy in the future if she desires, would continue tacrolimus during pregnancy. She is not interested in getting pregnant in the near future. Recommend discussing with OB/GYN or PCP started progesterone based birth controll, would not use  birth control with estrogen until > 1 year out from transplant. POP pills may not be effective with a higher BMI. She should continue to use use barrier methods in the meantime.     Having issues with leukopenia/neutropenia. CMV pcr negative, on valcyte ppx, but slightly under dosed due to renal function    - Continue tacrolimus with goal 6-8. Continue prednisone 5 mg daily for now given elevated creatinine  - OK to stop mycophenolate  - If ANC improving next week, increase valcyte 450 mg daily  - Decrease ursodiol to once a day  - Continue bactrim three times a week  - Talk to your gynecologist about getting on a progesterone based birth control option  - Call the alcohol treatment program to do an assessment to get started with treatment    RTC 4 months.    Eloisa Guajardo MD MSc  Transplant Hepatology  [facility]    Approximately 30 minutes was spent for the visit with 30 minutes of non face-to-face time were spent in review of the patient's medical record on the day of the visit. This included review of previous: clinic visits, hospital records, lab results, imaging studies, and documentation.  The findings from this review are summarized in the above note.

## 2024-02-21 NOTE — Clinical Note
Cally Guajardo, I spoke with patient before your visit and have some recs I told patient to discuss with you. See below, thank you.  1. Discuss with Dr. Guajardo about increasing Valcyte 450mg and Bactrim S S once daily.  2. When off Mycophenolate Mofetil, taking Omeprazole 20mg every other day for 1 month, then stop. Can use Famotidine (Pepcid AC) 20mg daily as needed for rebound heartburn.  3. Reduce Magnesium to 400mg (1 tab) twice daily.  4. Talk to Dr. Guajardo about flu and COVID shot today. 5. After 5/5/24 due for Tetanus, Shingrix (2 doses 8 weeks apart), Prevnar 20. You can separate these by a few weeks. After these get a hepatitis B series.

## 2024-02-21 NOTE — PROGRESS NOTES
[facility]     Date of Visit: February 21, 2024    Reason for referral: Follow up liver transplant    Subjective:  Ms. Milner is a 34 year old woman with a history of AUD, anxiety, depression, ARLD who is s/p LT 11/5/2023. This is her first visit back to hepatology.     DBD LT 11/5/2023 with End-to-end Choledochocholedochostomy     C. LIVER (1945 gm)/ GALLBLADDER; EXPLANTED AT TRANSPLANTATION:  Advanced cirrhosis (Laennec fibrosis stage 4C):  -Inactive with marked cholestasis, consistent with end stage decompensation  -Compatible with alcohol etiology, now quiescent from abstinence  -Negative for hepatocellular or other malignancy  -Bile ducts are present in normal numbers and architecture  Gallbladder: no significant morphologic abnormality    Transplant coordinator Mariola Castellanos [phone].  Biliary stent:  No            Donor status:  DBD  CMV D + / R -  EBV D + / R +  Anticoagulation plan: Aspirin x3 months     Had EDGAR prior to transplant, received basiliximab for renal sparing. Was on RRT post op - off dialysis since. Creatinine around 1.4    No history of rejection    Having some issues with leukopenia/neutropenia, on renally dosed valcyte and doing MMF wean (taking  mg daily)    Current IS  Tacrolimus 3 mg twice daily  Mycophenolate Mofetil 250 mg daily tapering  Prednisone 5 mg every morning     CMV prophylaxis: CrCl 10 to 24 mL/minute: Valcyte 450 mg twice weekly Donor (+), Recipient (-), treat 6 months post tx   PJP prophylaxis: Bactrim SS three times per week for 6 months post txp    AUD  - Peth testing has been negative  - Feels she is doing well with her sobriety, but has not started treatment  - Anxiety doing ok    Sexually active, using condoms. Not planning on being pregnant soon. Just got her menstrual period for the first time in awhile.     Other meds: Ursodiol 300mg twice daily    Had c. Dif post transplant    ROS: 14 point ROS negative except for  positives noted in HPI.    PMHx:  Past Medical History:   Diagnosis Date     Alcoholic cirrhosis of liver with ascites (H) 2023     History of alcohol abuse     Formatting of this note might be different from the original. In remission as of late 2023       PSHx:  Past Surgical History:   Procedure Laterality Date     BENCH LIVER  2023    Procedure: Bench liver;  Surgeon: Aime Lowe MD;  Location: [facility] OR     IR CVC TUNNEL PLACEMENT > 5 YRS OF AGE  2023     IR CVC TUNNEL REMOVAL RIGHT  2023     IR PARACENTESIS  2023     IR PARACENTESIS  2023     IR PARACENTESIS  2023     IR PARACENTESIS  10/5/2023     IR PARACENTESIS  10/9/2023     IR PARACENTESIS  10/16/2023     IR PARACENTESIS  10/23/2023     TRANSPLANT LIVER RECIPIENT  DONOR N/A 2023    Procedure: Transplant liver recipient  donor;  Surgeon: Aime Lowe MD;  Location: [facility] OR       FamHx:  No family history on file.    SocHx:  Social History     Socioeconomic History     Marital status: Single     Spouse name: Not on file     Number of children: Not on file     Years of education: Not on file     Highest education level: Not on file   Occupational History     Not on file   Tobacco Use     Smoking status: Former     Types: Cigarettes     Quit date: 2023     Years since quittin.7     Smokeless tobacco: Never   Substance and Sexual Activity     Alcohol use: Not Currently     Comment: last ETOH use 2023     Drug use: Never     Sexual activity: Not on file   Other Topics Concern     Not on file   Social History Narrative     Not on file     Social Determinants of Health     Financial Resource Strain: Not on file   Food Insecurity: Not on file   Transportation Needs: Not on file   Physical Activity: Not on file   Stress: Not on file   Social Connections: Not on file   Interpersonal Safety: Not on file   Housing Stability: Not on file       Medications:  Current Outpatient Medications    Medication     acetaminophen (TYLENOL) 325 MG tablet     dicyclomine (BENTYL) 10 MG capsule     gabapentin (NEURONTIN) 100 MG capsule     hydrOXYzine HCl (ATARAX) 25 MG tablet     magnesium oxide (MAG-OX) 400 MG tablet     omeprazole (PRILOSEC) 20 MG DR capsule     predniSONE (DELTASONE) 5 MG tablet     psyllium (METAMUCIL/KONSYL) 0.52 g capsule     sulfamethoxazole-trimethoprim (BACTRIM) 400-80 MG tablet     tacrolimus (GENERIC) 1 MG capsule     thiamine (B-1) 100 MG tablet     valGANciclovir (VALCYTE) 450 MG tablet     Vitamin D3 (CHOLECALCIFEROL) 25 mcg (1000 units) tablet     ursodiol (ACTIGALL) 300 MG capsule     No current facility-administered medications for this visit.       Allergies:   No Known Allergies    Objective:  /74   Pulse 89   Temp 97.7  F (36.5  C) (Oral)   Wt 81 kg (178 lb 8 oz)   SpO2 96%   BMI 31.62 kg/m    Constitutional: pleasant woman in NAD  Eyes: non icteric  Respiratory: Normal respiratory excursion   MSK: normal range of motion of visualized extremities  Ext: no swelling  Psychiatric: normal mood and orientation    Labs:  Last Comprehensive Metabolic Panel:  Sodium   Date Value Ref Range Status   02/21/2024 137 135 - 145 mmol/L Final     Comment:     Reference intervals for this test were updated on 09/26/2023 to more accurately reflect our healthy population. There may be differences in the flagging of prior results with similar values performed with this method. Interpretation of those prior results can be made in the context of the updated reference intervals.      Potassium   Date Value Ref Range Status   02/21/2024 4.7 3.4 - 5.3 mmol/L Final     Potassium POCT   Date Value Ref Range Status   11/05/2023 2.9 (L) 3.5 - 5.0 mmol/L Final     Comment:     CRITICAL VALUES NOTED AND REPORTED TO MD     Chloride   Date Value Ref Range Status   02/21/2024 102 98 - 107 mmol/L Final     Chloride (External)   Date Value Ref Range Status   02/14/2024 111 (H) 98 - 109 meq/L Final      Carbon Dioxide (CO2)   Date Value Ref Range Status   02/21/2024 24 22 - 29 mmol/L Final     Anion Gap   Date Value Ref Range Status   02/21/2024 11 7 - 15 mmol/L Final     Glucose   Date Value Ref Range Status   02/21/2024 104 (H) 70 - 99 mg/dL Final     GLUCOSE BY METER POCT   Date Value Ref Range Status   11/14/2023 84 70 - 99 mg/dL Final     Urea Nitrogen   Date Value Ref Range Status   02/21/2024 33.2 (H) 6.0 - 20.0 mg/dL Final     Creatinine   Date Value Ref Range Status   02/21/2024 1.60 (H) 0.51 - 0.95 mg/dL Final     GFR Estimate   Date Value Ref Range Status   02/21/2024 43 (L) >60 mL/min/1.73m2 Final     Calcium   Date Value Ref Range Status   02/21/2024 9.0 8.6 - 10.0 mg/dL Final     Bilirubin Total   Date Value Ref Range Status   02/21/2024 0.3 <=1.2 mg/dL Final     Alkaline Phosphatase   Date Value Ref Range Status   02/21/2024 115 40 - 150 U/L Final     Comment:     Reference intervals for this test were updated on 11/14/2023 to more accurately reflect our healthy population. There may be differences in the flagging of prior results with similar values performed with this method. Interpretation of those prior results can be made in the context of the updated reference intervals.     ALT   Date Value Ref Range Status   02/21/2024 17 0 - 50 U/L Final     Comment:     Reference intervals for this test were updated on 6/12/2023 to more accurately reflect our healthy population. There may be differences in the flagging of prior results with similar values performed with this method. Interpretation of those prior results can be made in the context of the updated reference intervals.       AST   Date Value Ref Range Status   02/21/2024 16 0 - 45 U/L Final     Comment:     Reference intervals for this test were updated on 6/12/2023 to more accurately reflect our healthy population. There may be differences in the flagging of prior results with similar values performed with this method. Interpretation of  those prior results can be made in the context of the updated reference intervals.       Lab Results   Component Value Date    WBC 3.1 01/25/2024     Lab Results   Component Value Date    RBC 3.65 01/25/2024     Lab Results   Component Value Date    HGB 11.0 01/25/2024     Lab Results   Component Value Date    HCT 32.1 01/25/2024     Lab Results   Component Value Date    MCV 88 01/25/2024     Lab Results   Component Value Date    MCH 30.1 01/25/2024     Lab Results   Component Value Date    MCHC 34.3 01/25/2024     Lab Results   Component Value Date    RDW 12.2 01/25/2024     Lab Results   Component Value Date     01/25/2024       INR   Date Value Ref Range Status   11/15/2023 1.10 0.85 - 1.15 Final        Imaging: Reviewed in EHR    Assessment/Plan: Ms. Milner is a 34 year old woman with a history of AUD, anxiety, depression, ARLD who is s/p LT 11/5/2023. This is her first visit back to hepatology.     She has not had issues with rejection or biliary strictures post transplant. She was on RRT post transplant, stopped before discharge. Has elevated creatinine around ~1.5. Doing well with her sobriety, but has not yet engaged in treatment. Asked that she do this. Will also discuss with . Discussed this is important to develop coping skills for long term sobriety.     Discussed that fertility returns after transplant, and she should avoid pregnancy for 1 year due to risks to her and the fetus. Discussed she can have a planned pregnancy in the future if she desires, would continue tacrolimus during pregnancy. She is not interested in getting pregnant in the near future. Recommend discussing with OB/GYN or PCP started progesterone based birth controll, would not use birth control with estrogen until > 1 year out from transplant. POP pills may not be effective with a higher BMI. She should continue to use use barrier methods in the meantime.     Having issues with leukopenia/neutropenia. CMV pcr  negative, on valcyte ppx, but slightly under dosed due to renal function    - Continue tacrolimus with goal 6-8. Continue prednisone 5 mg daily for now given elevated creatinine  - OK to stop mycophenolate  - If ANC improving next week, increase valcyte 450 mg daily  - Decrease ursodiol to once a day  - Continue bactrim three times a week  - Talk to your gynecologist about getting on a progesterone based birth control option  - Call the alcohol treatment program to do an assessment to get started with treatment    RTC 4 months.    Eloisa Guajardo MD MSc  Transplant Hepatology  [facility]    Approximately 30 minutes was spent for the visit with 30 minutes of non face-to-face time were spent in review of the patient's medical record on the day of the visit. This included review of previous: clinic visits, hospital records, lab results, imaging studies, and documentation.  The findings from this review are summarized in the above note.

## 2024-02-21 NOTE — PATIENT INSTRUCTIONS
"Recommendations from today's MTM visit:                                                      Discuss with Dr. Guajardo about increasing Valcyte 450mg and Bactrim S S once daily.   When off Mycophenolate Mofetil, taking Omeprazole 20mg every other day for 1 month, then stop. Can use Famotidine (Pepcid AC) 20mg daily as needed for rebound heartburn.   Reduce Magnesium to 400mg (1 tab) twice daily.   Talk to Dr. Guajardo about flu and COVID shot today.  After 5/5/24 due for Tetanus, Shingrix (2 doses 8 weeks apart), Prevnar 20. You can separate these by a few weeks. After these get a hepatitis B series.     Follow-up: as needed     It was great speaking with you today.  I value your experience and would be very thankful for your time in providing feedback in our clinic survey. In the next few days, you may receive an email or text message from [employer] with a link to a survey related to your  clinical pharmacist.\"     To schedule another MTM appointment, please call the clinic directly or you may call the MTM scheduling line at [phone] or toll-free at [phone].     My Clinical Pharmacist's contact information:                                                      Please feel free to contact me with any questions or concerns you have.      Osmar Cain, PharmD  MTM Pharmacist    Phone: [phone]     "

## 2024-02-21 NOTE — PROGRESS NOTES
Disease State Management Encounter:                          Lola Milner is a 34 year old female called for a follow-up visit from 11/24.      Reason for visit: 3 months post txp.    Allergies/ADRs: Reviewed in chart  Past Medical History: Reviewed in chart  Tobacco: She reports that she quit smoking about 8 months ago. Her smoking use included cigarettes. She has never used smokeless tobacco.  Alcohol: not currently using  THC/CBD: none     Medication Adherence/Access: no issues reported    Liver Transplant:    Tacrolimus 3 mg twice daily  Mycophenolate Mofetil 250 mg daily tapering  Prednisone 5 mg every morning   Pt reports headaches  Transplant date: 11/5/23  Other meds: Ursodiol 300mg twice daily  CMV prophylaxis: CrCl 10 to 24 mL/minute: Valcyte 450 mg twice weekly Donor (+), Recipient (-), treat 6 months post tx   PJP prophylaxis: Bactrim SS three times per week for 6 months post txp  Tx Coordinator: Abbe Castellanos MD: Dr Roach, Using Med Card: Yes  Recent Infections:  CDIF Vancomycin started 11/21. 125mg 4 TIMES DAILY x 10 days. Having soft/ loose stools, not watery 4-5  x daily. Does think it is getting better.  Immunizations: annual flu shot unknown; Kihbuzbog41:  unknown; Prevnar 20: unknown; TDaP:  unknown; Shingrix: unknown, HBV: no immunity, COVID: Primary x 2  Estimated Creatinine Clearance: 53.3 mL/min (A) (based on SCr of 1.47 mg/dL (H)).     Supplements:   MVI daily  Vitamin D3 50mcg daily.   B1 100mg daily.   Magnesium 800mg twice daily. Getting some cramps in abdomen.    Latest Reference Range & Units 01/31/24 13:27 02/07/24 12:08 02/14/24 12:33   Magnesium (External) 1.6 - 2.6 mg/dL 2.0 2.1 (E) 2.3   (E): External lab result    GERD:   Omeprazole 20 mg once daily   Patient reports no current symptoms.   Patient feels that current regimen is effective.    Loose stools:   Metamucil 1 tsp daily.  2-3 stools daily, formed.     Today's Vitals: There were no vitals taken for this  visit.    Assessment/Plan:    Discuss with Dr. Guajardo about increasing Valcyte 450mg and Bactrim S S once daily.   When off Mycophenolate Mofetil, taking Omeprazole 20mg every other day for 1 month, then stop. Can use Famotidine (Pepcid AC) 20mg daily as needed for rebound heartburn.   Reduce Magnesium to 400mg (1 tab) twice daily.   Talk to Dr. Guajardo about flu and COVID shot today.  After 5/5/24 due for Tetanus, Shingrix (2 doses 8 weeks apart), Prevnar 20. You can separate these by a few weeks. After these get a hepatitis B series.     Follow-up: as needed    I spent 17 minutes with this patient today. All changes were made via collaborative practice agreement with Dr. Bauer. A copy of the visit note was provided to the patient's provider(s).    A summary of these recommendations was sent via Topic.    Osmar Cain, PharmD  [facility] Pharmacist    Phone: [phone]      Medication Therapy Recommendations  Gastroesophageal reflux disease, unspecified whether esophagitis present    Current Medication: omeprazole (PRILOSEC) 20 MG DR capsule   Rationale: No medical indication at this time - Unnecessary medication therapy - Indication   Recommendation: Discontinue Medication   Status: Accepted per CPA         Liver replaced by transplant (H)    Current Medication: valGANciclovir (VALCYTE) 450 MG tablet   Rationale: Dose too low - Dosage too low - Effectiveness   Recommendation: Increase Frequency   Status: Contact Provider - Awaiting Response          Rationale: Preventive therapy - Needs additional medication therapy - Indication   Recommendation: Order Vaccine - Shingrix 50 MCG/0.5ML Susr   Status: Accepted - no CPA Needed         Takes dietary supplements    Current Medication: magnesium oxide (MAG-OX) 400 MG tablet   Rationale: Dose too high - Dosage too high - Safety   Recommendation: Decrease Dose   Status: Accepted - no CPA Needed

## 2024-02-21 NOTE — NURSING NOTE
Chief Complaint   Patient presents with    RECHECK     3 mo f/u       /74   Pulse 89   Temp 97.7  F (36.5  C) (Oral)   Wt 81 kg (178 lb 8 oz)   SpO2 96%   BMI 31.62 kg/m      Yves Copeland on 2/21/2024 at 3:19 PM

## 2024-02-21 NOTE — PATIENT INSTRUCTIONS
- OK to stop mycophenolate  - Decrease ursodiol to once a day  - If you white blood cell count is getting better, we will increase your valcyte dose  - Continue bactrim three times a week  - Talk to your gynecologist about getting on a progesterone based birth control option  - Call the alcohol treatment program to do an assessment to get started with treatment

## 2024-02-22 ENCOUNTER — TELEPHONE (OUTPATIENT)
Dept: TRANSPLANT | Facility: CLINIC | Age: 34
End: 2024-02-22
Payer: COMMERCIAL

## 2024-02-22 DIAGNOSIS — Z94.4 LIVER TRANSPLANTED (H): ICD-10-CM

## 2024-02-22 PROBLEM — N18.31 CHRONIC KIDNEY DISEASE, STAGE 3A (H): Status: ACTIVE | Noted: 2024-02-22

## 2024-02-22 LAB — CMV DNA SPEC NAA+PROBE-ACNC: NOT DETECTED IU/ML

## 2024-02-22 RX ORDER — URSODIOL 300 MG/1
300 CAPSULE ORAL DAILY
Qty: 90 CAPSULE | Refills: 3 | Status: SHIPPED | OUTPATIENT
Start: 2024-02-22 | End: 2024-02-28

## 2024-02-28 ENCOUNTER — TELEPHONE (OUTPATIENT)
Dept: TRANSPLANT | Facility: CLINIC | Age: 34
End: 2024-02-28
Payer: COMMERCIAL

## 2024-02-28 DIAGNOSIS — Z94.4 LIVER TRANSPLANTED (H): ICD-10-CM

## 2024-02-28 DIAGNOSIS — K72.10 END STAGE LIVER DISEASE (H): ICD-10-CM

## 2024-02-28 RX ORDER — VALGANCICLOVIR 450 MG/1
450 TABLET, FILM COATED ORAL DAILY
Qty: 30 TABLET | Refills: 2 | Status: SHIPPED | OUTPATIENT
Start: 2024-02-28 | End: 2024-06-11

## 2024-02-28 RX ORDER — PREDNISONE 5 MG/1
2.5 TABLET ORAL DAILY
Qty: 14 TABLET | Refills: 1 | Status: SHIPPED | OUTPATIENT
Start: 2024-02-28 | End: 2024-03-07

## 2024-02-28 NOTE — TELEPHONE ENCOUNTER
Reviewed labs with dr kidd.   Pt to stop jimmie. Wean prednisone to off. Pt will start 2.5 mg daily and repeat labs next week.    Pt to increase valcyte 450 mg daily    Left message and sent mychart message to patient.

## 2024-03-05 ENCOUNTER — MYC REFILL (OUTPATIENT)
Dept: TRANSPLANT | Facility: CLINIC | Age: 34
End: 2024-03-05
Payer: COMMERCIAL

## 2024-03-05 DIAGNOSIS — Z94.4 LIVER TRANSPLANTED (H): ICD-10-CM

## 2024-03-06 ENCOUNTER — TELEPHONE (OUTPATIENT)
Dept: TRANSPLANT | Facility: CLINIC | Age: 34
End: 2024-03-06
Payer: COMMERCIAL

## 2024-03-06 DIAGNOSIS — Z94.4 LIVER TRANSPLANTED (H): ICD-10-CM

## 2024-03-06 RX ORDER — TACROLIMUS 1 MG/1
3 CAPSULE ORAL EVERY 12 HOURS
Qty: 180 CAPSULE | Refills: 11 | Status: SHIPPED | OUTPATIENT
Start: 2024-03-06 | End: 2024-04-17

## 2024-03-06 NOTE — TELEPHONE ENCOUNTER
Spoke with Lola. She is scared that her labs increased.      AST 69    Pt is feeling well. Only recent changes were decreasing the prednisone and stopping ursodiol.     Reviewed with dr kidd. Pt to increase prednisone to 5 mg daily and repeat labs on Monday.     Lola repeated plan.

## 2024-03-07 RX ORDER — PREDNISONE 5 MG/1
5 TABLET ORAL DAILY
Qty: 30 TABLET | Refills: 0 | Status: SHIPPED | OUTPATIENT
Start: 2024-03-07 | End: 2024-06-25

## 2024-03-12 ENCOUNTER — TELEPHONE (OUTPATIENT)
Dept: TRANSPLANT | Facility: CLINIC | Age: 34
End: 2024-03-12
Payer: COMMERCIAL

## 2024-03-12 DIAGNOSIS — Z94.4 LIVER TRANSPLANTED (H): Primary | ICD-10-CM

## 2024-03-12 DIAGNOSIS — R79.89 ELEVATED LFTS: ICD-10-CM

## 2024-03-12 NOTE — TELEPHONE ENCOUNTER
Pt had increase in LFTs. Prednisone was increased. Pt repeated and remain slightly elevated. Per dr kidd patient to have liver biopsy. Orders placed. Pt aware. Pt aware will need a .

## 2024-03-14 ENCOUNTER — TELEPHONE (OUTPATIENT)
Dept: TRANSPLANT | Facility: CLINIC | Age: 34
End: 2024-03-14
Payer: COMMERCIAL

## 2024-03-14 DIAGNOSIS — Z94.4 LIVER TRANSPLANTED (H): Primary | ICD-10-CM

## 2024-03-14 NOTE — LETTER
OUTPATIENT OR TRANSITIONAL CARE  LABORATORY TEST ORDER  [place] [phone]     Patient Name: Lola Milner  Transplant Date: 11/5/2023   YOB: 1990  Issue Date: 03/14/24   [facility] MR#:[id number]  Expiration Date: 4/14/24       Diagnoses: [x]      Liver Transplant (ICD-10 Z94.4)    [x]      Long term use of medications (ICD-10 Z79.899)     Please fax results to [phone]    Frequency: one time 3/15 or   3/18        [x] Hepatic panel (Albumin, Alk Phos, ALT, AST, Direct and Total Bili)  [x] CMV Quantitative PCR    If you have questions, please call [phone] or [phone].      Eloisa Guajardo MD

## 2024-03-19 ENCOUNTER — TELEPHONE (OUTPATIENT)
Dept: TRANSPLANT | Facility: CLINIC | Age: 34
End: 2024-03-19
Payer: COMMERCIAL

## 2024-03-19 NOTE — TELEPHONE ENCOUNTER
Transplant Social Work Services Phone Call    Data: Lola is s/p liver transplant   Intervention: I called Lola to provide check in. 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 Lola reported that she feels like she is doing well and doesn't need extra support. She continues to attend AA, and doesn't feel like she needs a formal treatment program. She voiced acknowledgement that that's what the team would like her to do. I provided further education about this indicating that although she does not need it now, it is important to build up skills and insight into her use to prevent it from happening again. Lola indicated that she does have an appointment with her PCP at [facility], and knows that she can also reach out to them re: a program through [facility]. I encouraged her to do so. I also provided encouragement to continue to AA.     Assessment: Lola continues to be resistant to a formal treatment program, and feels like she is doing well.   Education provided by SW: Formal substance use treatment.   Plan: Lola knows how to reach me if needs arise.     ABEBA Rosas, [facility]  Liver Transplant   [facility]  Phone: [phone]

## 2024-03-21 ENCOUNTER — APPOINTMENT (OUTPATIENT)
Dept: MEDSURG UNIT | Facility: CLINIC | Age: 34
End: 2024-03-21
Attending: STUDENT IN AN ORGANIZED HEALTH CARE EDUCATION/TRAINING PROGRAM
Payer: COMMERCIAL

## 2024-03-21 ENCOUNTER — APPOINTMENT (OUTPATIENT)
Dept: INTERVENTIONAL RADIOLOGY/VASCULAR | Facility: CLINIC | Age: 34
End: 2024-03-21
Attending: STUDENT IN AN ORGANIZED HEALTH CARE EDUCATION/TRAINING PROGRAM
Payer: COMMERCIAL

## 2024-03-21 ENCOUNTER — HOSPITAL ENCOUNTER (OUTPATIENT)
Facility: CLINIC | Age: 34
Discharge: HOME | End: 2024-03-21
Attending: STUDENT IN AN ORGANIZED HEALTH CARE EDUCATION/TRAINING PROGRAM | Admitting: STUDENT IN AN ORGANIZED HEALTH CARE EDUCATION/TRAINING PROGRAM
Payer: COMMERCIAL

## 2024-03-21 VITALS
HEART RATE: 88 BPM | OXYGEN SATURATION: 98 % | TEMPERATURE: 98.4 F | WEIGHT: 192.8 LBS | SYSTOLIC BLOOD PRESSURE: 118 MMHG | BODY MASS INDEX: 34.15 KG/M2 | DIASTOLIC BLOOD PRESSURE: 74 MMHG | RESPIRATION RATE: 16 BRPM

## 2024-03-21 DIAGNOSIS — Z94.4 LIVER TRANSPLANTED (H): ICD-10-CM

## 2024-03-21 DIAGNOSIS — R79.89 ELEVATED LFTS: ICD-10-CM

## 2024-03-21 LAB — INR PPP: 1.1 (ref 0.85–1.15)

## 2024-03-21 PROCEDURE — 258N000003 HC RX IP 258 OP 636: Performed by: PHYSICIAN ASSISTANT

## 2024-03-21 PROCEDURE — 99152 MOD SED SAME PHYS/QHP 5/>YRS: CPT

## 2024-03-21 PROCEDURE — 76942 ECHO GUIDE FOR BIOPSY: CPT | Mod: 26 | Performed by: STUDENT IN AN ORGANIZED HEALTH CARE EDUCATION/TRAINING PROGRAM

## 2024-03-21 PROCEDURE — 88313 SPECIAL STAINS GROUP 2: CPT | Mod: 26 | Performed by: PATHOLOGY

## 2024-03-21 PROCEDURE — 85610 PROTHROMBIN TIME: CPT | Performed by: PHYSICIAN ASSISTANT

## 2024-03-21 PROCEDURE — 99152 MOD SED SAME PHYS/QHP 5/>YRS: CPT | Mod: GC | Performed by: STUDENT IN AN ORGANIZED HEALTH CARE EDUCATION/TRAINING PROGRAM

## 2024-03-21 PROCEDURE — 250N000011 HC RX IP 250 OP 636

## 2024-03-21 PROCEDURE — 47000 NEEDLE BIOPSY OF LIVER PERQ: CPT | Mod: GC | Performed by: STUDENT IN AN ORGANIZED HEALTH CARE EDUCATION/TRAINING PROGRAM

## 2024-03-21 PROCEDURE — 999N000133 HC STATISTIC PP CARE STAGE 2

## 2024-03-21 PROCEDURE — 36415 COLL VENOUS BLD VENIPUNCTURE: CPT | Performed by: PHYSICIAN ASSISTANT

## 2024-03-21 PROCEDURE — 88313 SPECIAL STAINS GROUP 2: CPT | Mod: TC,59 | Performed by: STUDENT IN AN ORGANIZED HEALTH CARE EDUCATION/TRAINING PROGRAM

## 2024-03-21 PROCEDURE — 250N000009 HC RX 250

## 2024-03-21 PROCEDURE — 88307 TISSUE EXAM BY PATHOLOGIST: CPT | Mod: 26 | Performed by: PATHOLOGY

## 2024-03-21 PROCEDURE — 999N000142 HC STATISTIC PROCEDURE PREP ONLY

## 2024-03-21 RX ORDER — NALOXONE HYDROCHLORIDE 0.4 MG/ML
0.4 INJECTION, SOLUTION INTRAMUSCULAR; INTRAVENOUS; SUBCUTANEOUS
Status: DISCONTINUED | OUTPATIENT
Start: 2024-03-21 | End: 2024-03-21 | Stop reason: HOSPADM

## 2024-03-21 RX ORDER — FENTANYL CITRATE 50 UG/ML
25-50 INJECTION, SOLUTION INTRAMUSCULAR; INTRAVENOUS EVERY 5 MIN PRN
Status: DISCONTINUED | OUTPATIENT
Start: 2024-03-21 | End: 2024-03-21 | Stop reason: HOSPADM

## 2024-03-21 RX ORDER — BIOTIN 1 MG
2000 TABLET ORAL DAILY
COMMUNITY

## 2024-03-21 RX ORDER — ONDANSETRON 2 MG/ML
4 INJECTION INTRAMUSCULAR; INTRAVENOUS
Status: DISCONTINUED | OUTPATIENT
Start: 2024-03-21 | End: 2024-03-21 | Stop reason: HOSPADM

## 2024-03-21 RX ORDER — FLUMAZENIL 0.1 MG/ML
0.2 INJECTION, SOLUTION INTRAVENOUS
Status: DISCONTINUED | OUTPATIENT
Start: 2024-03-21 | End: 2024-03-21 | Stop reason: HOSPADM

## 2024-03-21 RX ORDER — LIDOCAINE 40 MG/G
CREAM TOPICAL
Status: DISCONTINUED | OUTPATIENT
Start: 2024-03-21 | End: 2024-03-21 | Stop reason: HOSPADM

## 2024-03-21 RX ORDER — SODIUM CHLORIDE 9 MG/ML
INJECTION, SOLUTION INTRAVENOUS CONTINUOUS
Status: DISCONTINUED | OUTPATIENT
Start: 2024-03-21 | End: 2024-03-21 | Stop reason: HOSPADM

## 2024-03-21 RX ORDER — NALOXONE HYDROCHLORIDE 0.4 MG/ML
0.2 INJECTION, SOLUTION INTRAMUSCULAR; INTRAVENOUS; SUBCUTANEOUS
Status: DISCONTINUED | OUTPATIENT
Start: 2024-03-21 | End: 2024-03-21 | Stop reason: HOSPADM

## 2024-03-21 RX ADMIN — SODIUM CHLORIDE: 9 INJECTION, SOLUTION INTRAVENOUS at 13:27

## 2024-03-21 RX ADMIN — FENTANYL CITRATE 25 MCG: 50 INJECTION, SOLUTION INTRAMUSCULAR; INTRAVENOUS at 15:02

## 2024-03-21 RX ADMIN — MIDAZOLAM HYDROCHLORIDE 0.5 MG: 1 INJECTION, SOLUTION INTRAMUSCULAR; INTRAVENOUS at 15:01

## 2024-03-21 RX ADMIN — MIDAZOLAM HYDROCHLORIDE 1 MG: 1 INJECTION, SOLUTION INTRAMUSCULAR; INTRAVENOUS at 14:55

## 2024-03-21 RX ADMIN — LIDOCAINE HYDROCHLORIDE 8 ML: 10 INJECTION, SOLUTION EPIDURAL; INFILTRATION; INTRACAUDAL; PERINEURAL at 14:56

## 2024-03-21 RX ADMIN — FENTANYL CITRATE 50 MCG: 50 INJECTION, SOLUTION INTRAMUSCULAR; INTRAVENOUS at 14:55

## 2024-03-21 ASSESSMENT — ACTIVITIES OF DAILY LIVING (ADL)
ADLS_ACUITY_SCORE: 36
ADLS_ACUITY_SCORE: 38

## 2024-03-21 NOTE — PRE-PROCEDURE
GENERAL PRE-PROCEDURE:   Date/Time:  3/21/2024 2:15 PM    Written consent obtained?: Yes    Risks and benefits: Risks, benefits and alternatives were discussed    DC Plan: Appropriate discharge home plan in place for patients who are going home after procedure   Consent given by:  Patient  Patient states understanding of procedure being performed: Yes    Patient's understanding of procedure matches consent: Yes    Procedure consent matches procedure scheduled: Yes    Expected level of sedation:  Moderate  Appropriately NPO:  Yes  ASA Class:  2  Mallampati  :  Grade 2- soft palate, base of uvula, tonsillar pillars, and portion of posterior pharyngeal wall visible  Lungs:  Lungs clear with good breath sounds bilaterally  Heart:  Normal heart sounds and rate  History & Physical reviewed:  History and physical reviewed and no updates needed  Statement of review:  I have reviewed the lab findings, diagnostic data, medications, and the plan for sedation

## 2024-03-21 NOTE — IR NOTE
Patient Name: Lola Milner  Medical Record Number: [id number]  Today's Date: 3/21/2024    Procedure: Image Guided Liver Biopsy  Proceduralist: Dr. Sapp & Dr. Young  Pathology present: N/A    Procedure Start: 1453  Procedure end: 1509  Sedation medications administered: 1.5 mg versed & 75 mcg fentanyl    Report given to:  RN  : N/A    Other Notes: Pt arrived to IR room 6 from . Consent reviewed. Pt denies any questions or concerns regarding procedure. Pt positioned supine and monitored per protocol. Pt tolerated procedure without any noted complications. Pt transferred back to .

## 2024-03-21 NOTE — PROCEDURES
[facility]    Procedure: IR Procedure Note    Date/Time: 3/21/2024 3:11 PM    Performed by: Isac Young MD  Authorized by: Isac Young MD  IR Fellow Physician:  Radiology Resident Physician: Isac Young  Other(s) attending procedure: Jack Sapp      UNIVERSAL PROTOCOL   Site Marked: NA  Prior Images Obtained and Reviewed:  Yes  Required items: Required blood products, implants, devices and special equipment available    Patient identity confirmed:  Verbally with patient, arm band, provided demographic data and hospital-assigned identification number  Patient was reevaluated immediately before administering moderate or deep sedation or anesthesia  Confirmation Checklist:  Patient's identity using two indicators, relevant allergies, procedure was appropriate and matched the consent or emergent situation and correct equipment/implants were available  Time out: Immediately prior to the procedure a time out was called    Universal Protocol: the Joint Commission Universal Protocol was followed    Preparation: Patient was prepped and draped in usual sterile fashion       ANESTHESIA    Anesthesia:  Local infiltration  Local Anesthetic:  Lidocaine 1% without epinephrine      SEDATION  Patient Sedated: Yes    Sedation Type:  Moderate (conscious) sedation  Sedation:  Fentanyl and midazolam  Vital signs: Vital signs monitored during sedation    See dictated procedure note for full details.  Findings: Nontargeted transplant liver biopsy    Specimens: core needle biopsy specimens sent for pathological analysis    Complications: None    Condition: Stable    Plan: 2 hours bedrest then okay to discharge      PROCEDURE  Describe Procedure: Nontargeted transplant liver biopsy  Patient Tolerance:  Patient tolerated the procedure well with no immediate complications  Length of time physician/provider present for 1:1 monitoring during sedation: 15

## 2024-03-21 NOTE — PROGRESS NOTES
Pt prepped for liver biopsy. INR in process. Consent needs to be signed. PIV in fusing fluids per MAR. Andi Steele, at bedside will be ride home. VSS, denies pain.

## 2024-03-21 NOTE — PROGRESS NOTES
Patient tolerated recovery stage well. VSS, RUQ site clean/dry/intact, no hematoma, and denies pain. Patient tolerated PO food and fluids. Teaching was done and discharge instructions were given. Patient ambulated, voided, and PIV was removed. Patient discharged from the hospital to home with [place] ([place]).

## 2024-03-21 NOTE — DISCHARGE INSTRUCTIONS
[facility]    Interventional Radiology  Patient Instructions Following Liver Biopsy    AFTER YOU GO HOME  If you were given sedation, for the first 24 hours: we recommend that an adult stay with you, DO NOT drive or operate machinery at home or at work, DO NOT smoke, DO NOT make any important or legal decisions.  DO NOT drink alcoholic beverages the day of your procedure  Drink plenty of fluids   Resume your regular diet, unless otherwise instructed by your Primary Physician  Relax and take it easy for 48 hours  DO NOT do any strenuous exercise or lifting (> 10 lbs) for at least 3 days following your procedure  Keep the dressing dry and in place for 24 hours. Replace with Band aid for 2 days.  Never leave a wet dressing in place.  Do not take a shower for at least 12 hours following your procedure. No tub bath, hot tub, or swimming for 5 days.  There should be minimum drainage from the biopsy site    CALL THE PHYSICIAN IF:  You start bleeding from the procedure site.  If you do start to bleed from that site, lie down flat and hold pressure on the site for a minimum of 10 minutes.  Your physician will tell you if you need to return to the hospital  You develop nausea or vomiting  You have excessive swelling, redness, or tenderness at the site  You have drainage that looks like it is infected.  You experience severe pain  You develop hives or a rash or unexplained itching  You develop shortness of breath  You develop a temperature of 101 degrees F or greater      [facility] INTERVENTIONAL RADIOLOGY DEPARTMENT  Procedure Physician: Isac Young MD                                      Date of procedure: March 21, 2024  Telephone Numbers: [phone]      Monday-Friday 7:30 am to 4:00 pm  [phone] After 4:00 pm Monday-Friday, Weekends & Holidays.   Ask for the Interventional Radiologist on call.  Someone is on call 24 hrs/day  [facility] toll free number: [phone] Monday-Friday 8:00 am to 4:30  pm  [facility] Emergency Dept: [phone]

## 2024-03-22 ENCOUNTER — TELEPHONE (OUTPATIENT)
Dept: TRANSPLANT | Facility: CLINIC | Age: 34
End: 2024-03-22
Payer: COMMERCIAL

## 2024-03-22 DIAGNOSIS — T86.41 LIVER TRANSPLANT REJECTION (H): ICD-10-CM

## 2024-03-22 DIAGNOSIS — Z94.4 LIVER TRANSPLANTED (H): Primary | ICD-10-CM

## 2024-03-22 LAB
PATH REPORT.COMMENTS IMP SPEC: NORMAL
PATH REPORT.FINAL DX SPEC: NORMAL
PATH REPORT.GROSS SPEC: NORMAL
PATH REPORT.MICROSCOPIC SPEC OTHER STN: NORMAL
PATH REPORT.RELEVANT HX SPEC: NORMAL
PHOTO IMAGE: NORMAL

## 2024-03-22 RX ORDER — PREDNISONE 10 MG/1
TABLET ORAL
Qty: 30 TABLET | Refills: 0 | Status: SHIPPED | OUTPATIENT
Start: 2024-03-22 | End: 2024-04-03

## 2024-03-22 NOTE — TELEPHONE ENCOUNTER
Pt has mild rejection.  Per dr kidd. Patient to have prednisone 40 mg daily x 3 days, then 30 mg daily for 3 days, then 20 mg daily for 3 days, then 10 mg daily for 3 days then stay at 5 mg prednisone daily.  Weekly labs.  Pt aware.  
Non Scheduled Urgent

## 2024-03-29 ENCOUNTER — MYC REFILL (OUTPATIENT)
Dept: TRANSPLANT | Facility: CLINIC | Age: 34
End: 2024-03-29
Payer: COMMERCIAL

## 2024-03-29 DIAGNOSIS — Z94.4 LIVER TRANSPLANTED (H): ICD-10-CM

## 2024-04-01 RX ORDER — TACROLIMUS 1 MG/1
3 CAPSULE ORAL EVERY 12 HOURS
Qty: 180 CAPSULE | Refills: 11 | OUTPATIENT
Start: 2024-04-01

## 2024-04-03 PROBLEM — T86.41 LIVER TRANSPLANT REJECTION (H): Status: ACTIVE | Noted: 2024-03-21

## 2024-04-04 ENCOUNTER — TELEPHONE (OUTPATIENT)
Dept: TRANSPLANT | Facility: CLINIC | Age: 34
End: 2024-04-04
Payer: COMMERCIAL

## 2024-04-04 DIAGNOSIS — T86.41 LIVER TRANSPLANT REJECTION (H): Primary | ICD-10-CM

## 2024-04-04 RX ORDER — MYCOPHENOLATE MOFETIL 250 MG/1
500 CAPSULE ORAL 2 TIMES DAILY
Qty: 120 CAPSULE | Refills: 11 | Status: SHIPPED | OUTPATIENT
Start: 2024-04-04

## 2024-04-04 NOTE — TELEPHONE ENCOUNTER
Spoke with dr Guajardo. Patient can start cellcept 500 mg BID and once started can stop prednisone 5 mg daily.    Pt will  and let tx office know when picks up cellcept.

## 2024-04-16 ENCOUNTER — TELEPHONE (OUTPATIENT)
Dept: TRANSPLANT | Facility: CLINIC | Age: 34
End: 2024-04-16
Payer: COMMERCIAL

## 2024-04-16 DIAGNOSIS — Z94.4 LIVER TRANSPLANTED (H): ICD-10-CM

## 2024-04-16 NOTE — TELEPHONE ENCOUNTER
ISSUE:   Tacrolimus IR level 10.2 on 4/16, goal 6-8, dose 3 mg BID.    PLAN:   Call Patient and confirm this was an accurate 12-hour trough.   Verify Tacrolimus IR dose 3 mg BID.   Confirm no new medications or or missed doses.   Confirm no new illness / infection / diarrhea.   If accurate trough and accurate dose, decrease Tacrolimus IR dose to 2 mg BID     Is this more than a 50% increase or decrease in current IS dose: No  If YES, justification: n/a    Repeat labs in 1 weeks.  *If > 50% change in immunosuppression dose, repeat labs in 1 week.     Sravanthi Snyder RN     OUTCOME:   Spoke with Patient, they confirm accurate trough level and current dose 3 mg BID.   Patient confirmed dose change to 2 mg BID.  Patient agreed to repeat labs in 1 weeks.   Orders sent to preferred pharmacy for dose change and lab for repeat labs.   Patient voiced understanding of plan.     Denia Amador LPN

## 2024-04-17 RX ORDER — TACROLIMUS 1 MG/1
2 CAPSULE ORAL EVERY 12 HOURS
Qty: 360 CAPSULE | Refills: 3 | Status: SHIPPED | OUTPATIENT
Start: 2024-04-17 | End: 2024-05-07

## 2024-04-28 RX ORDER — GABAPENTIN 100 MG/1
200 CAPSULE ORAL AT BEDTIME
Qty: 14 CAPSULE | Refills: 0 | OUTPATIENT
Start: 2024-04-28

## 2024-05-07 ENCOUNTER — TELEPHONE (OUTPATIENT)
Dept: TRANSPLANT | Facility: CLINIC | Age: 34
End: 2024-05-07
Payer: COMMERCIAL

## 2024-05-07 DIAGNOSIS — Z94.4 LIVER TRANSPLANTED (H): ICD-10-CM

## 2024-05-07 RX ORDER — TACROLIMUS 1 MG/1
CAPSULE ORAL
Qty: 450 CAPSULE | Refills: 3 | Status: SHIPPED | OUTPATIENT
Start: 2024-05-07 | End: 2024-07-11

## 2024-05-07 NOTE — TELEPHONE ENCOUNTER
ISSUE:   Tacrolimus IR level 5.4 on 5/6, goal 6-8, dose 2 mg BID.    PLAN:   Call Patient and confirm this was an accurate 12-hour trough.   Verify Tacrolimus IR dose 5.4 mg BID.   Confirm no new medications or or missed doses.   Confirm no new illness / infection / diarrhea.   If accurate trough and accurate dose, increase Tacrolimus IR dose to 2 mg AM and 3 mg PM     Is this more than a 50% increase or decrease in current IS dose: no  If YES, justification:     Repeat labs in 1 weeks.  *If > 50% change in immunosuppression dose, repeat labs in 1 week.     OUTCOME:   Spoke with Patient, they confirm accurate trough level and current dose 2 mg BID.   Patient confirmed dose change to 2 mg Am and 3 mg PM.  Patient agreed to repeat labs in 1 weeks.   Orders sent to preferred pharmacy for dose change and lab for repeat labs.   Patient voiced understanding of plan.

## 2024-06-11 ENCOUNTER — MYC REFILL (OUTPATIENT)
Dept: TRANSPLANT | Facility: CLINIC | Age: 34
End: 2024-06-11
Payer: COMMERCIAL

## 2024-06-11 DIAGNOSIS — K72.10 END STAGE LIVER DISEASE (H): ICD-10-CM

## 2024-06-11 RX ORDER — VALGANCICLOVIR 450 MG/1
450 TABLET, FILM COATED ORAL DAILY
Qty: 30 TABLET | Refills: 2 | Status: CANCELLED | OUTPATIENT
Start: 2024-06-11

## 2024-06-13 ENCOUNTER — TELEPHONE (OUTPATIENT)
Dept: TRANSPLANT | Facility: CLINIC | Age: 34
End: 2024-06-13
Payer: COMMERCIAL

## 2024-06-13 DIAGNOSIS — E83.42 HYPOMAGNESEMIA: ICD-10-CM

## 2024-06-13 RX ORDER — MAGNESIUM OXIDE 400 MG/1
400 TABLET ORAL DAILY
Qty: 90 TABLET | Refills: 3 | Status: SHIPPED | OUTPATIENT
Start: 2024-06-13

## 2024-06-13 NOTE — TELEPHONE ENCOUNTER
Pt's magnesium level within normal range. Pt taking 400 mg BID. Pt will decrease to 400 mg daily.

## 2024-06-25 ENCOUNTER — OFFICE VISIT (OUTPATIENT)
Dept: GASTROENTEROLOGY | Facility: CLINIC | Age: 34
End: 2024-06-25
Attending: STUDENT IN AN ORGANIZED HEALTH CARE EDUCATION/TRAINING PROGRAM
Payer: COMMERCIAL

## 2024-06-25 ENCOUNTER — LAB (OUTPATIENT)
Dept: LAB | Facility: CLINIC | Age: 34
End: 2024-06-25
Attending: STUDENT IN AN ORGANIZED HEALTH CARE EDUCATION/TRAINING PROGRAM
Payer: COMMERCIAL

## 2024-06-25 VITALS
DIASTOLIC BLOOD PRESSURE: 79 MMHG | BODY MASS INDEX: 37.72 KG/M2 | SYSTOLIC BLOOD PRESSURE: 116 MMHG | HEART RATE: 94 BPM | WEIGHT: 212.9 LBS | HEIGHT: 63 IN

## 2024-06-25 DIAGNOSIS — N18.31 CHRONIC KIDNEY DISEASE, STAGE 3A (H): ICD-10-CM

## 2024-06-25 DIAGNOSIS — Z94.4 LIVER REPLACED BY TRANSPLANT (H): ICD-10-CM

## 2024-06-25 DIAGNOSIS — Z94.4 LIVER REPLACED BY TRANSPLANT (H): Primary | ICD-10-CM

## 2024-06-25 DIAGNOSIS — K70.31 ALCOHOLIC CIRRHOSIS OF LIVER WITH ASCITES (H): ICD-10-CM

## 2024-06-25 LAB
ALBUMIN SERPL BCG-MCNC: 4.2 G/DL (ref 3.5–5.2)
ALP SERPL-CCNC: 119 U/L (ref 40–150)
ALT SERPL W P-5'-P-CCNC: 21 U/L (ref 0–50)
ANION GAP SERPL CALCULATED.3IONS-SCNC: 13 MMOL/L (ref 7–15)
AST SERPL W P-5'-P-CCNC: 22 U/L (ref 0–45)
BILIRUB DIRECT SERPL-MCNC: <0.2 MG/DL (ref 0–0.3)
BILIRUB SERPL-MCNC: 0.3 MG/DL
BUN SERPL-MCNC: 17.2 MG/DL (ref 6–20)
CALCIUM SERPL-MCNC: 9.5 MG/DL (ref 8.6–10)
CHLORIDE SERPL-SCNC: 105 MMOL/L (ref 98–107)
CREAT SERPL-MCNC: 1.55 MG/DL (ref 0.51–0.95)
DEPRECATED HCO3 PLAS-SCNC: 21 MMOL/L (ref 22–29)
EGFRCR SERPLBLD CKD-EPI 2021: 45 ML/MIN/1.73M2
GLUCOSE SERPL-MCNC: 108 MG/DL (ref 70–99)
MAGNESIUM SERPL-MCNC: 2.1 MG/DL (ref 1.7–2.3)
POTASSIUM SERPL-SCNC: 5.4 MMOL/L (ref 3.4–5.3)
PROT SERPL-MCNC: 6.4 G/DL (ref 6.4–8.3)
SODIUM SERPL-SCNC: 139 MMOL/L (ref 135–145)

## 2024-06-25 PROCEDURE — 83735 ASSAY OF MAGNESIUM: CPT | Performed by: PATHOLOGY

## 2024-06-25 PROCEDURE — 99213 OFFICE O/P EST LOW 20 MIN: CPT | Performed by: STUDENT IN AN ORGANIZED HEALTH CARE EDUCATION/TRAINING PROGRAM

## 2024-06-25 PROCEDURE — 99000 SPECIMEN HANDLING OFFICE-LAB: CPT | Performed by: PATHOLOGY

## 2024-06-25 PROCEDURE — 80053 COMPREHEN METABOLIC PANEL: CPT | Performed by: PATHOLOGY

## 2024-06-25 PROCEDURE — 82248 BILIRUBIN DIRECT: CPT | Performed by: PATHOLOGY

## 2024-06-25 PROCEDURE — G0480 DRUG TEST DEF 1-7 CLASSES: HCPCS | Mod: 90 | Performed by: PATHOLOGY

## 2024-06-25 PROCEDURE — 36415 COLL VENOUS BLD VENIPUNCTURE: CPT | Performed by: PATHOLOGY

## 2024-06-25 PROCEDURE — 99214 OFFICE O/P EST MOD 30 MIN: CPT | Performed by: STUDENT IN AN ORGANIZED HEALTH CARE EDUCATION/TRAINING PROGRAM

## 2024-06-25 RX ORDER — METHOCARBAMOL 500 MG/1
500 TABLET, FILM COATED ORAL
COMMUNITY
Start: 2024-05-30 | End: 2025-06-04

## 2024-06-25 ASSESSMENT — PAIN SCALES - GENERAL: PAINLEVEL: NO PAIN (0)

## 2024-06-25 NOTE — NURSING NOTE
"Chief Complaint   Patient presents with    RECHECK     Follow up with liver transplant     /79   Pulse 94   Ht 1.6 m (5' 3\")   Wt 96.6 kg (212 lb 14.4 oz)   BMI 37.71 kg/m    Brandy Bradley, Lead CMA  6/25/2024 11:10 AM    "

## 2024-06-25 NOTE — LETTER
6/25/2024      Lola Milner  [address]  [place] [address]      Dear Colleague,    Thank you for referring your patient, Lola Milner, to the [facility] [place]. Please see a copy of my visit note below.    [facility]     Date of Visit: June 25, 2024    Reason for referral: Follow up liver transplant    Subjective:  Ms. Milner is a 34 year old woman with a history of AUD, anxiety, depression, ARLD who is s/p LT 11/5/2023.     DBD LT 11/5/2023 with End-to-end Choledochocholedochostomy     C. LIVER (1945 gm)/ GALLBLADDER; EXPLANTED AT TRANSPLANTATION:  Advanced cirrhosis (Laennec fibrosis stage 4C):  -Inactive with marked cholestasis, consistent with end stage decompensation  -Compatible with alcohol etiology, now quiescent from abstinence  -Negative for hepatocellular or other malignancy  -Bile ducts are present in normal numbers and architecture  Gallbladder: no significant morphologic abnormality    Transplant coordinator Mariola Castellanos [phone].  Biliary stent:  No            Donor status:  DBD  CMV D + / R -  EBV D + / R +  Anticoagulation plan: Aspirin x3 months     Had EDGAR prior to transplant, received basiliximab for renal sparing. Was on RRT post op - off dialysis since. Creatinine around 1.4    Had some issues with leukopenia/neutropenia    Completed 6 months valcyte and bactrim    Had c. Dif post transplant    Current IS  Tacrolimus 3 mg twice daily  Mycophenolate Mofetil 500 mg BID    AUD  - Peth testing has been negative. Doing well with her sobriety. Insurance would not cover CD treatment or individual therapy    Not planning on becoming pregnant. Not on birth control, abstaining but otherwise using condoms    Noted to have elevated LFTs 3/2024    Mild acute cellular rejection, ISRAEL 2-3 /9 with associated hepatocytes ballooning, mild and of uncertain cause or significance. Initially treated with steroids and increased CNI - had  issues with steroids so now on CNI and MMF    Peths have been consistently negative    ROS: 14 point ROS negative except for positives noted in HPI.    PMHx:  Past Medical History:   Diagnosis Date     Alcoholic cirrhosis of liver with ascites (H) 2023     History of alcohol abuse     Formatting of this note might be different from the original. In remission as of late 2023     Liver transplant rejection (H) 2024       PSHx:  Past Surgical History:   Procedure Laterality Date     BENCH LIVER  2023    Procedure: Bench liver;  Surgeon: Aime Lowe MD;  Location: [facility] OR     IR CVC TUNNEL PLACEMENT > 5 YRS OF AGE  2023     IR CVC TUNNEL REMOVAL RIGHT  2023     IR LIVER BIOPSY PERCUTANEOUS  3/21/2024     IR PARACENTESIS  2023     IR PARACENTESIS  2023     IR PARACENTESIS  2023     IR PARACENTESIS  10/5/2023     IR PARACENTESIS  10/9/2023     IR PARACENTESIS  10/16/2023     IR PARACENTESIS  10/23/2023     TRANSPLANT LIVER RECIPIENT  DONOR N/A 2023    Procedure: Transplant liver recipient  donor;  Surgeon: Aime Lowe MD;  Location: [facility] OR       FamHx:  No family history on file.    SocHx:  Social History     Socioeconomic History     Marital status: Single     Spouse name: Not on file     Number of children: Not on file     Years of education: Not on file     Highest education level: Not on file   Occupational History     Not on file   Tobacco Use     Smoking status: Former     Current packs/day: 0.00     Types: Cigarettes     Quit date: 2023     Years since quittin.0     Smokeless tobacco: Never   Substance and Sexual Activity     Alcohol use: Not Currently     Comment: last ETOH use 2023     Drug use: Never     Sexual activity: Not on file   Other Topics Concern     Not on file   Social History Narrative     Not on file     Social Determinants of Health     Financial Resource Strain: Low Risk  (2023)    Received from [facility]  [facility], [facility]    Overall Financial Resource Strain (CARDIA)      Difficulty of Paying Living Expenses: Not hard at all   Food Insecurity: No Food Insecurity (6/23/2023)    Received from [facility], [facility]    Hunger Vital Sign      Worried About Running Out of Food in the Last Year: Never true      Ran Out of Food in the Last Year: Never true   Transportation Needs: No Transportation Needs (6/23/2023)    Received from [facility], [facility]    PRAPARE - Transportation      Lack of Transportation (Medical): No      Lack of Transportation (Non-Medical): No   Physical Activity: Sufficiently Active (3/25/2024)    Received from [facility], [facility]    Exercise Vital Sign      Days of Exercise per Week: 6 days      Minutes of Exercise per Session: 60 min   Stress: Stress Concern Present (7/6/2023)    Received from [facility], [facility]    [place] [employer] - Occupational Stress Questionnaire      Feeling of Stress : Very much   Social Connections: Moderately Isolated (7/6/2023)    Received from [facility], [facility]    Social Connection and Isolation Panel [NHANES]      Frequency of Communication with Friends and Family: More than three times a week      Frequency of Social Gatherings with Friends and Family: More than three times a week      Attends Spiritism Services: Never      Active Member of Clubs or Organizations: No      Attends Club or Organization Meetings: Never      Marital Status: Living with partner   Interpersonal Safety: Not At Risk (7/6/2023)    Received from [facility], [facility]    Humiliation, Afraid, Rape, and Kick questionnaire      Fear of Current or Ex-Partner: No      Emotionally Abused: No      Physically  "Abused: No      Sexually Abused: No   Housing Stability: Low Risk  (6/23/2023)    Received from [facility], [facility]    Housing Stability Vital Sign      Unable to Pay for Housing in the Last Year: No      Number of Places Lived in the Last Year: 1      Unstable Housing in the Last Year: No       Medications:  Current Outpatient Medications   Medication Sig Dispense Refill     acetaminophen (TYLENOL) 325 MG tablet Take 1-2 tablets (325-650 mg) by mouth every 6 hours as needed for mild pain 100 tablet 0     biotin 1000 MCG TABS tablet Take 2,000 mcg by mouth daily       gabapentin (NEURONTIN) 100 MG capsule Take 200 mg by mouth at bedtime       hydrOXYzine HCl (ATARAX) 25 MG tablet Take 25 mg by mouth 3 times daily as needed for itching       magnesium oxide (MAG-OX) 400 MG tablet Take 1 tablet (400 mg) by mouth daily 90 tablet 3     methocarbamol (ROBAXIN) 500 MG tablet Take 500 mg by mouth       mycophenolate (GENERIC EQUIVALENT) 250 MG capsule Take 2 capsules (500 mg) by mouth 2 times daily 120 capsule 11     psyllium (METAMUCIL/KONSYL) 0.52 g capsule Take 1 capsule by mouth daily       tacrolimus (GENERIC EQUIVALENT) 1 MG capsule Take 2 capsules (2 mg) by mouth every morning AND 3 capsules (3 mg) every evening. 450 capsule 3     Vitamin D3 (CHOLECALCIFEROL) 25 mcg (1000 units) tablet Take 2 tablets (50 mcg) by mouth daily 90 tablet 3     dicyclomine (BENTYL) 10 MG capsule Take 10 mg by mouth 2 times daily as needed (stomach cramping/period) (Patient not taking: Reported on 6/25/2024)       No current facility-administered medications for this visit.       Allergies:   No Known Allergies    Objective:  /79   Pulse 94   Ht 1.6 m (5' 3\")   Wt 96.6 kg (212 lb 14.4 oz)   BMI 37.71 kg/m    Constitutional: pleasant woman in NAD  Eyes: non icteric  Respiratory: Normal respiratory excursion   MSK: normal range of motion of visualized extremities  Ext: no " swelling  Psychiatric: normal mood and orientation    Labs:  Last Comprehensive Metabolic Panel:  Sodium   Date Value Ref Range Status   06/25/2024 139 135 - 145 mmol/L Final     Comment:     Reference intervals for this test were updated on 09/26/2023 to more accurately reflect our healthy population. There may be differences in the flagging of prior results with similar values performed with this method. Interpretation of those prior results can be made in the context of the updated reference intervals.      Potassium   Date Value Ref Range Status   06/25/2024 5.4 (H) 3.4 - 5.3 mmol/L Final     Potassium POCT   Date Value Ref Range Status   11/05/2023 2.9 (L) 3.5 - 5.0 mmol/L Final     Comment:     CRITICAL VALUES NOTED AND REPORTED TO MD     Chloride   Date Value Ref Range Status   06/25/2024 105 98 - 107 mmol/L Final     Chloride (External)   Date Value Ref Range Status   06/05/2024 108 98 - 109 meq/L Final     Carbon Dioxide (CO2)   Date Value Ref Range Status   06/25/2024 21 (L) 22 - 29 mmol/L Final     Anion Gap   Date Value Ref Range Status   06/25/2024 13 7 - 15 mmol/L Final     Glucose   Date Value Ref Range Status   06/25/2024 108 (H) 70 - 99 mg/dL Final     GLUCOSE BY METER POCT   Date Value Ref Range Status   11/14/2023 84 70 - 99 mg/dL Final     Urea Nitrogen   Date Value Ref Range Status   06/25/2024 17.2 6.0 - 20.0 mg/dL Final     Creatinine   Date Value Ref Range Status   06/25/2024 1.55 (H) 0.51 - 0.95 mg/dL Final     GFR Estimate   Date Value Ref Range Status   06/25/2024 45 (L) >60 mL/min/1.73m2 Final     Comment:     eGFR calculated using 2021 CKD-EPI equation.     Calcium   Date Value Ref Range Status   06/25/2024 9.5 8.6 - 10.0 mg/dL Final     Bilirubin Total   Date Value Ref Range Status   06/25/2024 0.3 <=1.2 mg/dL Final     Alkaline Phosphatase   Date Value Ref Range Status   06/25/2024 119 40 - 150 U/L Final     ALT   Date Value Ref Range Status   06/25/2024 21 0 - 50 U/L Final      Comment:     Reference intervals for this test were updated on 6/12/2023 to more accurately reflect our healthy population. There may be differences in the flagging of prior results with similar values performed with this method. Interpretation of those prior results can be made in the context of the updated reference intervals.       AST   Date Value Ref Range Status   06/25/2024 22 0 - 45 U/L Final     Comment:     Reference intervals for this test were updated on 6/12/2023 to more accurately reflect our healthy population. There may be differences in the flagging of prior results with similar values performed with this method. Interpretation of those prior results can be made in the context of the updated reference intervals.       Lab Results   Component Value Date    WBC 3.1 01/25/2024     Lab Results   Component Value Date    RBC 3.65 01/25/2024     Lab Results   Component Value Date    HGB 11.0 01/25/2024     Lab Results   Component Value Date    HCT 32.1 01/25/2024     Lab Results   Component Value Date    MCV 88 01/25/2024     Lab Results   Component Value Date    MCH 30.1 01/25/2024     Lab Results   Component Value Date    MCHC 34.3 01/25/2024     Lab Results   Component Value Date    RDW 12.2 01/25/2024     Lab Results   Component Value Date     01/25/2024       INR   Date Value Ref Range Status   03/21/2024 1.10 0.85 - 1.15 Final        Imaging: Reviewed in EHR    Assessment/Plan: Ms. Milner is a 34 year old woman with a history of AUD, anxiety, depression, ARLD who is s/p LT 11/5/2023. She had mild rejection 3/3024 and responded to treatment.     She has not had issues with biliary strictures post transplant. She was on RRT post transplant, stopped before discharge. Has elevated creatinine around ~1.5.     She is doing well with her sobriety. Attending AA.     Discussed that fertility returns after transplant, and she should avoid pregnancy for 1 year after rejection (3/2025 earliest) due to  risks to her and the fetus. Discussed she can have a planned pregnancy in the future if she desires, would continue tacrolimus during pregnancy. She cannot get pregnant on MMF. She is not interested in getting pregnant in the near future. Recommend discussing with OB/GYN or PCP started progesterone based birth controll, would not use birth control with estrogen until > 1 year out from transplant. POP pills may not be effective with a higher BMI. She should continue to use use barrier methods in the meantime.     - Continue tacrolimus with goal 6-8.  mg BID. Based on next months labs will likely start to decrease tacrolimus trough   - Talk to your gynecologist about getting on a progesterone based birth control option  - Continue to go to AA  - Dermatology appt scheduled    RTC 6 months.    Eloisa Guajardo MD MSc  Transplant Hepatology  [facility]            Again, thank you for allowing me to participate in the care of your patient.        Sincerely,        Eloisa Guajardo MD

## 2024-06-25 NOTE — PROGRESS NOTES
[facility]     Date of Visit: June 25, 2024    Reason for referral: Follow up liver transplant    Subjective:  Ms. Milner is a 34 year old woman with a history of AUD, anxiety, depression, ARLD who is s/p LT 11/5/2023.     DBD LT 11/5/2023 with End-to-end Choledochocholedochostomy     C. LIVER (1945 gm)/ GALLBLADDER; EXPLANTED AT TRANSPLANTATION:  Advanced cirrhosis (Laennec fibrosis stage 4C):  -Inactive with marked cholestasis, consistent with end stage decompensation  -Compatible with alcohol etiology, now quiescent from abstinence  -Negative for hepatocellular or other malignancy  -Bile ducts are present in normal numbers and architecture  Gallbladder: no significant morphologic abnormality    Transplant coordinator Mariola Castellanos [phone].  Biliary stent:  No            Donor status:  DBD  CMV D + / R -  EBV D + / R +  Anticoagulation plan: Aspirin x3 months     Had EDGAR prior to transplant, received basiliximab for renal sparing. Was on RRT post op - off dialysis since. Creatinine around 1.4    Had some issues with leukopenia/neutropenia    Completed 6 months valcyte and bactrim    Had c. Dif post transplant    Current IS  Tacrolimus 3 mg twice daily  Mycophenolate Mofetil 500 mg BID    AUD  - Peth testing has been negative. Doing well with her sobriety. Insurance would not cover CD treatment or individual therapy    Not planning on becoming pregnant. Not on birth control, abstaining but otherwise using condoms    Noted to have elevated LFTs 3/2024    Mild acute cellular rejection, ISRAEL 2-3 /9 with associated hepatocytes ballooning, mild and of uncertain cause or significance. Initially treated with steroids and increased CNI - had issues with steroids so now on CNI and MMF    Peths have been consistently negative    ROS: 14 point ROS negative except for positives noted in HPI.    PMHx:  Past Medical History:   Diagnosis Date    Alcoholic cirrhosis of liver with ascites  (H) 2023    History of alcohol abuse     Formatting of this note might be different from the original. In remission as of late 2023    Liver transplant rejection (H) 2024       PSHx:  Past Surgical History:   Procedure Laterality Date    BENCH LIVER  2023    Procedure: Bench liver;  Surgeon: Aime Lowe MD;  Location: [facility] OR    IR CVC TUNNEL PLACEMENT > 5 YRS OF AGE  2023    IR CVC TUNNEL REMOVAL RIGHT  2023    IR LIVER BIOPSY PERCUTANEOUS  3/21/2024    IR PARACENTESIS  2023    IR PARACENTESIS  2023    IR PARACENTESIS  2023    IR PARACENTESIS  10/5/2023    IR PARACENTESIS  10/9/2023    IR PARACENTESIS  10/16/2023    IR PARACENTESIS  10/23/2023    TRANSPLANT LIVER RECIPIENT  DONOR N/A 2023    Procedure: Transplant liver recipient  donor;  Surgeon: Aime Lowe MD;  Location: [facility] OR       FamHx:  No family history on file.    SocHx:  Social History     Socioeconomic History    Marital status: Single     Spouse name: Not on file    Number of children: Not on file    Years of education: Not on file    Highest education level: Not on file   Occupational History    Not on file   Tobacco Use    Smoking status: Former     Current packs/day: 0.00     Types: Cigarettes     Quit date: 2023     Years since quittin.0    Smokeless tobacco: Never   Substance and Sexual Activity    Alcohol use: Not Currently     Comment: last ETOH use 2023    Drug use: Never    Sexual activity: Not on file   Other Topics Concern    Not on file   Social History Narrative    Not on file     Social Determinants of Health     Financial Resource Strain: Low Risk  (2023)    Received from [facility], [facility]    Overall Financial Resource Strain (CARDIA)     Difficulty of Paying Living Expenses: Not hard at all   Food Insecurity: No Food Insecurity (2023)    [facility]  [facility]    Hunger Vital Sign     Worried About Running Out of Food in the Last Year: Never true     Ran Out of Food in the Last Year: Never true   Transportation Needs: No Transportation Needs (6/23/2023)    Received from [facility], [facility]    PRAPARE - Transportation     Lack of Transportation (Medical): No     Lack of Transportation (Non-Medical): No   Physical Activity: Sufficiently Active (3/25/2024)    Received from [facility], [facility]    Exercise Vital Sign     Days of Exercise per Week: 6 days     Minutes of Exercise per Session: 60 min   Stress: Stress Concern Present (7/6/2023)    Received from [facility], [facility]    [employer] - Occupational Stress Questionnaire     Feeling of Stress : Very much   Social Connections: Moderately Isolated (7/6/2023)    Received from [facility], [facility]    Social Connection and Isolation Panel [NHANES]     Frequency of Communication with Friends and Family: More than three times a week     Frequency of Social Gatherings with Friends and Family: More than three times a week     Attends Druze Services: Never     Active Member of Clubs or Organizations: No     Attends Club or Organization Meetings: Never     Marital Status: Living with partner   Interpersonal Safety: Not At Risk (7/6/2023)    Received from [facility], [facility]    Humiliation, Afraid, Rape, and Kick questionnaire     Fear of Current or Ex-Partner: No     Emotionally Abused: No     Physically Abused: No     Sexually Abused: No   Housing Stability: Low Risk  (6/23/2023)    Received from [facility], [facility]    Housing Stability Vital Sign     Unable to Pay for Housing in the Last Year: No     Number of Places Lived in the Last Year: 1      "Unstable Housing in the Last Year: No       Medications:  Current Outpatient Medications   Medication Sig Dispense Refill    acetaminophen (TYLENOL) 325 MG tablet Take 1-2 tablets (325-650 mg) by mouth every 6 hours as needed for mild pain 100 tablet 0    biotin 1000 MCG TABS tablet Take 2,000 mcg by mouth daily      gabapentin (NEURONTIN) 100 MG capsule Take 200 mg by mouth at bedtime      hydrOXYzine HCl (ATARAX) 25 MG tablet Take 25 mg by mouth 3 times daily as needed for itching      magnesium oxide (MAG-OX) 400 MG tablet Take 1 tablet (400 mg) by mouth daily 90 tablet 3    methocarbamol (ROBAXIN) 500 MG tablet Take 500 mg by mouth      mycophenolate (GENERIC EQUIVALENT) 250 MG capsule Take 2 capsules (500 mg) by mouth 2 times daily 120 capsule 11    psyllium (METAMUCIL/KONSYL) 0.52 g capsule Take 1 capsule by mouth daily      tacrolimus (GENERIC EQUIVALENT) 1 MG capsule Take 2 capsules (2 mg) by mouth every morning AND 3 capsules (3 mg) every evening. 450 capsule 3    Vitamin D3 (CHOLECALCIFEROL) 25 mcg (1000 units) tablet Take 2 tablets (50 mcg) by mouth daily 90 tablet 3    dicyclomine (BENTYL) 10 MG capsule Take 10 mg by mouth 2 times daily as needed (stomach cramping/period) (Patient not taking: Reported on 6/25/2024)       No current facility-administered medications for this visit.       Allergies:   No Known Allergies    Objective:  /79   Pulse 94   Ht 1.6 m (5' 3\")   Wt 96.6 kg (212 lb 14.4 oz)   BMI 37.71 kg/m    Constitutional: pleasant woman in NAD  Eyes: non icteric  Respiratory: Normal respiratory excursion   MSK: normal range of motion of visualized extremities  Ext: no swelling  Psychiatric: normal mood and orientation    Labs:  Last Comprehensive Metabolic Panel:  Sodium   Date Value Ref Range Status   06/25/2024 139 135 - 145 mmol/L Final     Comment:     Reference intervals for this test were updated on 09/26/2023 to more accurately reflect our healthy population. There may be " differences in the flagging of prior results with similar values performed with this method. Interpretation of those prior results can be made in the context of the updated reference intervals.      Potassium   Date Value Ref Range Status   06/25/2024 5.4 (H) 3.4 - 5.3 mmol/L Final     Potassium POCT   Date Value Ref Range Status   11/05/2023 2.9 (L) 3.5 - 5.0 mmol/L Final     Comment:     CRITICAL VALUES NOTED AND REPORTED TO MD     Chloride   Date Value Ref Range Status   06/25/2024 105 98 - 107 mmol/L Final     Chloride (External)   Date Value Ref Range Status   06/05/2024 108 98 - 109 meq/L Final     Carbon Dioxide (CO2)   Date Value Ref Range Status   06/25/2024 21 (L) 22 - 29 mmol/L Final     Anion Gap   Date Value Ref Range Status   06/25/2024 13 7 - 15 mmol/L Final     Glucose   Date Value Ref Range Status   06/25/2024 108 (H) 70 - 99 mg/dL Final     GLUCOSE BY METER POCT   Date Value Ref Range Status   11/14/2023 84 70 - 99 mg/dL Final     Urea Nitrogen   Date Value Ref Range Status   06/25/2024 17.2 6.0 - 20.0 mg/dL Final     Creatinine   Date Value Ref Range Status   06/25/2024 1.55 (H) 0.51 - 0.95 mg/dL Final     GFR Estimate   Date Value Ref Range Status   06/25/2024 45 (L) >60 mL/min/1.73m2 Final     Comment:     eGFR calculated using 2021 CKD-EPI equation.     Calcium   Date Value Ref Range Status   06/25/2024 9.5 8.6 - 10.0 mg/dL Final     Bilirubin Total   Date Value Ref Range Status   06/25/2024 0.3 <=1.2 mg/dL Final     Alkaline Phosphatase   Date Value Ref Range Status   06/25/2024 119 40 - 150 U/L Final     ALT   Date Value Ref Range Status   06/25/2024 21 0 - 50 U/L Final     Comment:     Reference intervals for this test were updated on 6/12/2023 to more accurately reflect our healthy population. There may be differences in the flagging of prior results with similar values performed with this method. Interpretation of those prior results can be made in the context of the updated reference  intervals.       AST   Date Value Ref Range Status   06/25/2024 22 0 - 45 U/L Final     Comment:     Reference intervals for this test were updated on 6/12/2023 to more accurately reflect our healthy population. There may be differences in the flagging of prior results with similar values performed with this method. Interpretation of those prior results can be made in the context of the updated reference intervals.       Lab Results   Component Value Date    WBC 3.1 01/25/2024     Lab Results   Component Value Date    RBC 3.65 01/25/2024     Lab Results   Component Value Date    HGB 11.0 01/25/2024     Lab Results   Component Value Date    HCT 32.1 01/25/2024     Lab Results   Component Value Date    MCV 88 01/25/2024     Lab Results   Component Value Date    MCH 30.1 01/25/2024     Lab Results   Component Value Date    MCHC 34.3 01/25/2024     Lab Results   Component Value Date    RDW 12.2 01/25/2024     Lab Results   Component Value Date     01/25/2024       INR   Date Value Ref Range Status   03/21/2024 1.10 0.85 - 1.15 Final        Imaging: Reviewed in EHR    Assessment/Plan: Ms. Milner is a 34 year old woman with a history of AUD, anxiety, depression, ARLD who is s/p LT 11/5/2023. She had mild rejection 3/3024 and responded to treatment.     She has not had issues with biliary strictures post transplant. She was on RRT post transplant, stopped before discharge. Has elevated creatinine around ~1.5.     She is doing well with her sobriety. Attending AA.     Discussed that fertility returns after transplant, and she should avoid pregnancy for 1 year after rejection (3/2025 earliest) due to risks to her and the fetus. Discussed she can have a planned pregnancy in the future if she desires, would continue tacrolimus during pregnancy. She cannot get pregnant on MMF. She is not interested in getting pregnant in the near future. Recommend discussing with OB/GYN or PCP started progesterone based birth controll,  would not use birth control with estrogen until > 1 year out from transplant. POP pills may not be effective with a higher BMI. She should continue to use use barrier methods in the meantime.     - Continue tacrolimus with goal 6-8.  mg BID. Based on next months labs will likely start to decrease tacrolimus trough   - Talk to your gynecologist about getting on a progesterone based birth control option  - Continue to go to AA  - Dermatology appt scheduled    RTC 6 months.    Eloisa Guajardo MD MSc  Transplant Hepatology  [facility]

## 2024-06-26 ENCOUNTER — TELEPHONE (OUTPATIENT)
Dept: TRANSPLANT | Facility: CLINIC | Age: 34
End: 2024-06-26
Payer: COMMERCIAL

## 2024-06-26 DIAGNOSIS — E87.5 HIGH POTASSIUM: Primary | ICD-10-CM

## 2024-06-26 RX ORDER — SODIUM POLYSTYRENE SULFONATE 15 G/60ML
15 SUSPENSION ORAL; RECTAL ONCE
Qty: 60 ML | Refills: 0 | Status: SHIPPED | OUTPATIENT
Start: 2024-06-26 | End: 2024-06-26

## 2024-06-26 NOTE — TELEPHONE ENCOUNTER
Potassium slightly elevated. Per dr bernabe mays x 1 and watch potassium in diet.    Left message with plan and requested call back with any changes.

## 2024-07-11 DIAGNOSIS — G62.1 ALCOHOL-INDUCED POLYNEUROPATHY (H): Primary | ICD-10-CM

## 2024-07-11 DIAGNOSIS — Z94.4 LIVER TRANSPLANTED (H): ICD-10-CM

## 2024-07-11 RX ORDER — TACROLIMUS 1 MG/1
2 CAPSULE ORAL EVERY 12 HOURS
Qty: 360 CAPSULE | Refills: 3 | Status: SHIPPED | OUTPATIENT
Start: 2024-07-11 | End: 2024-09-11

## 2024-07-11 NOTE — TELEPHONE ENCOUNTER
ISSUE:   Tacrolimus IR level 10.2 on 7/9, goal 6-8, dose 2 mg AM and 3 mg PM  PLAN:   Call Patient and confirm this was an accurate 12-hour trough.   Verify Tacrolimus IR dose 2 mg AM and 3 mg PM.   Confirm no new medications or or missed doses.   Confirm no new illness / infection / diarrhea.   If accurate trough and accurate dose, decrease Tacrolimus IR dose to 2 mg BID     Is this more than a 50% increase or decrease in current IS dose: No  If YES, justification:     Repeat labs in 4 weeks.  *If > 50% change in immunosuppression dose, repeat labs in 1 week.   Mariola Castellanos RN      OUTCOME:   Spoke with Patient, they confirm accurate trough level and current dose 2 mg am, 3 mg pm .   Patient confirmed dose change to 2 mg BID.  Patient agreed to repeat labs in 1 months.   Orders sent to preferred pharmacy for dose change and lab for repeat labs.   Patient voiced understanding of plan.     Denia Amador LPN

## 2024-07-29 DIAGNOSIS — Z94.4 LIVER REPLACED BY TRANSPLANT (H): ICD-10-CM

## 2024-07-29 DIAGNOSIS — E55.9 VITAMIN D DEFICIENCY: ICD-10-CM

## 2024-07-29 RX ORDER — VITAMIN B COMPLEX
50 TABLET ORAL DAILY
Qty: 180 TABLET | Refills: 3 | Status: SHIPPED | OUTPATIENT
Start: 2024-07-29

## 2024-09-11 ENCOUNTER — TELEPHONE (OUTPATIENT)
Dept: TRANSPLANT | Facility: CLINIC | Age: 34
End: 2024-09-11
Payer: COMMERCIAL

## 2024-09-11 DIAGNOSIS — Z94.4 LIVER TRANSPLANTED (H): ICD-10-CM

## 2024-09-11 RX ORDER — TACROLIMUS 1 MG/1
CAPSULE ORAL
Qty: 270 CAPSULE | Refills: 3 | Status: SHIPPED | OUTPATIENT
Start: 2024-09-11

## 2024-09-11 NOTE — TELEPHONE ENCOUNTER
ISSUE:   Tacrolimus IR level 10.6 on 9/10, goal 6-8, dose 2 mg BID.    PLAN:   Call Patient and confirm this was an accurate 12-hour trough.   Verify Tacrolimus IR dose 2 mg BID.   Confirm no new medications or or missed doses.   Confirm no new illness / infection / diarrhea.   If accurate trough and accurate dose, decrease Tacrolimus IR dose to 2 mg AM and 1 mg PM     Is this more than a 50% increase or decrease in current IS dose: No  If YES, justification:     Repeat labs in 2 weeks.  *If > 50% change in immunosuppression dose, repeat labs in 1 week.     OUTCOME:   Spoke with Patient, they confirm accurate trough level and current dose 2 mg BID.   Patient confirmed dose change to 2 mg Am and 1 mg PM.  Patient agreed to repeat labs in 1 weeks.   Orders sent to preferred pharmacy for dose change and lab for repeat labs.   Patient voiced understanding of plan.

## 2024-09-27 ENCOUNTER — TELEPHONE (OUTPATIENT)
Dept: TRANSPLANT | Facility: CLINIC | Age: 34
End: 2024-09-27
Payer: COMMERCIAL

## 2024-09-27 DIAGNOSIS — Z94.4 LIVER TRANSPLANTED (H): Primary | ICD-10-CM

## 2024-09-27 NOTE — TELEPHONE ENCOUNTER
ISSUE: tac 5.4, goal 6-8. Alk phos elevated but coming down.  PLAN: Call to assess. repeat labs in 1 week    OUTCOME: Patient states this was a longer trough than 12 hours. Continues to have cold symptoms. No other recent changes in weight, nutrition or health. States she knows she needs to increase fluids. Agreed to repeat labs next week.

## 2024-10-02 ENCOUNTER — RESULTS ONLY (OUTPATIENT)
Dept: LAB | Facility: CLINIC | Age: 34
End: 2024-10-02
Payer: COMMERCIAL

## 2024-10-03 LAB
% BASOPHILS (EXTERNAL): 0.5 %
% EOSINOPHILS (EXTERNAL): 1.5 %
% IMMATURE GRANULOCYTES (EXTERNAL): 0.5 %
% LYMPHOCYTES (EXTERNAL): 22.1 %
% MONOCYTES (EXTERNAL): 6 %
% NEUTROPHILS (EXTERNAL): 69.4 %
ABSOLUTE BASOPHILS (EXTERNAL): 0 K/UL (ref 0–0.2)
ABSOLUTE EOSINOPHILS (EXTERNAL): 0.1 K/UL (ref 0–0.7)
ABSOLUTE IMMATURE GRANULOCYTES (EXTERNAL): 0.04 K/UL (ref 0–0.06)
ABSOLUTE LYMPHOCYTES (EXTERNAL): 1.7 K/UL (ref 0.8–4.1)
ABSOLUTE MONOCYTES (EXTERNAL): 0.5 K/UL (ref 0–1)
ABSOLUTE NEUTROPHILS (EXTERNAL): 5.2 K/UL (ref 1.8–8)
ALBUMIN (EXTERNAL): 4.4 G/DL (ref 3.5–5.2)
ALK PHOSPHATASE (EXTERNAL): 177 U/L (ref 40–150)
ALT SERPL-CCNC: 22 U/L
ANION GAP SERPL CALC-SCNC: 13 MEQ/L (ref 6–20)
AST SERPL-CCNC: 23 U/L
BILIRUB SERPL-MCNC: 0.4 MG/DL (ref 0.2–1.2)
BILIRUBIN DIRECT (EXTERNAL): 0.1 MG/DL (ref 0–0.5)
BUN SERPL-MCNC: 17 MG/DL (ref 6–22)
BUN/CREATININE RATIO (EXTERNAL): 12.1 RATIO (ref 10–25)
CALCIUM (EXTERNAL): 9.6 MG/DL (ref 8.5–10.5)
CHLORIDE (EXTERNAL): 110 MEQ/L (ref 98–109)
CO2 (EXTERNAL): 23 MEQ/L (ref 22–31)
CREATININE (EXTERNAL): 1.4 MG/DL (ref 0.55–1.02)
ERYTHROCYTE [DISTWIDTH] IN BLOOD BY AUTOMATED COUNT: 12.4 % (ref 11.5–15.5)
GFR ESTIMATED (EXTERNAL): 51 ML/MIN/1.73M2
GLUCOSE (EXTERNAL): 116 MG/DL (ref 70–99)
HEMATOCRIT (EXTERNAL): 36.5 % (ref 35–45)
HEMOGLOBIN (EXTERNAL): 12.2 G/DL (ref 11.5–15.8)
MAGNESIUM (EXTERNAL): 1.8 MG/DL (ref 1.6–2.6)
MCH RBC QN AUTO: 27.7 PG (ref 25.5–34)
MCV RBC AUTO: 83 FL (ref 80–98)
NUCLEATED RBCS (EXTERNAL): 0 /100 WBC
PHOSPHATE SERPL-MCNC: 3.1 MG/DL (ref 2.3–4.7)
PLATELET COUNT (EXTERNAL): 308 K/UL (ref 140–400)
POTASSIUM (EXTERNAL): 4.6 MEQ/L (ref 3.5–5.1)
PROTEIN TOTAL (EXTERNAL): 6.9 G/DL (ref 6–8.3)
RBC # BLD AUTO: 4.4 M/UL (ref 3.8–5.3)
SODIUM (EXTERNAL): 141 MEQ/L (ref 136–145)
TACROLIMUS(FK-506) (EXTERNAL): 7.4 NG/ML (ref 5–20)
WBC COUNT (EXTERNAL): 7.5 K/UL (ref 4–11)

## 2024-10-04 ENCOUNTER — TELEPHONE (OUTPATIENT)
Dept: TRANSPLANT | Facility: CLINIC | Age: 34
End: 2024-10-04
Payer: COMMERCIAL

## 2024-11-06 DIAGNOSIS — T86.41 LIVER TRANSPLANT REJECTION (H): Primary | ICD-10-CM

## 2025-01-19 ENCOUNTER — HEALTH MAINTENANCE LETTER (OUTPATIENT)
Age: 35
End: 2025-01-19

## 2025-01-20 ENCOUNTER — TELEPHONE (OUTPATIENT)
Dept: TRANSPLANT | Facility: CLINIC | Age: 35
End: 2025-01-20
Payer: COMMERCIAL

## 2025-01-20 DIAGNOSIS — T86.41 LIVER TRANSPLANT REJECTION (H): Primary | ICD-10-CM

## 2025-01-20 NOTE — TELEPHONE ENCOUNTER
Pt returned call. Pt starts work at 12:00pm. If unable to call before then, GoingOn message is preferred

## 2025-01-20 NOTE — TELEPHONE ENCOUNTER
Left message for kam. Will await message from Jazmin PRICE if patient can do video and return call.

## 2025-01-20 NOTE — TELEPHONE ENCOUNTER
Spoke with Lola. She is tearful, but her significant other will be ok. He may require surgery.      She confirmed she could be in [place], [place] for the video visit if it is allowed. Updated Jazmin PRICE and scheduling.

## 2025-01-22 ENCOUNTER — VIRTUAL VISIT (OUTPATIENT)
Dept: GASTROENTEROLOGY | Facility: CLINIC | Age: 35
End: 2025-01-22
Attending: STUDENT IN AN ORGANIZED HEALTH CARE EDUCATION/TRAINING PROGRAM
Payer: COMMERCIAL

## 2025-01-22 DIAGNOSIS — N18.31 CHRONIC KIDNEY DISEASE, STAGE 3A (H): ICD-10-CM

## 2025-01-22 DIAGNOSIS — Z94.4 LIVER REPLACED BY TRANSPLANT (H): Primary | ICD-10-CM

## 2025-01-22 DIAGNOSIS — Z30.09 GENERAL COUNSELING AND ADVICE FOR CONTRACEPTIVE MANAGEMENT: ICD-10-CM

## 2025-01-22 NOTE — NURSING NOTE
Current patient location: Patient declined to provide     Is the patient currently in the state of [place]? YES    Visit mode: VIDEO    If the visit is dropped, the patient can be reconnected by:VIDEO VISIT: Text to cell phone:   Telephone Information:   Mobile [phone]       Will anyone else be joining the visit? NO  (If patient encounters technical issues they should call [phone]    Are changes needed to the allergy or medication list? No    Are refills needed on medications prescribed by this physician? NO    Rooming Documentation:  Questionnaire(s) completed    Reason for visit: RECHECK    Sapphire BRODERICKF

## 2025-01-22 NOTE — PROGRESS NOTES
Virtual Visit Details    Type of service:  Video Visit     Originating Location (pt. Location): Home  Distant Location (provider location):  On-site  Platform used for Video Visit: Lia  Start Time: 12:51 PM  End Time: 1:17 Pm    [facility]     Date of Visit: January 22, 2025    Reason for referral: Follow up liver transplant    Subjective:  Ms. Milner is a 34 year old woman with a history of AUD, anxiety, depression, ARLD who is s/p LT 11/5/2023.     DBD LT 11/5/2023 with End-to-end Choledochocholedochostomy     C. LIVER (1945 gm)/ GALLBLADDER; EXPLANTED AT TRANSPLANTATION:  Advanced cirrhosis (Laennec fibrosis stage 4C):  -Inactive with marked cholestasis, consistent with end stage decompensation  -Compatible with alcohol etiology, now quiescent from abstinence  -Negative for hepatocellular or other malignancy  -Bile ducts are present in normal numbers and architecture  Gallbladder: no significant morphologic abnormality    Transplant coordinator Mariola Castellanos [phone].  Biliary stent:  No            Donor status:  DBD  CMV D + / R -  EBV D + / R +  Anticoagulation plan: Aspirin x3 months     Had EDGAR prior to transplant, received basiliximab for renal sparing. Was on RRT post op - off dialysis since. Creatinine around 1.4    Had some issues with leukopenia/neutropenia    Completed 6 months valcyte and bactrim    Had c. Dif post transplant    Current IS  Tacrolimus 2/1  Mycophenolate Mofetil 500 mg BID    AUD  - Peth testing has been negative. Doing well with her sobriety.     Noted to have elevated LFTs 3/2024    Mild acute cellular rejection, ISRAEL 2-3 /9 with associated hepatocytes ballooning, mild and of uncertain cause or significance. 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  - Not sexually active right now, she had her fiancé have talked about having kids in the near future.  She is not on any birth control due to concerns about getting pregnant right now.    ROS: 14 point ROS negative except for positives noted in HPI.    PMHx:  Past Medical History:   Diagnosis Date    Alcoholic cirrhosis of liver with ascites (H) 2023    History of alcohol abuse     Formatting of this note might be different from the original. In remission as of late 2023    Liver transplant rejection (H) 2024       PSHx:  Past Surgical History:   Procedure Laterality Date    BENCH LIVER  2023    Procedure: Bench liver;  Surgeon: Aime Lowe MD;  Location: [facility] OR    IR CVC TUNNEL PLACEMENT > 5 YRS OF AGE  2023    IR CVC TUNNEL REMOVAL RIGHT  2023    IR LIVER BIOPSY PERCUTANEOUS  3/21/2024    IR PARACENTESIS  2023    IR PARACENTESIS  2023    IR PARACENTESIS  2023    IR PARACENTESIS  10/5/2023    IR PARACENTESIS  10/9/2023    IR PARACENTESIS  10/16/2023    IR PARACENTESIS  10/23/2023    TRANSPLANT LIVER RECIPIENT  DONOR N/A 2023    Procedure: Transplant liver recipient  donor;  Surgeon: Aime Lowe MD;  Location: [facility] OR       FamHx:  No family history on file.    SocHx:  Social History     Socioeconomic History    Marital status: Single     Spouse name: Not on file    Number of children: Not on file    Years of education: Not on file    Highest education level: Not on file   Occupational History    Not on file   Tobacco Use    Smoking status: Former     Current packs/day: 0.00     Types: Cigarettes     Quit date: 2023     Years since quittin.6    Smokeless tobacco: Never   Substance and Sexual Activity    Alcohol use: Not Currently     Comment: last ETOH use 2023    Drug use: Never    Sexual activity: Not on file   Other Topics Concern    Not on file   Social History Narrative    Not on file     Social Drivers of Health      Financial Resource Strain: Low Risk  (6/23/2023)    [facility]    Overall Financial Resource Strain (CARDIA)     Difficulty of Paying Living Expenses: Not hard at all   Food Insecurity: No Food Insecurity (6/23/2023)    [facility]    Hunger Vital Sign     Worried About Running Out of Food in the Last Year: Never true     Ran Out of Food in the Last Year: Never true   Transportation Needs: No Transportation Needs (6/23/2023)    [facility]    PRAPARE - Transportation     Lack of Transportation (Medical): No     Lack of Transportation (Non-Medical): No   Physical Activity: Sufficiently Active (3/25/2024)    [facility]    Exercise Vital Sign     Days of Exercise per Week: 6 days     Minutes of Exercise per Session: 60 min   Stress: Stress Concern Present (7/6/2023)    [facility]    [employer] - Occupational Stress Questionnaire     Feeling of Stress : Very much   Social Connections: Moderately Isolated (7/6/2023)    [facility]    Social Connection and Isolation Panel [NHANES]     Frequency of Communication with Friends and Family: More than three times a week     Frequency of Social Gatherings with Friends and Family: More than three times a week     Attends Orthodox Services: Never     Active Member of Clubs or Organizations: No     Attends Club or Organization Meetings: Never     Marital Status: Living with partner   Interpersonal Safety: Not At Risk (7/6/2023)    [facility]    Humiliation, Afraid, Rape, and Kick questionnaire     Fear of  Current or Ex-Partner: No     Emotionally Abused: No     Physically Abused: No     Sexually Abused: No   Housing Stability: Low Risk  (6/23/2023)    [facility]    Housing Stability Vital Sign     Unable to Pay for Housing in the Last Year: No     Number of Places Lived in the Last Year: 1     Unstable Housing in the Last Year: No       Medications:  Current Outpatient Medications   Medication Sig Dispense Refill    acetaminophen (TYLENOL) 325 MG tablet Take 1-2 tablets (325-650 mg) by mouth every 6 hours as needed for mild pain 100 tablet 0    biotin 1000 MCG TABS tablet Take 2,000 mcg by mouth daily      dicyclomine (BENTYL) 10 MG capsule Take 10 mg by mouth 2 times daily as needed (stomach cramping/period) (Patient not taking: Reported on 6/25/2024)      gabapentin (NEURONTIN) 100 MG capsule Take 200 mg by mouth at bedtime      hydrOXYzine HCl (ATARAX) 25 MG tablet Take 25 mg by mouth 3 times daily as needed for itching      magnesium oxide (MAG-OX) 400 MG tablet Take 1 tablet (400 mg) by mouth daily 90 tablet 3    methocarbamol (ROBAXIN) 500 MG tablet Take 500 mg by mouth      mycophenolate (GENERIC EQUIVALENT) 250 MG capsule Take 2 capsules (500 mg) by mouth 2 times daily 120 capsule 11    psyllium (METAMUCIL/KONSYL) 0.52 g capsule Take 1 capsule by mouth daily      tacrolimus (GENERIC EQUIVALENT) 1 MG capsule Take 2 capsules (2 mg) by mouth every morning AND 1 capsule (1 mg) every evening. 270 capsule 3    Vitamin D3 (CHOLECALCIFEROL) 25 mcg (1000 units) tablet Take 2 tablets (50 mcg) by mouth daily 180 tablet 3     No current facility-administered medications for this visit.       Allergies:   No Known Allergies    Objective:  There were no vitals taken for this visit.  Constitutional: pleasant woman in NAD  Eyes: non icteric  Respiratory: Normal respiratory excursion   MSK: normal range of motion of visualized extremities  Ext: no  swelling  Psychiatric: normal mood and orientation    Labs:  Last Comprehensive Metabolic Panel:  Sodium   Date Value Ref Range Status   06/25/2024 139 135 - 145 mmol/L Final     Comment:     Reference intervals for this test were updated on 09/26/2023 to more accurately reflect our healthy population. There may be differences in the flagging of prior results with similar values performed with this method. Interpretation of those prior results can be made in the context of the updated reference intervals.      Potassium   Date Value Ref Range Status   06/25/2024 5.4 (H) 3.4 - 5.3 mmol/L Final     Potassium POCT   Date Value Ref Range Status   11/05/2023 2.9 (L) 3.5 - 5.0 mmol/L Final     Comment:     CRITICAL VALUES NOTED AND REPORTED TO MD     Chloride (External)   Date Value Ref Range Status   11/25/2024 106 98 - 109 meq/L Final     Carbon Dioxide (CO2)   Date Value Ref Range Status   06/25/2024 21 (L) 22 - 29 mmol/L Final     Anion Gap   Date Value Ref Range Status   06/25/2024 13 7 - 15 mmol/L Final     Glucose   Date Value Ref Range Status   06/25/2024 108 (H) 70 - 99 mg/dL Final     GLUCOSE BY METER POCT   Date Value Ref Range Status   11/14/2023 84 70 - 99 mg/dL Final     Urea Nitrogen   Date Value Ref Range Status   06/25/2024 17.2 6.0 - 20.0 mg/dL Final     Creatinine   Date Value Ref Range Status   06/25/2024 1.55 (H) 0.51 - 0.95 mg/dL Final     GFR Estimate   Date Value Ref Range Status   06/25/2024 45 (L) >60 mL/min/1.73m2 Final     Comment:     eGFR calculated using 2021 CKD-EPI equation.     Calcium   Date Value Ref Range Status   06/25/2024 9.5 8.6 - 10.0 mg/dL Final     Bilirubin Total   Date Value Ref Range Status   06/25/2024 0.3 <=1.2 mg/dL Final     Alkaline Phosphatase   Date Value Ref Range Status   06/25/2024 119 40 - 150 U/L Final     ALT   Date Value Ref Range Status   06/25/2024 21 0 - 50 U/L Final     Comment:     Reference intervals for this test were updated on 6/12/2023 to more  accurately reflect our healthy population. There may be differences in the flagging of prior results with similar values performed with this method. Interpretation of those prior results can be made in the context of the updated reference intervals.       AST   Date Value Ref Range Status   06/25/2024 22 0 - 45 U/L Final     Comment:     Reference intervals for this test were updated on 6/12/2023 to more accurately reflect our healthy population. There may be differences in the flagging of prior results with similar values performed with this method. Interpretation of those prior results can be made in the context of the updated reference intervals.       Lab Results   Component Value Date    WBC 3.1 01/25/2024     Lab Results   Component Value Date    RBC 3.65 01/25/2024     Lab Results   Component Value Date    HGB 11.0 01/25/2024     Lab Results   Component Value Date    HCT 32.1 01/25/2024     Lab Results   Component Value Date    MCV 88 01/25/2024     Lab Results   Component Value Date    MCH 30.1 01/25/2024     Lab Results   Component Value Date    MCHC 34.3 01/25/2024     Lab Results   Component Value Date    RDW 12.2 01/25/2024     Lab Results   Component Value Date     01/25/2024       INR   Date Value Ref Range Status   03/21/2024 1.10 0.85 - 1.15 Final        Imaging: Reviewed in EHR    Assessment/Plan: Ms. Milner is a 34 year old woman with a history of AUD, anxiety, depression, ARLD who is s/p LT 11/5/2023. She had mild rejection 3/3024 and responded to treatment.     She has not had issues with biliary strictures post transplant. She was on RRT post transplant, stopped before discharge. Has elevated creatinine around ~1.5.     She is doing well with her sobriety.  He is back at work.    Starting to wean her immunosuppression.  Given her desire to potentially become pregnant later on this year, will wean her mycophenolate first.  Decrease to 250 mg twice a day and then stop after a week.  After  this we will start to wean her tacrolimus with a goal of 3-5.  Discussed that if her liver enzymes rise with this may increase her tacrolimus or starting Imuran.  Discussed that she should be rejection free for a year prior to getting pregnant.  Discussed that women with a history of transplant are at higher risk for  birth and gestational complications of pregnancy including preeclampsia and gestational hypertension.  She is also at risk for these conditions given her weight.  She is not currently sexually active, if she becomes sexually active I would recommend she be on a reliable form of birth control for the time being.  She can take any birth control including estrogen containing medications at this time.    Discussed that I agree working to lose weight is important for her long-term health.  If her insurance would not cover a GLP-1 agonist I would be fine with her taking this.  I am also okay with her taking Wellbutrin or naltrexone.  I would monitor her liver enzymes every other week when she starts his medications.    -Decrease MMF to 250 mg twice a day for a week and then stop.  She should get labs now and after leaving her mycophenolate.  After this most start to wean her tacrolimus to a goal of 3-5.  She is currently taking 2 mg in the morning 1 mg in the afternoon.    -Recommend working with her primary care doctor and weight loss.  She is okay to take a GLP-1 agonist for uterine or naltrexone.  -Recommend reliable birth control given risk of pregnancy right now.  Discussed that it is not safe to get pregnant on mycophenolate.  Discussed for future pregnancy planning she should be rejection free for at least 1 year prior to conceiving and should not be on mycophenolate.  -Recommend seeing dermatology for total-body skin exam    RTC as scheduled in     Eloisa Guajardo MD MSc  Transplant Hepatology  [facility]

## 2025-01-22 NOTE — LETTER
1/22/2025      Lola Milner  [address]  [facility] [address]      Dear Colleague,    Thank you for referring your patient, Lola Milner, to the [facility] [place]. Please see a copy of my visit note below.    Virtual Visit Details    Type of service:  Video Visit     Originating Location (pt. Location): Home  Distant Location (provider location):  On-site  Platform used for Video Visit: Live Matrix  Start Time: 12:51 PM  End Time: 1:17 Pm    [facility]     Date of Visit: January 22, 2025    Reason for referral: Follow up liver transplant    Subjective:  Ms. Milner is a 34 year old woman with a history of AUD, anxiety, depression, ARLD who is s/p LT 11/5/2023.     DBD LT 11/5/2023 with End-to-end Choledochocholedochostomy     C. LIVER (1945 gm)/ GALLBLADDER; EXPLANTED AT TRANSPLANTATION:  Advanced cirrhosis (Laennec fibrosis stage 4C):  -Inactive with marked cholestasis, consistent with end stage decompensation  -Compatible with alcohol etiology, now quiescent from abstinence  -Negative for hepatocellular or other malignancy  -Bile ducts are present in normal numbers and architecture  Gallbladder: no significant morphologic abnormality    Transplant coordinator Mariola Castellanos [phone].  Biliary stent:  No            Donor status:  DBD  CMV D + / R -  EBV D + / R +  Anticoagulation plan: Aspirin x3 months     Had EDGAR prior to transplant, received basiliximab for renal sparing. Was on RRT post op - off dialysis since. Creatinine around 1.4    Had some issues with leukopenia/neutropenia    Completed 6 months valcyte and bactrim    Had c. Dif post transplant    Current IS  Tacrolimus 2/1  Mycophenolate Mofetil 500 mg BID    AUD  - Peth testing has been negative. Doing well with her sobriety.     Noted to have elevated LFTs 3/2024    Mild acute cellular rejection, ISRAEL 2-3 /9 with associated hepatocytes ballooning, mild and of uncertain cause or  significance. 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  - Not sexually active right now, she had her fiancé have talked about having kids in the near future.  She is not on any birth control due to concerns about getting pregnant right now.    ROS: 14 point ROS negative except for positives noted in HPI.    PMHx:  Past Medical History:   Diagnosis Date     Alcoholic cirrhosis of liver with ascites (H) 2023     History of alcohol abuse     Formatting of this note might be different from the original. In remission as of late 2023     Liver transplant rejection (H) 2024       PSHx:  Past Surgical History:   Procedure Laterality Date     BENCH LIVER  2023    Procedure: Bench liver;  Surgeon: Aime Lowe MD;  Location: [facility] OR     IR CVC TUNNEL PLACEMENT > 5 YRS OF AGE  2023     IR CVC TUNNEL REMOVAL RIGHT  2023     IR LIVER BIOPSY PERCUTANEOUS  3/21/2024     IR PARACENTESIS  2023     IR PARACENTESIS  2023     IR PARACENTESIS  2023     IR PARACENTESIS  10/5/2023     IR PARACENTESIS  10/9/2023     IR PARACENTESIS  10/16/2023     IR PARACENTESIS  10/23/2023     TRANSPLANT LIVER RECIPIENT  DONOR N/A 2023    Procedure: Transplant liver recipient  donor;  Surgeon: Aime Lowe MD;  Location: [facility] OR       FamHx:  No family history on file.    SocHx:  Social History     Socioeconomic History     Marital status: Single     Spouse name: Not on file     Number of children: Not on file     Years of education: Not on file     Highest education level: Not on file   Occupational History     Not on file   Tobacco Use     Smoking status: Former     Current packs/day: 0.00     Types: Cigarettes     Quit date: 2023     Years since quittin.6     Smokeless  tobacco: Never   Substance and Sexual Activity     Alcohol use: Not Currently     Comment: last ETOH use 6/13/2023     Drug use: Never     Sexual activity: Not on file   Other Topics Concern     Not on file   Social History Narrative     Not on file     Social Drivers of Health     Financial Resource Strain: Low Risk  (6/23/2023)    [facility]    Overall Financial Resource Strain (CARDIA)      Difficulty of Paying Living Expenses: Not hard at all   Food Insecurity: No Food Insecurity (6/23/2023)    [facility]    Hunger Vital Sign      Worried About Running Out of Food in the Last Year: Never true      Ran Out of Food in the Last Year: Never true   Transportation Needs: No Transportation Needs (6/23/2023)    [facility]    PRAPARE - Transportation      Lack of Transportation (Medical): No      Lack of Transportation (Non-Medical): No   Physical Activity: Sufficiently Active (3/25/2024)    [facility] from [facility]    Exercise Vital Sign      Days of Exercise per Week: 6 days      Minutes of Exercise per Session: 60 min   Stress: Stress Concern Present (7/6/2023)    Received from [facility]    [employer] [employer] - Occupational Stress Questionnaire      Feeling of Stress : Very much   Social Connections: Moderately Isolated (7/6/2023)    Received from [facility], [facility]    Social Connection and Isolation Panel [NHANES]      Frequency of Communication with Friends and Family: More than three times a week      Frequency of Social Gatherings with Friends and Family: More than three times a week      Attends Jainism Services: Never      Active Member of Clubs or  Organizations: No      Attends Club or Organization Meetings: Never      Marital Status: Living with partner   Interpersonal Safety: Not At Risk (7/6/2023)    [facility]    Humiliation, Afraid, Rape, and Kick questionnaire      Fear of Current or Ex-Partner: No      Emotionally Abused: No      Physically Abused: No      Sexually Abused: No   Housing Stability: Low Risk  (6/23/2023)    [facility]    Housing Stability Vital Sign      Unable to Pay for Housing in the Last Year: No      Number of Places Lived in the Last Year: 1      Unstable Housing in the Last Year: No       Medications:  Current Outpatient Medications   Medication Sig Dispense Refill     acetaminophen (TYLENOL) 325 MG tablet Take 1-2 tablets (325-650 mg) by mouth every 6 hours as needed for mild pain 100 tablet 0     biotin 1000 MCG TABS tablet Take 2,000 mcg by mouth daily       dicyclomine (BENTYL) 10 MG capsule Take 10 mg by mouth 2 times daily as needed (stomach cramping/period) (Patient not taking: Reported on 6/25/2024)       gabapentin (NEURONTIN) 100 MG capsule Take 200 mg by mouth at bedtime       hydrOXYzine HCl (ATARAX) 25 MG tablet Take 25 mg by mouth 3 times daily as needed for itching       magnesium oxide (MAG-OX) 400 MG tablet Take 1 tablet (400 mg) by mouth daily 90 tablet 3     methocarbamol (ROBAXIN) 500 MG tablet Take 500 mg by mouth       mycophenolate (GENERIC EQUIVALENT) 250 MG capsule Take 2 capsules (500 mg) by mouth 2 times daily 120 capsule 11     psyllium (METAMUCIL/KONSYL) 0.52 g capsule Take 1 capsule by mouth daily       tacrolimus (GENERIC EQUIVALENT) 1 MG capsule Take 2 capsules (2 mg) by mouth every morning AND 1 capsule (1 mg) every evening. 270 capsule 3     Vitamin D3 (CHOLECALCIFEROL) 25 mcg (1000 units) tablet Take 2 tablets (50 mcg) by mouth daily 180 tablet 3     No current  facility-administered medications for this visit.       Allergies:   No Known Allergies    Objective:  There were no vitals taken for this visit.  Constitutional: pleasant woman in NAD  Eyes: non icteric  Respiratory: Normal respiratory excursion   MSK: normal range of motion of visualized extremities  Ext: no swelling  Psychiatric: normal mood and orientation    Labs:  Last Comprehensive Metabolic Panel:  Sodium   Date Value Ref Range Status   06/25/2024 139 135 - 145 mmol/L Final     Comment:     Reference intervals for this test were updated on 09/26/2023 to more accurately reflect our healthy population. There may be differences in the flagging of prior results with similar values performed with this method. Interpretation of those prior results can be made in the context of the updated reference intervals.      Potassium   Date Value Ref Range Status   06/25/2024 5.4 (H) 3.4 - 5.3 mmol/L Final     Potassium POCT   Date Value Ref Range Status   11/05/2023 2.9 (L) 3.5 - 5.0 mmol/L Final     Comment:     CRITICAL VALUES NOTED AND REPORTED TO MD     Chloride (External)   Date Value Ref Range Status   11/25/2024 106 98 - 109 meq/L Final     Carbon Dioxide (CO2)   Date Value Ref Range Status   06/25/2024 21 (L) 22 - 29 mmol/L Final     Anion Gap   Date Value Ref Range Status   06/25/2024 13 7 - 15 mmol/L Final     Glucose   Date Value Ref Range Status   06/25/2024 108 (H) 70 - 99 mg/dL Final     GLUCOSE BY METER POCT   Date Value Ref Range Status   11/14/2023 84 70 - 99 mg/dL Final     Urea Nitrogen   Date Value Ref Range Status   06/25/2024 17.2 6.0 - 20.0 mg/dL Final     Creatinine   Date Value Ref Range Status   06/25/2024 1.55 (H) 0.51 - 0.95 mg/dL Final     GFR Estimate   Date Value Ref Range Status   06/25/2024 45 (L) >60 mL/min/1.73m2 Final     Comment:     eGFR calculated using 2021 CKD-EPI equation.     Calcium   Date Value Ref Range Status   06/25/2024 9.5 8.6 - 10.0 mg/dL Final     Bilirubin Total   Date  Value Ref Range Status   06/25/2024 0.3 <=1.2 mg/dL Final     Alkaline Phosphatase   Date Value Ref Range Status   06/25/2024 119 40 - 150 U/L Final     ALT   Date Value Ref Range Status   06/25/2024 21 0 - 50 U/L Final     Comment:     Reference intervals for this test were updated on 6/12/2023 to more accurately reflect our healthy population. There may be differences in the flagging of prior results with similar values performed with this method. Interpretation of those prior results can be made in the context of the updated reference intervals.       AST   Date Value Ref Range Status   06/25/2024 22 0 - 45 U/L Final     Comment:     Reference intervals for this test were updated on 6/12/2023 to more accurately reflect our healthy population. There may be differences in the flagging of prior results with similar values performed with this method. Interpretation of those prior results can be made in the context of the updated reference intervals.       Lab Results   Component Value Date    WBC 3.1 01/25/2024     Lab Results   Component Value Date    RBC 3.65 01/25/2024     Lab Results   Component Value Date    HGB 11.0 01/25/2024     Lab Results   Component Value Date    HCT 32.1 01/25/2024     Lab Results   Component Value Date    MCV 88 01/25/2024     Lab Results   Component Value Date    MCH 30.1 01/25/2024     Lab Results   Component Value Date    MCHC 34.3 01/25/2024     Lab Results   Component Value Date    RDW 12.2 01/25/2024     Lab Results   Component Value Date     01/25/2024       INR   Date Value Ref Range Status   03/21/2024 1.10 0.85 - 1.15 Final        Imaging: Reviewed in EHR    Assessment/Plan: Ms. Milner is a 34 year old woman with a history of AUD, anxiety, depression, ARLD who is s/p LT 11/5/2023. She had mild rejection 3/3024 and responded to treatment.     She has not had issues with biliary strictures post transplant. She was on RRT post transplant, stopped before discharge. Has  elevated creatinine around ~1.5.     She is doing well with her sobriety.  He is back at work.    Starting to wean her immunosuppression.  Given her desire to potentially become pregnant later on this year, will wean her mycophenolate first.  Decrease to 250 mg twice a day and then stop after a week.  After this we will start to wean her tacrolimus with a goal of 3-5.  Discussed that if her liver enzymes rise with this may increase her tacrolimus or starting Imuran.  Discussed that she should be rejection free for a year prior to getting pregnant.  Discussed that women with a history of transplant are at higher risk for  birth and gestational complications of pregnancy including preeclampsia and gestational hypertension.  She is also at risk for these conditions given her weight.  She is not currently sexually active, if she becomes sexually active I would recommend she be on a reliable form of birth control for the time being.  She can take any birth control including estrogen containing medications at this time.    Discussed that I agree working to lose weight is important for her long-term health.  If her insurance would not cover a GLP-1 agonist I would be fine with her taking this.  I am also okay with her taking Wellbutrin or naltrexone.  I would monitor her liver enzymes every other week when she starts his medications.    -Decrease MMF to 250 mg twice a day for a week and then stop.  She should get labs now and after leaving her mycophenolate.  After this most start to wean her tacrolimus to a goal of 3-5.  She is currently taking 2 mg in the morning 1 mg in the afternoon.    -Recommend working with her primary care doctor and weight loss.  She is okay to take a GLP-1 agonist for uterine or naltrexone.  -Recommend reliable birth control given risk of pregnancy right now.  Discussed that it is not safe to get pregnant on mycophenolate.  Discussed for future pregnancy planning she should be rejection  free for at least 1 year prior to conceiving and should not be on mycophenolate.  -Recommend seeing dermatology for total-body skin exam    RTC as scheduled in June    Eloisa Guajardo MD MSc  Transplant Hepatology  [facility]           Again, thank you for allowing me to participate in the care of your patient.        Sincerely,        Eloisa Guajardo MD    Electronically signed

## 2025-01-27 ENCOUNTER — TELEPHONE (OUTPATIENT)
Dept: GASTROENTEROLOGY | Facility: CLINIC | Age: 35
End: 2025-01-27
Payer: COMMERCIAL

## 2025-01-27 DIAGNOSIS — T86.41 LIVER TRANSPLANT REJECTION (H): Primary | ICD-10-CM

## 2025-01-27 NOTE — TELEPHONE ENCOUNTER
[facility] Call Center    Phone Message    May a detailed message be left on voicemail: yes     Reason for Call: Other: Lola is calling in asking for a call back. Pt states that she was supposed to have lab orders sent to [facility] in [place], but they have not received anything as of yet. Pt also would like to discuss her medications with her care team. Please call back as soon as possible to discuss.     Action Taken: Message routed to:  [facility] Hep    Travel Screening: Not Applicable     Date of Service:

## 2025-01-27 NOTE — LETTER
OUTPATIENT LABORATORY TEST ORDER  [facility] lab  Fax#[phone]     Patient Name: Lola iMlner     YOB: 1990       [facility] ED MR# [if applicable]: [id number]   Date & Time: November 6, 2024  4:00 PM  Expiration Date: 1 year after date issued         Diagnosis:      Liver Transplant (ICD-10 Z94.4)   Aftercare following organ transplant (ICD-10 Z48.288)   Long term use of medications (ICD-10 Z79.899)      We ask your assistance in obtaining the following laboratory tests, which are part of our routine surveillance program for Solid Organ Transplant patients.      Please fax each result to [phone], same day as resulted/available    Critical lab results page [phone]       Every 2-3 months and as needed  CBC with Platelets   Basic Metabolic Panel   Magnesium  Hepatic panel   Tacrolimus drug level - 12 hour trough, please document time of last dose         Labs annually due January 2025  Phosphorous  Fasting Lipid Panel  Urine protein/creatinine ratio  UA with reflex to micro  Phoshatdylethanol(PeTH)    If you have any questions, please call [facility]- [phone] or [phone], Fax- [phone].      Eloisa Guajardo MD

## 2025-01-28 ENCOUNTER — TELEPHONE (OUTPATIENT)
Dept: TRANSPLANT | Facility: CLINIC | Age: 35
End: 2025-01-28
Payer: COMMERCIAL

## 2025-01-28 DIAGNOSIS — T86.41 LIVER TRANSPLANT REJECTION (H): Primary | ICD-10-CM

## 2025-01-28 RX ORDER — MYCOPHENOLATE MOFETIL 250 MG/1
250 CAPSULE ORAL 2 TIMES DAILY
Qty: 60 CAPSULE | Refills: 0 | Status: SHIPPED | OUTPATIENT
Start: 2025-01-28

## 2025-01-28 NOTE — TELEPHONE ENCOUNTER
Per dr kidd pt to decrease MMF to 250 mg BID x 1 week and discontinue MMF. And repeat labs  Pt aware

## 2025-01-29 NOTE — TELEPHONE ENCOUNTER
Spoke to the lab at [facility] who confirms that the lab orders for pt are in her chart. Will inform pt.

## 2025-01-29 NOTE — TELEPHONE ENCOUNTER
Informed pt lab orders were in pt's chart at [place].     Pt states they put a freeze for medicaid. Pt is unsure about coverage.

## 2025-02-12 ENCOUNTER — TELEPHONE (OUTPATIENT)
Dept: TRANSPLANT | Facility: CLINIC | Age: 35
End: 2025-02-12
Payer: COMMERCIAL

## 2025-02-12 DIAGNOSIS — T86.41 LIVER TRANSPLANT REJECTION (H): Primary | ICD-10-CM

## 2025-02-12 NOTE — TELEPHONE ENCOUNTER
Pt completed cellcept wean. Pt to repeat labs in 2 weeks. If labs stable will lower tacro goal 3-5.  Pt taking tacro 2mg AM and 1 mg PM.

## 2025-05-02 ENCOUNTER — TELEPHONE (OUTPATIENT)
Dept: TRANSPLANT | Facility: CLINIC | Age: 35
End: 2025-05-02
Payer: COMMERCIAL

## 2025-05-02 DIAGNOSIS — T86.41 LIVER TRANSPLANT REJECTION (H): Primary | ICD-10-CM

## 2025-05-02 NOTE — TELEPHONE ENCOUNTER
[facility] Call Center    Phone Message    May a detailed message be left on voicemail: yes     Reason for Call: Other: Patient is concerned about her insurance not covering the 06/20/2025 visits with Dr. Guajardo.  Her new insurance has been added to her account and is in effect on 5/5/25 with [facility].  She is worried that either the lab appointment or both the lab and MD visit will not be covered.  If it's just the lab visit that is an issue, can she have that done closer to home?  If it is both the lab and the MD visit, is there a way for her to transfer care to Dr. Remedios Obando and be seen in [place].  Patient agreed to keep the appointments in June because she does not have a problem coming to [place] for the visits, she is only concerned about insurance coverage.  Thank you for your help!     Action Taken: Message routed to:  [facility] JANET METZ    Travel Screening: Not Applicable     Date of Service:

## 2025-05-02 NOTE — TELEPHONE ENCOUNTER
Phone call to pt to review she should contact her insurance directly with billing questions.  No contact made.  Voicemail left requesting return call.  Will also send eMithilaHaat message.

## 2025-06-26 ENCOUNTER — TELEPHONE (OUTPATIENT)
Dept: GASTROENTEROLOGY | Facility: CLINIC | Age: 35
End: 2025-06-26
Payer: COMMERCIAL

## 2025-06-26 DIAGNOSIS — T86.41 LIVER TRANSPLANT REJECTION (H): Primary | ICD-10-CM

## 2025-06-26 NOTE — TELEPHONE ENCOUNTER
Was the patient contacted by phone and reminded of the upcoming visit? message left    Was the patient instructed to bring a current list of all medications to the appointment or instructed to bring in all medication bottles? Yes     Was the patient instructed to arrive prior to the appointment time to have ordered labs drawn?  Yes, overdue for transplant labs. Reminded to take tacrolimus at 8-8:15 pm then hold morning dose until after labs.     Were the needed  orders placed? Yes    Was lab appointment made 1 hour or more prior to visit?  Yes    Renetta Alvarado CMA  6/26/2025 10:30 AM

## 2025-06-30 ENCOUNTER — RESULTS FOLLOW-UP (OUTPATIENT)
Dept: MULTI SPECIALTY CLINIC | Facility: CLINIC | Age: 35
End: 2025-06-30

## 2025-06-30 ENCOUNTER — LAB (OUTPATIENT)
Dept: LAB | Facility: CLINIC | Age: 35
End: 2025-06-30
Attending: STUDENT IN AN ORGANIZED HEALTH CARE EDUCATION/TRAINING PROGRAM
Payer: COMMERCIAL

## 2025-06-30 ENCOUNTER — OFFICE VISIT (OUTPATIENT)
Dept: GASTROENTEROLOGY | Facility: CLINIC | Age: 35
End: 2025-06-30
Attending: STUDENT IN AN ORGANIZED HEALTH CARE EDUCATION/TRAINING PROGRAM
Payer: COMMERCIAL

## 2025-06-30 VITALS
HEART RATE: 81 BPM | OXYGEN SATURATION: 99 % | BODY MASS INDEX: 39.41 KG/M2 | HEIGHT: 63 IN | SYSTOLIC BLOOD PRESSURE: 134 MMHG | RESPIRATION RATE: 20 BRPM | TEMPERATURE: 97.9 F | DIASTOLIC BLOOD PRESSURE: 95 MMHG | WEIGHT: 222.4 LBS

## 2025-06-30 DIAGNOSIS — T86.41 LIVER TRANSPLANT REJECTION (H): Primary | ICD-10-CM

## 2025-06-30 DIAGNOSIS — Z94.4 LIVER TRANSPLANTED (H): ICD-10-CM

## 2025-06-30 DIAGNOSIS — G62.1 ALCOHOL-INDUCED POLYNEUROPATHY: ICD-10-CM

## 2025-06-30 LAB
ALBUMIN SERPL BCG-MCNC: 4.2 G/DL (ref 3.5–5.2)
ALP SERPL-CCNC: 154 U/L (ref 40–150)
ALT SERPL W P-5'-P-CCNC: 12 U/L (ref 0–50)
ANION GAP SERPL CALCULATED.3IONS-SCNC: 11 MMOL/L (ref 7–15)
AST SERPL W P-5'-P-CCNC: 17 U/L (ref 0–45)
BILIRUB SERPL-MCNC: 0.2 MG/DL
BILIRUBIN DIRECT (ROCHE PRO & PURE): 0.13 MG/DL (ref 0–0.45)
BUN SERPL-MCNC: 14.3 MG/DL (ref 6–20)
CALCIUM SERPL-MCNC: 9.1 MG/DL (ref 8.8–10.4)
CHLORIDE SERPL-SCNC: 103 MMOL/L (ref 98–107)
CREAT SERPL-MCNC: 1.46 MG/DL (ref 0.51–0.95)
EGFRCR SERPLBLD CKD-EPI 2021: 48 ML/MIN/1.73M2
ERYTHROCYTE [DISTWIDTH] IN BLOOD BY AUTOMATED COUNT: 12.7 % (ref 10–15)
GLUCOSE SERPL-MCNC: 125 MG/DL (ref 70–99)
HCO3 SERPL-SCNC: 23 MMOL/L (ref 22–29)
HCT VFR BLD AUTO: 34.5 % (ref 35–47)
HGB BLD-MCNC: 11.3 G/DL (ref 11.7–15.7)
MAGNESIUM SERPL-MCNC: 2 MG/DL (ref 1.7–2.3)
MCH RBC QN AUTO: 28.6 PG (ref 26.5–33)
MCHC RBC AUTO-ENTMCNC: 32.8 G/DL (ref 31.5–36.5)
MCV RBC AUTO: 87 FL (ref 78–100)
PHOSPHATE SERPL-MCNC: 3 MG/DL (ref 2.5–4.5)
PLATELET # BLD AUTO: 274 10E3/UL (ref 150–450)
POTASSIUM SERPL-SCNC: 4.7 MMOL/L (ref 3.4–5.3)
PROT SERPL-MCNC: 6.9 G/DL (ref 6.4–8.3)
RBC # BLD AUTO: 3.95 10E6/UL (ref 3.8–5.2)
SODIUM SERPL-SCNC: 137 MMOL/L (ref 135–145)
TACROLIMUS BLD-MCNC: 7.3 UG/L (ref 5–15)
TME LAST DOSE: NORMAL H
TME LAST DOSE: NORMAL H
WBC # BLD AUTO: 10 10E3/UL (ref 4–11)

## 2025-06-30 PROCEDURE — G2211 COMPLEX E/M VISIT ADD ON: HCPCS | Performed by: STUDENT IN AN ORGANIZED HEALTH CARE EDUCATION/TRAINING PROGRAM

## 2025-06-30 PROCEDURE — 99213 OFFICE O/P EST LOW 20 MIN: CPT | Performed by: STUDENT IN AN ORGANIZED HEALTH CARE EDUCATION/TRAINING PROGRAM

## 2025-06-30 PROCEDURE — 99214 OFFICE O/P EST MOD 30 MIN: CPT | Performed by: STUDENT IN AN ORGANIZED HEALTH CARE EDUCATION/TRAINING PROGRAM

## 2025-06-30 PROCEDURE — 80197 ASSAY OF TACROLIMUS: CPT | Performed by: STUDENT IN AN ORGANIZED HEALTH CARE EDUCATION/TRAINING PROGRAM

## 2025-06-30 ASSESSMENT — PAIN SCALES - GENERAL: PAINLEVEL_OUTOF10: MODERATE PAIN (4)

## 2025-06-30 NOTE — NURSING NOTE
"Chief Complaint   Patient presents with    RECHECK     post liver transplant 11/5/2023     Vital signs:  Temp: 97.9  F (36.6  C) Temp src: Oral BP: (!) 134/95 Pulse: 81   Resp: 20 SpO2: 99 %     Height: 160 cm (5' 2.99\") Weight: 100.9 kg (222 lb 6.4 oz)  Estimated body mass index is 39.41 kg/m  as calculated from the following:    Height as of this encounter: 1.6 m (5' 2.99\").    Weight as of this encounter: 100.9 kg (222 lb 6.4 oz).      Renetta Alvarado, [facility]  6/30/2025 9:20 AM    "

## 2025-06-30 NOTE — LETTER
6/30/2025      Lola Milner  [address] [place] [id number]      Dear Colleague,    Thank you for referring your patient, Lola Milner, to the [facility] [place]. Please see a copy of my visit note below.    [facility]     Date of Visit: June 30, 2025    Reason for referral: Follow up liver transplant    Subjective:  Ms. Milner is a 35 year old woman with a history of AUD, anxiety, depression, ARLD who is s/p LT 11/5/2023.     DBD LT 11/5/2023 with End-to-end Choledochocholedochostomy     C. LIVER (1945 gm)/ GALLBLADDER; EXPLANTED AT TRANSPLANTATION:  Advanced cirrhosis (Laennec fibrosis stage 4C):  -Inactive with marked cholestasis, consistent with end stage decompensation  -Compatible with alcohol etiology, now quiescent from abstinence  -Negative for hepatocellular or other malignancy  -Bile ducts are present in normal numbers and architecture  Gallbladder: no significant morphologic abnormality    Transplant coordinator Mariola Castellanos [phone].  Biliary stent:  No            Donor status:  DBD  CMV D + / R -  EBV D + / R +  Anticoagulation plan: Aspirin x3 months     Had EDGAR prior to transplant, received basiliximab for renal sparing. Was on RRT post op - off dialysis since. Creatinine around 1.4    Had some issues with leukopenia/neutropenia    Completed 6 months valcyte and bactrim    Had c. Dif post transplant    AUD  - Peth testing has been negative. Doing well with her sobriety.     Noted to have elevated LFTs 3/2024    Mild acute cellular rejection, ISRAEL 2-3 /9 with associated hepatocytes ballooning, mild and of uncertain cause or significance. Initially treated with steroids and increased CNI - had issues with steroids so now on CNI and MMF    Interval Events:   - Weaned of steroids 2/2025, also decrease her tacrolimus to 2/1  - Working at [employer], doing well. Bought a house with her fiance. She is thinking about going back to school.  "She is abstinent, would like to get  before having kids. She feels like she is \"thriving\" and is doing really well    ROS: 14 point ROS negative except for positives noted in HPI.    PMHx:  Past Medical History:   Diagnosis Date     Alcoholic cirrhosis of liver with ascites (H) 2023     History of alcohol abuse 2023    Formatting of this note might be different from the original. In remission as of late 2023     Liver transplant rejection (H) 2024       PSHx:  Past Surgical History:   Procedure Laterality Date     BENCH LIVER  2023    Procedure: Bench liver;  Surgeon: Aime Lowe MD;  Location: [facility] OR     IR CVC TUNNEL PLACEMENT > 5 YRS OF AGE  2023     IR CVC TUNNEL REMOVAL RIGHT  2023     IR LIVER BIOPSY PERCUTANEOUS  3/21/2024     IR PARACENTESIS  2023     IR PARACENTESIS  2023     IR PARACENTESIS  2023     IR PARACENTESIS  10/5/2023     IR PARACENTESIS  10/9/2023     IR PARACENTESIS  10/16/2023     IR PARACENTESIS  10/23/2023     TRANSPLANT LIVER RECIPIENT  DONOR N/A 2023    Procedure: Transplant liver recipient  donor;  Surgeon: Aime Lowe MD;  Location: [facility] OR       FamHx:  No family history on file.    SocHx:  Social History     Socioeconomic History     Marital status: Single     Spouse name: Not on file     Number of children: Not on file     Years of education: Not on file     Highest education level: Not on file   Occupational History     Not on file   Tobacco Use     Smoking status: Former     Current packs/day: 0.00     Types: Cigarettes     Quit date: 2023     Years since quittin.0     Smokeless tobacco: Never   Substance and Sexual Activity     Alcohol use: Not Currently     Comment: last ETOH use 2023     Drug use: Never     Sexual activity: Not on file   Other Topics Concern     Not on file   Social History Narrative     Not on file     Social Drivers of Health     Financial Resource Strain: Low Risk  " (6/23/2023)    Received from [facility]    Overall Financial Resource Strain (CARDIA)      Difficulty of Paying Living Expenses: Not on file   Food Insecurity: No Food Insecurity (6/23/2023)    Received from [facility]    Hunger Vital Sign      Worried About Running Out of Food in the Last Year: Not on file      Ran Out of Food in the Last Year: Not on file   Transportation Needs: No Transportation Needs (6/23/2023)    Received from [facility]    PRAPARE - Transportation      Lack of Transportation (Medical): Not on file      Lack of Transportation (Non-Medical): Not on file   Physical Activity: Sufficiently Active (3/25/2024)    Received from [facility]    Exercise Vital Sign      Days of Exercise per Week: Not on file      Minutes of Exercise per Session: Not on file   Stress: Stress Concern Present (7/6/2023)    Received from [facility]    [employer] [place] [employer] - Occupational Stress Questionnaire      Feeling of Stress : Not on file   Social Connections: Moderately Isolated (7/6/2023)    Received from [facility]    Social Connection and Isolation Panel [NHANES]      Frequency of Communication with Friends and Family: Not on file      Frequency of Social Gatherings with Friends and Family: Not on file      Attends Mosque Services: Not on file      Active Member of Clubs or Organizations: Not on file      Attends Club or Organization Meetings: Not on file      Marital Status: Not on file   Interpersonal Safety: Not At Risk (7/6/2023)    Received from [facility]    Humiliation, Afraid, Rape, and Kick questionnaire      Fear of Current or Ex-Partner: Not on file      Emotionally Abused: Not on file      Physically Abused: Not on file      Sexually Abused: Not on file   Housing Stability: Low Risk  (6/23/2023)    Received from [facility]     "Housing Stability Vital Sign      Unable to Pay for Housing in the Last Year: Not on file      Number of Places Lived in the Last Year: 1      Unstable Housing in the Last Year: Not on file       Medications:  Current Outpatient Medications   Medication Sig Dispense Refill     acetaminophen (TYLENOL) 325 MG tablet Take 1-2 tablets (325-650 mg) by mouth every 6 hours as needed for mild pain 100 tablet 0     gabapentin (NEURONTIN) 100 MG capsule Take 200 mg by mouth at bedtime (Patient taking differently: Take 300 mg by mouth at bedtime.)       magnesium oxide (MAG-OX) 400 MG tablet Take 1 tablet (400 mg) by mouth daily 90 tablet 3     tacrolimus (GENERIC EQUIVALENT) 1 MG capsule Take 2 capsules (2 mg) by mouth every morning AND 1 capsule (1 mg) every evening. 270 capsule 3     Vitamin D3 (CHOLECALCIFEROL) 25 mcg (1000 units) tablet Take 2 tablets (50 mcg) by mouth daily 180 tablet 3     biotin 1000 MCG TABS tablet Take 2,000 mcg by mouth daily (Patient not taking: Reported on 6/30/2025)       hydrOXYzine HCl (ATARAX) 25 MG tablet Take 25 mg by mouth 3 times daily as needed for itching       No current facility-administered medications for this visit.       Allergies:   No Known Allergies    Objective:  BP (!) 134/95 (BP Location: Left arm, Patient Position: Sitting, Cuff Size: Adult Large)   Pulse 81   Temp 97.9  F (36.6  C) (Oral)   Resp 20   Ht 1.6 m (5' 2.99\")   Wt 100.9 kg (222 lb 6.4 oz)   SpO2 99%   BMI 39.41 kg/m    Constitutional: pleasant woman in NAD  Eyes: non icteric  Respiratory: Normal respiratory excursion   MSK: normal range of motion of visualized extremities  Ext: no swelling  Psychiatric: normal mood and orientation    Labs:  Last Comprehensive Metabolic Panel:  Sodium   Date Value Ref Range Status   06/30/2025 137 135 - 145 mmol/L Final     Potassium   Date Value Ref Range Status   06/30/2025 4.7 3.4 - 5.3 mmol/L Final     Potassium POCT   Date Value Ref Range Status   11/05/2023 2.9 (L) " 3.5 - 5.0 mmol/L Final     Comment:     CRITICAL VALUES NOTED AND REPORTED TO MD     Chloride   Date Value Ref Range Status   06/30/2025 103 98 - 107 mmol/L Final     Chloride (External)   Date Value Ref Range Status   02/12/2025 108 98 - 109 meq/L Final     Carbon Dioxide (CO2)   Date Value Ref Range Status   06/30/2025 23 22 - 29 mmol/L Final     Anion Gap   Date Value Ref Range Status   06/30/2025 11 7 - 15 mmol/L Final     Glucose   Date Value Ref Range Status   06/30/2025 125 (H) 70 - 99 mg/dL Final     GLUCOSE BY METER POCT   Date Value Ref Range Status   11/14/2023 84 70 - 99 mg/dL Final     Urea Nitrogen   Date Value Ref Range Status   06/30/2025 14.3 6.0 - 20.0 mg/dL Final     Creatinine   Date Value Ref Range Status   06/30/2025 1.46 (H) 0.51 - 0.95 mg/dL Final     GFR Estimate   Date Value Ref Range Status   06/30/2025 48 (L) >60 mL/min/1.73m2 Final     Comment:     eGFR calculated using 2021 CKD-EPI equation.     Calcium   Date Value Ref Range Status   06/30/2025 9.1 8.8 - 10.4 mg/dL Final     Bilirubin Total   Date Value Ref Range Status   06/30/2025 0.2 <=1.2 mg/dL Final     Alkaline Phosphatase   Date Value Ref Range Status   06/30/2025 154 (H) 40 - 150 U/L Final     ALT   Date Value Ref Range Status   06/30/2025 12 0 - 50 U/L Final     AST   Date Value Ref Range Status   06/30/2025 17 0 - 45 U/L Final       Lab Results   Component Value Date    WBC 3.1 01/25/2024     Lab Results   Component Value Date    RBC 3.65 01/25/2024     Lab Results   Component Value Date    HGB 11.0 01/25/2024     Lab Results   Component Value Date    HCT 32.1 01/25/2024     Lab Results   Component Value Date    MCV 88 01/25/2024     Lab Results   Component Value Date    MCH 30.1 01/25/2024     Lab Results   Component Value Date    MCHC 34.3 01/25/2024     Lab Results   Component Value Date    RDW 12.2 01/25/2024     Lab Results   Component Value Date     01/25/2024       INR   Date Value Ref Range Status   03/21/2024  1.10 0.85 - 1.15 Final        Imaging: Reviewed in EHR    Assessment/Plan: Ms. Milner is a 35 year old woman with a history of AUD, anxiety, depression, ARLD who is s/p LT 11/5/2023. She had mild rejection 3/3024 and responded to treatment.     She has not had issues with biliary strictures post transplant. She was on RRT post transplant, stopped before discharge. Has elevated creatinine around ~1.5.     She is doing well with her sobriety. Is working and feels great    She is hoping to become pregnant sometime in the future.  Not actively trying now.  Discussed that tacrolimus is safe to continue while pregnant.  Discussed the importance of stable liver function prior to pregnancy, I think her liver is doing well and she has not had recent rejection.  Her alkaline phosphatase is slightly elevated, would repeat in 1 month and may need to consider imaging if rising.  Discussed that tacrolimus can be associated with high risk of hypertensive complications of pregnancy such as preeclampsia and help syndrome.  Her kidney disease and we also put her at risk for this.  Would recommend she meet with OB/GYN closer to home to discuss potentially starting aspirin prior to getting pregnant, also would discuss birth control options.  She may not be a good candidate for estrogen containing medications given her blood pressure but from a liver transplant standpoint any effective birth control would be okay for her to take.     Discussed that I agree working to lose weight is important for her long-term health.    -Continue tacrolimus 2 mg in the morning 1 mg in the evening, goal 3-5  -Repeat labs 1 month given slightly elevated alkaline phosphatase  -Recommend working with her primary care doctor and weight loss.  She is okay to take a GLP-1 agonist or naltrexone.  -Discussed pregnancy after liver transplant, I would recommend that she meet with an OB/GYN prior to trying to conceive.  Discussed the risks of pregnancy, but  tacrolimus is located continue but would be associated with higher risk of hypertensive complications of pregnancy.  -Recommend seeing dermatology for total-body skin exam    RTC 1 year    Eloisa Guajardo MD MSc  Transplant Hepatology  [facility]     The longitudinal plan of care for the diagnosis(es)/condition(s) as documented were addressed during this visit. Due to the added complexity in care, I will continue to support Lola in the subsequent management and with ongoing continuity of care.        Again, thank you for allowing me to participate in the care of your patient.        Sincerely,        Eloisa Guajardo MD    Electronically signed

## 2025-06-30 NOTE — PROGRESS NOTES
"[facility]     Date of Visit: June 30, 2025    Reason for referral: Follow up liver transplant    Subjective:  Ms. Milner is a 35 year old woman with a history of AUD, anxiety, depression, ARLD who is s/p LT 11/5/2023.     DBD LT 11/5/2023 with End-to-end Choledochocholedochostomy     C. LIVER (1945 gm)/ GALLBLADDER; EXPLANTED AT TRANSPLANTATION:  Advanced cirrhosis (Laennec fibrosis stage 4C):  -Inactive with marked cholestasis, consistent with end stage decompensation  -Compatible with alcohol etiology, now quiescent from abstinence  -Negative for hepatocellular or other malignancy  -Bile ducts are present in normal numbers and architecture  Gallbladder: no significant morphologic abnormality    Transplant coordinator Mariola Castellanos [phone].  Biliary stent:  No            Donor status:  DBD  CMV D + / R -  EBV D + / R +  Anticoagulation plan: Aspirin x3 months     Had EDGAR prior to transplant, received basiliximab for renal sparing. Was on RRT post op - off dialysis since. Creatinine around 1.4    Had some issues with leukopenia/neutropenia    Completed 6 months valcyte and bactrim    Had c. Dif post transplant    AUD  - Peth testing has been negative. Doing well with her sobriety.     Noted to have elevated LFTs 3/2024    Mild acute cellular rejection, ISRAEL 2-3 /9 with associated hepatocytes ballooning, mild and of uncertain cause or significance. Initially treated with steroids and increased CNI - had issues with steroids so now on CNI and MMF    Interval Events:   - Weaned of steroids 2/2025, also decrease her tacrolimus to 2/1  - Working at [employer], doing well. Bought a house with her fiance. She is thinking about going back to school. She is abstinent, would like to get  before having kids. She feels like she is \"thriving\" and is doing really well    ROS: 14 point ROS negative except for positives noted in HPI.    PMHx:  Past Medical History:   Diagnosis Date    " Alcoholic cirrhosis of liver with ascites (H) 2023    History of alcohol abuse 2023    Formatting of this note might be different from the original. In remission as of late 2023    Liver transplant rejection (H) 2024       PSHx:  Past Surgical History:   Procedure Laterality Date    BENCH LIVER  2023    Procedure: Bench liver;  Surgeon: Aime Lowe MD;  Location: [facility] OR    IR CVC TUNNEL PLACEMENT > 5 YRS OF AGE  2023    IR CVC TUNNEL REMOVAL RIGHT  2023    IR LIVER BIOPSY PERCUTANEOUS  3/21/2024    IR PARACENTESIS  2023    IR PARACENTESIS  2023    IR PARACENTESIS  2023    IR PARACENTESIS  10/5/2023    IR PARACENTESIS  10/9/2023    IR PARACENTESIS  10/16/2023    IR PARACENTESIS  10/23/2023    TRANSPLANT LIVER RECIPIENT  DONOR N/A 2023    Procedure: Transplant liver recipient  donor;  Surgeon: Aime Lowe MD;  Location: [facility] OR       FamHx:  No family history on file.    SocHx:  Social History     Socioeconomic History    Marital status: Single     Spouse name: Not on file    Number of children: Not on file    Years of education: Not on file    Highest education level: Not on file   Occupational History    Not on file   Tobacco Use    Smoking status: Former     Current packs/day: 0.00     Types: Cigarettes     Quit date: 2023     Years since quittin.0    Smokeless tobacco: Never   Substance and Sexual Activity    Alcohol use: Not Currently     Comment: last ETOH use 2023    Drug use: Never    Sexual activity: Not on file   Other Topics Concern    Not on file   Social History Narrative    Not on file     Social Drivers of Health     Financial Resource Strain: Low Risk  (2023)    Received from [facility]    Overall Financial Resource Strain (CARDIA)     Difficulty of Paying Living Expenses: Not on file   Food Insecurity: No Food Insecurity (2023)    Received from [facility]    Hunger  Vital Sign     Worried About Running Out of Food in the Last Year: Not on file     Ran Out of Food in the Last Year: Not on file   Transportation Needs: No Transportation Needs (6/23/2023)    Received from [facility]    PRAPARE - Transportation     Lack of Transportation (Medical): Not on file     Lack of Transportation (Non-Medical): Not on file   Physical Activity: Sufficiently Active (3/25/2024)    Received from [facility]    Exercise Vital Sign     Days of Exercise per Week: Not on file     Minutes of Exercise per Session: Not on file   Stress: Stress Concern Present (7/6/2023)    Received from [facility]    [employer] [place] [employer] - Occupational Stress Questionnaire     Feeling of Stress : Not on file   Social Connections: Moderately Isolated (7/6/2023)    Received from [facility]    Social Connection and Isolation Panel [NHANES]     Frequency of Communication with Friends and Family: Not on file     Frequency of Social Gatherings with Friends and Family: Not on file     Attends Orthodox Services: Not on file     Active Member of Clubs or Organizations: Not on file     Attends Club or Organization Meetings: Not on file     Marital Status: Not on file   Interpersonal Safety: Not At Risk (7/6/2023)    Received from [facility]    Humiliation, Afraid, Rape, and Kick questionnaire     Fear of Current or Ex-Partner: Not on file     Emotionally Abused: Not on file     Physically Abused: Not on file     Sexually Abused: Not on file   Housing Stability: Low Risk  (6/23/2023)    Received from [facility]    Housing Stability Vital Sign     Unable to Pay for Housing in the Last Year: Not on file     Number of Places Lived in the Last Year: 1     Unstable Housing in the Last Year: Not on file       Medications:  Current Outpatient Medications   Medication Sig Dispense Refill    acetaminophen  "(TYLENOL) 325 MG tablet Take 1-2 tablets (325-650 mg) by mouth every 6 hours as needed for mild pain 100 tablet 0    gabapentin (NEURONTIN) 100 MG capsule Take 200 mg by mouth at bedtime (Patient taking differently: Take 300 mg by mouth at bedtime.)      magnesium oxide (MAG-OX) 400 MG tablet Take 1 tablet (400 mg) by mouth daily 90 tablet 3    tacrolimus (GENERIC EQUIVALENT) 1 MG capsule Take 2 capsules (2 mg) by mouth every morning AND 1 capsule (1 mg) every evening. 270 capsule 3    Vitamin D3 (CHOLECALCIFEROL) 25 mcg (1000 units) tablet Take 2 tablets (50 mcg) by mouth daily 180 tablet 3    biotin 1000 MCG TABS tablet Take 2,000 mcg by mouth daily (Patient not taking: Reported on 6/30/2025)      hydrOXYzine HCl (ATARAX) 25 MG tablet Take 25 mg by mouth 3 times daily as needed for itching       No current facility-administered medications for this visit.       Allergies:   No Known Allergies    Objective:  BP (!) 134/95 (BP Location: Left arm, Patient Position: Sitting, Cuff Size: Adult Large)   Pulse 81   Temp 97.9  F (36.6  C) (Oral)   Resp 20   Ht 1.6 m (5' 2.99\")   Wt 100.9 kg (222 lb 6.4 oz)   SpO2 99%   BMI 39.41 kg/m    Constitutional: pleasant woman in NAD  Eyes: non icteric  Respiratory: Normal respiratory excursion   MSK: normal range of motion of visualized extremities  Ext: no swelling  Psychiatric: normal mood and orientation    Labs:  Last Comprehensive Metabolic Panel:  Sodium   Date Value Ref Range Status   06/30/2025 137 135 - 145 mmol/L Final     Potassium   Date Value Ref Range Status   06/30/2025 4.7 3.4 - 5.3 mmol/L Final     Potassium POCT   Date Value Ref Range Status   11/05/2023 2.9 (L) 3.5 - 5.0 mmol/L Final     Comment:     CRITICAL VALUES NOTED AND REPORTED TO MD     Chloride   Date Value Ref Range Status   06/30/2025 103 98 - 107 mmol/L Final     Chloride (External)   Date Value Ref Range Status   02/12/2025 108 98 - 109 meq/L Final     Carbon Dioxide (CO2)   Date Value Ref " Range Status   06/30/2025 23 22 - 29 mmol/L Final     Anion Gap   Date Value Ref Range Status   06/30/2025 11 7 - 15 mmol/L Final     Glucose   Date Value Ref Range Status   06/30/2025 125 (H) 70 - 99 mg/dL Final     GLUCOSE BY METER POCT   Date Value Ref Range Status   11/14/2023 84 70 - 99 mg/dL Final     Urea Nitrogen   Date Value Ref Range Status   06/30/2025 14.3 6.0 - 20.0 mg/dL Final     Creatinine   Date Value Ref Range Status   06/30/2025 1.46 (H) 0.51 - 0.95 mg/dL Final     GFR Estimate   Date Value Ref Range Status   06/30/2025 48 (L) >60 mL/min/1.73m2 Final     Comment:     eGFR calculated using 2021 CKD-EPI equation.     Calcium   Date Value Ref Range Status   06/30/2025 9.1 8.8 - 10.4 mg/dL Final     Bilirubin Total   Date Value Ref Range Status   06/30/2025 0.2 <=1.2 mg/dL Final     Alkaline Phosphatase   Date Value Ref Range Status   06/30/2025 154 (H) 40 - 150 U/L Final     ALT   Date Value Ref Range Status   06/30/2025 12 0 - 50 U/L Final     AST   Date Value Ref Range Status   06/30/2025 17 0 - 45 U/L Final       Lab Results   Component Value Date    WBC 3.1 01/25/2024     Lab Results   Component Value Date    RBC 3.65 01/25/2024     Lab Results   Component Value Date    HGB 11.0 01/25/2024     Lab Results   Component Value Date    HCT 32.1 01/25/2024     Lab Results   Component Value Date    MCV 88 01/25/2024     Lab Results   Component Value Date    MCH 30.1 01/25/2024     Lab Results   Component Value Date    MCHC 34.3 01/25/2024     Lab Results   Component Value Date    RDW 12.2 01/25/2024     Lab Results   Component Value Date     01/25/2024       INR   Date Value Ref Range Status   03/21/2024 1.10 0.85 - 1.15 Final        Imaging: Reviewed in EHR    Assessment/Plan: Ms. Milner is a 35 year old woman with a history of AUD, anxiety, depression, ARLD who is s/p LT 11/5/2023. She had mild rejection 3/3024 and responded to treatment.     She has not had issues with biliary strictures post  transplant. She was on RRT post transplant, stopped before discharge. Has elevated creatinine around ~1.5.     She is doing well with her sobriety. Is working and feels great    She is hoping to become pregnant sometime in the future.  Not actively trying now.  Discussed that tacrolimus is safe to continue while pregnant.  Discussed the importance of stable liver function prior to pregnancy, I think her liver is doing well and she has not had recent rejection.  Her alkaline phosphatase is slightly elevated, would repeat in 1 month and may need to consider imaging if rising.  Discussed that tacrolimus can be associated with high risk of hypertensive complications of pregnancy such as preeclampsia and help syndrome.  Her kidney disease and we also put her at risk for this.  Would recommend she meet with OB/GYN closer to home to discuss potentially starting aspirin prior to getting pregnant, also would discuss birth control options.  She may not be a good candidate for estrogen containing medications given her blood pressure but from a liver transplant standpoint any effective birth control would be okay for her to take.     Discussed that I agree working to lose weight is important for her long-term health.    -Continue tacrolimus 2 mg in the morning 1 mg in the evening, goal 3-5  -Repeat labs 1 month given slightly elevated alkaline phosphatase  -Recommend working with her primary care doctor and weight loss.  She is okay to take a GLP-1 agonist or naltrexone.  -Discussed pregnancy after liver transplant, I would recommend that she meet with an OB/GYN prior to trying to conceive.  Discussed the risks of pregnancy, but tacrolimus is located continue but would be associated with higher risk of hypertensive complications of pregnancy.  -Recommend seeing dermatology for total-body skin exam    RTC 1 year    Eloisa Guajardo MD MSc  Transplant Hepatology  [facility]     The longitudinal plan of care for the  diagnosis(es)/condition(s) as documented were addressed during this visit. Due to the added complexity in care, I will continue to support Lola in the subsequent management and with ongoing continuity of care.       I was present during the key portion of the procedure.

## 2025-07-01 DIAGNOSIS — T86.41 LIVER TRANSPLANT REJECTION (H): Primary | ICD-10-CM

## 2025-07-02 LAB
LABORATORY REPORT: NORMAL
PETH INTERPRETATION: NORMAL
PLPETH BLD-MCNC: <10 NG/ML
POPETH BLD-MCNC: <10 NG/ML

## (undated) DEVICE — TUBING IRRIG CYSTO/BLADDER SET 81" LF 2C4040

## (undated) DEVICE — WIPES FOLEY CARE SURESTEP PROVON DFC100

## (undated) DEVICE — LINEN TOWEL PACK X30 5481

## (undated) DEVICE — SU PDS II 1 TP-1 48" Z880G

## (undated) DEVICE — ESU HANDPIECE ABC BEND-A-BEAM 6" 134006

## (undated) DEVICE — SU SILK 3-0 SH CR 8X18" C013D

## (undated) DEVICE — Device

## (undated) DEVICE — SU SILK 0 TIE 6X30" A306H

## (undated) DEVICE — RX SURGIFLO HEMOSTATIC MATRIX W/THROMBIN 8ML 2994

## (undated) DEVICE — ESU LIGASURE TRIVERSE 3MM FT3000

## (undated) DEVICE — SYR BULB IRRIG DOVER 60 ML LATEX FREE 67000

## (undated) DEVICE — DRAPE SHEET REV FOLD 3/4 9349

## (undated) DEVICE — LINEN TOWEL PACK X6 WHITE 5487

## (undated) DEVICE — NDL COUNTER 20CT 31142493

## (undated) DEVICE — ESU GROUND PAD ADULT W/CORD E7507

## (undated) DEVICE — SYR 01ML 27GA 0.5" NDL TBC 309623

## (undated) DEVICE — GLOVE BIOGEL PI MICRO SZ 7.5 48575

## (undated) DEVICE — SU PROLENE 3-0 SHDA 36" 8522H

## (undated) DEVICE — SU PDS II 6-0 RB-2DA 30" Z149H

## (undated) DEVICE — SPONGE LAP 18X18" X8435

## (undated) DEVICE — DRAIN JACKSON PRATT ROUND SIL 19FR W/TROCAR LF JP-2232

## (undated) DEVICE — STPL SKIN 35W ROTATING HEAD PRW35

## (undated) DEVICE — ESU LIGASURE IMPACT OPEN SEALER/DVDR CVD LG JAW LF4418

## (undated) DEVICE — GLOVE BIOGEL PI MICRO INDICATOR UNDERGLOVE SZ 8.0 48980

## (undated) DEVICE — ESU ELEC BLADE 6" COATED E1450-6

## (undated) DEVICE — SU PROLENE 6-0 C-1DA 30" 8706H

## (undated) DEVICE — ESU GROUND PAD THERMOGUARD PLUS ABC PEDS 7-382

## (undated) DEVICE — CELL SAVER

## (undated) DEVICE — DRAPE IOBAN C-SECTION W/POUCH 30X35" 6657

## (undated) DEVICE — STPL ENDO LINEAR CUT ECHELON GST 45MM COMPACT PCEE45A

## (undated) DEVICE — SU PROLENE 7-0 BV-1DA 4X30" M8703

## (undated) DEVICE — DRSG TEGADERM 8X12" 1629

## (undated) DEVICE — CLIP APPLIER 11" MED LIGACLIP MCM30

## (undated) DEVICE — CUP AND LID 2PK 2OZ STERILE  SSK9006A

## (undated) DEVICE — SU ETHILON 3-0 PS-1 18" 1663H

## (undated) DEVICE — SU PROLENE 5-0 C-1DA 36" 8720H

## (undated) DEVICE — SUCTION MANIFOLD NEPTUNE 2 SYS 4 PORT 0702-020-000

## (undated) DEVICE — INSERT FOGARTY 61MM TRACTION HYDRAJAW HYDRA61

## (undated) DEVICE — DRAPE ISOLATION BAG 1003

## (undated) DEVICE — DEFIB PRO-PADZ LVP LQD GEL ADULT 8900-2105-01

## (undated) DEVICE — CLAMP BULLDOG VASCUSTATT II MINI STR YELLOW 1001-572

## (undated) DEVICE — SU PROLENE 4-0 SHDA 36" 8521H

## (undated) DEVICE — BASIN SET SINGLE STERILE 13752-624

## (undated) DEVICE — SU SILK 1 TIE 6X30" A307H

## (undated) DEVICE — SU SILK 2-0 TIE 12X30" A305H

## (undated) DEVICE — DECANTER BAG 2002S

## (undated) DEVICE — SU VICRYL 3-0 SH 27" UND J416H

## (undated) DEVICE — SUCTION TIP YANKAUER W/O VENT K86

## (undated) DEVICE — INSERT FOGARTY 33MM TRACTION HYDRAJAW HYDRA33

## (undated) DEVICE — NDL COUNTER 40CT  31142311

## (undated) DEVICE — DRAIN JACKSON PRATT RESERVOIR 400ML SU130-1000

## (undated) DEVICE — SU PROLENE 7-0 BV-1DA 30" 8703H

## (undated) DEVICE — CATH TRAY FOLEY SURESTEP 16FR W/TMP PRB STLK LATEX A319416AM

## (undated) DEVICE — SU SILK 3-0 TIE 12X30" A304H

## (undated) DEVICE — SOL NACL 0.9% IRRIG 1000ML BOTTLE 2F7124

## (undated) DEVICE — SU SILK 4-0 TIE 12X30" A303H

## (undated) DEVICE — SURGICEL ABSORBABLE HEMOSTAT SNOW 4"X4" 2083

## (undated) DEVICE — CLIP APPLIER 09 3/8" SM LIGACLIP MCS20

## (undated) DEVICE — DRAPE FLUID WARMING 52 X 60" ORS-321

## (undated) DEVICE — CLIP APPLIER 13" LG LIGACLIP MCL20

## (undated) RX ORDER — ATROPINE SULFATE 0.4 MG/ML
AMPUL (ML) INJECTION
Status: DISPENSED
Start: 2023-11-02

## (undated) RX ORDER — HEPARIN SODIUM 1000 [USP'U]/ML
INJECTION, SOLUTION INTRAVENOUS; SUBCUTANEOUS
Status: DISPENSED
Start: 2023-11-29

## (undated) RX ORDER — HEPARIN SODIUM 1000 [USP'U]/ML
INJECTION, SOLUTION INTRAVENOUS; SUBCUTANEOUS
Status: DISPENSED
Start: 2023-11-05

## (undated) RX ORDER — FENTANYL CITRATE 50 UG/ML
INJECTION, SOLUTION INTRAMUSCULAR; INTRAVENOUS
Status: DISPENSED
Start: 2024-03-21

## (undated) RX ORDER — DOBUTAMINE HYDROCHLORIDE 200 MG/100ML
INJECTION INTRAVENOUS
Status: DISPENSED
Start: 2023-11-02

## (undated) RX ORDER — FENTANYL CITRATE 50 UG/ML
INJECTION, SOLUTION INTRAMUSCULAR; INTRAVENOUS
Status: DISPENSED
Start: 2023-11-13

## (undated) RX ORDER — HEPARIN SODIUM 1000 [USP'U]/ML
INJECTION, SOLUTION INTRAVENOUS; SUBCUTANEOUS
Status: DISPENSED
Start: 2023-11-13

## (undated) RX ORDER — SODIUM CHLORIDE 9 MG/ML
INJECTION, SOLUTION INTRAVENOUS
Status: DISPENSED
Start: 2024-03-21

## (undated) RX ORDER — CARDIOPLEG/ORGAN PRESERV NO.1 9-198-2-1
BOTTLE PERFUSION
Status: DISPENSED
Start: 2023-11-05

## (undated) RX ORDER — HEPARIN SODIUM 1000 [USP'U]/ML
INJECTION, SOLUTION INTRAVENOUS; SUBCUTANEOUS
Status: DISPENSED
Start: 2023-11-20

## (undated) RX ORDER — LIDOCAINE HYDROCHLORIDE 10 MG/ML
INJECTION, SOLUTION EPIDURAL; INFILTRATION; INTRACAUDAL; PERINEURAL
Status: DISPENSED
Start: 2023-12-07

## (undated) RX ORDER — HEPARIN SODIUM 1000 [USP'U]/ML
INJECTION, SOLUTION INTRAVENOUS; SUBCUTANEOUS
Status: DISPENSED
Start: 2023-12-06

## (undated) RX ORDER — LIDOCAINE HYDROCHLORIDE 10 MG/ML
INJECTION, SOLUTION EPIDURAL; INFILTRATION; INTRACAUDAL; PERINEURAL
Status: DISPENSED
Start: 2023-11-13

## (undated) RX ORDER — FUROSEMIDE 10 MG/ML
INJECTION INTRAMUSCULAR; INTRAVENOUS
Status: DISPENSED
Start: 2023-11-05

## (undated) RX ORDER — LIDOCAINE HYDROCHLORIDE 10 MG/ML
INJECTION, SOLUTION EPIDURAL; INFILTRATION; INTRACAUDAL; PERINEURAL
Status: DISPENSED
Start: 2024-03-21

## (undated) RX ORDER — CEFAZOLIN SODIUM 2 G/100ML
INJECTION, SOLUTION INTRAVENOUS
Status: DISPENSED
Start: 2023-11-13

## (undated) RX ORDER — METOPROLOL TARTRATE 1 MG/ML
INJECTION, SOLUTION INTRAVENOUS
Status: DISPENSED
Start: 2023-11-02

## (undated) RX ORDER — FENTANYL CITRATE 50 UG/ML
INJECTION, SOLUTION INTRAMUSCULAR; INTRAVENOUS
Status: DISPENSED
Start: 2023-11-05